# Patient Record
Sex: MALE | Race: BLACK OR AFRICAN AMERICAN | Employment: OTHER | URBAN - METROPOLITAN AREA
[De-identification: names, ages, dates, MRNs, and addresses within clinical notes are randomized per-mention and may not be internally consistent; named-entity substitution may affect disease eponyms.]

---

## 2018-02-08 PROBLEM — Z21 HIV (HUMAN IMMUNODEFICIENCY VIRUS INFECTION) (HCC): Chronic | Status: ACTIVE | Noted: 2018-02-08

## 2018-02-08 PROBLEM — R65.10 SIRS (SYSTEMIC INFLAMMATORY RESPONSE SYNDROME) (HCC): Status: ACTIVE | Noted: 2018-02-08

## 2018-02-08 PROBLEM — I50.33 ACUTE ON CHRONIC DIASTOLIC CHF (CONGESTIVE HEART FAILURE) (HCC): Status: ACTIVE | Noted: 2018-02-08

## 2018-02-08 PROBLEM — B20 HIV (HUMAN IMMUNODEFICIENCY VIRUS INFECTION) (HCC): Chronic | Status: ACTIVE | Noted: 2018-02-08

## 2018-02-08 PROBLEM — E87.20 LACTIC ACIDOSIS: Status: ACTIVE | Noted: 2018-02-08

## 2018-02-08 PROBLEM — F10.10 ALCOHOL ABUSE: Status: ACTIVE | Noted: 2018-02-08

## 2018-02-08 PROBLEM — I10 POORLY-CONTROLLED HYPERTENSION: Status: ACTIVE | Noted: 2018-02-08

## 2018-02-08 PROBLEM — Z72.0 TOBACCO ABUSE: Chronic | Status: ACTIVE | Noted: 2018-02-08

## 2018-04-15 ENCOUNTER — HOSPITAL ENCOUNTER (EMERGENCY)
Age: 58
Discharge: LEFT BEFORE DISCHARGE | End: 2018-04-15
Attending: EMERGENCY MEDICINE
Payer: COMMERCIAL

## 2018-04-15 ENCOUNTER — APPOINTMENT (OUTPATIENT)
Dept: GENERAL RADIOLOGY | Age: 58
End: 2018-04-15
Payer: COMMERCIAL

## 2018-04-15 VITALS
HEART RATE: 98 BPM | HEIGHT: 66 IN | BODY MASS INDEX: 28.28 KG/M2 | OXYGEN SATURATION: 90 % | SYSTOLIC BLOOD PRESSURE: 139 MMHG | RESPIRATION RATE: 16 BRPM | DIASTOLIC BLOOD PRESSURE: 89 MMHG | WEIGHT: 176 LBS | TEMPERATURE: 97.8 F

## 2018-04-15 DIAGNOSIS — Z53.20 PATIENT LEFT BEFORE TREATMENT COMPLETED: Primary | ICD-10-CM

## 2018-04-15 LAB
EKG ATRIAL RATE: 83 BPM
EKG P AXIS: 79 DEGREES
EKG P-R INTERVAL: 156 MS
EKG Q-T INTERVAL: 412 MS
EKG QRS DURATION: 92 MS
EKG QTC CALCULATION (BAZETT): 484 MS
EKG R AXIS: 14 DEGREES
EKG T AXIS: 52 DEGREES
EKG VENTRICULAR RATE: 83 BPM

## 2018-04-15 PROCEDURE — 94640 AIRWAY INHALATION TREATMENT: CPT

## 2018-04-15 PROCEDURE — 6370000000 HC RX 637 (ALT 250 FOR IP): Performed by: EMERGENCY MEDICINE

## 2018-04-15 PROCEDURE — 93005 ELECTROCARDIOGRAM TRACING: CPT | Performed by: EMERGENCY MEDICINE

## 2018-04-15 PROCEDURE — 71045 X-RAY EXAM CHEST 1 VIEW: CPT

## 2018-04-15 PROCEDURE — 99285 EMERGENCY DEPT VISIT HI MDM: CPT

## 2018-04-15 RX ORDER — IPRATROPIUM BROMIDE AND ALBUTEROL SULFATE 2.5; .5 MG/3ML; MG/3ML
3 SOLUTION RESPIRATORY (INHALATION) CONTINUOUS
Status: DISCONTINUED | OUTPATIENT
Start: 2018-04-15 | End: 2018-04-15

## 2018-04-15 RX ADMIN — IPRATROPIUM BROMIDE AND ALBUTEROL SULFATE 3 AMPULE: .5; 3 SOLUTION RESPIRATORY (INHALATION) at 19:12

## 2018-04-15 ASSESSMENT — ENCOUNTER SYMPTOMS
EYE PAIN: 0
NAUSEA: 0
SHORTNESS OF BREATH: 1
WHEEZING: 0
EYE REDNESS: 0
BACK PAIN: 0
SINUS PRESSURE: 0
ABDOMINAL PAIN: 0
SORE THROAT: 0
DIARRHEA: 0
COUGH: 0
EYE DISCHARGE: 0
VOMITING: 0

## 2018-04-19 ENCOUNTER — HOSPITAL ENCOUNTER (OUTPATIENT)
Dept: NON INVASIVE DIAGNOSTICS | Age: 58
Discharge: HOME | End: 2018-04-19
Payer: COMMERCIAL

## 2018-04-19 LAB
LV EF: 62 %
LVEF MODALITY: NORMAL

## 2018-04-19 PROCEDURE — 93306 TTE W/DOPPLER COMPLETE: CPT

## 2018-05-02 ENCOUNTER — HOSPITAL ENCOUNTER (EMERGENCY)
Age: 58
Discharge: HOME | End: 2018-05-02
Payer: COMMERCIAL

## 2018-05-02 ENCOUNTER — APPOINTMENT (OUTPATIENT)
Dept: GENERAL RADIOLOGY | Age: 58
End: 2018-05-02
Payer: COMMERCIAL

## 2018-05-02 VITALS
OXYGEN SATURATION: 94 % | HEART RATE: 80 BPM | WEIGHT: 188.2 LBS | HEIGHT: 66 IN | SYSTOLIC BLOOD PRESSURE: 152 MMHG | TEMPERATURE: 98 F | RESPIRATION RATE: 17 BRPM | DIASTOLIC BLOOD PRESSURE: 81 MMHG | BODY MASS INDEX: 30.25 KG/M2

## 2018-05-02 DIAGNOSIS — J44.1 COPD EXACERBATION (HCC): Primary | ICD-10-CM

## 2018-05-02 LAB
ANION GAP SERPL CALCULATED.3IONS-SCNC: 9 MMOL/L (ref 7–16)
BASOPHILS ABSOLUTE: 0 E9/L (ref 0–0.2)
BASOPHILS RELATIVE PERCENT: 0.3 % (ref 0–2)
BUN BLDV-MCNC: 28 MG/DL (ref 6–20)
CALCIUM SERPL-MCNC: 8.7 MG/DL (ref 8.6–10.2)
CHLORIDE BLD-SCNC: 102 MMOL/L (ref 98–107)
CO2: 32 MMOL/L (ref 22–29)
CREAT SERPL-MCNC: 0.6 MG/DL (ref 0.7–1.2)
D DIMER: <200 NG/ML DDU
EKG ATRIAL RATE: 84 BPM
EKG P AXIS: 59 DEGREES
EKG P-R INTERVAL: 154 MS
EKG Q-T INTERVAL: 392 MS
EKG QRS DURATION: 96 MS
EKG QTC CALCULATION (BAZETT): 463 MS
EKG R AXIS: 44 DEGREES
EKG T AXIS: 62 DEGREES
EKG VENTRICULAR RATE: 84 BPM
EOSINOPHILS ABSOLUTE: 0.07 E9/L (ref 0.05–0.5)
EOSINOPHILS RELATIVE PERCENT: 0.5 % (ref 0–6)
GFR AFRICAN AMERICAN: >60
GFR NON-AFRICAN AMERICAN: >60 ML/MIN/1.73
GLUCOSE BLD-MCNC: 122 MG/DL (ref 74–109)
HCT VFR BLD CALC: 39.6 % (ref 37–54)
HEMOGLOBIN: 12.6 G/DL (ref 12.5–16.5)
LYMPHOCYTES ABSOLUTE: 2.29 E9/L (ref 1.5–4)
LYMPHOCYTES RELATIVE PERCENT: 15.6 % (ref 20–42)
MCH RBC QN AUTO: 29.6 PG (ref 26–35)
MCHC RBC AUTO-ENTMCNC: 31.8 % (ref 32–34.5)
MCV RBC AUTO: 93 FL (ref 80–99.9)
METAMYELOCYTES RELATIVE PERCENT: 0.5 % (ref 0–1)
MONOCYTES ABSOLUTE: 1.72 E9/L (ref 0.1–0.95)
MONOCYTES RELATIVE PERCENT: 12.1 % (ref 2–12)
MYELOCYTE PERCENT: 0.5 % (ref 0–0)
NEUTROPHILS ABSOLUTE: 10.3 E9/L (ref 1.8–7.3)
NEUTROPHILS RELATIVE PERCENT: 70.9 % (ref 43–80)
PDW BLD-RTO: 14.6 FL (ref 11.5–15)
PLATELET # BLD: 152 E9/L (ref 130–450)
PMV BLD AUTO: 10.9 FL (ref 7–12)
POIKILOCYTES: ABNORMAL
POTASSIUM SERPL-SCNC: 3.9 MMOL/L (ref 3.5–5)
PRO-BNP: 33 PG/ML (ref 0–125)
RBC # BLD: 4.26 E12/L (ref 3.8–5.8)
SODIUM BLD-SCNC: 143 MMOL/L (ref 132–146)
SPHEROCYTES: ABNORMAL
TARGET CELLS: ABNORMAL
TROPONIN: <0.01 NG/ML (ref 0–0.03)
WBC # BLD: 14.3 E9/L (ref 4.5–11.5)

## 2018-05-02 PROCEDURE — 84484 ASSAY OF TROPONIN QUANT: CPT

## 2018-05-02 PROCEDURE — 93005 ELECTROCARDIOGRAM TRACING: CPT | Performed by: EMERGENCY MEDICINE

## 2018-05-02 PROCEDURE — 94761 N-INVAS EAR/PLS OXIMETRY MLT: CPT

## 2018-05-02 PROCEDURE — 99285 EMERGENCY DEPT VISIT HI MDM: CPT

## 2018-05-02 PROCEDURE — 85378 FIBRIN DEGRADE SEMIQUANT: CPT

## 2018-05-02 PROCEDURE — 6370000000 HC RX 637 (ALT 250 FOR IP): Performed by: EMERGENCY MEDICINE

## 2018-05-02 PROCEDURE — 80048 BASIC METABOLIC PNL TOTAL CA: CPT

## 2018-05-02 PROCEDURE — 85025 COMPLETE CBC W/AUTO DIFF WBC: CPT

## 2018-05-02 PROCEDURE — 83880 ASSAY OF NATRIURETIC PEPTIDE: CPT

## 2018-05-02 PROCEDURE — 71045 X-RAY EXAM CHEST 1 VIEW: CPT

## 2018-05-02 PROCEDURE — 94664 DEMO&/EVAL PT USE INHALER: CPT

## 2018-05-02 PROCEDURE — 36415 COLL VENOUS BLD VENIPUNCTURE: CPT

## 2018-05-02 RX ORDER — IPRATROPIUM BROMIDE AND ALBUTEROL SULFATE 2.5; .5 MG/3ML; MG/3ML
1 SOLUTION RESPIRATORY (INHALATION) ONCE
Status: COMPLETED | OUTPATIENT
Start: 2018-05-02 | End: 2018-05-02

## 2018-05-02 RX ORDER — DOXYCYCLINE HYCLATE 100 MG
100 TABLET ORAL 2 TIMES DAILY
Qty: 20 TABLET | Refills: 0 | Status: SHIPPED | OUTPATIENT
Start: 2018-05-02 | End: 2018-05-12

## 2018-05-02 RX ORDER — PREDNISONE 20 MG/1
TABLET ORAL
Qty: 10 TABLET | Refills: 0 | Status: ON HOLD | OUTPATIENT
Start: 2018-05-02 | End: 2018-05-22 | Stop reason: HOSPADM

## 2018-05-02 RX ADMIN — IPRATROPIUM BROMIDE AND ALBUTEROL SULFATE 1 AMPULE: .5; 3 SOLUTION RESPIRATORY (INHALATION) at 20:42

## 2018-05-02 ASSESSMENT — ENCOUNTER SYMPTOMS
SHORTNESS OF BREATH: 1
COUGH: 1
WHEEZING: 1
VOMITING: 0
NAUSEA: 0
ABDOMINAL PAIN: 0
BACK PAIN: 0

## 2018-05-15 ENCOUNTER — HOSPITAL ENCOUNTER (INPATIENT)
Age: 58
LOS: 7 days | Discharge: HOME HEALTH | DRG: 139 | End: 2018-05-22
Attending: EMERGENCY MEDICINE | Admitting: HOSPITALIST
Payer: COMMERCIAL

## 2018-05-15 ENCOUNTER — APPOINTMENT (OUTPATIENT)
Dept: GENERAL RADIOLOGY | Age: 58
DRG: 139 | End: 2018-05-15
Payer: COMMERCIAL

## 2018-05-15 DIAGNOSIS — J96.02 ACUTE RESPIRATORY FAILURE WITH HYPERCAPNIA (HCC): Primary | ICD-10-CM

## 2018-05-15 DIAGNOSIS — J45.901 SEVERE ASTHMA WITH ACUTE EXACERBATION, UNSPECIFIED WHETHER PERSISTENT: ICD-10-CM

## 2018-05-15 PROBLEM — J44.1 ASTHMA EXACERBATION WITH COPD (CHRONIC OBSTRUCTIVE PULMONARY DISEASE) (HCC): Status: ACTIVE | Noted: 2018-05-15

## 2018-05-15 LAB
AADO2: 213.1 MMHG
ALBUMIN SERPL-MCNC: 3.8 G/DL (ref 3.5–5.2)
ALP BLD-CCNC: 76 U/L (ref 40–129)
ALT SERPL-CCNC: 44 U/L (ref 0–40)
ANION GAP SERPL CALCULATED.3IONS-SCNC: 12 MMOL/L (ref 7–16)
ANISOCYTOSIS: ABNORMAL
AST SERPL-CCNC: 18 U/L (ref 0–39)
B.E.: 10.8 MMOL/L (ref -3–3)
B.E.: 5.2 MMOL/L (ref -3–3)
BASOPHILS ABSOLUTE: 0 E9/L (ref 0–0.2)
BASOPHILS RELATIVE PERCENT: 0.5 % (ref 0–2)
BILIRUB SERPL-MCNC: 1.7 MG/DL (ref 0–1.2)
BUN BLDV-MCNC: 26 MG/DL (ref 8–23)
CALCIUM SERPL-MCNC: 9.3 MG/DL (ref 8.6–10.2)
CARDIOPULMONARY BYPASS: NO
CHLORIDE BLD-SCNC: 97 MMOL/L (ref 98–107)
CO2: 35 MMOL/L (ref 22–29)
COHB: 1 % (ref 0–1.5)
CREAT SERPL-MCNC: 0.6 MG/DL (ref 0.7–1.2)
CRITICAL: ABNORMAL
DATE ANALYZED: ABNORMAL
DATE OF COLLECTION: ABNORMAL
DEVICE: ABNORMAL
EKG ATRIAL RATE: 95 BPM
EKG P AXIS: 74 DEGREES
EKG P-R INTERVAL: 118 MS
EKG Q-T INTERVAL: 382 MS
EKG QRS DURATION: 108 MS
EKG QTC CALCULATION (BAZETT): 480 MS
EKG R AXIS: 71 DEGREES
EKG T AXIS: 70 DEGREES
EKG VENTRICULAR RATE: 95 BPM
EOSINOPHILS ABSOLUTE: 0 E9/L (ref 0.05–0.5)
EOSINOPHILS RELATIVE PERCENT: 0 % (ref 0–6)
FIO2 ARTERIAL: 4
FIO2: 50 %
GFR AFRICAN AMERICAN: >60
GFR NON-AFRICAN AMERICAN: >60 ML/MIN/1.73
GLUCOSE BLD-MCNC: 215 MG/DL (ref 74–109)
HCO3 ARTERIAL: 40.3 MMOL/L (ref 22–26)
HCO3: 33 MMOL/L (ref 22–26)
HCT VFR BLD CALC: 42.7 % (ref 37–54)
HEMOGLOBIN: 13.6 G/DL (ref 12.5–16.5)
HHB: 10.5 % (ref 0–5)
LAB: ABNORMAL
LYMPHOCYTES ABSOLUTE: 0.93 E9/L (ref 1.5–4)
LYMPHOCYTES RELATIVE PERCENT: 6.8 % (ref 20–42)
Lab: 2300
MCH RBC QN AUTO: 29.4 PG (ref 26–35)
MCHC RBC AUTO-ENTMCNC: 31.9 % (ref 32–34.5)
MCV RBC AUTO: 92.4 FL (ref 80–99.9)
METHB: 0.3 % (ref 0–1.5)
MODE: ABNORMAL
MONOCYTES ABSOLUTE: 1.06 E9/L (ref 0.1–0.95)
MONOCYTES RELATIVE PERCENT: 7.8 % (ref 2–12)
NEUTROPHILS ABSOLUTE: 11.31 E9/L (ref 1.8–7.3)
NEUTROPHILS RELATIVE PERCENT: 85.4 % (ref 43–80)
O2 CONTENT: 17.4 ML/DL
O2 SATURATION: 89.4 % (ref 92–98.5)
O2 SATURATION: 99.2 % (ref 92–98.5)
O2HB: 88.2 % (ref 94–97)
OPERATOR ID: 377
OPERATOR ID: 377
PATIENT TEMP: 37 C
PCO2 ARTERIAL: 74.1 MMHG (ref 35–45)
PCO2: 62.8 MMHG (ref 35–45)
PDW BLD-RTO: 15 FL (ref 11.5–15)
PEEP/CPAP: 8 CMH?O
PFO2: 1.21 MMHG/%
PH BLOOD GAS: 7.34 (ref 7.35–7.45)
PH BLOOD GAS: 7.34 (ref 7.35–7.45)
PLATELET # BLD: 167 E9/L (ref 130–450)
PMV BLD AUTO: 11.1 FL (ref 7–12)
PO2 ARTERIAL: 156 MMHG (ref 60–80)
PO2: 60.3 MMHG (ref 60–100)
POTASSIUM SERPL-SCNC: 4.2 MMOL/L (ref 3.5–5)
PRO-BNP: 26 PG/ML (ref 0–125)
PS: 14 CMH20
RBC # BLD: 4.62 E12/L (ref 3.8–5.8)
RI(T): 3.53
SODIUM BLD-SCNC: 144 MMOL/L (ref 132–146)
SOURCE, BLOOD GAS: ABNORMAL
SOURCE, BLOOD GAS: ABNORMAL
THB: 14 G/DL (ref 11.5–16.5)
TIME ANALYZED: 2300
TOTAL PROTEIN: 6.5 G/DL (ref 6.4–8.3)
TROPONIN: <0.01 NG/ML (ref 0–0.03)
WBC # BLD: 13.3 E9/L (ref 4.5–11.5)

## 2018-05-15 PROCEDURE — 6360000002 HC RX W HCPCS: Performed by: EMERGENCY MEDICINE

## 2018-05-15 PROCEDURE — 2500000003 HC RX 250 WO HCPCS: Performed by: EMERGENCY MEDICINE

## 2018-05-15 PROCEDURE — 36415 COLL VENOUS BLD VENIPUNCTURE: CPT

## 2018-05-15 PROCEDURE — 2060000000 HC ICU INTERMEDIATE R&B

## 2018-05-15 PROCEDURE — 80053 COMPREHEN METABOLIC PANEL: CPT

## 2018-05-15 PROCEDURE — 82805 BLOOD GASES W/O2 SATURATION: CPT

## 2018-05-15 PROCEDURE — 99285 EMERGENCY DEPT VISIT HI MDM: CPT

## 2018-05-15 PROCEDURE — 6370000000 HC RX 637 (ALT 250 FOR IP): Performed by: EMERGENCY MEDICINE

## 2018-05-15 PROCEDURE — 83880 ASSAY OF NATRIURETIC PEPTIDE: CPT

## 2018-05-15 PROCEDURE — 2580000003 HC RX 258: Performed by: EMERGENCY MEDICINE

## 2018-05-15 PROCEDURE — 36600 WITHDRAWAL OF ARTERIAL BLOOD: CPT

## 2018-05-15 PROCEDURE — 85025 COMPLETE CBC W/AUTO DIFF WBC: CPT

## 2018-05-15 PROCEDURE — 94660 CPAP INITIATION&MGMT: CPT

## 2018-05-15 PROCEDURE — 96374 THER/PROPH/DIAG INJ IV PUSH: CPT

## 2018-05-15 PROCEDURE — 94640 AIRWAY INHALATION TREATMENT: CPT

## 2018-05-15 PROCEDURE — 82803 BLOOD GASES ANY COMBINATION: CPT

## 2018-05-15 PROCEDURE — 71045 X-RAY EXAM CHEST 1 VIEW: CPT

## 2018-05-15 PROCEDURE — 84484 ASSAY OF TROPONIN QUANT: CPT

## 2018-05-15 RX ORDER — IPRATROPIUM BROMIDE AND ALBUTEROL SULFATE 2.5; .5 MG/3ML; MG/3ML
3 SOLUTION RESPIRATORY (INHALATION) ONCE
Status: COMPLETED | OUTPATIENT
Start: 2018-05-15 | End: 2018-05-15

## 2018-05-15 RX ORDER — METHYLPREDNISOLONE SODIUM SUCCINATE 125 MG/2ML
125 INJECTION, POWDER, LYOPHILIZED, FOR SOLUTION INTRAMUSCULAR; INTRAVENOUS ONCE
Status: COMPLETED | OUTPATIENT
Start: 2018-05-15 | End: 2018-05-15

## 2018-05-15 RX ORDER — IPRATROPIUM BROMIDE AND ALBUTEROL SULFATE 2.5; .5 MG/3ML; MG/3ML
1 SOLUTION RESPIRATORY (INHALATION)
Status: DISCONTINUED | OUTPATIENT
Start: 2018-05-16 | End: 2018-05-15

## 2018-05-15 RX ORDER — ALBUTEROL SULFATE 2.5 MG/3ML
2.5 SOLUTION RESPIRATORY (INHALATION) EVERY 4 HOURS PRN
Status: DISCONTINUED | OUTPATIENT
Start: 2018-05-15 | End: 2018-05-17

## 2018-05-15 RX ADMIN — WATER 2 G: 1 INJECTION INTRAMUSCULAR; INTRAVENOUS; SUBCUTANEOUS at 23:17

## 2018-05-15 RX ADMIN — DOXYCYCLINE 100 MG: 100 INJECTION, POWDER, LYOPHILIZED, FOR SOLUTION INTRAVENOUS at 23:17

## 2018-05-15 RX ADMIN — METHYLPREDNISOLONE SODIUM SUCCINATE 125 MG: 125 INJECTION, POWDER, FOR SOLUTION INTRAMUSCULAR; INTRAVENOUS at 21:40

## 2018-05-15 RX ADMIN — IPRATROPIUM BROMIDE AND ALBUTEROL SULFATE 3 AMPULE: .5; 3 SOLUTION RESPIRATORY (INHALATION) at 21:48

## 2018-05-15 ASSESSMENT — ENCOUNTER SYMPTOMS
SHORTNESS OF BREATH: 1
ABDOMINAL PAIN: 0
DIARRHEA: 0
VOMITING: 0
WHEEZING: 1
COUGH: 1
SORE THROAT: 0

## 2018-05-16 ENCOUNTER — APPOINTMENT (OUTPATIENT)
Dept: CT IMAGING | Age: 58
DRG: 139 | End: 2018-05-16
Payer: COMMERCIAL

## 2018-05-16 LAB
ACETAMINOPHEN LEVEL: <5 MCG/ML (ref 10–30)
AMMONIA: 30 UMOL/L (ref 16–60)
ANION GAP SERPL CALCULATED.3IONS-SCNC: 16 MMOL/L (ref 7–16)
ANISOCYTOSIS: ABNORMAL
BASOPHILIC STIPPLING: ABNORMAL
BASOPHILS ABSOLUTE: 0 E9/L (ref 0–0.2)
BASOPHILS RELATIVE PERCENT: 0.3 % (ref 0–2)
BUN BLDV-MCNC: 29 MG/DL (ref 8–23)
CALCIUM IONIZED: 1.27 MMOL/L (ref 1.15–1.33)
CALCIUM SERPL-MCNC: 9 MG/DL (ref 8.6–10.2)
CHLORIDE BLD-SCNC: 96 MMOL/L (ref 98–107)
CO2: 32 MMOL/L (ref 22–29)
CREAT SERPL-MCNC: 0.6 MG/DL (ref 0.7–1.2)
EOSINOPHILS ABSOLUTE: 0 E9/L (ref 0.05–0.5)
EOSINOPHILS RELATIVE PERCENT: 0 % (ref 0–6)
ETHANOL: <10 MG/DL (ref 0–0.08)
FILM ARRAY ADENOVIRUS: ABNORMAL
FILM ARRAY BORDETELLA PERTUSSIS: ABNORMAL
FILM ARRAY CHLAMYDOPHILIA PNEUMONIAE: ABNORMAL
FILM ARRAY CORONAVIRUS 229E: ABNORMAL
FILM ARRAY CORONAVIRUS HKU1: ABNORMAL
FILM ARRAY CORONAVIRUS NL63: ABNORMAL
FILM ARRAY CORONAVIRUS OC43: ABNORMAL
FILM ARRAY INFLUENZA A VIRUS 09H1: ABNORMAL
FILM ARRAY INFLUENZA A VIRUS H1: ABNORMAL
FILM ARRAY INFLUENZA A VIRUS H3: ABNORMAL
FILM ARRAY INFLUENZA A VIRUS: ABNORMAL
FILM ARRAY INFLUENZA B: ABNORMAL
FILM ARRAY METAPNEUMOVIRUS: ABNORMAL
FILM ARRAY MYCOPLASMA PNEUMONIAE: ABNORMAL
FILM ARRAY PARAINFLUENZA VIRUS 1: ABNORMAL
FILM ARRAY PARAINFLUENZA VIRUS 2: ABNORMAL
FILM ARRAY PARAINFLUENZA VIRUS 4: ABNORMAL
FILM ARRAY RESPIRATORY SYNCITIAL VIRUS: ABNORMAL
FILM ARRAY RHINOVIRUS/ENTEROVIRUS: ABNORMAL
GFR AFRICAN AMERICAN: >60
GFR NON-AFRICAN AMERICAN: >60 ML/MIN/1.73
GLUCOSE BLD-MCNC: 185 MG/DL (ref 74–109)
HCT VFR BLD CALC: 41 % (ref 37–54)
HEMOGLOBIN: 12.7 G/DL (ref 12.5–16.5)
INFLUENZA A BY PCR: NOT DETECTED
INFLUENZA B BY PCR: NOT DETECTED
LACTIC ACID: 3.9 MMOL/L (ref 0.5–2.2)
LIPASE: 27 U/L (ref 13–60)
LYMPHOCYTES ABSOLUTE: 0.44 E9/L (ref 1.5–4)
LYMPHOCYTES RELATIVE PERCENT: 4 % (ref 20–42)
MAGNESIUM: 2.3 MG/DL (ref 1.6–2.6)
MAGNESIUM: 2.3 MG/DL (ref 1.6–2.6)
MCH RBC QN AUTO: 29.1 PG (ref 26–35)
MCHC RBC AUTO-ENTMCNC: 31 % (ref 32–34.5)
MCV RBC AUTO: 93.8 FL (ref 80–99.9)
MONOCYTES ABSOLUTE: 0.44 E9/L (ref 0.1–0.95)
MONOCYTES RELATIVE PERCENT: 4 % (ref 2–12)
NEUTROPHILS ABSOLUTE: 10.12 E9/L (ref 1.8–7.3)
NEUTROPHILS RELATIVE PERCENT: 92 % (ref 43–80)
ORGANISM: ABNORMAL
OSMOLALITY: 308 MOSM/KG (ref 285–310)
OVALOCYTES: ABNORMAL
PDW BLD-RTO: 15.2 FL (ref 11.5–15)
PHOSPHORUS: 4.2 MG/DL (ref 2.5–4.5)
PLATELET # BLD: 145 E9/L (ref 130–450)
PMV BLD AUTO: 11.3 FL (ref 7–12)
POIKILOCYTES: ABNORMAL
POLYCHROMASIA: ABNORMAL
POTASSIUM REFLEX MAGNESIUM: 4.5 MMOL/L (ref 3.5–5)
PROCALCITONIN: 0.04 NG/ML (ref 0–0.08)
RBC # BLD: 4.37 E12/L (ref 3.8–5.8)
SALICYLATE, SERUM: <0.3 MG/DL (ref 0–30)
SODIUM BLD-SCNC: 144 MMOL/L (ref 132–146)
T4 FREE: 0.81 NG/DL (ref 0.93–1.7)
TARGET CELLS: ABNORMAL
TROPONIN: <0.01 NG/ML (ref 0–0.03)
TSH SERPL DL<=0.05 MIU/L-ACNC: 0.11 UIU/ML (ref 0.27–4.2)
VITAMIN B-12: 426 PG/ML (ref 211–946)
WBC # BLD: 11 E9/L (ref 4.5–11.5)

## 2018-05-16 PROCEDURE — 6360000002 HC RX W HCPCS: Performed by: PHYSICIAN ASSISTANT

## 2018-05-16 PROCEDURE — 6370000000 HC RX 637 (ALT 250 FOR IP): Performed by: PHYSICIAN ASSISTANT

## 2018-05-16 PROCEDURE — 94660 CPAP INITIATION&MGMT: CPT

## 2018-05-16 PROCEDURE — 87502 INFLUENZA DNA AMP PROBE: CPT

## 2018-05-16 PROCEDURE — 87501 INFLUENZA DNA AMP PROB 1+: CPT

## 2018-05-16 PROCEDURE — 6360000002 HC RX W HCPCS: Performed by: EMERGENCY MEDICINE

## 2018-05-16 PROCEDURE — 6370000000 HC RX 637 (ALT 250 FOR IP): Performed by: EMERGENCY MEDICINE

## 2018-05-16 PROCEDURE — 80048 BASIC METABOLIC PNL TOTAL CA: CPT

## 2018-05-16 PROCEDURE — 87486 CHLMYD PNEUM DNA AMP PROBE: CPT

## 2018-05-16 PROCEDURE — 82330 ASSAY OF CALCIUM: CPT

## 2018-05-16 PROCEDURE — 6360000002 HC RX W HCPCS: Performed by: HOSPITALIST

## 2018-05-16 PROCEDURE — 84484 ASSAY OF TROPONIN QUANT: CPT

## 2018-05-16 PROCEDURE — 2060000000 HC ICU INTERMEDIATE R&B

## 2018-05-16 PROCEDURE — 83930 ASSAY OF BLOOD OSMOLALITY: CPT

## 2018-05-16 PROCEDURE — 86359 T CELLS TOTAL COUNT: CPT

## 2018-05-16 PROCEDURE — 36415 COLL VENOUS BLD VENIPUNCTURE: CPT

## 2018-05-16 PROCEDURE — 84100 ASSAY OF PHOSPHORUS: CPT

## 2018-05-16 PROCEDURE — 83605 ASSAY OF LACTIC ACID: CPT

## 2018-05-16 PROCEDURE — 94640 AIRWAY INHALATION TREATMENT: CPT

## 2018-05-16 PROCEDURE — 84145 PROCALCITONIN (PCT): CPT

## 2018-05-16 PROCEDURE — 83735 ASSAY OF MAGNESIUM: CPT

## 2018-05-16 PROCEDURE — 86592 SYPHILIS TEST NON-TREP QUAL: CPT

## 2018-05-16 PROCEDURE — 84443 ASSAY THYROID STIM HORMONE: CPT

## 2018-05-16 PROCEDURE — 71250 CT THORAX DX C-: CPT

## 2018-05-16 PROCEDURE — 86360 T CELL ABSOLUTE COUNT/RATIO: CPT

## 2018-05-16 PROCEDURE — 85025 COMPLETE CBC W/AUTO DIFF WBC: CPT

## 2018-05-16 PROCEDURE — 2580000003 HC RX 258: Performed by: PHYSICIAN ASSISTANT

## 2018-05-16 PROCEDURE — G0480 DRUG TEST DEF 1-7 CLASSES: HCPCS

## 2018-05-16 PROCEDURE — 2500000003 HC RX 250 WO HCPCS: Performed by: PHYSICIAN ASSISTANT

## 2018-05-16 PROCEDURE — 82140 ASSAY OF AMMONIA: CPT

## 2018-05-16 PROCEDURE — 82607 VITAMIN B-12: CPT

## 2018-05-16 PROCEDURE — 80307 DRUG TEST PRSMV CHEM ANLYZR: CPT

## 2018-05-16 PROCEDURE — 87581 M.PNEUMON DNA AMP PROBE: CPT

## 2018-05-16 PROCEDURE — 83690 ASSAY OF LIPASE: CPT

## 2018-05-16 PROCEDURE — 87503 INFLUENZA DNA AMP PROB ADDL: CPT

## 2018-05-16 PROCEDURE — 6370000000 HC RX 637 (ALT 250 FOR IP): Performed by: HOSPITALIST

## 2018-05-16 PROCEDURE — 87798 DETECT AGENT NOS DNA AMP: CPT

## 2018-05-16 PROCEDURE — 87536 HIV-1 QUANT&REVRSE TRNSCRPJ: CPT

## 2018-05-16 PROCEDURE — 84439 ASSAY OF FREE THYROXINE: CPT

## 2018-05-16 RX ORDER — M-VIT,TX,IRON,MINS/CALC/FOLIC 27MG-0.4MG
1 TABLET ORAL DAILY
Status: DISCONTINUED | OUTPATIENT
Start: 2018-05-16 | End: 2018-05-22 | Stop reason: HOSPADM

## 2018-05-16 RX ORDER — ONDANSETRON 2 MG/ML
4 INJECTION INTRAMUSCULAR; INTRAVENOUS EVERY 6 HOURS PRN
Status: DISCONTINUED | OUTPATIENT
Start: 2018-05-16 | End: 2018-05-16

## 2018-05-16 RX ORDER — LISINOPRIL 20 MG/1
20 TABLET ORAL DAILY
Status: DISCONTINUED | OUTPATIENT
Start: 2018-05-16 | End: 2018-05-22 | Stop reason: HOSPADM

## 2018-05-16 RX ORDER — METHYLPREDNISOLONE SODIUM SUCCINATE 40 MG/ML
40 INJECTION, POWDER, LYOPHILIZED, FOR SOLUTION INTRAMUSCULAR; INTRAVENOUS EVERY 8 HOURS
Status: DISCONTINUED | OUTPATIENT
Start: 2018-05-16 | End: 2018-05-19

## 2018-05-16 RX ORDER — ALBUTEROL SULFATE 2.5 MG/3ML
2.5 SOLUTION RESPIRATORY (INHALATION)
Status: DISCONTINUED | OUTPATIENT
Start: 2018-05-16 | End: 2018-05-22 | Stop reason: HOSPADM

## 2018-05-16 RX ORDER — POTASSIUM CHLORIDE 20MEQ/15ML
40 LIQUID (ML) ORAL PRN
Status: DISCONTINUED | OUTPATIENT
Start: 2018-05-16 | End: 2018-05-22 | Stop reason: HOSPADM

## 2018-05-16 RX ORDER — FLUTICASONE FUROATE AND VILANTEROL 200; 25 UG/1; UG/1
1 POWDER RESPIRATORY (INHALATION) 2 TIMES DAILY
Status: DISCONTINUED | OUTPATIENT
Start: 2018-05-16 | End: 2018-05-16 | Stop reason: ALTCHOICE

## 2018-05-16 RX ORDER — MAGNESIUM SULFATE IN WATER 40 MG/ML
2 INJECTION, SOLUTION INTRAVENOUS ONCE
Status: COMPLETED | OUTPATIENT
Start: 2018-05-16 | End: 2018-05-16

## 2018-05-16 RX ORDER — ACETAMINOPHEN 325 MG/1
650 TABLET ORAL EVERY 4 HOURS PRN
Status: DISCONTINUED | OUTPATIENT
Start: 2018-05-16 | End: 2018-05-22 | Stop reason: HOSPADM

## 2018-05-16 RX ORDER — PHENYTOIN SODIUM 100 MG/1
100 CAPSULE, EXTENDED RELEASE ORAL DAILY
Status: DISCONTINUED | OUTPATIENT
Start: 2018-05-16 | End: 2018-05-22 | Stop reason: HOSPADM

## 2018-05-16 RX ORDER — SODIUM CHLORIDE 0.9 % (FLUSH) 0.9 %
10 SYRINGE (ML) INJECTION PRN
Status: DISCONTINUED | OUTPATIENT
Start: 2018-05-16 | End: 2018-05-22 | Stop reason: HOSPADM

## 2018-05-16 RX ORDER — LORAZEPAM 2 MG/ML
1 INJECTION INTRAMUSCULAR
Status: DISCONTINUED | OUTPATIENT
Start: 2018-05-16 | End: 2018-05-17

## 2018-05-16 RX ORDER — HYDROXYZINE PAMOATE 25 MG/1
25 CAPSULE ORAL 2 TIMES DAILY
Status: DISCONTINUED | OUTPATIENT
Start: 2018-05-16 | End: 2018-05-22 | Stop reason: HOSPADM

## 2018-05-16 RX ORDER — LORAZEPAM 2 MG/ML
4 INJECTION INTRAMUSCULAR
Status: DISCONTINUED | OUTPATIENT
Start: 2018-05-16 | End: 2018-05-17

## 2018-05-16 RX ORDER — POTASSIUM CHLORIDE 7.45 MG/ML
10 INJECTION INTRAVENOUS PRN
Status: DISCONTINUED | OUTPATIENT
Start: 2018-05-16 | End: 2018-05-22 | Stop reason: HOSPADM

## 2018-05-16 RX ORDER — LORAZEPAM 1 MG/1
4 TABLET ORAL
Status: DISCONTINUED | OUTPATIENT
Start: 2018-05-16 | End: 2018-05-17

## 2018-05-16 RX ORDER — LORAZEPAM 1 MG/1
2 TABLET ORAL
Status: DISCONTINUED | OUTPATIENT
Start: 2018-05-16 | End: 2018-05-17

## 2018-05-16 RX ORDER — METHYLPREDNISOLONE SODIUM SUCCINATE 40 MG/ML
40 INJECTION, POWDER, LYOPHILIZED, FOR SOLUTION INTRAMUSCULAR; INTRAVENOUS DAILY
Status: DISCONTINUED | OUTPATIENT
Start: 2018-05-16 | End: 2018-05-16

## 2018-05-16 RX ORDER — CARVEDILOL 25 MG/1
25 TABLET ORAL 2 TIMES DAILY
Status: DISCONTINUED | OUTPATIENT
Start: 2018-05-16 | End: 2018-05-22 | Stop reason: HOSPADM

## 2018-05-16 RX ORDER — SODIUM CHLORIDE 0.9 % (FLUSH) 0.9 %
10 SYRINGE (ML) INJECTION EVERY 12 HOURS SCHEDULED
Status: DISCONTINUED | OUTPATIENT
Start: 2018-05-16 | End: 2018-05-22 | Stop reason: HOSPADM

## 2018-05-16 RX ORDER — FAMOTIDINE 20 MG/1
20 TABLET, FILM COATED ORAL 2 TIMES DAILY
Status: DISCONTINUED | OUTPATIENT
Start: 2018-05-16 | End: 2018-05-22 | Stop reason: HOSPADM

## 2018-05-16 RX ORDER — THIAMINE MONONITRATE (VIT B1) 100 MG
100 TABLET ORAL DAILY
Status: DISCONTINUED | OUTPATIENT
Start: 2018-05-16 | End: 2018-05-22 | Stop reason: HOSPADM

## 2018-05-16 RX ORDER — LORAZEPAM 1 MG/1
3 TABLET ORAL
Status: DISCONTINUED | OUTPATIENT
Start: 2018-05-16 | End: 2018-05-17

## 2018-05-16 RX ORDER — FOLIC ACID 1 MG/1
1 TABLET ORAL DAILY
Status: DISCONTINUED | OUTPATIENT
Start: 2018-05-16 | End: 2018-05-22 | Stop reason: HOSPADM

## 2018-05-16 RX ORDER — IPRATROPIUM BROMIDE AND ALBUTEROL SULFATE 2.5; .5 MG/3ML; MG/3ML
1 SOLUTION RESPIRATORY (INHALATION) EVERY 4 HOURS
Status: DISCONTINUED | OUTPATIENT
Start: 2018-05-16 | End: 2018-05-22 | Stop reason: HOSPADM

## 2018-05-16 RX ORDER — LORAZEPAM 2 MG/ML
3 INJECTION INTRAMUSCULAR
Status: DISCONTINUED | OUTPATIENT
Start: 2018-05-16 | End: 2018-05-17

## 2018-05-16 RX ORDER — LORAZEPAM 1 MG/1
1 TABLET ORAL
Status: DISCONTINUED | OUTPATIENT
Start: 2018-05-16 | End: 2018-05-17

## 2018-05-16 RX ORDER — ATAZANAVIR 300 MG/1
300 CAPSULE ORAL NIGHTLY
Status: DISCONTINUED | OUTPATIENT
Start: 2018-05-16 | End: 2018-05-22 | Stop reason: HOSPADM

## 2018-05-16 RX ORDER — POTASSIUM CHLORIDE 20 MEQ/1
40 TABLET, EXTENDED RELEASE ORAL PRN
Status: DISCONTINUED | OUTPATIENT
Start: 2018-05-16 | End: 2018-05-22 | Stop reason: HOSPADM

## 2018-05-16 RX ORDER — LORAZEPAM 2 MG/ML
2 INJECTION INTRAMUSCULAR
Status: DISCONTINUED | OUTPATIENT
Start: 2018-05-16 | End: 2018-05-17

## 2018-05-16 RX ORDER — CLONIDINE HYDROCHLORIDE 0.2 MG/1
0.2 TABLET ORAL 2 TIMES DAILY
Status: DISCONTINUED | OUTPATIENT
Start: 2018-05-16 | End: 2018-05-22 | Stop reason: HOSPADM

## 2018-05-16 RX ORDER — DULOXETIN HYDROCHLORIDE 60 MG/1
60 CAPSULE, DELAYED RELEASE ORAL 2 TIMES DAILY
Status: DISCONTINUED | OUTPATIENT
Start: 2018-05-16 | End: 2018-05-22 | Stop reason: HOSPADM

## 2018-05-16 RX ADMIN — PHENYTOIN SODIUM 100 MG: 100 CAPSULE, EXTENDED RELEASE ORAL at 08:55

## 2018-05-16 RX ADMIN — Medication 10 ML: at 20:03

## 2018-05-16 RX ADMIN — IPRATROPIUM BROMIDE AND ALBUTEROL SULFATE 1 AMPULE: .5; 3 SOLUTION RESPIRATORY (INHALATION) at 01:53

## 2018-05-16 RX ADMIN — FAMOTIDINE 20 MG: 20 TABLET ORAL at 08:55

## 2018-05-16 RX ADMIN — IPRATROPIUM BROMIDE AND ALBUTEROL SULFATE 1 AMPULE: .5; 3 SOLUTION RESPIRATORY (INHALATION) at 18:29

## 2018-05-16 RX ADMIN — WATER 2 G: 1 INJECTION INTRAMUSCULAR; INTRAVENOUS; SUBCUTANEOUS at 10:49

## 2018-05-16 RX ADMIN — METHYLPREDNISOLONE SODIUM SUCCINATE 40 MG: 40 INJECTION, POWDER, FOR SOLUTION INTRAMUSCULAR; INTRAVENOUS at 16:55

## 2018-05-16 RX ADMIN — METHYLPREDNISOLONE SODIUM SUCCINATE 40 MG: 40 INJECTION, POWDER, FOR SOLUTION INTRAMUSCULAR; INTRAVENOUS at 08:54

## 2018-05-16 RX ADMIN — Medication 100 MG: at 08:55

## 2018-05-16 RX ADMIN — FOLIC ACID 1 MG: 1 TABLET ORAL at 08:55

## 2018-05-16 RX ADMIN — IPRATROPIUM BROMIDE AND ALBUTEROL SULFATE 1 AMPULE: .5; 3 SOLUTION RESPIRATORY (INHALATION) at 09:35

## 2018-05-16 RX ADMIN — DOXYCYCLINE 100 MG: 100 INJECTION, POWDER, LYOPHILIZED, FOR SOLUTION INTRAVENOUS at 10:52

## 2018-05-16 RX ADMIN — HYDROXYZINE PAMOATE 25 MG: 25 CAPSULE ORAL at 19:57

## 2018-05-16 RX ADMIN — MAGNESIUM HYDROXIDE 30 ML: 400 SUSPENSION ORAL at 03:16

## 2018-05-16 RX ADMIN — ACETAMINOPHEN 650 MG: 325 TABLET ORAL at 14:08

## 2018-05-16 RX ADMIN — ATAZANAVIR 300 MG: 300 CAPSULE ORAL at 19:54

## 2018-05-16 RX ADMIN — ENOXAPARIN SODIUM 40 MG: 40 INJECTION SUBCUTANEOUS at 08:54

## 2018-05-16 RX ADMIN — CLONIDINE HYDROCHLORIDE 0.2 MG: 0.2 TABLET ORAL at 19:54

## 2018-05-16 RX ADMIN — MAGNESIUM SULFATE HEPTAHYDRATE 2 G: 40 INJECTION, SOLUTION INTRAVENOUS at 03:36

## 2018-05-16 RX ADMIN — CARVEDILOL 25 MG: 25 TABLET, FILM COATED ORAL at 08:55

## 2018-05-16 RX ADMIN — ALBUTEROL SULFATE 2.5 MG: 2.5 SOLUTION RESPIRATORY (INHALATION) at 15:53

## 2018-05-16 RX ADMIN — WATER 2 G: 1 INJECTION INTRAMUSCULAR; INTRAVENOUS; SUBCUTANEOUS at 23:23

## 2018-05-16 RX ADMIN — CARVEDILOL 25 MG: 25 TABLET, FILM COATED ORAL at 03:16

## 2018-05-16 RX ADMIN — MULTIPLE VITAMINS W/ MINERALS TAB 1 TABLET: TAB at 08:55

## 2018-05-16 RX ADMIN — HYDROXYZINE PAMOATE 25 MG: 25 CAPSULE ORAL at 03:16

## 2018-05-16 RX ADMIN — CARVEDILOL 25 MG: 25 TABLET, FILM COATED ORAL at 19:56

## 2018-05-16 RX ADMIN — CLONIDINE HYDROCHLORIDE 0.2 MG: 0.2 TABLET ORAL at 03:16

## 2018-05-16 RX ADMIN — FAMOTIDINE 20 MG: 20 TABLET ORAL at 19:57

## 2018-05-16 RX ADMIN — IPRATROPIUM BROMIDE AND ALBUTEROL SULFATE 1 AMPULE: .5; 3 SOLUTION RESPIRATORY (INHALATION) at 06:01

## 2018-05-16 RX ADMIN — DULOXETINE HYDROCHLORIDE 60 MG: 60 CAPSULE, DELAYED RELEASE ORAL at 08:55

## 2018-05-16 RX ADMIN — HYDROXYZINE PAMOATE 25 MG: 25 CAPSULE ORAL at 08:55

## 2018-05-16 RX ADMIN — Medication 10 ML: at 08:54

## 2018-05-16 RX ADMIN — DULOXETINE HYDROCHLORIDE 60 MG: 60 CAPSULE, DELAYED RELEASE ORAL at 19:56

## 2018-05-16 RX ADMIN — CLONIDINE HYDROCHLORIDE 0.2 MG: 0.2 TABLET ORAL at 08:55

## 2018-05-16 RX ADMIN — IPRATROPIUM BROMIDE AND ALBUTEROL SULFATE 1 AMPULE: .5; 3 SOLUTION RESPIRATORY (INHALATION) at 22:24

## 2018-05-16 RX ADMIN — IPRATROPIUM BROMIDE AND ALBUTEROL SULFATE 1 AMPULE: .5; 3 SOLUTION RESPIRATORY (INHALATION) at 13:31

## 2018-05-16 RX ADMIN — METHYLPREDNISOLONE SODIUM SUCCINATE 40 MG: 40 INJECTION, POWDER, FOR SOLUTION INTRAMUSCULAR; INTRAVENOUS at 23:23

## 2018-05-16 RX ADMIN — LISINOPRIL 20 MG: 20 TABLET ORAL at 08:55

## 2018-05-16 ASSESSMENT — PAIN SCALES - GENERAL
PAINLEVEL_OUTOF10: 0
PAINLEVEL_OUTOF10: 6
PAINLEVEL_OUTOF10: 0

## 2018-05-17 LAB
L. PNEUMOPHILA SEROGP 1 UR AG: NORMAL
MAGNESIUM: 2.6 MG/DL (ref 1.6–2.6)
RPR: NORMAL
SEDIMENTATION RATE, ERYTHROCYTE: 1 MM/HR (ref 0–15)

## 2018-05-17 PROCEDURE — 6360000002 HC RX W HCPCS: Performed by: HOSPITALIST

## 2018-05-17 PROCEDURE — 2700000000 HC OXYGEN THERAPY PER DAY

## 2018-05-17 PROCEDURE — 94640 AIRWAY INHALATION TREATMENT: CPT

## 2018-05-17 PROCEDURE — 6370000000 HC RX 637 (ALT 250 FOR IP): Performed by: PHYSICIAN ASSISTANT

## 2018-05-17 PROCEDURE — 94660 CPAP INITIATION&MGMT: CPT

## 2018-05-17 PROCEDURE — 2580000003 HC RX 258: Performed by: PHYSICIAN ASSISTANT

## 2018-05-17 PROCEDURE — 1200000000 HC SEMI PRIVATE

## 2018-05-17 PROCEDURE — 83735 ASSAY OF MAGNESIUM: CPT

## 2018-05-17 PROCEDURE — 87116 MYCOBACTERIA CULTURE: CPT

## 2018-05-17 PROCEDURE — 36415 COLL VENOUS BLD VENIPUNCTURE: CPT

## 2018-05-17 PROCEDURE — 6360000002 HC RX W HCPCS: Performed by: PHYSICIAN ASSISTANT

## 2018-05-17 PROCEDURE — 85651 RBC SED RATE NONAUTOMATED: CPT

## 2018-05-17 PROCEDURE — 87450 HC DIRECT STREP B ANTIGEN: CPT

## 2018-05-17 PROCEDURE — 2500000003 HC RX 250 WO HCPCS: Performed by: SPECIALIST

## 2018-05-17 PROCEDURE — 6370000000 HC RX 637 (ALT 250 FOR IP): Performed by: HOSPITALIST

## 2018-05-17 RX ADMIN — MULTIPLE VITAMINS W/ MINERALS TAB 1 TABLET: TAB at 09:49

## 2018-05-17 RX ADMIN — CLONIDINE HYDROCHLORIDE 0.2 MG: 0.2 TABLET ORAL at 09:49

## 2018-05-17 RX ADMIN — FAMOTIDINE 20 MG: 20 TABLET ORAL at 09:49

## 2018-05-17 RX ADMIN — DULOXETINE HYDROCHLORIDE 60 MG: 60 CAPSULE, DELAYED RELEASE ORAL at 20:17

## 2018-05-17 RX ADMIN — IPRATROPIUM BROMIDE AND ALBUTEROL SULFATE 1 AMPULE: .5; 3 SOLUTION RESPIRATORY (INHALATION) at 02:22

## 2018-05-17 RX ADMIN — CLONIDINE HYDROCHLORIDE 0.2 MG: 0.2 TABLET ORAL at 20:17

## 2018-05-17 RX ADMIN — IPRATROPIUM BROMIDE AND ALBUTEROL SULFATE 1 AMPULE: .5; 3 SOLUTION RESPIRATORY (INHALATION) at 10:39

## 2018-05-17 RX ADMIN — FAMOTIDINE 20 MG: 20 TABLET ORAL at 20:17

## 2018-05-17 RX ADMIN — ATAZANAVIR 300 MG: 300 CAPSULE ORAL at 21:09

## 2018-05-17 RX ADMIN — WATER 2 G: 1 INJECTION INTRAMUSCULAR; INTRAVENOUS; SUBCUTANEOUS at 11:00

## 2018-05-17 RX ADMIN — HYDROXYZINE PAMOATE 25 MG: 25 CAPSULE ORAL at 20:17

## 2018-05-17 RX ADMIN — IPRATROPIUM BROMIDE AND ALBUTEROL SULFATE 1 AMPULE: .5; 3 SOLUTION RESPIRATORY (INHALATION) at 18:47

## 2018-05-17 RX ADMIN — Medication 10 ML: at 21:14

## 2018-05-17 RX ADMIN — IPRATROPIUM BROMIDE AND ALBUTEROL SULFATE 1 AMPULE: .5; 3 SOLUTION RESPIRATORY (INHALATION) at 22:53

## 2018-05-17 RX ADMIN — FOLIC ACID 1 MG: 1 TABLET ORAL at 09:49

## 2018-05-17 RX ADMIN — Medication 10 ML: at 11:00

## 2018-05-17 RX ADMIN — ACETAMINOPHEN 650 MG: 325 TABLET ORAL at 15:55

## 2018-05-17 RX ADMIN — HYDROXYZINE PAMOATE 25 MG: 25 CAPSULE ORAL at 09:49

## 2018-05-17 RX ADMIN — WATER 2 G: 1 INJECTION INTRAMUSCULAR; INTRAVENOUS; SUBCUTANEOUS at 22:43

## 2018-05-17 RX ADMIN — Medication 100 MG: at 09:49

## 2018-05-17 RX ADMIN — METHYLPREDNISOLONE SODIUM SUCCINATE 40 MG: 40 INJECTION, POWDER, FOR SOLUTION INTRAMUSCULAR; INTRAVENOUS at 17:04

## 2018-05-17 RX ADMIN — CARVEDILOL 25 MG: 25 TABLET, FILM COATED ORAL at 09:51

## 2018-05-17 RX ADMIN — LISINOPRIL 20 MG: 20 TABLET ORAL at 09:49

## 2018-05-17 RX ADMIN — PHENYTOIN SODIUM 100 MG: 100 CAPSULE, EXTENDED RELEASE ORAL at 09:48

## 2018-05-17 RX ADMIN — Medication 10 ML: at 09:56

## 2018-05-17 RX ADMIN — DULOXETINE HYDROCHLORIDE 60 MG: 60 CAPSULE, DELAYED RELEASE ORAL at 09:49

## 2018-05-17 RX ADMIN — IPRATROPIUM BROMIDE AND ALBUTEROL SULFATE 1 AMPULE: .5; 3 SOLUTION RESPIRATORY (INHALATION) at 06:19

## 2018-05-17 RX ADMIN — METHYLPREDNISOLONE SODIUM SUCCINATE 40 MG: 40 INJECTION, POWDER, FOR SOLUTION INTRAMUSCULAR; INTRAVENOUS at 09:49

## 2018-05-17 RX ADMIN — TUBERCULIN PURIFIED PROTEIN DERIVATIVE 5 UNITS: 5 INJECTION INTRADERMAL at 13:26

## 2018-05-17 RX ADMIN — CARVEDILOL 25 MG: 25 TABLET, FILM COATED ORAL at 20:17

## 2018-05-17 RX ADMIN — ENOXAPARIN SODIUM 40 MG: 40 INJECTION SUBCUTANEOUS at 09:49

## 2018-05-17 ASSESSMENT — PAIN SCALES - GENERAL
PAINLEVEL_OUTOF10: 0
PAINLEVEL_OUTOF10: 5
PAINLEVEL_OUTOF10: 0

## 2018-05-17 ASSESSMENT — PAIN DESCRIPTION - FREQUENCY: FREQUENCY: CONTINUOUS

## 2018-05-17 ASSESSMENT — PAIN DESCRIPTION - LOCATION: LOCATION: GENERALIZED

## 2018-05-17 ASSESSMENT — PAIN DESCRIPTION - DESCRIPTORS: DESCRIPTORS: ACHING

## 2018-05-17 ASSESSMENT — PAIN DESCRIPTION - PAIN TYPE: TYPE: CHRONIC PAIN

## 2018-05-18 LAB
ABSOLUTE CD 3: 499 CELLS/UL (ref 570–2400)
ABSOLUTE CD 4 HELPER: 221 CELLS/UL (ref 430–1800)
ABSOLUTE CD 8 (SUPP): 277 CELLS/UL (ref 210–1200)
CD4/CD8 RATIO: 0.8 RATIO (ref 0.8–3.9)
LYMPH SUBSET INFORMATION: ABNORMAL
MAGNESIUM: 2.6 MG/DL (ref 1.6–2.6)

## 2018-05-18 PROCEDURE — 2580000003 HC RX 258: Performed by: PHYSICIAN ASSISTANT

## 2018-05-18 PROCEDURE — 1200000000 HC SEMI PRIVATE

## 2018-05-18 PROCEDURE — 94640 AIRWAY INHALATION TREATMENT: CPT

## 2018-05-18 PROCEDURE — 6370000000 HC RX 637 (ALT 250 FOR IP): Performed by: SPECIALIST

## 2018-05-18 PROCEDURE — 6360000002 HC RX W HCPCS: Performed by: PHYSICIAN ASSISTANT

## 2018-05-18 PROCEDURE — 83735 ASSAY OF MAGNESIUM: CPT

## 2018-05-18 PROCEDURE — 94660 CPAP INITIATION&MGMT: CPT

## 2018-05-18 PROCEDURE — 86481 TB AG RESPONSE T-CELL SUSP: CPT

## 2018-05-18 PROCEDURE — 36415 COLL VENOUS BLD VENIPUNCTURE: CPT

## 2018-05-18 PROCEDURE — 6360000002 HC RX W HCPCS: Performed by: HOSPITALIST

## 2018-05-18 PROCEDURE — 6370000000 HC RX 637 (ALT 250 FOR IP): Performed by: PHYSICIAN ASSISTANT

## 2018-05-18 PROCEDURE — 2700000000 HC OXYGEN THERAPY PER DAY

## 2018-05-18 RX ORDER — DOXYCYCLINE HYCLATE 100 MG/1
100 CAPSULE ORAL EVERY 12 HOURS SCHEDULED
Status: DISCONTINUED | OUTPATIENT
Start: 2018-05-18 | End: 2018-05-22 | Stop reason: HOSPADM

## 2018-05-18 RX ORDER — CLOTRIMAZOLE 10 MG/1
10 LOZENGE ORAL; TOPICAL
Status: DISCONTINUED | OUTPATIENT
Start: 2018-05-18 | End: 2018-05-22 | Stop reason: HOSPADM

## 2018-05-18 RX ORDER — DOXYCYCLINE HYCLATE 100 MG/1
100 CAPSULE ORAL EVERY 12 HOURS SCHEDULED
Qty: 20 CAPSULE | Refills: 0 | Status: SHIPPED | OUTPATIENT
Start: 2018-05-18 | End: 2018-05-28

## 2018-05-18 RX ADMIN — IPRATROPIUM BROMIDE AND ALBUTEROL SULFATE 1 AMPULE: .5; 3 SOLUTION RESPIRATORY (INHALATION) at 19:40

## 2018-05-18 RX ADMIN — IPRATROPIUM BROMIDE AND ALBUTEROL SULFATE 1 AMPULE: .5; 3 SOLUTION RESPIRATORY (INHALATION) at 06:33

## 2018-05-18 RX ADMIN — ENOXAPARIN SODIUM 40 MG: 40 INJECTION SUBCUTANEOUS at 09:33

## 2018-05-18 RX ADMIN — FAMOTIDINE 20 MG: 20 TABLET ORAL at 20:25

## 2018-05-18 RX ADMIN — DULOXETINE HYDROCHLORIDE 60 MG: 60 CAPSULE, DELAYED RELEASE ORAL at 20:26

## 2018-05-18 RX ADMIN — DOXYCYCLINE HYCLATE 100 MG: 100 CAPSULE ORAL at 20:30

## 2018-05-18 RX ADMIN — DULOXETINE HYDROCHLORIDE 60 MG: 60 CAPSULE, DELAYED RELEASE ORAL at 09:33

## 2018-05-18 RX ADMIN — CLONIDINE HYDROCHLORIDE 0.2 MG: 0.2 TABLET ORAL at 20:26

## 2018-05-18 RX ADMIN — FOLIC ACID 1 MG: 1 TABLET ORAL at 09:33

## 2018-05-18 RX ADMIN — Medication 10 ML: at 09:36

## 2018-05-18 RX ADMIN — CARVEDILOL 25 MG: 25 TABLET, FILM COATED ORAL at 09:33

## 2018-05-18 RX ADMIN — IPRATROPIUM BROMIDE AND ALBUTEROL SULFATE 1 AMPULE: .5; 3 SOLUTION RESPIRATORY (INHALATION) at 02:17

## 2018-05-18 RX ADMIN — CLONIDINE HYDROCHLORIDE 0.2 MG: 0.2 TABLET ORAL at 09:33

## 2018-05-18 RX ADMIN — METHYLPREDNISOLONE SODIUM SUCCINATE 40 MG: 40 INJECTION, POWDER, FOR SOLUTION INTRAMUSCULAR; INTRAVENOUS at 09:33

## 2018-05-18 RX ADMIN — HYDROXYZINE PAMOATE 25 MG: 25 CAPSULE ORAL at 09:35

## 2018-05-18 RX ADMIN — Medication 10 ML: at 20:27

## 2018-05-18 RX ADMIN — METHYLPREDNISOLONE SODIUM SUCCINATE 40 MG: 40 INJECTION, POWDER, FOR SOLUTION INTRAMUSCULAR; INTRAVENOUS at 16:50

## 2018-05-18 RX ADMIN — HYDROXYZINE PAMOATE 25 MG: 25 CAPSULE ORAL at 20:31

## 2018-05-18 RX ADMIN — CLOTRIMAZOLE 10 MG: 10 LOZENGE ORAL at 15:23

## 2018-05-18 RX ADMIN — LISINOPRIL 20 MG: 20 TABLET ORAL at 09:33

## 2018-05-18 RX ADMIN — Medication 100 MG: at 09:33

## 2018-05-18 RX ADMIN — IPRATROPIUM BROMIDE AND ALBUTEROL SULFATE 1 AMPULE: .5; 3 SOLUTION RESPIRATORY (INHALATION) at 09:55

## 2018-05-18 RX ADMIN — CLOTRIMAZOLE 10 MG: 10 LOZENGE ORAL at 20:27

## 2018-05-18 RX ADMIN — PHENYTOIN SODIUM 100 MG: 100 CAPSULE, EXTENDED RELEASE ORAL at 09:33

## 2018-05-18 RX ADMIN — METHYLPREDNISOLONE SODIUM SUCCINATE 40 MG: 40 INJECTION, POWDER, FOR SOLUTION INTRAMUSCULAR; INTRAVENOUS at 00:40

## 2018-05-18 RX ADMIN — DOXYCYCLINE HYCLATE 100 MG: 100 CAPSULE ORAL at 12:21

## 2018-05-18 RX ADMIN — MULTIPLE VITAMINS W/ MINERALS TAB 1 TABLET: TAB at 09:33

## 2018-05-18 RX ADMIN — IPRATROPIUM BROMIDE AND ALBUTEROL SULFATE 1 AMPULE: .5; 3 SOLUTION RESPIRATORY (INHALATION) at 15:22

## 2018-05-18 RX ADMIN — CARVEDILOL 25 MG: 25 TABLET, FILM COATED ORAL at 20:25

## 2018-05-18 RX ADMIN — ATAZANAVIR 300 MG: 300 CAPSULE ORAL at 20:32

## 2018-05-18 RX ADMIN — FAMOTIDINE 20 MG: 20 TABLET ORAL at 09:33

## 2018-05-18 ASSESSMENT — PAIN SCALES - GENERAL
PAINLEVEL_OUTOF10: 0

## 2018-05-19 LAB — MAGNESIUM: 2.8 MG/DL (ref 1.6–2.6)

## 2018-05-19 PROCEDURE — 6370000000 HC RX 637 (ALT 250 FOR IP): Performed by: HOSPITALIST

## 2018-05-19 PROCEDURE — 6370000000 HC RX 637 (ALT 250 FOR IP): Performed by: SPECIALIST

## 2018-05-19 PROCEDURE — 94660 CPAP INITIATION&MGMT: CPT

## 2018-05-19 PROCEDURE — 36415 COLL VENOUS BLD VENIPUNCTURE: CPT

## 2018-05-19 PROCEDURE — 1200000000 HC SEMI PRIVATE

## 2018-05-19 PROCEDURE — 83735 ASSAY OF MAGNESIUM: CPT

## 2018-05-19 PROCEDURE — 2580000003 HC RX 258: Performed by: PHYSICIAN ASSISTANT

## 2018-05-19 PROCEDURE — 6370000000 HC RX 637 (ALT 250 FOR IP): Performed by: PHYSICIAN ASSISTANT

## 2018-05-19 PROCEDURE — 94640 AIRWAY INHALATION TREATMENT: CPT

## 2018-05-19 PROCEDURE — 2700000000 HC OXYGEN THERAPY PER DAY

## 2018-05-19 PROCEDURE — 6360000002 HC RX W HCPCS: Performed by: HOSPITALIST

## 2018-05-19 PROCEDURE — 6360000002 HC RX W HCPCS: Performed by: PHYSICIAN ASSISTANT

## 2018-05-19 RX ORDER — PREDNISONE 20 MG/1
40 TABLET ORAL DAILY
Status: DISCONTINUED | OUTPATIENT
Start: 2018-05-19 | End: 2018-05-22 | Stop reason: HOSPADM

## 2018-05-19 RX ADMIN — DOXYCYCLINE HYCLATE 100 MG: 100 CAPSULE ORAL at 20:40

## 2018-05-19 RX ADMIN — IPRATROPIUM BROMIDE AND ALBUTEROL SULFATE 1 AMPULE: .5; 3 SOLUTION RESPIRATORY (INHALATION) at 18:29

## 2018-05-19 RX ADMIN — FAMOTIDINE 20 MG: 20 TABLET ORAL at 09:45

## 2018-05-19 RX ADMIN — Medication 10 ML: at 20:40

## 2018-05-19 RX ADMIN — CLOTRIMAZOLE 10 MG: 10 LOZENGE ORAL at 22:22

## 2018-05-19 RX ADMIN — PREDNISONE 40 MG: 20 TABLET ORAL at 09:45

## 2018-05-19 RX ADMIN — FAMOTIDINE 20 MG: 20 TABLET ORAL at 20:41

## 2018-05-19 RX ADMIN — Medication 10 ML: at 20:39

## 2018-05-19 RX ADMIN — PHENYTOIN SODIUM 100 MG: 100 CAPSULE, EXTENDED RELEASE ORAL at 09:44

## 2018-05-19 RX ADMIN — CLOTRIMAZOLE 10 MG: 10 LOZENGE ORAL at 17:35

## 2018-05-19 RX ADMIN — IPRATROPIUM BROMIDE AND ALBUTEROL SULFATE 1 AMPULE: .5; 3 SOLUTION RESPIRATORY (INHALATION) at 10:04

## 2018-05-19 RX ADMIN — HYDROXYZINE PAMOATE 25 MG: 25 CAPSULE ORAL at 20:42

## 2018-05-19 RX ADMIN — Medication 100 MG: at 09:45

## 2018-05-19 RX ADMIN — CLONIDINE HYDROCHLORIDE 0.2 MG: 0.2 TABLET ORAL at 09:44

## 2018-05-19 RX ADMIN — METHYLPREDNISOLONE SODIUM SUCCINATE 40 MG: 40 INJECTION, POWDER, FOR SOLUTION INTRAMUSCULAR; INTRAVENOUS at 00:30

## 2018-05-19 RX ADMIN — MULTIPLE VITAMINS W/ MINERALS TAB 1 TABLET: TAB at 09:45

## 2018-05-19 RX ADMIN — MAGNESIUM HYDROXIDE 30 ML: 400 SUSPENSION ORAL at 06:24

## 2018-05-19 RX ADMIN — CLOTRIMAZOLE 10 MG: 10 LOZENGE ORAL at 11:08

## 2018-05-19 RX ADMIN — IPRATROPIUM BROMIDE AND ALBUTEROL SULFATE 1 AMPULE: .5; 3 SOLUTION RESPIRATORY (INHALATION) at 05:55

## 2018-05-19 RX ADMIN — Medication 10 ML: at 09:46

## 2018-05-19 RX ADMIN — DULOXETINE HYDROCHLORIDE 60 MG: 60 CAPSULE, DELAYED RELEASE ORAL at 20:40

## 2018-05-19 RX ADMIN — ATAZANAVIR 300 MG: 300 CAPSULE ORAL at 20:32

## 2018-05-19 RX ADMIN — CLONIDINE HYDROCHLORIDE 0.2 MG: 0.2 TABLET ORAL at 20:41

## 2018-05-19 RX ADMIN — FOLIC ACID 1 MG: 1 TABLET ORAL at 09:45

## 2018-05-19 RX ADMIN — IPRATROPIUM BROMIDE AND ALBUTEROL SULFATE 1 AMPULE: .5; 3 SOLUTION RESPIRATORY (INHALATION) at 22:03

## 2018-05-19 RX ADMIN — CLOTRIMAZOLE 10 MG: 10 LOZENGE ORAL at 00:30

## 2018-05-19 RX ADMIN — ENOXAPARIN SODIUM 40 MG: 40 INJECTION SUBCUTANEOUS at 09:45

## 2018-05-19 RX ADMIN — HYDROXYZINE PAMOATE 25 MG: 25 CAPSULE ORAL at 09:45

## 2018-05-19 RX ADMIN — DOXYCYCLINE HYCLATE 100 MG: 100 CAPSULE ORAL at 09:44

## 2018-05-19 RX ADMIN — DULOXETINE HYDROCHLORIDE 60 MG: 60 CAPSULE, DELAYED RELEASE ORAL at 09:45

## 2018-05-19 RX ADMIN — CARVEDILOL 25 MG: 25 TABLET, FILM COATED ORAL at 20:40

## 2018-05-19 RX ADMIN — CARVEDILOL 25 MG: 25 TABLET, FILM COATED ORAL at 09:45

## 2018-05-19 RX ADMIN — CLOTRIMAZOLE 10 MG: 10 LOZENGE ORAL at 06:24

## 2018-05-19 RX ADMIN — LISINOPRIL 20 MG: 20 TABLET ORAL at 09:45

## 2018-05-19 RX ADMIN — IPRATROPIUM BROMIDE AND ALBUTEROL SULFATE 1 AMPULE: .5; 3 SOLUTION RESPIRATORY (INHALATION) at 14:12

## 2018-05-19 ASSESSMENT — PAIN SCALES - GENERAL
PAINLEVEL_OUTOF10: 0

## 2018-05-20 LAB
ALBUMIN SERPL-MCNC: 3.5 G/DL (ref 3.5–5.2)
ALP BLD-CCNC: 56 U/L (ref 40–129)
ALT SERPL-CCNC: 38 U/L (ref 0–40)
ANION GAP SERPL CALCULATED.3IONS-SCNC: 4 MMOL/L (ref 7–16)
AST SERPL-CCNC: 26 U/L (ref 0–39)
BASOPHILS ABSOLUTE: 0.02 E9/L (ref 0–0.2)
BASOPHILS RELATIVE PERCENT: 0.2 % (ref 0–2)
BILIRUB SERPL-MCNC: 3.2 MG/DL (ref 0–1.2)
BUN BLDV-MCNC: 20 MG/DL (ref 8–23)
CALCIUM SERPL-MCNC: 8.6 MG/DL (ref 8.6–10.2)
CHLORIDE BLD-SCNC: 92 MMOL/L (ref 98–107)
CO2: 40 MMOL/L (ref 22–29)
CREAT SERPL-MCNC: 0.4 MG/DL (ref 0.7–1.2)
EOSINOPHILS ABSOLUTE: 0 E9/L (ref 0.05–0.5)
EOSINOPHILS RELATIVE PERCENT: 0 % (ref 0–6)
GFR AFRICAN AMERICAN: >60
GFR NON-AFRICAN AMERICAN: >60 ML/MIN/1.73
GLUCOSE BLD-MCNC: 149 MG/DL (ref 74–109)
HCT VFR BLD CALC: 39.5 % (ref 37–54)
HEMOGLOBIN: 12.4 G/DL (ref 12.5–16.5)
IMMATURE GRANULOCYTES #: 0.18 E9/L
IMMATURE GRANULOCYTES %: 1.7 % (ref 0–5)
LYMPHOCYTES ABSOLUTE: 0.85 E9/L (ref 1.5–4)
LYMPHOCYTES RELATIVE PERCENT: 7.8 % (ref 20–42)
MAGNESIUM: 2.5 MG/DL (ref 1.6–2.6)
MCH RBC QN AUTO: 29.5 PG (ref 26–35)
MCHC RBC AUTO-ENTMCNC: 31.4 % (ref 32–34.5)
MCV RBC AUTO: 94 FL (ref 80–99.9)
MONOCYTES ABSOLUTE: 1.33 E9/L (ref 0.1–0.95)
MONOCYTES RELATIVE PERCENT: 12.2 % (ref 2–12)
NEUTROPHILS ABSOLUTE: 8.5 E9/L (ref 1.8–7.3)
NEUTROPHILS RELATIVE PERCENT: 78.1 % (ref 43–80)
PDW BLD-RTO: 14.7 FL (ref 11.5–15)
PLATELET # BLD: 133 E9/L (ref 130–450)
PMV BLD AUTO: 11 FL (ref 7–12)
POTASSIUM SERPL-SCNC: 5.2 MMOL/L (ref 3.5–5)
RBC # BLD: 4.2 E12/L (ref 3.8–5.8)
SODIUM BLD-SCNC: 136 MMOL/L (ref 132–146)
TOTAL PROTEIN: 6 G/DL (ref 6.4–8.3)
WBC # BLD: 10.9 E9/L (ref 4.5–11.5)

## 2018-05-20 PROCEDURE — 94640 AIRWAY INHALATION TREATMENT: CPT

## 2018-05-20 PROCEDURE — 97116 GAIT TRAINING THERAPY: CPT | Performed by: PHYSICAL THERAPIST

## 2018-05-20 PROCEDURE — 6370000000 HC RX 637 (ALT 250 FOR IP): Performed by: PHYSICIAN ASSISTANT

## 2018-05-20 PROCEDURE — 2700000000 HC OXYGEN THERAPY PER DAY

## 2018-05-20 PROCEDURE — 6360000002 HC RX W HCPCS: Performed by: PHYSICIAN ASSISTANT

## 2018-05-20 PROCEDURE — 6370000000 HC RX 637 (ALT 250 FOR IP): Performed by: HOSPITALIST

## 2018-05-20 PROCEDURE — 87116 MYCOBACTERIA CULTURE: CPT

## 2018-05-20 PROCEDURE — G8978 MOBILITY CURRENT STATUS: HCPCS | Performed by: PHYSICAL THERAPIST

## 2018-05-20 PROCEDURE — 87206 SMEAR FLUORESCENT/ACID STAI: CPT

## 2018-05-20 PROCEDURE — G8979 MOBILITY GOAL STATUS: HCPCS | Performed by: PHYSICAL THERAPIST

## 2018-05-20 PROCEDURE — 87015 SPECIMEN INFECT AGNT CONCNTJ: CPT

## 2018-05-20 PROCEDURE — 1200000000 HC SEMI PRIVATE

## 2018-05-20 PROCEDURE — 83735 ASSAY OF MAGNESIUM: CPT

## 2018-05-20 PROCEDURE — 94660 CPAP INITIATION&MGMT: CPT

## 2018-05-20 PROCEDURE — 80053 COMPREHEN METABOLIC PANEL: CPT

## 2018-05-20 PROCEDURE — 6370000000 HC RX 637 (ALT 250 FOR IP): Performed by: SPECIALIST

## 2018-05-20 PROCEDURE — 36415 COLL VENOUS BLD VENIPUNCTURE: CPT

## 2018-05-20 PROCEDURE — 97161 PT EVAL LOW COMPLEX 20 MIN: CPT | Performed by: PHYSICAL THERAPIST

## 2018-05-20 PROCEDURE — 2580000003 HC RX 258: Performed by: PHYSICIAN ASSISTANT

## 2018-05-20 PROCEDURE — 6360000002 HC RX W HCPCS: Performed by: INTERNAL MEDICINE

## 2018-05-20 PROCEDURE — 85025 COMPLETE CBC W/AUTO DIFF WBC: CPT

## 2018-05-20 RX ORDER — FUROSEMIDE 10 MG/ML
40 INJECTION INTRAMUSCULAR; INTRAVENOUS 2 TIMES DAILY
Status: DISCONTINUED | OUTPATIENT
Start: 2018-05-20 | End: 2018-05-22 | Stop reason: HOSPADM

## 2018-05-20 RX ADMIN — FAMOTIDINE 20 MG: 20 TABLET ORAL at 21:37

## 2018-05-20 RX ADMIN — DOXYCYCLINE HYCLATE 100 MG: 100 CAPSULE ORAL at 21:37

## 2018-05-20 RX ADMIN — MULTIPLE VITAMINS W/ MINERALS TAB 1 TABLET: TAB at 08:57

## 2018-05-20 RX ADMIN — Medication 100 MG: at 08:56

## 2018-05-20 RX ADMIN — CLOTRIMAZOLE 10 MG: 10 LOZENGE ORAL at 19:03

## 2018-05-20 RX ADMIN — ATAZANAVIR 300 MG: 300 CAPSULE ORAL at 21:39

## 2018-05-20 RX ADMIN — DULOXETINE HYDROCHLORIDE 60 MG: 60 CAPSULE, DELAYED RELEASE ORAL at 21:38

## 2018-05-20 RX ADMIN — CARVEDILOL 25 MG: 25 TABLET, FILM COATED ORAL at 21:38

## 2018-05-20 RX ADMIN — CLOTRIMAZOLE 10 MG: 10 LOZENGE ORAL at 16:37

## 2018-05-20 RX ADMIN — FAMOTIDINE 20 MG: 20 TABLET ORAL at 08:57

## 2018-05-20 RX ADMIN — MOMETASONE FUROATE AND FORMOTEROL FUMARATE DIHYDRATE 2 PUFF: 200; 5 AEROSOL RESPIRATORY (INHALATION) at 09:54

## 2018-05-20 RX ADMIN — CLOTRIMAZOLE 10 MG: 10 LOZENGE ORAL at 06:51

## 2018-05-20 RX ADMIN — HYDROXYZINE PAMOATE 25 MG: 25 CAPSULE ORAL at 08:57

## 2018-05-20 RX ADMIN — MOMETASONE FUROATE AND FORMOTEROL FUMARATE DIHYDRATE 2 PUFF: 200; 5 AEROSOL RESPIRATORY (INHALATION) at 18:26

## 2018-05-20 RX ADMIN — DOXYCYCLINE HYCLATE 100 MG: 100 CAPSULE ORAL at 08:57

## 2018-05-20 RX ADMIN — CLONIDINE HYDROCHLORIDE 0.2 MG: 0.2 TABLET ORAL at 21:38

## 2018-05-20 RX ADMIN — PHENYTOIN SODIUM 100 MG: 100 CAPSULE, EXTENDED RELEASE ORAL at 08:57

## 2018-05-20 RX ADMIN — ENOXAPARIN SODIUM 40 MG: 40 INJECTION SUBCUTANEOUS at 08:57

## 2018-05-20 RX ADMIN — IPRATROPIUM BROMIDE AND ALBUTEROL SULFATE 1 AMPULE: .5; 3 SOLUTION RESPIRATORY (INHALATION) at 22:41

## 2018-05-20 RX ADMIN — FUROSEMIDE 40 MG: 10 INJECTION, SOLUTION INTRAMUSCULAR; INTRAVENOUS at 19:03

## 2018-05-20 RX ADMIN — FUROSEMIDE 40 MG: 10 INJECTION, SOLUTION INTRAMUSCULAR; INTRAVENOUS at 14:20

## 2018-05-20 RX ADMIN — IPRATROPIUM BROMIDE AND ALBUTEROL SULFATE 1 AMPULE: .5; 3 SOLUTION RESPIRATORY (INHALATION) at 14:03

## 2018-05-20 RX ADMIN — CLONIDINE HYDROCHLORIDE 0.2 MG: 0.2 TABLET ORAL at 08:57

## 2018-05-20 RX ADMIN — HYDROXYZINE PAMOATE 25 MG: 25 CAPSULE ORAL at 21:37

## 2018-05-20 RX ADMIN — DULOXETINE HYDROCHLORIDE 60 MG: 60 CAPSULE, DELAYED RELEASE ORAL at 08:57

## 2018-05-20 RX ADMIN — IPRATROPIUM BROMIDE AND ALBUTEROL SULFATE 1 AMPULE: .5; 3 SOLUTION RESPIRATORY (INHALATION) at 09:53

## 2018-05-20 RX ADMIN — Medication 10 ML: at 08:58

## 2018-05-20 RX ADMIN — PREDNISONE 40 MG: 20 TABLET ORAL at 08:56

## 2018-05-20 RX ADMIN — Medication 10 ML: at 21:37

## 2018-05-20 RX ADMIN — FOLIC ACID 1 MG: 1 TABLET ORAL at 08:57

## 2018-05-20 RX ADMIN — CARVEDILOL 25 MG: 25 TABLET, FILM COATED ORAL at 08:57

## 2018-05-20 RX ADMIN — IPRATROPIUM BROMIDE AND ALBUTEROL SULFATE 1 AMPULE: .5; 3 SOLUTION RESPIRATORY (INHALATION) at 06:01

## 2018-05-20 RX ADMIN — CLOTRIMAZOLE 10 MG: 10 LOZENGE ORAL at 12:48

## 2018-05-20 RX ADMIN — Medication 10 ML: at 14:20

## 2018-05-20 RX ADMIN — LISINOPRIL 20 MG: 20 TABLET ORAL at 08:57

## 2018-05-20 RX ADMIN — IPRATROPIUM BROMIDE AND ALBUTEROL SULFATE 1 AMPULE: .5; 3 SOLUTION RESPIRATORY (INHALATION) at 18:26

## 2018-05-20 ASSESSMENT — PAIN SCALES - GENERAL
PAINLEVEL_OUTOF10: 0

## 2018-05-21 ENCOUNTER — APPOINTMENT (OUTPATIENT)
Dept: GENERAL RADIOLOGY | Age: 58
DRG: 139 | End: 2018-05-21
Payer: COMMERCIAL

## 2018-05-21 LAB
ANION GAP SERPL CALCULATED.3IONS-SCNC: 3 MMOL/L (ref 7–16)
BUN BLDV-MCNC: 20 MG/DL (ref 8–23)
CALCIUM SERPL-MCNC: 8.6 MG/DL (ref 8.6–10.2)
CHLORIDE BLD-SCNC: 88 MMOL/L (ref 98–107)
CO2: 42 MMOL/L (ref 22–29)
CREAT SERPL-MCNC: 0.5 MG/DL (ref 0.7–1.2)
GFR AFRICAN AMERICAN: >60
GFR NON-AFRICAN AMERICAN: >60 ML/MIN/1.73
GLUCOSE BLD-MCNC: 118 MG/DL (ref 74–109)
POTASSIUM SERPL-SCNC: 5.3 MMOL/L (ref 3.5–5)
SODIUM BLD-SCNC: 133 MMOL/L (ref 132–146)

## 2018-05-21 PROCEDURE — 1200000000 HC SEMI PRIVATE

## 2018-05-21 PROCEDURE — G8987 SELF CARE CURRENT STATUS: HCPCS

## 2018-05-21 PROCEDURE — 97110 THERAPEUTIC EXERCISES: CPT

## 2018-05-21 PROCEDURE — 6370000000 HC RX 637 (ALT 250 FOR IP): Performed by: SPECIALIST

## 2018-05-21 PROCEDURE — 6370000000 HC RX 637 (ALT 250 FOR IP): Performed by: PHYSICIAN ASSISTANT

## 2018-05-21 PROCEDURE — 6360000002 HC RX W HCPCS: Performed by: PHYSICIAN ASSISTANT

## 2018-05-21 PROCEDURE — 80048 BASIC METABOLIC PNL TOTAL CA: CPT

## 2018-05-21 PROCEDURE — 97116 GAIT TRAINING THERAPY: CPT

## 2018-05-21 PROCEDURE — 97535 SELF CARE MNGMENT TRAINING: CPT

## 2018-05-21 PROCEDURE — G8988 SELF CARE GOAL STATUS: HCPCS

## 2018-05-21 PROCEDURE — 71046 X-RAY EXAM CHEST 2 VIEWS: CPT

## 2018-05-21 PROCEDURE — 6360000002 HC RX W HCPCS: Performed by: INTERNAL MEDICINE

## 2018-05-21 PROCEDURE — 2580000003 HC RX 258: Performed by: PHYSICIAN ASSISTANT

## 2018-05-21 PROCEDURE — 94640 AIRWAY INHALATION TREATMENT: CPT

## 2018-05-21 PROCEDURE — 2700000000 HC OXYGEN THERAPY PER DAY

## 2018-05-21 PROCEDURE — 94660 CPAP INITIATION&MGMT: CPT

## 2018-05-21 PROCEDURE — 36415 COLL VENOUS BLD VENIPUNCTURE: CPT

## 2018-05-21 PROCEDURE — 97165 OT EVAL LOW COMPLEX 30 MIN: CPT

## 2018-05-21 PROCEDURE — 6370000000 HC RX 637 (ALT 250 FOR IP): Performed by: HOSPITALIST

## 2018-05-21 RX ADMIN — IPRATROPIUM BROMIDE AND ALBUTEROL SULFATE 1 AMPULE: .5; 3 SOLUTION RESPIRATORY (INHALATION) at 06:55

## 2018-05-21 RX ADMIN — DOXYCYCLINE HYCLATE 100 MG: 100 CAPSULE ORAL at 08:58

## 2018-05-21 RX ADMIN — FUROSEMIDE 40 MG: 10 INJECTION, SOLUTION INTRAMUSCULAR; INTRAVENOUS at 08:58

## 2018-05-21 RX ADMIN — PHENYTOIN SODIUM 100 MG: 100 CAPSULE, EXTENDED RELEASE ORAL at 08:58

## 2018-05-21 RX ADMIN — ATAZANAVIR 300 MG: 300 CAPSULE ORAL at 20:22

## 2018-05-21 RX ADMIN — CARVEDILOL 25 MG: 25 TABLET, FILM COATED ORAL at 20:21

## 2018-05-21 RX ADMIN — PREDNISONE 40 MG: 20 TABLET ORAL at 08:58

## 2018-05-21 RX ADMIN — ENOXAPARIN SODIUM 40 MG: 40 INJECTION SUBCUTANEOUS at 10:07

## 2018-05-21 RX ADMIN — MOMETASONE FUROATE AND FORMOTEROL FUMARATE DIHYDRATE 2 PUFF: 200; 5 AEROSOL RESPIRATORY (INHALATION) at 20:01

## 2018-05-21 RX ADMIN — CLONIDINE HYDROCHLORIDE 0.2 MG: 0.2 TABLET ORAL at 08:56

## 2018-05-21 RX ADMIN — DULOXETINE HYDROCHLORIDE 60 MG: 60 CAPSULE, DELAYED RELEASE ORAL at 20:21

## 2018-05-21 RX ADMIN — MOMETASONE FUROATE AND FORMOTEROL FUMARATE DIHYDRATE 2 PUFF: 200; 5 AEROSOL RESPIRATORY (INHALATION) at 06:55

## 2018-05-21 RX ADMIN — FUROSEMIDE 40 MG: 10 INJECTION, SOLUTION INTRAMUSCULAR; INTRAVENOUS at 17:03

## 2018-05-21 RX ADMIN — DULOXETINE HYDROCHLORIDE 60 MG: 60 CAPSULE, DELAYED RELEASE ORAL at 08:57

## 2018-05-21 RX ADMIN — FAMOTIDINE 20 MG: 20 TABLET ORAL at 08:57

## 2018-05-21 RX ADMIN — FAMOTIDINE 20 MG: 20 TABLET ORAL at 20:21

## 2018-05-21 RX ADMIN — CLONIDINE HYDROCHLORIDE 0.2 MG: 0.2 TABLET ORAL at 20:21

## 2018-05-21 RX ADMIN — LISINOPRIL 20 MG: 20 TABLET ORAL at 08:57

## 2018-05-21 RX ADMIN — IPRATROPIUM BROMIDE AND ALBUTEROL SULFATE 1 AMPULE: .5; 3 SOLUTION RESPIRATORY (INHALATION) at 02:12

## 2018-05-21 RX ADMIN — ALBUTEROL SULFATE 2.5 MG: 2.5 SOLUTION RESPIRATORY (INHALATION) at 10:51

## 2018-05-21 RX ADMIN — CLOTRIMAZOLE 10 MG: 10 LOZENGE ORAL at 06:46

## 2018-05-21 RX ADMIN — IPRATROPIUM BROMIDE AND ALBUTEROL SULFATE 1 AMPULE: .5; 3 SOLUTION RESPIRATORY (INHALATION) at 19:51

## 2018-05-21 RX ADMIN — Medication 10 ML: at 08:58

## 2018-05-21 RX ADMIN — Medication 100 MG: at 08:58

## 2018-05-21 RX ADMIN — MULTIPLE VITAMINS W/ MINERALS TAB 1 TABLET: TAB at 08:58

## 2018-05-21 RX ADMIN — IPRATROPIUM BROMIDE AND ALBUTEROL SULFATE 1 AMPULE: .5; 3 SOLUTION RESPIRATORY (INHALATION) at 15:22

## 2018-05-21 RX ADMIN — HYDROXYZINE PAMOATE 25 MG: 25 CAPSULE ORAL at 20:21

## 2018-05-21 RX ADMIN — IPRATROPIUM BROMIDE AND ALBUTEROL SULFATE 1 AMPULE: .5; 3 SOLUTION RESPIRATORY (INHALATION) at 09:56

## 2018-05-21 RX ADMIN — HYDROXYZINE PAMOATE 25 MG: 25 CAPSULE ORAL at 08:58

## 2018-05-21 RX ADMIN — FOLIC ACID 1 MG: 1 TABLET ORAL at 08:57

## 2018-05-21 RX ADMIN — Medication 10 ML: at 20:24

## 2018-05-21 RX ADMIN — DOXYCYCLINE HYCLATE 100 MG: 100 CAPSULE ORAL at 20:21

## 2018-05-21 RX ADMIN — CARVEDILOL 25 MG: 25 TABLET, FILM COATED ORAL at 08:56

## 2018-05-21 ASSESSMENT — PAIN SCALES - GENERAL
PAINLEVEL_OUTOF10: 0

## 2018-05-22 VITALS
TEMPERATURE: 97.9 F | HEIGHT: 66 IN | RESPIRATION RATE: 18 BRPM | WEIGHT: 188 LBS | HEART RATE: 80 BPM | OXYGEN SATURATION: 99 % | DIASTOLIC BLOOD PRESSURE: 78 MMHG | SYSTOLIC BLOOD PRESSURE: 104 MMHG | BODY MASS INDEX: 30.22 KG/M2

## 2018-05-22 LAB
ANION GAP SERPL CALCULATED.3IONS-SCNC: 5 MMOL/L (ref 7–16)
BUN BLDV-MCNC: 19 MG/DL (ref 8–23)
CALCIUM SERPL-MCNC: 8.8 MG/DL (ref 8.6–10.2)
CHLORIDE BLD-SCNC: 89 MMOL/L (ref 98–107)
CO2: 43 MMOL/L (ref 22–29)
CREAT SERPL-MCNC: 0.5 MG/DL (ref 0.7–1.2)
DIRECT EXAM: NORMAL
GFR AFRICAN AMERICAN: >60
GFR NON-AFRICAN AMERICAN: >60 ML/MIN/1.73
GLUCOSE BLD-MCNC: 105 MG/DL (ref 74–109)
POTASSIUM SERPL-SCNC: 4.9 MMOL/L (ref 3.5–5)
SODIUM BLD-SCNC: 137 MMOL/L (ref 132–146)
SPECIMEN: NORMAL

## 2018-05-22 PROCEDURE — 6370000000 HC RX 637 (ALT 250 FOR IP): Performed by: HOSPITALIST

## 2018-05-22 PROCEDURE — 6370000000 HC RX 637 (ALT 250 FOR IP): Performed by: PHYSICIAN ASSISTANT

## 2018-05-22 PROCEDURE — 36415 COLL VENOUS BLD VENIPUNCTURE: CPT

## 2018-05-22 PROCEDURE — 94640 AIRWAY INHALATION TREATMENT: CPT

## 2018-05-22 PROCEDURE — 2700000000 HC OXYGEN THERAPY PER DAY

## 2018-05-22 PROCEDURE — 6360000002 HC RX W HCPCS: Performed by: INTERNAL MEDICINE

## 2018-05-22 PROCEDURE — 6370000000 HC RX 637 (ALT 250 FOR IP): Performed by: SPECIALIST

## 2018-05-22 PROCEDURE — 2580000003 HC RX 258: Performed by: PHYSICIAN ASSISTANT

## 2018-05-22 PROCEDURE — 6360000002 HC RX W HCPCS: Performed by: PHYSICIAN ASSISTANT

## 2018-05-22 PROCEDURE — 80048 BASIC METABOLIC PNL TOTAL CA: CPT

## 2018-05-22 RX ORDER — CLOTRIMAZOLE 10 MG/1
10 LOZENGE ORAL; TOPICAL
Qty: 50 TABLET | Refills: 0 | Status: SHIPPED | OUTPATIENT
Start: 2018-05-22 | End: 2018-06-01

## 2018-05-22 RX ORDER — PREDNISONE 10 MG/1
TABLET ORAL
Qty: 18 TABLET | Refills: 0 | Status: SHIPPED | OUTPATIENT
Start: 2018-05-23 | End: 2018-09-05 | Stop reason: ALTCHOICE

## 2018-05-22 RX ADMIN — MOMETASONE FUROATE AND FORMOTEROL FUMARATE DIHYDRATE 2 PUFF: 200; 5 AEROSOL RESPIRATORY (INHALATION) at 06:09

## 2018-05-22 RX ADMIN — HYDROXYZINE PAMOATE 25 MG: 25 CAPSULE ORAL at 08:56

## 2018-05-22 RX ADMIN — IPRATROPIUM BROMIDE AND ALBUTEROL SULFATE 1 AMPULE: .5; 3 SOLUTION RESPIRATORY (INHALATION) at 09:43

## 2018-05-22 RX ADMIN — LISINOPRIL 20 MG: 20 TABLET ORAL at 08:55

## 2018-05-22 RX ADMIN — ENOXAPARIN SODIUM 40 MG: 40 INJECTION SUBCUTANEOUS at 08:56

## 2018-05-22 RX ADMIN — MULTIPLE VITAMINS W/ MINERALS TAB 1 TABLET: TAB at 08:55

## 2018-05-22 RX ADMIN — PHENYTOIN SODIUM 100 MG: 100 CAPSULE, EXTENDED RELEASE ORAL at 08:55

## 2018-05-22 RX ADMIN — FAMOTIDINE 20 MG: 20 TABLET ORAL at 08:55

## 2018-05-22 RX ADMIN — FUROSEMIDE 40 MG: 10 INJECTION, SOLUTION INTRAMUSCULAR; INTRAVENOUS at 08:56

## 2018-05-22 RX ADMIN — FOLIC ACID 1 MG: 1 TABLET ORAL at 08:55

## 2018-05-22 RX ADMIN — CARVEDILOL 25 MG: 25 TABLET, FILM COATED ORAL at 08:55

## 2018-05-22 RX ADMIN — IPRATROPIUM BROMIDE AND ALBUTEROL SULFATE 1 AMPULE: .5; 3 SOLUTION RESPIRATORY (INHALATION) at 06:09

## 2018-05-22 RX ADMIN — DOXYCYCLINE HYCLATE 100 MG: 100 CAPSULE ORAL at 08:56

## 2018-05-22 RX ADMIN — Medication 100 MG: at 08:55

## 2018-05-22 RX ADMIN — IPRATROPIUM BROMIDE AND ALBUTEROL SULFATE 1 AMPULE: .5; 3 SOLUTION RESPIRATORY (INHALATION) at 13:08

## 2018-05-22 RX ADMIN — Medication 10 ML: at 08:56

## 2018-05-22 RX ADMIN — DULOXETINE HYDROCHLORIDE 60 MG: 60 CAPSULE, DELAYED RELEASE ORAL at 08:55

## 2018-05-22 RX ADMIN — CLONIDINE HYDROCHLORIDE 0.2 MG: 0.2 TABLET ORAL at 08:57

## 2018-05-22 RX ADMIN — PREDNISONE 40 MG: 20 TABLET ORAL at 08:56

## 2018-05-22 ASSESSMENT — PAIN SCALES - GENERAL
PAINLEVEL_OUTOF10: 0
PAINLEVEL_OUTOF10: 0

## 2018-05-23 ENCOUNTER — TELEPHONE (OUTPATIENT)
Dept: PULMONOLOGY | Age: 58
End: 2018-05-23

## 2018-05-30 ENCOUNTER — OFFICE VISIT (OUTPATIENT)
Dept: CARDIOLOGY CLINIC | Age: 58
End: 2018-05-30
Payer: COMMERCIAL

## 2018-05-30 VITALS
WEIGHT: 182 LBS | HEIGHT: 66 IN | SYSTOLIC BLOOD PRESSURE: 100 MMHG | BODY MASS INDEX: 29.25 KG/M2 | HEART RATE: 121 BPM | DIASTOLIC BLOOD PRESSURE: 62 MMHG

## 2018-05-30 DIAGNOSIS — J44.9 CHRONIC OBSTRUCTIVE PULMONARY DISEASE, UNSPECIFIED COPD TYPE (HCC): ICD-10-CM

## 2018-05-30 DIAGNOSIS — B20 HIV (HUMAN IMMUNODEFICIENCY VIRUS INFECTION) (HCC): Primary | Chronic | ICD-10-CM

## 2018-05-30 DIAGNOSIS — I10 ESSENTIAL HYPERTENSION: ICD-10-CM

## 2018-05-30 LAB
COMMENT: NORMAL
REPORT: NORMAL

## 2018-05-30 PROCEDURE — G8417 CALC BMI ABV UP PARAM F/U: HCPCS | Performed by: INTERNAL MEDICINE

## 2018-05-30 PROCEDURE — G8427 DOCREV CUR MEDS BY ELIG CLIN: HCPCS | Performed by: INTERNAL MEDICINE

## 2018-05-30 PROCEDURE — 1036F TOBACCO NON-USER: CPT | Performed by: INTERNAL MEDICINE

## 2018-05-30 PROCEDURE — 3023F SPIROM DOC REV: CPT | Performed by: INTERNAL MEDICINE

## 2018-05-30 PROCEDURE — G8926 SPIRO NO PERF OR DOC: HCPCS | Performed by: INTERNAL MEDICINE

## 2018-05-30 PROCEDURE — 93000 ELECTROCARDIOGRAM COMPLETE: CPT | Performed by: INTERNAL MEDICINE

## 2018-05-30 PROCEDURE — 3017F COLORECTAL CA SCREEN DOC REV: CPT | Performed by: INTERNAL MEDICINE

## 2018-05-30 PROCEDURE — 99204 OFFICE O/P NEW MOD 45 MIN: CPT | Performed by: INTERNAL MEDICINE

## 2018-05-30 PROCEDURE — 1111F DSCHRG MED/CURRENT MED MERGE: CPT | Performed by: INTERNAL MEDICINE

## 2018-05-30 RX ORDER — POTASSIUM CHLORIDE 750 MG/1
10 CAPSULE, EXTENDED RELEASE ORAL DAILY
Status: ON HOLD | COMMUNITY
Start: 2018-04-25 | End: 2020-08-10 | Stop reason: HOSPADM

## 2018-05-30 RX ORDER — TAMSULOSIN HYDROCHLORIDE 0.4 MG/1
0.4 CAPSULE ORAL DAILY
COMMUNITY
Start: 2018-05-09

## 2018-05-30 RX ORDER — UMECLIDINIUM 62.5 UG/1
AEROSOL, POWDER ORAL
Refills: 3 | COMMUNITY
Start: 2018-05-25 | End: 2018-09-05

## 2018-05-30 RX ORDER — FUROSEMIDE 80 MG
TABLET ORAL
Refills: 0 | Status: ON HOLD | COMMUNITY
Start: 2018-05-25 | End: 2020-02-11 | Stop reason: HOSPADM

## 2018-07-03 ENCOUNTER — OFFICE VISIT (OUTPATIENT)
Dept: PULMONOLOGY | Age: 58
End: 2018-07-03
Payer: MEDICAID

## 2018-07-03 VITALS
RESPIRATION RATE: 16 BRPM | SYSTOLIC BLOOD PRESSURE: 113 MMHG | TEMPERATURE: 98.3 F | HEIGHT: 66 IN | BODY MASS INDEX: 29.73 KG/M2 | DIASTOLIC BLOOD PRESSURE: 55 MMHG | WEIGHT: 185 LBS | OXYGEN SATURATION: 94 % | HEART RATE: 82 BPM

## 2018-07-03 DIAGNOSIS — J44.1 ASTHMA EXACERBATION WITH COPD (CHRONIC OBSTRUCTIVE PULMONARY DISEASE) (HCC): Primary | ICD-10-CM

## 2018-07-03 DIAGNOSIS — J45.901 ASTHMA EXACERBATION WITH COPD (CHRONIC OBSTRUCTIVE PULMONARY DISEASE) (HCC): Primary | ICD-10-CM

## 2018-07-03 LAB — PARTS PER BILLION: 6

## 2018-07-03 PROCEDURE — 99203 OFFICE O/P NEW LOW 30 MIN: CPT | Performed by: INTERNAL MEDICINE

## 2018-07-03 PROCEDURE — 3017F COLORECTAL CA SCREEN DOC REV: CPT | Performed by: INTERNAL MEDICINE

## 2018-07-03 PROCEDURE — G8427 DOCREV CUR MEDS BY ELIG CLIN: HCPCS | Performed by: INTERNAL MEDICINE

## 2018-07-03 PROCEDURE — 99205 OFFICE O/P NEW HI 60 MIN: CPT | Performed by: INTERNAL MEDICINE

## 2018-07-03 PROCEDURE — 1036F TOBACCO NON-USER: CPT | Performed by: INTERNAL MEDICINE

## 2018-07-03 PROCEDURE — G8926 SPIRO NO PERF OR DOC: HCPCS | Performed by: INTERNAL MEDICINE

## 2018-07-03 PROCEDURE — G8417 CALC BMI ABV UP PARAM F/U: HCPCS | Performed by: INTERNAL MEDICINE

## 2018-07-03 PROCEDURE — 95012 NITRIC OXIDE EXP GAS DETER: CPT | Performed by: INTERNAL MEDICINE

## 2018-07-03 PROCEDURE — 3023F SPIROM DOC REV: CPT | Performed by: INTERNAL MEDICINE

## 2018-07-03 RX ORDER — GUAIFENESIN 1200 MG/1
TABLET, EXTENDED RELEASE ORAL
COMMUNITY
Start: 2018-06-08 | End: 2018-08-10

## 2018-07-03 RX ORDER — AMLODIPINE BESYLATE 5 MG/1
TABLET ORAL
Status: ON HOLD | COMMUNITY
Start: 2018-03-28 | End: 2020-08-10 | Stop reason: HOSPADM

## 2018-07-03 RX ORDER — PREDNISONE 20 MG/1
TABLET ORAL
Qty: 7 TABLET | Refills: 0 | Status: SHIPPED | OUTPATIENT
Start: 2018-07-03 | End: 2018-07-13

## 2018-07-03 RX ORDER — AZITHROMYCIN 250 MG/1
250 TABLET, FILM COATED ORAL DAILY
Qty: 6 TABLET | Refills: 0 | Status: SHIPPED | OUTPATIENT
Start: 2018-07-03 | End: 2018-07-09

## 2018-07-03 NOTE — PROGRESS NOTES
New pt to see Dr. Braulio Thibodeaux. History of SOB and Asthma/COPD. LAb work to be completed and Scripts sent to pharmacy for Prednisone, Azythromycin and Script printed for [place]. Follow up in 4 weeks; appt given.

## 2018-07-03 NOTE — PROGRESS NOTES
Dispense Refill    amLODIPine (NORVASC) 5 MG tablet Take by mouth      guaiFENesin (MUCINEX MAXIMUM STRENGTH) 1200 MG TB12 Take by mouth      furosemide (LASIX) 80 MG tablet TAKE 1 TABLET BY ORAL ROUTE EVERY DAY FOR 7 DAYS AND THEN RSUME 40 MG DOSE  0    potassium chloride (MICRO-K) 10 MEQ extended release capsule Take 10 mEq by mouth daily       INCRUSE ELLIPTA 62.5 MCG/INH AEPB INHALE 1 PUFF BY INHALATION ROUTE EVERY DAY AT THE SAME TIME EACH DAY  3    lisinopril (PRINIVIL;ZESTRIL) 20 MG tablet Take 1 tablet by mouth daily 30 tablet 0    DULoxetine (CYMBALTA) 20 MG extended release capsule Take 3 capsules by mouth 2 times daily      Fluticasone Furoate-Vilanterol (BREO ELLIPTA) 200-25 MCG/INH AEPB Inhale 1 puff into the lungs 2 times daily      carvedilol (COREG) 25 MG tablet Take 25 mg by mouth 2 times daily      ranitidine (ZANTAC) 150 MG tablet Take 150 mg by mouth 2 times daily      ipratropium-albuterol (DUONEB) 0.5-2.5 (3) MG/3ML SOLN nebulizer solution Inhale 3 mLs into the lungs every 6 hours as needed for Shortness of Breath 360 mL 0    albuterol sulfate  (90 BASE) MCG/ACT inhaler Inhale 2 puffs into the lungs every 6 hours as needed for Wheezing      cloNIDine (CATAPRES) 0.1 MG tablet 2 in the morning and one at at bedtime (Patient taking differently: Take 0.2 mg by mouth 2 times daily ) 90 tablet 3    tamsulosin (FLOMAX) 0.4 MG capsule Take 0.4 mg by mouth daily       predniSONE (DELTASONE) 10 MG tablet Take 3 tabs for 3 days, then 2 tabs for 3 days, then 1 tab for 3 days, then stop 18 tablet 0    phenytoin (DILANTIN) 100 MG ER capsule Take 100 mg by mouth daily      emtricitabine-tenofovir alafenamide (DESCOVY) 200-25 MG TABS tablet Take 1 tablet by mouth daily 30 tablet 3    hydrOXYzine (VISTARIL) 25 MG capsule Take 25 mg by mouth 2 times daily      ritonavir (NORVIR) 100 MG capsule Take 100 mg by mouth nightly       atazanavir (REYATAZ) 150 MG capsule Take 2 capsules by mouth daily (with breakfast) (Patient taking differently: Take 300 mg by mouth nightly ) 60 capsule 3     No current facility-administered medications for this visit. SOCIAL AND OCCUPATIONAL HEALTH:  History   Smoking Status    Former Smoker    Packs/day: 0.50    Years: 35.00    Types: Cigarettes    Quit date: 1/1/2018   Smokeless Tobacco    Never Used     TB :  Pets   Industrial exposure:  Birds :    SURGICAL HISTORY:   Past Surgical History:   Procedure Laterality Date    OTHER SURGICAL HISTORY  05/09/2017    exc abd neoplasm    ROTATOR CUFF REPAIR  2011    left shoulder in [address]:   Lung cancer:  DVT or PE     REVIEW OF SYSTEMS:  Constitutional: General health is good . There has been no weight changes. No fevers, fatigue or weakness. Head: Patient denies any history of trauma, convulsive disorder or syncope. Skin:  Patient denies history of changes in pigmentation, eruptions or pruritus. No easy bruising or bleeding. EENT: no nasal congestion   Cardiovascular ,No chest pain ,No edema ,  Respiration:SOB:  ,MCGARRY :  Gastrointestinal:No GI bleed ,no melena  ,no hematemesis    Musculoskeletal: no joint pain ,no swelling  Neurological:no , syncope. Denies twitching, convulsions, loss of consciousness or memory. Endocrine:  . No history of goiter, exophthalmos or dryness of skin. The patient has no history of diabetes. Hematopoietic:  No history of bleeding disorders or easy bruising. Rheumatic:  No connective tissue disease history or polyarthritis/inflammatory joint disease. PHYSICAL EXAMINATION:  Vitals:    07/03/18 1412   BP: (!) 113/55   Pulse: 82   Resp: 16   Temp: 98.3 °F (36.8 °C)   SpO2: 94%     Constitutional: This is a well developed, well nourished 59y.o. year old male who is alert, oriented, cooperative and in no apparent distress. Head was normocephalic and atraumatic. EENT: Mallampati class :  Extraocular muscles intact.    External canals are

## 2018-07-10 LAB
AFB CULTURE (MYCOBACTERIA): NORMAL
AFB SMEAR: NORMAL
CULTURE, AFB BLOOD: NORMAL
CULTURE, AFB BLOOD: NORMAL

## 2018-07-23 ENCOUNTER — HOSPITAL ENCOUNTER (OUTPATIENT)
Age: 58
Discharge: HOME | End: 2018-07-23
Payer: MEDICAID

## 2018-07-23 ENCOUNTER — HOSPITAL ENCOUNTER (OUTPATIENT)
Dept: MAMMOGRAPHY | Age: 58
Discharge: HOME | End: 2018-07-25
Payer: MEDICAID

## 2018-07-23 DIAGNOSIS — Z13.820 SCREENING FOR OSTEOPOROSIS: ICD-10-CM

## 2018-07-23 LAB
ALBUMIN SERPL-MCNC: 4 G/DL (ref 3.5–5.2)
ALP BLD-CCNC: 84 U/L (ref 40–129)
ALT SERPL-CCNC: 21 U/L (ref 0–40)
ANION GAP SERPL CALCULATED.3IONS-SCNC: 11 MMOL/L (ref 7–16)
AST SERPL-CCNC: 21 U/L (ref 0–39)
BASOPHILS ABSOLUTE: 0.03 E9/L (ref 0–0.2)
BASOPHILS RELATIVE PERCENT: 0.3 % (ref 0–2)
BILIRUB SERPL-MCNC: 0.4 MG/DL (ref 0–1.2)
BUN BLDV-MCNC: 7 MG/DL (ref 8–23)
CALCIUM SERPL-MCNC: 9.4 MG/DL (ref 8.6–10.2)
CHLORIDE BLD-SCNC: 101 MMOL/L (ref 98–107)
CHOLESTEROL, TOTAL: 177 MG/DL (ref 0–199)
CO2: 30 MMOL/L (ref 22–29)
CREAT SERPL-MCNC: 0.8 MG/DL (ref 0.7–1.2)
EOSINOPHILS ABSOLUTE: 0.15 E9/L (ref 0.05–0.5)
EOSINOPHILS RELATIVE PERCENT: 1.6 % (ref 0–6)
GFR AFRICAN AMERICAN: >60
GFR NON-AFRICAN AMERICAN: >60 ML/MIN/1.73
GLUCOSE BLD-MCNC: 104 MG/DL (ref 74–109)
HCT VFR BLD CALC: 35.8 % (ref 37–54)
HDLC SERPL-MCNC: 43 MG/DL
HEMOGLOBIN: 11.1 G/DL (ref 12.5–16.5)
IMMATURE GRANULOCYTES #: 0.05 E9/L
IMMATURE GRANULOCYTES %: 0.5 % (ref 0–5)
LDL CHOLESTEROL CALCULATED: 111 MG/DL (ref 0–99)
LYMPHOCYTES ABSOLUTE: 2.68 E9/L (ref 1.5–4)
LYMPHOCYTES RELATIVE PERCENT: 28.3 % (ref 20–42)
MCH RBC QN AUTO: 29 PG (ref 26–35)
MCHC RBC AUTO-ENTMCNC: 31 % (ref 32–34.5)
MCV RBC AUTO: 93.5 FL (ref 80–99.9)
MONOCYTES ABSOLUTE: 1.02 E9/L (ref 0.1–0.95)
MONOCYTES RELATIVE PERCENT: 10.8 % (ref 2–12)
NEUTROPHILS ABSOLUTE: 5.53 E9/L (ref 1.8–7.3)
NEUTROPHILS RELATIVE PERCENT: 58.5 % (ref 43–80)
PDW BLD-RTO: 15.3 FL (ref 11.5–15)
PLATELET # BLD: 262 E9/L (ref 130–450)
PMV BLD AUTO: 11.7 FL (ref 7–12)
POTASSIUM SERPL-SCNC: 3.9 MMOL/L (ref 3.5–5)
RBC # BLD: 3.83 E12/L (ref 3.8–5.8)
SODIUM BLD-SCNC: 142 MMOL/L (ref 132–146)
TESTOSTERONE TOTAL: 569.9 NG/DL
TOTAL PROTEIN: 7.4 G/DL (ref 6.4–8.3)
TRIGL SERPL-MCNC: 117 MG/DL (ref 0–149)
VITAMIN D 25-HYDROXY: 33 NG/ML (ref 30–100)
VLDLC SERPL CALC-MCNC: 23 MG/DL
WBC # BLD: 9.5 E9/L (ref 4.5–11.5)

## 2018-07-23 PROCEDURE — 82306 VITAMIN D 25 HYDROXY: CPT

## 2018-07-23 PROCEDURE — 80061 LIPID PANEL: CPT

## 2018-07-23 PROCEDURE — 85025 COMPLETE CBC W/AUTO DIFF WBC: CPT

## 2018-07-23 PROCEDURE — 86481 TB AG RESPONSE T-CELL SUSP: CPT

## 2018-07-23 PROCEDURE — 80053 COMPREHEN METABOLIC PANEL: CPT

## 2018-07-23 PROCEDURE — 87536 HIV-1 QUANT&REVRSE TRNSCRPJ: CPT

## 2018-07-23 PROCEDURE — 77080 DXA BONE DENSITY AXIAL: CPT

## 2018-07-23 PROCEDURE — 86357 NK CELLS TOTAL COUNT: CPT

## 2018-07-23 PROCEDURE — 86803 HEPATITIS C AB TEST: CPT

## 2018-07-23 PROCEDURE — 86360 T CELL ABSOLUTE COUNT/RATIO: CPT

## 2018-07-23 PROCEDURE — 84403 ASSAY OF TOTAL TESTOSTERONE: CPT

## 2018-07-23 PROCEDURE — 86359 T CELLS TOTAL COUNT: CPT

## 2018-07-23 PROCEDURE — 36415 COLL VENOUS BLD VENIPUNCTURE: CPT

## 2018-07-24 ENCOUNTER — HOSPITAL ENCOUNTER (OUTPATIENT)
Age: 58
Discharge: HOME | End: 2018-07-24
Payer: MEDICAID

## 2018-07-24 LAB
% CD 3 POS. LYMPH.: 74 % (ref 62–87)
% CD19: 3 % (ref 6–23)
% CD4: 34 % (ref 32–64)
% CD8: 40 % (ref 15–46)
% NK (CD56/16): 22 % (ref 4–26)
ABSOLUTE CD 3: 1893 CELLS/UL (ref 570–2400)
ABSOLUTE CD 4 HELPER: 863 CELLS/UL (ref 430–1800)
ABSOLUTE CD 8 (SUPP): 1009 CELLS/UL (ref 210–1200)
CD19 ABSOLUTE: 86 CELLS/UL (ref 91–610)
CD4/CD8 RATIO: 0.85 RATIO (ref 0.8–3.9)
EOSINOPHILS ABSOLUTE COUNT: 120 /UL (ref 50–250)
HEPATITIS C ANTIBODY INTERPRETATION: NORMAL
LYMPHOCYTE SUBSET PANEL 5 INFO: ABNORMAL
NATURAL KILLER CD16 AND CD56 ABSOLUTE: 553 CELLS/UL (ref 78–470)
SEDIMENTATION RATE, ERYTHROCYTE: 30 MM/HR (ref 0–15)

## 2018-07-24 PROCEDURE — 85651 RBC SED RATE NONAUTOMATED: CPT

## 2018-07-24 PROCEDURE — 86003 ALLG SPEC IGE CRUDE XTRC EA: CPT

## 2018-07-24 PROCEDURE — 85048 AUTOMATED LEUKOCYTE COUNT: CPT

## 2018-07-24 PROCEDURE — 82103 ALPHA-1-ANTITRYPSIN TOTAL: CPT

## 2018-07-24 PROCEDURE — 82785 ASSAY OF IGE: CPT

## 2018-07-24 PROCEDURE — 36415 COLL VENOUS BLD VENIPUNCTURE: CPT

## 2018-07-25 LAB — ALPHA-1 ANTITRYPSIN: 181 MG/DL (ref 90–200)

## 2018-07-26 LAB
Lab: NORMAL
REPORT: NORMAL
THIS TEST SENT TO: NORMAL

## 2018-07-29 ENCOUNTER — HOSPITAL ENCOUNTER (OUTPATIENT)
Dept: SLEEP CENTER | Age: 58
Discharge: HOME | End: 2018-07-29
Payer: MEDICAID

## 2018-07-29 LAB
COMMENT: NORMAL
REPORT: NORMAL

## 2018-07-29 PROCEDURE — 95810 POLYSOM 6/> YRS 4/> PARAM: CPT

## 2018-07-30 LAB
DIRECT EXAM: NORMAL
SPECIMEN: NORMAL

## 2018-08-02 ENCOUNTER — OFFICE VISIT (OUTPATIENT)
Dept: PULMONOLOGY | Age: 58
End: 2018-08-02
Payer: MEDICAID

## 2018-08-02 VITALS
TEMPERATURE: 98 F | BODY MASS INDEX: 30.86 KG/M2 | HEIGHT: 66 IN | SYSTOLIC BLOOD PRESSURE: 142 MMHG | OXYGEN SATURATION: 91 % | WEIGHT: 192 LBS | RESPIRATION RATE: 16 BRPM | HEART RATE: 76 BPM | DIASTOLIC BLOOD PRESSURE: 70 MMHG

## 2018-08-02 DIAGNOSIS — J44.9 CHRONIC OBSTRUCTIVE PULMONARY DISEASE, UNSPECIFIED COPD TYPE (HCC): ICD-10-CM

## 2018-08-02 DIAGNOSIS — J45.901 ASTHMA EXACERBATION WITH COPD (CHRONIC OBSTRUCTIVE PULMONARY DISEASE) (HCC): ICD-10-CM

## 2018-08-02 DIAGNOSIS — J44.1 ASTHMA EXACERBATION WITH COPD (CHRONIC OBSTRUCTIVE PULMONARY DISEASE) (HCC): ICD-10-CM

## 2018-08-02 PROCEDURE — 99213 OFFICE O/P EST LOW 20 MIN: CPT | Performed by: INTERNAL MEDICINE

## 2018-08-02 PROCEDURE — 3023F SPIROM DOC REV: CPT | Performed by: INTERNAL MEDICINE

## 2018-08-02 PROCEDURE — 1036F TOBACCO NON-USER: CPT | Performed by: INTERNAL MEDICINE

## 2018-08-02 PROCEDURE — G8926 SPIRO NO PERF OR DOC: HCPCS | Performed by: INTERNAL MEDICINE

## 2018-08-02 PROCEDURE — 3017F COLORECTAL CA SCREEN DOC REV: CPT | Performed by: INTERNAL MEDICINE

## 2018-08-02 PROCEDURE — G8417 CALC BMI ABV UP PARAM F/U: HCPCS | Performed by: INTERNAL MEDICINE

## 2018-08-02 PROCEDURE — G8427 DOCREV CUR MEDS BY ELIG CLIN: HCPCS | Performed by: INTERNAL MEDICINE

## 2018-08-02 RX ORDER — MONTELUKAST SODIUM 10 MG/1
10 TABLET, FILM COATED ORAL NIGHTLY
Qty: 30 TABLET | Refills: 3
Start: 2018-08-02 | End: 2018-09-05 | Stop reason: ALTCHOICE

## 2018-08-02 NOTE — PROGRESS NOTES
Sleep apnea     no cpap       MEDICATIONS:   Current Outpatient Prescriptions   Medication Sig Dispense Refill    Umeclidinium Bromide (INCRUSE ELLIPTA) 62.5 MCG/INH AEPB Inhale 62.5 mcg into the lungs daily 1 each 3    Fluticasone Furoate-Vilanterol (BREO ELLIPTA) 200-25 MCG/INH AEPB Inhale 1 puff into the lungs 2 times daily      albuterol sulfate  (90 BASE) MCG/ACT inhaler Inhale 2 puffs into the lungs every 6 hours as needed for Wheezing      amLODIPine (NORVASC) 5 MG tablet Take by mouth      guaiFENesin (MUCINEX MAXIMUM STRENGTH) 1200 MG TB12 Take by mouth      furosemide (LASIX) 80 MG tablet TAKE 1 TABLET BY ORAL ROUTE EVERY DAY FOR 7 DAYS AND THEN RSUME 40 MG DOSE  0    potassium chloride (MICRO-K) 10 MEQ extended release capsule Take 10 mEq by mouth daily       tamsulosin (FLOMAX) 0.4 MG capsule Take 0.4 mg by mouth daily       INCRUSE ELLIPTA 62.5 MCG/INH AEPB INHALE 1 PUFF BY INHALATION ROUTE EVERY DAY AT THE SAME TIME EACH DAY  3    predniSONE (DELTASONE) 10 MG tablet Take 3 tabs for 3 days, then 2 tabs for 3 days, then 1 tab for 3 days, then stop 18 tablet 0    lisinopril (PRINIVIL;ZESTRIL) 20 MG tablet Take 1 tablet by mouth daily 30 tablet 0    DULoxetine (CYMBALTA) 20 MG extended release capsule Take 3 capsules by mouth 2 times daily      phenytoin (DILANTIN) 100 MG ER capsule Take 100 mg by mouth daily      emtricitabine-tenofovir alafenamide (DESCOVY) 200-25 MG TABS tablet Take 1 tablet by mouth daily 30 tablet 3    carvedilol (COREG) 25 MG tablet Take 25 mg by mouth 2 times daily      ranitidine (ZANTAC) 150 MG tablet Take 150 mg by mouth 2 times daily      ipratropium-albuterol (DUONEB) 0.5-2.5 (3) MG/3ML SOLN nebulizer solution Inhale 3 mLs into the lungs every 6 hours as needed for Shortness of Breath 360 mL 0    hydrOXYzine (VISTARIL) 25 MG capsule Take 25 mg by mouth 2 times daily      ritonavir (NORVIR) 100 MG capsule Take 100 mg by mouth nightly       cloNIDine y.o. year old male who is alert, oriented, cooperative and in no apparent distress. Head was normocephalic and atraumatic. EENT: Mallampati class :  Extraocular muscles intact. External canals are patent and no discharge was appreciated. Septum was midline,   mucosa was without erythema, exudates or cobblestoning. No thrush was noted. Neck: Supple without thyromegaly. No elevated JVP. Trachea was midline. No carotid bruits were auscultated. Respiratory:rhonchi and bibasilar rales     Cardiovascular: Regular without murmur, clicks, gallops or rubs. There is no left or right ventricular heave. Pulses:  Carotid, radial and femoral pulses were equally bilaterally. Abdomen: Slightly rounded and soft without organomegaly. No rebound, rigidity or guarding was appreciated. Lymphatic: No lymphadenopathy. Musculoskeletal: no joint deformity   Extremities: +2   Skin:  Warm and dry. Good color, turgor and pigmentation. No lesions or scars. Neurological/Psychiatric: The patient's general behavior, level of consciousness, thought content and emotional status is normal.  Cranial nerves II-XII are intact. DATA: Spirometry done in the office today demonstrates an FVC of 1.88  liters which is 54  % of predicted with an FEV1 of  0.87  liters which is  31 % of predicted. FEV1/FVC ratio is 46 %. Flow volume loop shows no signs of intrathoracic or extrathoracic obstruction. Impressions:   TLC 7.94  128  RV/TLC 74         IMPRESSION:    1-Very severe COPD   2-?  CHEN   3-CHF   4-severe  air trapping  5- micro nodularities with possible atypical infection     6- HIV disease          PLAN:        Patient is very pleasant 75-year-old male with history of HIV, multiple years history of smoking, has been having worsening shortness of breath and has also congestive heart failure normal EF but also has possible pulmonary hypertension    The patient has been having worsening hypoxia in addition to his PFT showing

## 2018-08-06 NOTE — PROGRESS NOTES
narcotics,  hypnotics, or any other substance that could worsen the apnea is  recommended.     Thank you kindly,        Leann Stone MD    D: 08/02/2018 17:49:51       T: 08/03/2018 0:56:54     [email or web address]  Job#: [id number]     Doc#: [id number]    CC:

## 2018-08-14 ENCOUNTER — TELEPHONE (OUTPATIENT)
Dept: PULMONOLOGY | Age: 58
End: 2018-08-14

## 2018-08-14 DIAGNOSIS — J44.9 CHRONIC OBSTRUCTIVE PULMONARY DISEASE, UNSPECIFIED COPD TYPE (HCC): ICD-10-CM

## 2018-08-14 DIAGNOSIS — J45.901 ASTHMA EXACERBATION WITH COPD (CHRONIC OBSTRUCTIVE PULMONARY DISEASE) (HCC): Primary | ICD-10-CM

## 2018-08-14 DIAGNOSIS — J44.1 ASTHMA EXACERBATION WITH COPD (CHRONIC OBSTRUCTIVE PULMONARY DISEASE) (HCC): Primary | ICD-10-CM

## 2018-08-14 DIAGNOSIS — J45.909 ASTHMA, INTRINSIC: ICD-10-CM

## 2018-08-14 RX ORDER — METHYLPREDNISOLONE SODIUM SUCCINATE 125 MG/2ML
125 INJECTION, POWDER, LYOPHILIZED, FOR SOLUTION INTRAMUSCULAR; INTRAVENOUS ONCE
Status: CANCELLED | OUTPATIENT
Start: 2018-08-14 | End: 2018-08-14

## 2018-08-14 RX ORDER — SODIUM CHLORIDE 0.9 % (FLUSH) 0.9 %
10 SYRINGE (ML) INJECTION PRN
Status: CANCELLED | OUTPATIENT
Start: 2018-08-14

## 2018-08-14 RX ORDER — EPINEPHRINE 0.3 MG/.3ML
0.3 INJECTION SUBCUTANEOUS ONCE
Qty: 0.3 ML | Refills: 1 | Status: SHIPPED
Start: 2018-08-14 | End: 2020-05-26 | Stop reason: ALTCHOICE

## 2018-08-14 RX ORDER — 0.9 % SODIUM CHLORIDE 0.9 %
10 VIAL (ML) INJECTION ONCE
Status: CANCELLED | OUTPATIENT
Start: 2018-08-14 | End: 2018-08-14

## 2018-08-14 RX ORDER — SODIUM CHLORIDE 0.9 % (FLUSH) 0.9 %
5 SYRINGE (ML) INJECTION PRN
Status: CANCELLED | OUTPATIENT
Start: 2018-08-14

## 2018-08-14 RX ORDER — SODIUM CHLORIDE 9 MG/ML
INJECTION, SOLUTION INTRAVENOUS CONTINUOUS
Status: CANCELLED | OUTPATIENT
Start: 2018-08-14

## 2018-08-14 RX ORDER — HEPARIN SODIUM (PORCINE) LOCK FLUSH IV SOLN 100 UNIT/ML 100 UNIT/ML
500 SOLUTION INTRAVENOUS PRN
Status: CANCELLED | OUTPATIENT
Start: 2018-08-14

## 2018-08-14 RX ORDER — EPINEPHRINE 1 MG/ML
0.3 INJECTION, SOLUTION, CONCENTRATE INTRAVENOUS PRN
Status: CANCELLED | OUTPATIENT
Start: 2018-08-14

## 2018-08-14 RX ORDER — DIPHENHYDRAMINE HYDROCHLORIDE 50 MG/ML
50 INJECTION INTRAMUSCULAR; INTRAVENOUS ONCE
Status: CANCELLED | OUTPATIENT
Start: 2018-08-14 | End: 2018-08-14

## 2018-08-14 NOTE — TELEPHONE ENCOUNTER
Infusion center was called and given information to schedule the patient for his first Xolair injection. An EpiPen script was generated and sent to Dr. Shazia Aleman for signature. Infusion Center will contact central scheduling for an appt date and then call the patient.

## 2018-08-15 ENCOUNTER — TELEPHONE (OUTPATIENT)
Dept: PULMONOLOGY | Age: 58
End: 2018-08-15

## 2018-08-15 NOTE — TELEPHONE ENCOUNTER
To home to advise pt/caregiver that EpiPen has been ordered for pt to take to infusion appt when it is scheduled to get Xolair injections. Advised pt that infusion center will be calling to schedule injection time. Pt answered and put caregiver Nisha on the phone right away and she reports they have already picked up the Epipen and she gave the pt a dose approx 10-15 min ago. Clarified with Letha Fernnadez that she administered that EpiPen and she confirmed \"Yes I did it the way the doctor ordered it\". Reviewed with Letha Fernandez that this medication was not intended to be given unless pt had a reaction to the Xolair when he received infusions in the future. Pt denies headache, dizziness or any tachycardia. Pt states \"I feel fine\". Spoke to Letha Fernandez at length about side effects and that Epipen medication acts quickly and doesn't last long if he did develop side effects of today's dose. Dr. Lulu Lemus notified of today's medication administration in the home. Advised Nisha I would be calling back in a little while to check on pt.

## 2018-08-16 ENCOUNTER — HOSPITAL ENCOUNTER (OUTPATIENT)
Dept: SLEEP CENTER | Age: 58
Discharge: HOME | End: 2018-08-16
Payer: MEDICAID

## 2018-08-16 DIAGNOSIS — G47.33 OSA (OBSTRUCTIVE SLEEP APNEA): Primary | ICD-10-CM

## 2018-08-16 PROCEDURE — 95811 POLYSOM 6/>YRS CPAP 4/> PARM: CPT

## 2018-08-22 ENCOUNTER — HOSPITAL ENCOUNTER (OUTPATIENT)
Dept: INFUSION THERAPY | Age: 58
Setting detail: INFUSION SERIES
Discharge: HOME | End: 2018-08-22
Payer: MEDICAID

## 2018-08-22 VITALS
HEART RATE: 68 BPM | TEMPERATURE: 97.6 F | RESPIRATION RATE: 18 BRPM | OXYGEN SATURATION: 95 % | DIASTOLIC BLOOD PRESSURE: 87 MMHG | SYSTOLIC BLOOD PRESSURE: 133 MMHG

## 2018-08-22 DIAGNOSIS — J44.1 ASTHMA EXACERBATION WITH COPD (CHRONIC OBSTRUCTIVE PULMONARY DISEASE) (HCC): ICD-10-CM

## 2018-08-22 DIAGNOSIS — J45.901 ASTHMA EXACERBATION WITH COPD (CHRONIC OBSTRUCTIVE PULMONARY DISEASE) (HCC): ICD-10-CM

## 2018-08-22 DIAGNOSIS — J45.909 ASTHMA, INTRINSIC: ICD-10-CM

## 2018-08-22 PROCEDURE — 2580000003 HC RX 258: Performed by: INTERNAL MEDICINE

## 2018-08-22 PROCEDURE — 6360000002 HC RX W HCPCS: Performed by: INTERNAL MEDICINE

## 2018-08-22 PROCEDURE — 96372 THER/PROPH/DIAG INJ SC/IM: CPT

## 2018-08-22 RX ORDER — HEPARIN SODIUM (PORCINE) LOCK FLUSH IV SOLN 100 UNIT/ML 100 UNIT/ML
500 SOLUTION INTRAVENOUS PRN
Status: CANCELLED | OUTPATIENT
Start: 2018-08-22

## 2018-08-22 RX ORDER — SODIUM CHLORIDE 0.9 % (FLUSH) 0.9 %
10 SYRINGE (ML) INJECTION PRN
Status: CANCELLED | OUTPATIENT
Start: 2018-08-22

## 2018-08-22 RX ORDER — SODIUM CHLORIDE 0.9 % (FLUSH) 0.9 %
5 SYRINGE (ML) INJECTION PRN
Status: DISCONTINUED | OUTPATIENT
Start: 2018-08-22 | End: 2018-08-23 | Stop reason: HOSPADM

## 2018-08-22 RX ORDER — DIPHENHYDRAMINE HYDROCHLORIDE 50 MG/ML
50 INJECTION INTRAMUSCULAR; INTRAVENOUS ONCE
Status: CANCELLED | OUTPATIENT
Start: 2018-08-22 | End: 2018-08-22

## 2018-08-22 RX ORDER — HEPARIN SODIUM (PORCINE) LOCK FLUSH IV SOLN 100 UNIT/ML 100 UNIT/ML
500 SOLUTION INTRAVENOUS PRN
Status: DISCONTINUED | OUTPATIENT
Start: 2018-08-22 | End: 2018-08-23 | Stop reason: HOSPADM

## 2018-08-22 RX ORDER — SODIUM CHLORIDE 0.9 % (FLUSH) 0.9 %
5 SYRINGE (ML) INJECTION PRN
Status: CANCELLED | OUTPATIENT
Start: 2018-08-22

## 2018-08-22 RX ORDER — METHYLPREDNISOLONE SODIUM SUCCINATE 125 MG/2ML
125 INJECTION, POWDER, LYOPHILIZED, FOR SOLUTION INTRAMUSCULAR; INTRAVENOUS ONCE
Status: CANCELLED | OUTPATIENT
Start: 2018-08-22 | End: 2018-08-22

## 2018-08-22 RX ORDER — SODIUM CHLORIDE 9 MG/ML
INJECTION, SOLUTION INTRAVENOUS CONTINUOUS
Status: CANCELLED | OUTPATIENT
Start: 2018-08-22

## 2018-08-22 RX ORDER — 0.9 % SODIUM CHLORIDE 0.9 %
10 VIAL (ML) INJECTION ONCE
Status: CANCELLED | OUTPATIENT
Start: 2018-08-22 | End: 2018-08-22

## 2018-08-22 RX ORDER — EPINEPHRINE 1 MG/ML
0.3 INJECTION, SOLUTION, CONCENTRATE INTRAVENOUS PRN
Status: CANCELLED | OUTPATIENT
Start: 2018-08-22

## 2018-08-22 RX ORDER — SODIUM CHLORIDE 0.9 % (FLUSH) 0.9 %
10 SYRINGE (ML) INJECTION PRN
Status: DISCONTINUED | OUTPATIENT
Start: 2018-08-22 | End: 2018-08-23 | Stop reason: HOSPADM

## 2018-08-22 RX ADMIN — OMALIZUMAB 375 MG: 202.5 INJECTION, SOLUTION SUBCUTANEOUS at 11:46

## 2018-08-27 NOTE — PROGRESS NOTES
[address]                                SLEEP STUDY REPORT    PATIENT NAME: Barby Armijo                  :        1960  MED REC NO:   [id number]                            ROOM:  ACCOUNT NO:   [de-identified]                           ADMIT DATE: 2018  PROVIDER:     Nany Hatch MD    DATE OF STUDY:  2018    ATTENDING PROVIDER:  Malika Mason CNP. SLEEP SPECIALIST:  Nany Hatch MD    INDICATION:  CPAP titration. SLEEP ARCHITECTURE:   minutes,  minutes, the sleep efficiency  is moderately decreased at 75%. SLEEP STAGES:  N1 3%, N2 30%, N3 7%, REM 58%. SLEEP LATENCY:  N1 0 minutes, N2 1 minute, N3 296 minutes,  minutes. VENTILATION SUMMARY:  With an apnea plus hypopnea index of 4. PLMS SUMMARY WITH AROUSALS:  With an index of 0. OXYGENATION SUMMARY:  Mean 90%. Note that the majority of the time, the  saturations were below 89%. HEART RATE SUMMARY:  With a mean of 67 beats per minute. CONCLUSION:  This titration was performed as per the [address] [place]. The best results were obtained with a  BiPAP system of 20/16 cm of water. The mask used was a ResMed AirFit F20  full face mask, size medium. Heated humidity and ramping of the pressures  will improve compliance. Please also note that there was evidence of  significant desaturations, I will strongly recommend recheck with an all  night oximeter with the BiPAP system on, whether this patient may benefit  from oxygen therapy.     Thank you kindly,        Nellie Sotelo MD    D: 2018 16:12:21       T: 2018 23:30:50     [email or web address]  Job#: [id number]     Doc#: [id number]    CC:

## 2018-09-05 ENCOUNTER — HOSPITAL ENCOUNTER (OUTPATIENT)
Dept: INFUSION THERAPY | Age: 58
Setting detail: INFUSION SERIES
Discharge: HOME | End: 2018-09-05
Payer: MEDICAID

## 2018-09-05 VITALS
SYSTOLIC BLOOD PRESSURE: 134 MMHG | OXYGEN SATURATION: 94 % | TEMPERATURE: 97.8 F | HEART RATE: 70 BPM | RESPIRATION RATE: 18 BRPM | DIASTOLIC BLOOD PRESSURE: 80 MMHG

## 2018-09-05 DIAGNOSIS — J45.901 ASTHMA EXACERBATION WITH COPD (CHRONIC OBSTRUCTIVE PULMONARY DISEASE) (HCC): ICD-10-CM

## 2018-09-05 DIAGNOSIS — J44.1 ASTHMA EXACERBATION WITH COPD (CHRONIC OBSTRUCTIVE PULMONARY DISEASE) (HCC): ICD-10-CM

## 2018-09-05 DIAGNOSIS — J45.909 ASTHMA, INTRINSIC: ICD-10-CM

## 2018-09-05 PROCEDURE — 6360000002 HC RX W HCPCS: Performed by: INTERNAL MEDICINE

## 2018-09-05 PROCEDURE — 2580000003 HC RX 258: Performed by: INTERNAL MEDICINE

## 2018-09-05 PROCEDURE — 96372 THER/PROPH/DIAG INJ SC/IM: CPT

## 2018-09-05 RX ORDER — 0.9 % SODIUM CHLORIDE 0.9 %
10 VIAL (ML) INJECTION ONCE
Status: CANCELLED | OUTPATIENT
Start: 2018-09-05 | End: 2018-09-05

## 2018-09-05 RX ORDER — DIPHENHYDRAMINE HYDROCHLORIDE 50 MG/ML
50 INJECTION INTRAMUSCULAR; INTRAVENOUS ONCE
Status: CANCELLED | OUTPATIENT
Start: 2018-09-05 | End: 2018-09-05

## 2018-09-05 RX ORDER — SODIUM CHLORIDE 9 MG/ML
INJECTION, SOLUTION INTRAVENOUS CONTINUOUS
Status: CANCELLED | OUTPATIENT
Start: 2018-09-05

## 2018-09-05 RX ORDER — EPINEPHRINE 1 MG/ML
0.3 INJECTION, SOLUTION, CONCENTRATE INTRAVENOUS PRN
Status: CANCELLED | OUTPATIENT
Start: 2018-09-05

## 2018-09-05 RX ORDER — SODIUM CHLORIDE 0.9 % (FLUSH) 0.9 %
10 SYRINGE (ML) INJECTION PRN
Status: CANCELLED | OUTPATIENT
Start: 2018-09-05

## 2018-09-05 RX ORDER — HEPARIN SODIUM (PORCINE) LOCK FLUSH IV SOLN 100 UNIT/ML 100 UNIT/ML
500 SOLUTION INTRAVENOUS PRN
Status: CANCELLED | OUTPATIENT
Start: 2018-09-05

## 2018-09-05 RX ORDER — METHYLPREDNISOLONE SODIUM SUCCINATE 125 MG/2ML
125 INJECTION, POWDER, LYOPHILIZED, FOR SOLUTION INTRAMUSCULAR; INTRAVENOUS ONCE
Status: CANCELLED | OUTPATIENT
Start: 2018-09-05 | End: 2018-09-05

## 2018-09-05 RX ORDER — SODIUM CHLORIDE 0.9 % (FLUSH) 0.9 %
5 SYRINGE (ML) INJECTION PRN
Status: CANCELLED | OUTPATIENT
Start: 2018-09-05

## 2018-09-05 RX ADMIN — WATER 375 MG: 1 INJECTION INTRAMUSCULAR; INTRAVENOUS; SUBCUTANEOUS at 11:17

## 2018-09-19 ENCOUNTER — HOSPITAL ENCOUNTER (OUTPATIENT)
Dept: INFUSION THERAPY | Age: 58
Setting detail: INFUSION SERIES
Discharge: HOME | End: 2018-09-19
Payer: MEDICAID

## 2018-09-19 VITALS
SYSTOLIC BLOOD PRESSURE: 163 MMHG | DIASTOLIC BLOOD PRESSURE: 94 MMHG | OXYGEN SATURATION: 92 % | RESPIRATION RATE: 18 BRPM | TEMPERATURE: 98.3 F | HEART RATE: 70 BPM

## 2018-09-19 DIAGNOSIS — J45.901 ASTHMA EXACERBATION WITH COPD (CHRONIC OBSTRUCTIVE PULMONARY DISEASE) (HCC): ICD-10-CM

## 2018-09-19 DIAGNOSIS — J45.909 ASTHMA, INTRINSIC: ICD-10-CM

## 2018-09-19 DIAGNOSIS — J44.1 ASTHMA EXACERBATION WITH COPD (CHRONIC OBSTRUCTIVE PULMONARY DISEASE) (HCC): ICD-10-CM

## 2018-09-19 PROCEDURE — 96372 THER/PROPH/DIAG INJ SC/IM: CPT

## 2018-09-19 PROCEDURE — 6360000002 HC RX W HCPCS: Performed by: INTERNAL MEDICINE

## 2018-09-19 PROCEDURE — 2580000003 HC RX 258: Performed by: INTERNAL MEDICINE

## 2018-09-19 RX ORDER — EPINEPHRINE 1 MG/ML
0.3 INJECTION, SOLUTION, CONCENTRATE INTRAVENOUS PRN
Status: CANCELLED | OUTPATIENT
Start: 2018-09-19

## 2018-09-19 RX ORDER — 0.9 % SODIUM CHLORIDE 0.9 %
10 VIAL (ML) INJECTION ONCE
Status: CANCELLED | OUTPATIENT
Start: 2018-09-19 | End: 2018-09-19

## 2018-09-19 RX ORDER — HEPARIN SODIUM (PORCINE) LOCK FLUSH IV SOLN 100 UNIT/ML 100 UNIT/ML
500 SOLUTION INTRAVENOUS PRN
Status: CANCELLED | OUTPATIENT
Start: 2018-09-19

## 2018-09-19 RX ORDER — DIPHENHYDRAMINE HYDROCHLORIDE 50 MG/ML
50 INJECTION INTRAMUSCULAR; INTRAVENOUS ONCE
Status: CANCELLED | OUTPATIENT
Start: 2018-09-19 | End: 2018-09-19

## 2018-09-19 RX ORDER — SODIUM CHLORIDE 9 MG/ML
INJECTION, SOLUTION INTRAVENOUS CONTINUOUS
Status: CANCELLED | OUTPATIENT
Start: 2018-09-19

## 2018-09-19 RX ORDER — SODIUM CHLORIDE 0.9 % (FLUSH) 0.9 %
5 SYRINGE (ML) INJECTION PRN
Status: CANCELLED | OUTPATIENT
Start: 2018-09-19

## 2018-09-19 RX ORDER — METHYLPREDNISOLONE SODIUM SUCCINATE 125 MG/2ML
125 INJECTION, POWDER, LYOPHILIZED, FOR SOLUTION INTRAMUSCULAR; INTRAVENOUS ONCE
Status: CANCELLED | OUTPATIENT
Start: 2018-09-19 | End: 2018-09-19

## 2018-09-19 RX ORDER — SODIUM CHLORIDE 0.9 % (FLUSH) 0.9 %
10 SYRINGE (ML) INJECTION PRN
Status: CANCELLED | OUTPATIENT
Start: 2018-09-19

## 2018-09-19 RX ADMIN — OMALIZUMAB 375 MG: 202.5 INJECTION, SOLUTION SUBCUTANEOUS at 11:38

## 2018-09-19 ASSESSMENT — PAIN DESCRIPTION - FREQUENCY: FREQUENCY: OTHER (COMMENT)

## 2018-09-19 ASSESSMENT — PAIN SCALES - GENERAL: PAINLEVEL_OUTOF10: 6

## 2018-09-19 ASSESSMENT — PAIN DESCRIPTION - DESCRIPTORS: DESCRIPTORS: ACHING

## 2018-09-19 ASSESSMENT — PAIN DESCRIPTION - PROGRESSION: CLINICAL_PROGRESSION: NOT CHANGED

## 2018-09-19 ASSESSMENT — PAIN DESCRIPTION - ONSET: ONSET: GRADUAL

## 2018-09-19 ASSESSMENT — PAIN DESCRIPTION - LOCATION: LOCATION: SHOULDER

## 2018-09-19 ASSESSMENT — PAIN DESCRIPTION - ORIENTATION: ORIENTATION: LEFT

## 2018-09-19 ASSESSMENT — PAIN DESCRIPTION - PAIN TYPE: TYPE: CHRONIC PAIN

## 2018-09-19 NOTE — PROGRESS NOTES
[facility] Xolair Allergy Control Test    Prescribing Physician:    Was patient administered Xolair on appointed administration date? [x] yes [] no    Reason for not administering Xolair :[] Illness [] No show [] Other:    Was there any reaction to mediation? [] yes [x] no    If Yes: Reactions:      1. In the past 4 weeks, how much of the time did your asthma keep you from getting as much done as usual at work, school or at home? [] 1 [] 2 [x] 3 [] 4 [] 5   Score:__3____    2. During the past 4 weeks, how often have you had shortness of breath? [] 1 [] 2 [] 3 [x] 4 [] 5   Score:_4_____    3. During the past four weeks, how often did your asthma symptoms (wheezing, coughing, shortness of breath, chest tightness, or pain) wake you up at night or earlier than usual in the morning? [] 1 [] 2 [x] 3 [x] 4 [] 5   Score:__3____    4. During the past four weeks, how often have you used your rescue inhaler or nebulizer medication? [] 1 [] 2 [x] 3 [] 4 [] 5   Score:__3____    5. How would you rate your asthma control during the past 4 weeks? [] 1 [] 2 [x] 3 [] 4 [] 5   Score:___3___        Total Score:_________16_______        To score the Asthma control test: Each response to the 5 ACT questions has a point value from 1-5 as shown on the form. To score the ACT, add up the point value for each response to the 5 questions.

## 2018-09-19 NOTE — PROGRESS NOTES
Observation period of 2 hours post Xolair injection completed. Patient tolerated very well. Oxygen saturation level is good. Patient declined a copy of his discharge instructions and discharged in stable condition.

## 2018-10-03 ENCOUNTER — HOSPITAL ENCOUNTER (OUTPATIENT)
Dept: INFUSION THERAPY | Age: 58
Setting detail: INFUSION SERIES
Discharge: HOME | End: 2018-10-03
Payer: MEDICAID

## 2018-10-03 VITALS
TEMPERATURE: 98.1 F | SYSTOLIC BLOOD PRESSURE: 144 MMHG | RESPIRATION RATE: 18 BRPM | DIASTOLIC BLOOD PRESSURE: 68 MMHG | OXYGEN SATURATION: 93 % | HEART RATE: 68 BPM

## 2018-10-03 DIAGNOSIS — J45.901 ASTHMA EXACERBATION WITH COPD (CHRONIC OBSTRUCTIVE PULMONARY DISEASE) (HCC): ICD-10-CM

## 2018-10-03 DIAGNOSIS — J44.1 ASTHMA EXACERBATION WITH COPD (CHRONIC OBSTRUCTIVE PULMONARY DISEASE) (HCC): ICD-10-CM

## 2018-10-03 DIAGNOSIS — J45.909 ASTHMA, INTRINSIC: ICD-10-CM

## 2018-10-03 PROCEDURE — 6360000002 HC RX W HCPCS: Performed by: INTERNAL MEDICINE

## 2018-10-03 PROCEDURE — 2580000003 HC RX 258: Performed by: INTERNAL MEDICINE

## 2018-10-03 PROCEDURE — 96372 THER/PROPH/DIAG INJ SC/IM: CPT

## 2018-10-03 PROCEDURE — 6360000002 HC RX W HCPCS

## 2018-10-03 PROCEDURE — 6370000000 HC RX 637 (ALT 250 FOR IP)

## 2018-10-03 PROCEDURE — 96374 THER/PROPH/DIAG INJ IV PUSH: CPT

## 2018-10-03 RX ORDER — DIPHENHYDRAMINE HYDROCHLORIDE 50 MG/ML
50 INJECTION INTRAMUSCULAR; INTRAVENOUS ONCE
Status: CANCELLED | OUTPATIENT
Start: 2018-10-03 | End: 2018-10-03

## 2018-10-03 RX ORDER — EPINEPHRINE 1 MG/ML
0.3 INJECTION, SOLUTION, CONCENTRATE INTRAVENOUS PRN
Status: CANCELLED | OUTPATIENT
Start: 2018-10-03

## 2018-10-03 RX ORDER — DIPHENHYDRAMINE HCL 25 MG
25 TABLET ORAL ONCE
Status: CANCELLED
Start: 2018-10-03 | End: 2018-10-03

## 2018-10-03 RX ORDER — HEPARIN SODIUM (PORCINE) LOCK FLUSH IV SOLN 100 UNIT/ML 100 UNIT/ML
500 SOLUTION INTRAVENOUS PRN
Status: CANCELLED | OUTPATIENT
Start: 2018-10-03

## 2018-10-03 RX ORDER — SODIUM CHLORIDE 0.9 % (FLUSH) 0.9 %
SYRINGE (ML) INJECTION
Status: DISPENSED
Start: 2018-10-03 | End: 2018-10-04

## 2018-10-03 RX ORDER — 0.9 % SODIUM CHLORIDE 0.9 %
10 VIAL (ML) INJECTION ONCE
Status: CANCELLED | OUTPATIENT
Start: 2018-10-03 | End: 2018-10-03

## 2018-10-03 RX ORDER — SODIUM CHLORIDE 0.9 % (FLUSH) 0.9 %
SYRINGE (ML) INJECTION
Status: DISCONTINUED
Start: 2018-10-03 | End: 2018-10-04 | Stop reason: HOSPADM

## 2018-10-03 RX ORDER — SODIUM CHLORIDE 0.9 % (FLUSH) 0.9 %
10 SYRINGE (ML) INJECTION PRN
Status: CANCELLED | OUTPATIENT
Start: 2018-10-03

## 2018-10-03 RX ORDER — METHYLPREDNISOLONE SODIUM SUCCINATE 125 MG/2ML
INJECTION, POWDER, LYOPHILIZED, FOR SOLUTION INTRAMUSCULAR; INTRAVENOUS
Status: COMPLETED
Start: 2018-10-03 | End: 2018-10-03

## 2018-10-03 RX ORDER — METHYLPREDNISOLONE SODIUM SUCCINATE 125 MG/2ML
125 INJECTION, POWDER, LYOPHILIZED, FOR SOLUTION INTRAMUSCULAR; INTRAVENOUS ONCE
Status: CANCELLED | OUTPATIENT
Start: 2018-10-03 | End: 2018-10-03

## 2018-10-03 RX ORDER — METHYLPREDNISOLONE SODIUM SUCCINATE 125 MG/2ML
125 INJECTION, POWDER, LYOPHILIZED, FOR SOLUTION INTRAMUSCULAR; INTRAVENOUS ONCE
Status: CANCELLED
Start: 2018-10-03 | End: 2018-10-03

## 2018-10-03 RX ORDER — DIPHENHYDRAMINE HCL 25 MG
TABLET ORAL
Status: COMPLETED
Start: 2018-10-03 | End: 2018-10-03

## 2018-10-03 RX ORDER — SODIUM CHLORIDE 0.9 % (FLUSH) 0.9 %
5 SYRINGE (ML) INJECTION PRN
Status: CANCELLED | OUTPATIENT
Start: 2018-10-03

## 2018-10-03 RX ORDER — SODIUM CHLORIDE 9 MG/ML
INJECTION, SOLUTION INTRAVENOUS CONTINUOUS
Status: CANCELLED | OUTPATIENT
Start: 2018-10-03

## 2018-10-03 RX ADMIN — METHYLPREDNISOLONE SODIUM SUCCINATE 125 MG: 125 INJECTION, POWDER, FOR SOLUTION INTRAMUSCULAR; INTRAVENOUS at 13:16

## 2018-10-03 RX ADMIN — DIPHENHYDRAMINE HCL 25 MG: 25 TABLET ORAL at 13:14

## 2018-10-03 RX ADMIN — OMALIZUMAB 375 MG: 202.5 INJECTION, SOLUTION SUBCUTANEOUS at 13:52

## 2018-10-03 NOTE — PROGRESS NOTES
Patient admitted to infusion services for Xolair injection. Patient states that in the last month or more he has been complaining of itching of his skin, especially his back and shoulders. He feels at times that he has a rash. He explains that he feels that his skin is crawling and the itching keeps him up at night. His wife states that he has trouble with anxiety and when he takes his vistaril he is less anxious but he continues to complain of itching. He state that the vistaril does not help his itching. He also states that he takes several showers a day to relieve the itching. Call placed to Dr. Mojgan Valentin about this.

## 2018-10-03 NOTE — PROGRESS NOTES
[facility] Xolair Allergy Control Test    Prescribing Physician:    Was patient administered Xolair on appointed administration date? [x] yes [] no    Reason for not administering Xolair :[] Illness [] No show [] Other:    Was there any reaction to mediation? [] yes [] no    If Yes: Reactions:      1. In the past 4 weeks, how much of the time did your asthma keep you from getting as much done as usual at work, school or at home? [] 1 [] 2 [] 3 [] 4 [x] 5   Score:___5___    2. During the past 4 weeks, how often have you had shortness of breath? [] 1 [] 2 [x] 3 [] 4 [] 5   Score:___3___    3. During the past four weeks, how often did your asthma symptoms (wheezing, coughing, shortness of breath, chest tightness, or pain) wake you up at night or earlier than usual in the morning? [] 1 [] 2 [x] 3 [] 4 [] 5   Score:___3___    4. During the past four weeks, how often have you used your rescue inhaler or nebulizer medication? [] 1 [x] 2 [] 3 [] 4 [] 5   Score:___2___    5. How would you rate your asthma control during the past 4 weeks? [] 1 [] 2 [] 3 [x] 4 [] 5   Score:___4___        Total Score:_____17___________        To score the Asthma control test: Each response to the 5 ACT questions has a point value from 1-5 as shown on the form. To score the ACT, add up the point value for each response to the 5 questions.

## 2018-10-17 ENCOUNTER — HOSPITAL ENCOUNTER (OUTPATIENT)
Dept: INFUSION THERAPY | Age: 58
Setting detail: INFUSION SERIES
Discharge: HOME | End: 2018-10-17
Payer: MEDICAID

## 2018-10-17 VITALS
OXYGEN SATURATION: 95 % | HEART RATE: 67 BPM | SYSTOLIC BLOOD PRESSURE: 146 MMHG | DIASTOLIC BLOOD PRESSURE: 87 MMHG | TEMPERATURE: 97.6 F | RESPIRATION RATE: 18 BRPM

## 2018-10-17 DIAGNOSIS — J45.909 ASTHMA, INTRINSIC: ICD-10-CM

## 2018-10-17 DIAGNOSIS — J45.901 ASTHMA EXACERBATION WITH COPD (CHRONIC OBSTRUCTIVE PULMONARY DISEASE) (HCC): ICD-10-CM

## 2018-10-17 DIAGNOSIS — J44.1 ASTHMA EXACERBATION WITH COPD (CHRONIC OBSTRUCTIVE PULMONARY DISEASE) (HCC): ICD-10-CM

## 2018-10-17 PROCEDURE — 2580000003 HC RX 258: Performed by: INTERNAL MEDICINE

## 2018-10-17 PROCEDURE — 96372 THER/PROPH/DIAG INJ SC/IM: CPT

## 2018-10-17 PROCEDURE — 6360000002 HC RX W HCPCS: Performed by: INTERNAL MEDICINE

## 2018-10-17 PROCEDURE — 6370000000 HC RX 637 (ALT 250 FOR IP): Performed by: INTERNAL MEDICINE

## 2018-10-17 PROCEDURE — 96374 THER/PROPH/DIAG INJ IV PUSH: CPT

## 2018-10-17 RX ORDER — DIPHENHYDRAMINE HYDROCHLORIDE 50 MG/ML
50 INJECTION INTRAMUSCULAR; INTRAVENOUS ONCE
Status: CANCELLED | OUTPATIENT
Start: 2018-10-17 | End: 2018-10-17

## 2018-10-17 RX ORDER — METHYLPREDNISOLONE SODIUM SUCCINATE 125 MG/2ML
125 INJECTION, POWDER, LYOPHILIZED, FOR SOLUTION INTRAMUSCULAR; INTRAVENOUS ONCE
Status: CANCELLED
Start: 2018-10-17 | End: 2018-10-17

## 2018-10-17 RX ORDER — METHYLPREDNISOLONE SODIUM SUCCINATE 125 MG/2ML
125 INJECTION, POWDER, LYOPHILIZED, FOR SOLUTION INTRAMUSCULAR; INTRAVENOUS ONCE
Status: CANCELLED | OUTPATIENT
Start: 2018-10-17 | End: 2018-10-17

## 2018-10-17 RX ORDER — METHYLPREDNISOLONE SODIUM SUCCINATE 125 MG/2ML
125 INJECTION, POWDER, LYOPHILIZED, FOR SOLUTION INTRAMUSCULAR; INTRAVENOUS ONCE
Status: DISCONTINUED | OUTPATIENT
Start: 2018-10-17 | End: 2018-10-18 | Stop reason: HOSPADM

## 2018-10-17 RX ORDER — SODIUM CHLORIDE 0.9 % (FLUSH) 0.9 %
10 SYRINGE (ML) INJECTION PRN
Status: CANCELLED | OUTPATIENT
Start: 2018-10-17

## 2018-10-17 RX ORDER — SODIUM CHLORIDE 9 MG/ML
INJECTION, SOLUTION INTRAVENOUS CONTINUOUS
Status: CANCELLED | OUTPATIENT
Start: 2018-10-17

## 2018-10-17 RX ORDER — DIPHENHYDRAMINE HCL 25 MG
25 TABLET ORAL ONCE
Status: COMPLETED | OUTPATIENT
Start: 2018-10-17 | End: 2018-10-17

## 2018-10-17 RX ORDER — DIPHENHYDRAMINE HCL 25 MG
25 TABLET ORAL ONCE
Status: CANCELLED
Start: 2018-10-17 | End: 2018-10-17

## 2018-10-17 RX ORDER — 0.9 % SODIUM CHLORIDE 0.9 %
10 VIAL (ML) INJECTION ONCE
Status: CANCELLED | OUTPATIENT
Start: 2018-10-17 | End: 2018-10-17

## 2018-10-17 RX ORDER — METHYLPREDNISOLONE SODIUM SUCCINATE 125 MG/2ML
125 INJECTION, POWDER, LYOPHILIZED, FOR SOLUTION INTRAMUSCULAR; INTRAVENOUS DAILY
Status: DISCONTINUED | OUTPATIENT
Start: 2018-10-17 | End: 2018-10-18 | Stop reason: HOSPADM

## 2018-10-17 RX ORDER — SODIUM CHLORIDE 0.9 % (FLUSH) 0.9 %
5 SYRINGE (ML) INJECTION PRN
Status: CANCELLED | OUTPATIENT
Start: 2018-10-17

## 2018-10-17 RX ORDER — EPINEPHRINE 1 MG/ML
0.3 INJECTION, SOLUTION, CONCENTRATE INTRAVENOUS PRN
Status: CANCELLED | OUTPATIENT
Start: 2018-10-17

## 2018-10-17 RX ORDER — HEPARIN SODIUM (PORCINE) LOCK FLUSH IV SOLN 100 UNIT/ML 100 UNIT/ML
500 SOLUTION INTRAVENOUS PRN
Status: CANCELLED | OUTPATIENT
Start: 2018-10-17

## 2018-10-17 RX ORDER — SODIUM CHLORIDE 0.9 % (FLUSH) 0.9 %
SYRINGE (ML) INJECTION
Status: DISPENSED
Start: 2018-10-17 | End: 2018-10-17

## 2018-10-17 RX ADMIN — DIPHENHYDRAMINE HCL 25 MG: 25 TABLET ORAL at 11:39

## 2018-10-17 RX ADMIN — METHYLPREDNISOLONE SODIUM SUCCINATE 125 MG: 125 INJECTION, POWDER, FOR SOLUTION INTRAMUSCULAR; INTRAVENOUS at 11:51

## 2018-10-17 RX ADMIN — OMALIZUMAB 375 MG: 202.5 INJECTION, SOLUTION SUBCUTANEOUS at 12:18

## 2018-10-17 NOTE — PROGRESS NOTES
[facility] Xolair Allergy Control Test    Prescribing Physician:    Was patient administered Xolair on appointed administration date? [x] yes [] no    Reason for not administering Xolair :[] Illness [] No show [] Other:    Was there any reaction to mediation? [] yes [x] no    If Yes: Reactions:      1. In the past 4 weeks, how much of the time did your asthma keep you from getting as much done as usual at work, school or at home? [] 1 [] 2 [] 3 [x] 4 [] 5   Score:_4_____    2. During the past 4 weeks, how often have you had shortness of breath? [] 1 [] 2 [x] 3 [] 4 [] 5   Score:_3_____    3. During the past four weeks, how often did your asthma symptoms (wheezing, coughing, shortness of breath, chest tightness, or pain) wake you up at night or earlier than usual in the morning? [] 1 [x] 2 [] 3 [] 4 [] 5   Score:_2_____    4. During the past four weeks, how often have you used your rescue inhaler or nebulizer medication? [] 1 [x] 2 [] 3 [] 4 [] 5   Score:_2_____    5. How would you rate your asthma control during the past 4 weeks? [] 1 [] 2 [x] 3 [] 4 [] 5   Score:__3____        Total Score:_____14___________        To score the Asthma control test: Each response to the 5 ACT questions has a point value from 1-5 as shown on the form. To score the ACT, add up the point value for each response to the 5 questions.

## 2018-10-23 ENCOUNTER — HOSPITAL ENCOUNTER (EMERGENCY)
Age: 58
Discharge: HOME | End: 2018-10-23
Attending: EMERGENCY MEDICINE
Payer: MEDICAID

## 2018-10-23 ENCOUNTER — APPOINTMENT (OUTPATIENT)
Dept: GENERAL RADIOLOGY | Age: 58
End: 2018-10-23
Payer: MEDICAID

## 2018-10-23 VITALS
RESPIRATION RATE: 18 BRPM | SYSTOLIC BLOOD PRESSURE: 127 MMHG | HEART RATE: 73 BPM | OXYGEN SATURATION: 95 % | BODY MASS INDEX: 26.52 KG/M2 | HEIGHT: 66 IN | WEIGHT: 165 LBS | TEMPERATURE: 97.1 F | DIASTOLIC BLOOD PRESSURE: 74 MMHG

## 2018-10-23 DIAGNOSIS — J45.909 MILD ASTHMA, UNSPECIFIED WHETHER COMPLICATED, UNSPECIFIED WHETHER PERSISTENT: Primary | ICD-10-CM

## 2018-10-23 LAB
EKG ATRIAL RATE: 72 BPM
EKG P AXIS: 81 DEGREES
EKG P-R INTERVAL: 176 MS
EKG Q-T INTERVAL: 398 MS
EKG QRS DURATION: 90 MS
EKG QTC CALCULATION (BAZETT): 435 MS
EKG R AXIS: 58 DEGREES
EKG T AXIS: 59 DEGREES
EKG VENTRICULAR RATE: 72 BPM

## 2018-10-23 PROCEDURE — 99284 EMERGENCY DEPT VISIT MOD MDM: CPT

## 2018-10-23 PROCEDURE — 71046 X-RAY EXAM CHEST 2 VIEWS: CPT

## 2018-10-23 PROCEDURE — 6370000000 HC RX 637 (ALT 250 FOR IP): Performed by: EMERGENCY MEDICINE

## 2018-10-23 PROCEDURE — G0383 LEV 4 HOSP TYPE B ED VISIT: HCPCS

## 2018-10-23 RX ORDER — IPRATROPIUM BROMIDE AND ALBUTEROL SULFATE 2.5; .5 MG/3ML; MG/3ML
1 SOLUTION RESPIRATORY (INHALATION) ONCE
Status: COMPLETED | OUTPATIENT
Start: 2018-10-23 | End: 2018-10-23

## 2018-10-23 RX ORDER — PREDNISONE 20 MG/1
40 TABLET ORAL ONCE
Status: COMPLETED | OUTPATIENT
Start: 2018-10-23 | End: 2018-10-23

## 2018-10-23 RX ORDER — PREDNISONE 20 MG/1
40 TABLET ORAL DAILY
Qty: 8 TABLET | Refills: 0 | Status: SHIPPED | OUTPATIENT
Start: 2018-10-23 | End: 2018-10-27

## 2018-10-23 RX ADMIN — IPRATROPIUM BROMIDE AND ALBUTEROL SULFATE 1 AMPULE: .5; 3 SOLUTION RESPIRATORY (INHALATION) at 14:17

## 2018-10-23 RX ADMIN — PREDNISONE 40 MG: 20 TABLET ORAL at 14:15

## 2018-10-23 ASSESSMENT — PAIN DESCRIPTION - LOCATION: LOCATION: GENERALIZED

## 2018-10-23 ASSESSMENT — PAIN DESCRIPTION - ONSET: ONSET: ON-GOING

## 2018-10-23 ASSESSMENT — PAIN DESCRIPTION - FREQUENCY: FREQUENCY: CONTINUOUS

## 2018-10-23 ASSESSMENT — PAIN DESCRIPTION - PAIN TYPE: TYPE: ACUTE PAIN

## 2018-10-23 ASSESSMENT — PAIN DESCRIPTION - DESCRIPTORS: DESCRIPTORS: ACHING

## 2018-10-23 ASSESSMENT — PAIN SCALES - GENERAL: PAINLEVEL_OUTOF10: 9

## 2018-10-23 ASSESSMENT — PAIN DESCRIPTION - PROGRESSION: CLINICAL_PROGRESSION: NOT CHANGED

## 2018-10-23 NOTE — ED PROVIDER NOTES
HPI:  10/23/18,   Time: 1:57 PM         Teddy Roberson is a 59 y.o. male with history of HIV, asthma/COPD presenting to the ED for cough and wheezing for the past 3 days. Patient states that he has a history of asthma and usually responds well to steroids. He states he does not have any chest pain. Patient states that he has been using his Proventil inhaler at home without relief. Patient arrives to the emergency department saturating at 95% on room air, he has no increased work of breathing, he is afebrile and blood pressure is normal.    ROS:   Pertinent positives and negatives are stated within HPI, all other systems reviewed and are negative.  --------------------------------------------- PAST HISTORY ---------------------------------------------  Past Medical History:  has a past medical history of Asthma; [facility] COPD (chronic obstructive pulmonary disease) ([facility] Depression; Emphysema of lung ([facility] History of echocardiogram; HIV (human immunodeficiency virus infection) ([facility] Hypertension; and Sleep apnea. Past Surgical History:  has a past surgical history that includes Rotator cuff repair (2011) and other surgical history (05/09/2017). Social History:  reports that he quit smoking about 9 months ago. His smoking use included Cigarettes. He has a 17.50 pack-year smoking history. He has never used smokeless tobacco. He reports that he drinks alcohol. He reports that he does not use drugs. Family History: family history includes Alcohol Abuse in his father and mother. The patients home medications have been reviewed.     Allergies: Ketorolac tromethamine    -------------------------------------------------- RESULTS -------------------------------------------------  All laboratory and radiology results have been personally reviewed by myself   LABS:  Results for orders placed or performed during the hospital encounter of 10/23/18   EKG 12 Lead   Result Value Ref Range    Ventricular Rate 72 BPM    Atrial Rate 72 BPM    P-R Interval 176 ms    QRS Duration 90 ms    Q-T Interval 398 ms    QTc Calculation (Bazett) 435 ms    P Axis 81 degrees    R Axis 58 degrees    T Axis 59 degrees       RADIOLOGY:  Interpreted by Radiologist.  XR CHEST STANDARD (2 VW)   Final Result   No acute abnormality.          ------------------------- NURSING NOTES AND VITALS REVIEWED ---------------------------   The nursing notes within the ED encounter and vital signs as below have been reviewed. /74   Pulse 73   Temp 97.1 °F (36.2 °C) (Oral)   Resp 18   Ht 5' 6\" (1.676 m)   Wt 165 lb (74.8 kg)   SpO2 95%   BMI 26.63 kg/m²   Oxygen Saturation Interpretation: Normal      ---------------------------------------------------PHYSICAL EXAM--------------------------------------    Constitutional/General: Alert and oriented x3, well appearing, non toxic in NAD  Head: NC/AT  Eyes: PERRL, EOMI  Mouth: Oropharynx clear, handling secretions, no trismus  Neck: Supple, full ROM, no meningeal signs  Pulmonary: Diffuse expiratory wheezing bilaterally and throughout  Cardiovascular:  Regular rate and rhythm, no murmurs, gallops, or rubs. 2+ distal pulses  Abdomen: Soft, non tender, non distended,   Extremities: Moves all extremities x 4. Warm and well perfused  Skin: warm and dry without rash  Neurologic: GCS 15, no focal deficits, no ataxia with ambulation  Psych: Normal Affect      ------------------------------ ED COURSE/MEDICAL DECISION MAKING----------------------  Medications   ipratropium-albuterol (DUONEB) nebulizer solution 1 ampule (1 ampule Inhalation Given 10/23/18 1417)   predniSONE (DELTASONE) tablet 40 mg (40 mg Oral Given 10/23/18 1415)         Medical Decision Making:    The patient is a 58-year-old male with history of COPD/asthma, presents to the emergency department with a chief complaint of wheezing for the past 3 days despite his use of inhalers at home.  Patient also complained of nonproductive cough. EKG Is normal sinus rhythm, did not show any ischemic changes, patient is not complaining of any chest pain. Patient was given a DuoNeb breathing treatment and his wheezing improved. He was also given a dose of oral prednisone. He stated that he felt much better. His lungs are clear on reassessment. His chest x-ray shows no evidence of any acute process. Patient was given a prescription for prednisone for home going. He states that he does have inhalers at home already. He'll follow-up with his primary care provider tomorrow for further management. Patient was discharged in stable condition. Counseling: The emergency provider has spoken with the patient and discussed todays results, in addition to providing specific details for the plan of care and counseling regarding the diagnosis and prognosis. Questions are answered at this time and they are agreeable with the plan.      --------------------------------- IMPRESSION AND DISPOSITION ---------------------------------    IMPRESSION  1.  Mild asthma, unspecified whether complicated, unspecified whether persistent        DISPOSITION  Disposition: Discharge to home  Patient condition is good                  Luis Shearer DO  10/23/18 8205

## 2018-10-31 ENCOUNTER — HOSPITAL ENCOUNTER (OUTPATIENT)
Dept: INFUSION THERAPY | Age: 58
Setting detail: INFUSION SERIES
Discharge: HOME | End: 2018-10-31
Payer: MEDICAID

## 2018-10-31 VITALS
TEMPERATURE: 98.6 F | SYSTOLIC BLOOD PRESSURE: 152 MMHG | RESPIRATION RATE: 18 BRPM | HEART RATE: 78 BPM | DIASTOLIC BLOOD PRESSURE: 71 MMHG | OXYGEN SATURATION: 98 %

## 2018-10-31 DIAGNOSIS — J44.1 ASTHMA EXACERBATION WITH COPD (CHRONIC OBSTRUCTIVE PULMONARY DISEASE) (HCC): ICD-10-CM

## 2018-10-31 DIAGNOSIS — J45.909 ASTHMA, INTRINSIC: ICD-10-CM

## 2018-10-31 DIAGNOSIS — J45.901 ASTHMA EXACERBATION WITH COPD (CHRONIC OBSTRUCTIVE PULMONARY DISEASE) (HCC): ICD-10-CM

## 2018-10-31 PROCEDURE — 6360000002 HC RX W HCPCS: Performed by: INTERNAL MEDICINE

## 2018-10-31 PROCEDURE — 2580000003 HC RX 258: Performed by: INTERNAL MEDICINE

## 2018-10-31 PROCEDURE — 6370000000 HC RX 637 (ALT 250 FOR IP): Performed by: INTERNAL MEDICINE

## 2018-10-31 PROCEDURE — 96372 THER/PROPH/DIAG INJ SC/IM: CPT

## 2018-10-31 RX ORDER — SODIUM CHLORIDE 0.9 % (FLUSH) 0.9 %
10 SYRINGE (ML) INJECTION PRN
Status: CANCELLED | OUTPATIENT
Start: 2018-10-31

## 2018-10-31 RX ORDER — EPINEPHRINE 1 MG/ML
0.3 INJECTION, SOLUTION, CONCENTRATE INTRAVENOUS PRN
Status: CANCELLED | OUTPATIENT
Start: 2018-10-31

## 2018-10-31 RX ORDER — 0.9 % SODIUM CHLORIDE 0.9 %
10 VIAL (ML) INJECTION ONCE
Status: CANCELLED | OUTPATIENT
Start: 2018-10-31 | End: 2018-10-31

## 2018-10-31 RX ORDER — SODIUM CHLORIDE 0.9 % (FLUSH) 0.9 %
5 SYRINGE (ML) INJECTION PRN
Status: CANCELLED | OUTPATIENT
Start: 2018-10-31

## 2018-10-31 RX ORDER — HEPARIN SODIUM (PORCINE) LOCK FLUSH IV SOLN 100 UNIT/ML 100 UNIT/ML
500 SOLUTION INTRAVENOUS PRN
Status: CANCELLED | OUTPATIENT
Start: 2018-10-31

## 2018-10-31 RX ORDER — METHYLPREDNISOLONE SODIUM SUCCINATE 125 MG/2ML
125 INJECTION, POWDER, LYOPHILIZED, FOR SOLUTION INTRAMUSCULAR; INTRAVENOUS ONCE
Status: COMPLETED | OUTPATIENT
Start: 2018-10-31 | End: 2018-10-31

## 2018-10-31 RX ORDER — DIPHENHYDRAMINE HCL 25 MG
25 TABLET ORAL ONCE
Status: COMPLETED | OUTPATIENT
Start: 2018-10-31 | End: 2018-10-31

## 2018-10-31 RX ORDER — DIPHENHYDRAMINE HCL 25 MG
25 TABLET ORAL ONCE
Status: CANCELLED
Start: 2018-10-31 | End: 2018-10-31

## 2018-10-31 RX ORDER — SODIUM CHLORIDE 9 MG/ML
INJECTION, SOLUTION INTRAVENOUS CONTINUOUS
Status: CANCELLED | OUTPATIENT
Start: 2018-10-31

## 2018-10-31 RX ORDER — METHYLPREDNISOLONE SODIUM SUCCINATE 125 MG/2ML
125 INJECTION, POWDER, LYOPHILIZED, FOR SOLUTION INTRAMUSCULAR; INTRAVENOUS ONCE
Status: CANCELLED | OUTPATIENT
Start: 2018-10-31 | End: 2018-10-31

## 2018-10-31 RX ORDER — METHYLPREDNISOLONE SODIUM SUCCINATE 125 MG/2ML
125 INJECTION, POWDER, LYOPHILIZED, FOR SOLUTION INTRAMUSCULAR; INTRAVENOUS ONCE
Status: CANCELLED
Start: 2018-10-31 | End: 2018-10-31

## 2018-10-31 RX ORDER — DIPHENHYDRAMINE HYDROCHLORIDE 50 MG/ML
50 INJECTION INTRAMUSCULAR; INTRAVENOUS ONCE
Status: CANCELLED | OUTPATIENT
Start: 2018-10-31 | End: 2018-10-31

## 2018-10-31 RX ADMIN — DIPHENHYDRAMINE HCL 25 MG: 25 TABLET ORAL at 11:18

## 2018-10-31 RX ADMIN — METHYLPREDNISOLONE SODIUM SUCCINATE 125 MG: 125 INJECTION, POWDER, FOR SOLUTION INTRAMUSCULAR; INTRAVENOUS at 11:29

## 2018-10-31 RX ADMIN — WATER 375 MG: 1 INJECTION INTRAMUSCULAR; INTRAVENOUS; SUBCUTANEOUS at 12:16

## 2018-10-31 NOTE — PROGRESS NOTES
Tolerated injection well discharged to home in stable condition. Patient declined discharge instructions.

## 2018-10-31 NOTE — PROGRESS NOTES
[facility] Xolair Allergy Control Test    Prescribing Physician:    Was patient administered Xolair on appointed administration date? [x] yes [] no    Reason for not administering Xolair :[] Illness [] No show [] Other:    Was there any reaction to mediation? [] yes [x] no    If Yes: Reactions:      1. In the past 4 weeks, how much of the time did your asthma keep you from getting as much done as usual at work, school or at home? [] 1 [] 2 [x] 3 [] 4 [] 5   Score:_3_____    2. During the past 4 weeks, how often have you had shortness of breath? [] 1 [] 2 [] 3 [x] 4 [] 5   Score:___4___    3. During the past four weeks, how often did your asthma symptoms (wheezing, coughing, shortness of breath, chest tightness, or pain) wake you up at night or earlier than usual in the morning? [] 1 [] 2 [] 3 [] 4 [x] 5   Score:___4___    4. During the past four weeks, how often have you used your rescue inhaler or nebulizer medication? [] 1 [] 2 [] 3 [x] 4 [] 5   Score:__4____    5. How would you rate your asthma control during the past 4 weeks? [] 1 [] 2 [] 3 [x] 4 [] 5   Score:___4___      19  Total Score:_____19___________        To score the Asthma control test: Each response to the 5 ACT questions has a point value from 1-5 as shown on the form. To score the ACT, add up the point value for each response to the 5 questions.

## 2018-11-14 ENCOUNTER — HOSPITAL ENCOUNTER (OUTPATIENT)
Dept: INFUSION THERAPY | Age: 58
Setting detail: INFUSION SERIES
Discharge: HOME | End: 2018-11-14
Payer: MEDICAID

## 2018-11-14 VITALS
OXYGEN SATURATION: 96 % | TEMPERATURE: 98.3 F | DIASTOLIC BLOOD PRESSURE: 87 MMHG | RESPIRATION RATE: 18 BRPM | SYSTOLIC BLOOD PRESSURE: 143 MMHG | HEART RATE: 73 BPM

## 2018-11-14 DIAGNOSIS — J45.909 ASTHMA, INTRINSIC: ICD-10-CM

## 2018-11-14 DIAGNOSIS — J44.1 ASTHMA EXACERBATION WITH COPD (CHRONIC OBSTRUCTIVE PULMONARY DISEASE) (HCC): ICD-10-CM

## 2018-11-14 DIAGNOSIS — J45.901 ASTHMA EXACERBATION WITH COPD (CHRONIC OBSTRUCTIVE PULMONARY DISEASE) (HCC): ICD-10-CM

## 2018-11-14 PROCEDURE — 6360000002 HC RX W HCPCS: Performed by: INTERNAL MEDICINE

## 2018-11-14 PROCEDURE — 2580000003 HC RX 258: Performed by: INTERNAL MEDICINE

## 2018-11-14 PROCEDURE — 6370000000 HC RX 637 (ALT 250 FOR IP): Performed by: INTERNAL MEDICINE

## 2018-11-14 PROCEDURE — 96372 THER/PROPH/DIAG INJ SC/IM: CPT

## 2018-11-14 RX ORDER — DIPHENHYDRAMINE HCL 25 MG
25 TABLET ORAL ONCE
Status: CANCELLED
Start: 2018-11-14 | End: 2018-11-14

## 2018-11-14 RX ORDER — SODIUM CHLORIDE 0.9 % (FLUSH) 0.9 %
10 SYRINGE (ML) INJECTION PRN
Status: CANCELLED | OUTPATIENT
Start: 2018-11-14

## 2018-11-14 RX ORDER — HEPARIN SODIUM (PORCINE) LOCK FLUSH IV SOLN 100 UNIT/ML 100 UNIT/ML
500 SOLUTION INTRAVENOUS PRN
Status: CANCELLED | OUTPATIENT
Start: 2018-11-14

## 2018-11-14 RX ORDER — SODIUM CHLORIDE 0.9 % (FLUSH) 0.9 %
10 SYRINGE (ML) INJECTION PRN
Status: DISCONTINUED | OUTPATIENT
Start: 2018-11-14 | End: 2018-11-15 | Stop reason: HOSPADM

## 2018-11-14 RX ORDER — SODIUM CHLORIDE 9 MG/ML
INJECTION, SOLUTION INTRAVENOUS CONTINUOUS
Status: CANCELLED | OUTPATIENT
Start: 2018-11-14

## 2018-11-14 RX ORDER — METHYLPREDNISOLONE SODIUM SUCCINATE 125 MG/2ML
125 INJECTION, POWDER, LYOPHILIZED, FOR SOLUTION INTRAMUSCULAR; INTRAVENOUS ONCE
Status: COMPLETED | OUTPATIENT
Start: 2018-11-14 | End: 2018-11-14

## 2018-11-14 RX ORDER — METHYLPREDNISOLONE SODIUM SUCCINATE 125 MG/2ML
125 INJECTION, POWDER, LYOPHILIZED, FOR SOLUTION INTRAMUSCULAR; INTRAVENOUS ONCE
Status: CANCELLED
Start: 2018-11-14 | End: 2018-11-14

## 2018-11-14 RX ORDER — EPINEPHRINE 1 MG/ML
0.3 INJECTION, SOLUTION, CONCENTRATE INTRAVENOUS PRN
Status: CANCELLED | OUTPATIENT
Start: 2018-11-14

## 2018-11-14 RX ORDER — HEPARIN SODIUM (PORCINE) LOCK FLUSH IV SOLN 100 UNIT/ML 100 UNIT/ML
500 SOLUTION INTRAVENOUS PRN
Status: DISCONTINUED | OUTPATIENT
Start: 2018-11-14 | End: 2018-11-15 | Stop reason: HOSPADM

## 2018-11-14 RX ORDER — SODIUM CHLORIDE 0.9 % (FLUSH) 0.9 %
5 SYRINGE (ML) INJECTION PRN
Status: CANCELLED | OUTPATIENT
Start: 2018-11-14

## 2018-11-14 RX ORDER — METHYLPREDNISOLONE SODIUM SUCCINATE 125 MG/2ML
125 INJECTION, POWDER, LYOPHILIZED, FOR SOLUTION INTRAMUSCULAR; INTRAVENOUS ONCE
Status: CANCELLED | OUTPATIENT
Start: 2018-11-14 | End: 2018-11-14

## 2018-11-14 RX ORDER — DIPHENHYDRAMINE HYDROCHLORIDE 50 MG/ML
50 INJECTION INTRAMUSCULAR; INTRAVENOUS ONCE
Status: CANCELLED | OUTPATIENT
Start: 2018-11-14 | End: 2018-11-14

## 2018-11-14 RX ORDER — SODIUM CHLORIDE 0.9 % (FLUSH) 0.9 %
5 SYRINGE (ML) INJECTION PRN
Status: DISCONTINUED | OUTPATIENT
Start: 2018-11-14 | End: 2018-11-15 | Stop reason: HOSPADM

## 2018-11-14 RX ORDER — 0.9 % SODIUM CHLORIDE 0.9 %
10 VIAL (ML) INJECTION ONCE
Status: CANCELLED | OUTPATIENT
Start: 2018-11-14 | End: 2018-11-14

## 2018-11-14 RX ORDER — DIPHENHYDRAMINE HCL 25 MG
25 TABLET ORAL ONCE
Status: COMPLETED | OUTPATIENT
Start: 2018-11-14 | End: 2018-11-14

## 2018-11-14 RX ADMIN — DIPHENHYDRAMINE HCL 25 MG: 25 TABLET ORAL at 11:14

## 2018-11-14 RX ADMIN — METHYLPREDNISOLONE SODIUM SUCCINATE 125 MG: 125 INJECTION, POWDER, FOR SOLUTION INTRAMUSCULAR; INTRAVENOUS at 11:14

## 2018-11-14 RX ADMIN — WATER 375 MG: 1 INJECTION INTRAMUSCULAR; INTRAVENOUS; SUBCUTANEOUS at 12:02

## 2018-11-28 ENCOUNTER — HOSPITAL ENCOUNTER (OUTPATIENT)
Dept: INFUSION THERAPY | Age: 58
Setting detail: INFUSION SERIES
Discharge: HOME | End: 2018-11-28
Payer: MEDICAID

## 2018-11-28 VITALS
DIASTOLIC BLOOD PRESSURE: 80 MMHG | HEART RATE: 64 BPM | SYSTOLIC BLOOD PRESSURE: 142 MMHG | RESPIRATION RATE: 18 BRPM | TEMPERATURE: 98.4 F | OXYGEN SATURATION: 95 %

## 2018-11-28 DIAGNOSIS — J45.901 ASTHMA EXACERBATION WITH COPD (CHRONIC OBSTRUCTIVE PULMONARY DISEASE) (HCC): ICD-10-CM

## 2018-11-28 DIAGNOSIS — J45.909 ASTHMA, INTRINSIC: ICD-10-CM

## 2018-11-28 DIAGNOSIS — J44.1 ASTHMA EXACERBATION WITH COPD (CHRONIC OBSTRUCTIVE PULMONARY DISEASE) (HCC): ICD-10-CM

## 2018-11-28 PROCEDURE — 6370000000 HC RX 637 (ALT 250 FOR IP): Performed by: INTERNAL MEDICINE

## 2018-11-28 PROCEDURE — 96372 THER/PROPH/DIAG INJ SC/IM: CPT

## 2018-11-28 PROCEDURE — 6360000002 HC RX W HCPCS: Performed by: INTERNAL MEDICINE

## 2018-11-28 PROCEDURE — 2580000003 HC RX 258: Performed by: INTERNAL MEDICINE

## 2018-11-28 RX ORDER — HEPARIN SODIUM (PORCINE) LOCK FLUSH IV SOLN 100 UNIT/ML 100 UNIT/ML
500 SOLUTION INTRAVENOUS PRN
Status: CANCELLED | OUTPATIENT
Start: 2018-11-28

## 2018-11-28 RX ORDER — METHYLPREDNISOLONE SODIUM SUCCINATE 125 MG/2ML
125 INJECTION, POWDER, LYOPHILIZED, FOR SOLUTION INTRAMUSCULAR; INTRAVENOUS ONCE
Status: CANCELLED | OUTPATIENT
Start: 2018-11-28 | End: 2018-11-28

## 2018-11-28 RX ORDER — METHYLPREDNISOLONE SODIUM SUCCINATE 125 MG/2ML
125 INJECTION, POWDER, LYOPHILIZED, FOR SOLUTION INTRAMUSCULAR; INTRAVENOUS ONCE
Status: COMPLETED | OUTPATIENT
Start: 2018-11-28 | End: 2018-11-28

## 2018-11-28 RX ORDER — DIPHENHYDRAMINE HYDROCHLORIDE 50 MG/ML
50 INJECTION INTRAMUSCULAR; INTRAVENOUS ONCE
Status: CANCELLED | OUTPATIENT
Start: 2018-11-28 | End: 2018-11-28

## 2018-11-28 RX ORDER — METHYLPREDNISOLONE SODIUM SUCCINATE 125 MG/2ML
125 INJECTION, POWDER, LYOPHILIZED, FOR SOLUTION INTRAMUSCULAR; INTRAVENOUS ONCE
Status: CANCELLED
Start: 2018-11-28 | End: 2018-11-28

## 2018-11-28 RX ORDER — SODIUM CHLORIDE 0.9 % (FLUSH) 0.9 %
5 SYRINGE (ML) INJECTION PRN
Status: CANCELLED | OUTPATIENT
Start: 2018-11-28

## 2018-11-28 RX ORDER — SODIUM CHLORIDE 0.9 % (FLUSH) 0.9 %
10 SYRINGE (ML) INJECTION PRN
Status: CANCELLED | OUTPATIENT
Start: 2018-11-28

## 2018-11-28 RX ORDER — DIPHENHYDRAMINE HCL 25 MG
25 TABLET ORAL ONCE
Status: CANCELLED
Start: 2018-11-28 | End: 2018-11-28

## 2018-11-28 RX ORDER — EPINEPHRINE 1 MG/ML
0.3 INJECTION, SOLUTION, CONCENTRATE INTRAVENOUS PRN
Status: CANCELLED | OUTPATIENT
Start: 2018-11-28

## 2018-11-28 RX ORDER — SODIUM CHLORIDE 9 MG/ML
INJECTION, SOLUTION INTRAVENOUS CONTINUOUS
Status: CANCELLED | OUTPATIENT
Start: 2018-11-28

## 2018-11-28 RX ORDER — DIPHENHYDRAMINE HCL 25 MG
25 TABLET ORAL ONCE
Status: COMPLETED | OUTPATIENT
Start: 2018-11-28 | End: 2018-11-28

## 2018-11-28 RX ORDER — 0.9 % SODIUM CHLORIDE 0.9 %
10 VIAL (ML) INJECTION ONCE
Status: CANCELLED | OUTPATIENT
Start: 2018-11-28 | End: 2018-11-28

## 2018-11-28 RX ADMIN — WATER 375 MG: 1 INJECTION INTRAMUSCULAR; INTRAVENOUS; SUBCUTANEOUS at 11:48

## 2018-11-28 RX ADMIN — METHYLPREDNISOLONE SODIUM SUCCINATE 125 MG: 125 INJECTION, POWDER, FOR SOLUTION INTRAMUSCULAR; INTRAVENOUS at 11:20

## 2018-11-28 RX ADMIN — DIPHENHYDRAMINE HCL 25 MG: 25 TABLET ORAL at 11:19

## 2018-11-28 NOTE — PROGRESS NOTES
Patient to complete Asthma Control Test form  (Xolair questionnaire) post injection and fax to physician office. [facility] Xolair Allergy Control Test    Prescribing Physician:    Was patient administered Xolair on appointed administration date? [x] yes [] no    Reason for not administering Xolair :[] Illness [] No show [] Other:    Was there any reaction to mediation? [] yes [x] no    If Yes: Reactions:      1. In the past 4 weeks, how much of the time did your asthma keep you from getting as much done as usual at work, school or at home? [] 1 [] 2 [x] 3 [] 4 [] 5   Score:______    2. During the past 4 weeks, how often have you had shortness of breath? [x] 1 [] 2 [] 3 [] 4 [] 5   Score:______    3. During the past four weeks, how often did your asthma symptoms (wheezing, coughing, shortness of breath, chest tightness, or pain) wake you up at night or earlier than usual in the morning? [] 1 [x] 2 [] 3 [] 4 [] 5   Score:______    4. During the past four weeks, how often have you used your rescue inhaler or nebulizer medication? [] 1 [] 2 [x] 3 [] 4 [] 5   Score:______    5. How would you rate your asthma control during the past 4 weeks? [] 1 [] 2 [x] 3 [] 4 [] 5   Score:______        Total Score:_____13___________        To score the Asthma control test: Each response to the 5 ACT questions has a point value from 1-5 as shown on the form. To score the ACT, add up the point value for each response to the 5 questions.

## 2018-12-12 ENCOUNTER — HOSPITAL ENCOUNTER (OUTPATIENT)
Dept: INFUSION THERAPY | Age: 58
Setting detail: INFUSION SERIES
Discharge: HOME | End: 2018-12-12
Payer: MEDICAID

## 2018-12-12 VITALS
RESPIRATION RATE: 20 BRPM | SYSTOLIC BLOOD PRESSURE: 132 MMHG | DIASTOLIC BLOOD PRESSURE: 81 MMHG | HEART RATE: 69 BPM | OXYGEN SATURATION: 96 % | TEMPERATURE: 97.7 F

## 2018-12-12 DIAGNOSIS — J44.1 ASTHMA EXACERBATION WITH COPD (CHRONIC OBSTRUCTIVE PULMONARY DISEASE) (HCC): ICD-10-CM

## 2018-12-12 DIAGNOSIS — J45.901 ASTHMA EXACERBATION WITH COPD (CHRONIC OBSTRUCTIVE PULMONARY DISEASE) (HCC): ICD-10-CM

## 2018-12-12 DIAGNOSIS — J45.909 ASTHMA, INTRINSIC: ICD-10-CM

## 2018-12-12 PROCEDURE — 6360000002 HC RX W HCPCS: Performed by: INTERNAL MEDICINE

## 2018-12-12 PROCEDURE — 6370000000 HC RX 637 (ALT 250 FOR IP): Performed by: INTERNAL MEDICINE

## 2018-12-12 PROCEDURE — 2580000003 HC RX 258: Performed by: INTERNAL MEDICINE

## 2018-12-12 PROCEDURE — 96372 THER/PROPH/DIAG INJ SC/IM: CPT

## 2018-12-12 RX ORDER — SODIUM CHLORIDE 0.9 % (FLUSH) 0.9 %
10 SYRINGE (ML) INJECTION PRN
Status: CANCELLED | OUTPATIENT
Start: 2018-12-12

## 2018-12-12 RX ORDER — SODIUM CHLORIDE 9 MG/ML
INJECTION, SOLUTION INTRAVENOUS CONTINUOUS
Status: CANCELLED | OUTPATIENT
Start: 2018-12-12

## 2018-12-12 RX ORDER — SODIUM CHLORIDE 0.9 % (FLUSH) 0.9 %
SYRINGE (ML) INJECTION
Status: DISCONTINUED
Start: 2018-12-12 | End: 2018-12-12 | Stop reason: WASHOUT

## 2018-12-12 RX ORDER — METHYLPREDNISOLONE SODIUM SUCCINATE 125 MG/2ML
125 INJECTION, POWDER, LYOPHILIZED, FOR SOLUTION INTRAMUSCULAR; INTRAVENOUS ONCE
Status: CANCELLED | OUTPATIENT
Start: 2018-12-12 | End: 2018-12-12

## 2018-12-12 RX ORDER — EPINEPHRINE 1 MG/ML
0.3 INJECTION, SOLUTION, CONCENTRATE INTRAVENOUS PRN
Status: CANCELLED | OUTPATIENT
Start: 2018-12-12

## 2018-12-12 RX ORDER — DIPHENHYDRAMINE HYDROCHLORIDE 50 MG/ML
50 INJECTION INTRAMUSCULAR; INTRAVENOUS ONCE
Status: CANCELLED | OUTPATIENT
Start: 2018-12-12 | End: 2018-12-12

## 2018-12-12 RX ORDER — 0.9 % SODIUM CHLORIDE 0.9 %
10 VIAL (ML) INJECTION ONCE
Status: CANCELLED | OUTPATIENT
Start: 2018-12-12 | End: 2018-12-12

## 2018-12-12 RX ORDER — HEPARIN SODIUM (PORCINE) LOCK FLUSH IV SOLN 100 UNIT/ML 100 UNIT/ML
500 SOLUTION INTRAVENOUS PRN
Status: CANCELLED | OUTPATIENT
Start: 2018-12-12

## 2018-12-12 RX ORDER — METHYLPREDNISOLONE SODIUM SUCCINATE 125 MG/2ML
125 INJECTION, POWDER, LYOPHILIZED, FOR SOLUTION INTRAMUSCULAR; INTRAVENOUS ONCE
Status: COMPLETED | OUTPATIENT
Start: 2018-12-12 | End: 2018-12-12

## 2018-12-12 RX ORDER — METHYLPREDNISOLONE SODIUM SUCCINATE 125 MG/2ML
125 INJECTION, POWDER, LYOPHILIZED, FOR SOLUTION INTRAMUSCULAR; INTRAVENOUS ONCE
Status: CANCELLED
Start: 2018-12-12 | End: 2018-12-12

## 2018-12-12 RX ORDER — DIPHENHYDRAMINE HCL 25 MG
25 TABLET ORAL ONCE
Status: CANCELLED
Start: 2018-12-12 | End: 2018-12-12

## 2018-12-12 RX ORDER — SODIUM CHLORIDE 0.9 % (FLUSH) 0.9 %
5 SYRINGE (ML) INJECTION PRN
Status: CANCELLED | OUTPATIENT
Start: 2018-12-12

## 2018-12-12 RX ORDER — DIPHENHYDRAMINE HCL 25 MG
25 TABLET ORAL ONCE
Status: COMPLETED | OUTPATIENT
Start: 2018-12-12 | End: 2018-12-12

## 2018-12-12 RX ADMIN — WATER 375 MG: 1 INJECTION INTRAMUSCULAR; INTRAVENOUS; SUBCUTANEOUS at 11:49

## 2018-12-12 RX ADMIN — METHYLPREDNISOLONE SODIUM SUCCINATE 125 MG: 125 INJECTION, POWDER, FOR SOLUTION INTRAMUSCULAR; INTRAVENOUS at 11:22

## 2018-12-12 RX ADMIN — DIPHENHYDRAMINE HCL 25 MG: 25 TABLET ORAL at 11:21

## 2018-12-26 ENCOUNTER — HOSPITAL ENCOUNTER (OUTPATIENT)
Dept: INFUSION THERAPY | Age: 58
Setting detail: INFUSION SERIES
Discharge: HOME | End: 2018-12-26
Payer: MEDICAID

## 2018-12-26 VITALS
DIASTOLIC BLOOD PRESSURE: 88 MMHG | OXYGEN SATURATION: 95 % | HEART RATE: 75 BPM | RESPIRATION RATE: 20 BRPM | TEMPERATURE: 98.1 F | SYSTOLIC BLOOD PRESSURE: 148 MMHG

## 2018-12-26 DIAGNOSIS — J45.901 ASTHMA EXACERBATION WITH COPD (CHRONIC OBSTRUCTIVE PULMONARY DISEASE) (HCC): ICD-10-CM

## 2018-12-26 DIAGNOSIS — J45.909 ASTHMA, INTRINSIC: ICD-10-CM

## 2018-12-26 DIAGNOSIS — J44.1 ASTHMA EXACERBATION WITH COPD (CHRONIC OBSTRUCTIVE PULMONARY DISEASE) (HCC): ICD-10-CM

## 2018-12-26 PROCEDURE — 6360000002 HC RX W HCPCS: Performed by: INTERNAL MEDICINE

## 2018-12-26 PROCEDURE — 96372 THER/PROPH/DIAG INJ SC/IM: CPT

## 2018-12-26 PROCEDURE — 6370000000 HC RX 637 (ALT 250 FOR IP): Performed by: INTERNAL MEDICINE

## 2018-12-26 PROCEDURE — 2580000003 HC RX 258: Performed by: INTERNAL MEDICINE

## 2018-12-26 RX ORDER — EPINEPHRINE 1 MG/ML
0.3 INJECTION, SOLUTION, CONCENTRATE INTRAVENOUS PRN
Status: CANCELLED | OUTPATIENT
Start: 2018-12-26

## 2018-12-26 RX ORDER — SODIUM CHLORIDE 0.9 % (FLUSH) 0.9 %
5 SYRINGE (ML) INJECTION PRN
Status: CANCELLED | OUTPATIENT
Start: 2018-12-26

## 2018-12-26 RX ORDER — METHYLPREDNISOLONE SODIUM SUCCINATE 125 MG/2ML
125 INJECTION, POWDER, LYOPHILIZED, FOR SOLUTION INTRAMUSCULAR; INTRAVENOUS ONCE
Status: CANCELLED | OUTPATIENT
Start: 2018-12-26 | End: 2018-12-26

## 2018-12-26 RX ORDER — DIPHENHYDRAMINE HYDROCHLORIDE 50 MG/ML
50 INJECTION INTRAMUSCULAR; INTRAVENOUS ONCE
Status: CANCELLED | OUTPATIENT
Start: 2018-12-26 | End: 2018-12-26

## 2018-12-26 RX ORDER — METHYLPREDNISOLONE SODIUM SUCCINATE 125 MG/2ML
125 INJECTION, POWDER, LYOPHILIZED, FOR SOLUTION INTRAMUSCULAR; INTRAVENOUS ONCE
Status: COMPLETED | OUTPATIENT
Start: 2018-12-26 | End: 2018-12-26

## 2018-12-26 RX ORDER — DIPHENHYDRAMINE HCL 25 MG
25 TABLET ORAL ONCE
Status: COMPLETED | OUTPATIENT
Start: 2018-12-26 | End: 2018-12-26

## 2018-12-26 RX ORDER — DIPHENHYDRAMINE HCL 25 MG
25 TABLET ORAL ONCE
Status: CANCELLED
Start: 2018-12-26 | End: 2018-12-26

## 2018-12-26 RX ORDER — SODIUM CHLORIDE 9 MG/ML
INJECTION, SOLUTION INTRAVENOUS CONTINUOUS
Status: CANCELLED | OUTPATIENT
Start: 2018-12-26

## 2018-12-26 RX ORDER — HEPARIN SODIUM (PORCINE) LOCK FLUSH IV SOLN 100 UNIT/ML 100 UNIT/ML
500 SOLUTION INTRAVENOUS PRN
Status: CANCELLED | OUTPATIENT
Start: 2018-12-26

## 2018-12-26 RX ORDER — 0.9 % SODIUM CHLORIDE 0.9 %
10 VIAL (ML) INJECTION ONCE
Status: CANCELLED | OUTPATIENT
Start: 2018-12-26 | End: 2018-12-26

## 2018-12-26 RX ORDER — SODIUM CHLORIDE 0.9 % (FLUSH) 0.9 %
10 SYRINGE (ML) INJECTION PRN
Status: CANCELLED | OUTPATIENT
Start: 2018-12-26

## 2018-12-26 RX ORDER — METHYLPREDNISOLONE SODIUM SUCCINATE 125 MG/2ML
125 INJECTION, POWDER, LYOPHILIZED, FOR SOLUTION INTRAMUSCULAR; INTRAVENOUS ONCE
Status: CANCELLED
Start: 2018-12-26 | End: 2018-12-26

## 2018-12-26 RX ADMIN — METHYLPREDNISOLONE SODIUM SUCCINATE 125 MG: 125 INJECTION, POWDER, FOR SOLUTION INTRAMUSCULAR; INTRAVENOUS at 10:30

## 2018-12-26 RX ADMIN — DIPHENHYDRAMINE HCL 25 MG: 25 TABLET ORAL at 10:30

## 2018-12-26 RX ADMIN — WATER 375 MG: 1 INJECTION INTRAMUSCULAR; INTRAVENOUS; SUBCUTANEOUS at 10:39

## 2018-12-26 NOTE — PROGRESS NOTES
[facility] Xolair Allergy Control Test    Prescribing Physician:    Was patient administered Xolair on appointed administration date? [x] yes [] no    Reason for not administering Xolair :[] Illness [] No show [] Other:    Was there any reaction to mediation? [] yes [] no    If Yes: Reactions:      1. In the past 4 weeks, how much of the time did your asthma keep you from getting as much done as usual at work, school or at home? [] 1 [] 2 [x] 3 [] 4 [] 5   Score:______    2. During the past 4 weeks, how often have you had shortness of breath? [] 1 [x] 2 [] 3 [] 4 [] 5   Score:______    3. During the past four weeks, how often did your asthma symptoms (wheezing, coughing, shortness of breath, chest tightness, or pain) wake you up at night or earlier than usual in the morning? [] 1 [] 2 [] 3 [x] 4 [] 5   Score:______    4. During the past four weeks, how often have you used your rescue inhaler or nebulizer medication? [] 1 [] 2 [] 3 [x] 4 [] 5   Score:______    5. How would you rate your asthma control during the past 4 weeks? [] 1 [] 2 [x] 3 [] 4 [] 5   Score:______        Total Score:_______18_________        To score the Asthma control test: Each response to the 5 ACT questions has a point value from 1-5 as shown on the form. To score the ACT, add up the point value for each response to the 5 questions.

## 2019-01-09 ENCOUNTER — HOSPITAL ENCOUNTER (OUTPATIENT)
Dept: INFUSION THERAPY | Age: 59
Setting detail: INFUSION SERIES
Discharge: HOME | End: 2019-01-09
Payer: MEDICAID

## 2019-01-09 VITALS
TEMPERATURE: 98.4 F | HEART RATE: 71 BPM | RESPIRATION RATE: 18 BRPM | DIASTOLIC BLOOD PRESSURE: 73 MMHG | OXYGEN SATURATION: 93 % | SYSTOLIC BLOOD PRESSURE: 122 MMHG

## 2019-01-09 DIAGNOSIS — J45.901 ASTHMA EXACERBATION WITH COPD (CHRONIC OBSTRUCTIVE PULMONARY DISEASE) (HCC): ICD-10-CM

## 2019-01-09 DIAGNOSIS — J45.909 ASTHMA, INTRINSIC: ICD-10-CM

## 2019-01-09 DIAGNOSIS — J44.1 ASTHMA EXACERBATION WITH COPD (CHRONIC OBSTRUCTIVE PULMONARY DISEASE) (HCC): ICD-10-CM

## 2019-01-09 PROCEDURE — 6360000002 HC RX W HCPCS: Performed by: INTERNAL MEDICINE

## 2019-01-09 PROCEDURE — 96372 THER/PROPH/DIAG INJ SC/IM: CPT

## 2019-01-09 PROCEDURE — 2580000003 HC RX 258: Performed by: INTERNAL MEDICINE

## 2019-01-09 PROCEDURE — 6370000000 HC RX 637 (ALT 250 FOR IP): Performed by: INTERNAL MEDICINE

## 2019-01-09 RX ORDER — SODIUM CHLORIDE 9 MG/ML
INJECTION, SOLUTION INTRAVENOUS CONTINUOUS
Status: CANCELLED | OUTPATIENT
Start: 2019-01-09

## 2019-01-09 RX ORDER — METHYLPREDNISOLONE SODIUM SUCCINATE 125 MG/2ML
125 INJECTION, POWDER, LYOPHILIZED, FOR SOLUTION INTRAMUSCULAR; INTRAVENOUS ONCE
Status: CANCELLED
Start: 2019-01-09 | End: 2019-01-09

## 2019-01-09 RX ORDER — DIPHENHYDRAMINE HCL 25 MG
25 TABLET ORAL ONCE
Status: COMPLETED | OUTPATIENT
Start: 2019-01-09 | End: 2019-01-09

## 2019-01-09 RX ORDER — DIPHENHYDRAMINE HYDROCHLORIDE 50 MG/ML
50 INJECTION INTRAMUSCULAR; INTRAVENOUS ONCE
Status: CANCELLED | OUTPATIENT
Start: 2019-01-09 | End: 2019-01-09

## 2019-01-09 RX ORDER — METHYLPREDNISOLONE SODIUM SUCCINATE 125 MG/2ML
125 INJECTION, POWDER, LYOPHILIZED, FOR SOLUTION INTRAMUSCULAR; INTRAVENOUS ONCE
Status: COMPLETED | OUTPATIENT
Start: 2019-01-09 | End: 2019-01-09

## 2019-01-09 RX ORDER — SODIUM CHLORIDE 0.9 % (FLUSH) 0.9 %
5 SYRINGE (ML) INJECTION PRN
Status: CANCELLED | OUTPATIENT
Start: 2019-01-09

## 2019-01-09 RX ORDER — DIPHENHYDRAMINE HCL 25 MG
25 TABLET ORAL ONCE
Status: CANCELLED
Start: 2019-01-09 | End: 2019-01-09

## 2019-01-09 RX ORDER — EPINEPHRINE 1 MG/ML
0.3 INJECTION, SOLUTION, CONCENTRATE INTRAVENOUS PRN
Status: CANCELLED | OUTPATIENT
Start: 2019-01-09

## 2019-01-09 RX ORDER — METHYLPREDNISOLONE SODIUM SUCCINATE 125 MG/2ML
125 INJECTION, POWDER, LYOPHILIZED, FOR SOLUTION INTRAMUSCULAR; INTRAVENOUS ONCE
Status: CANCELLED | OUTPATIENT
Start: 2019-01-09 | End: 2019-01-09

## 2019-01-09 RX ORDER — 0.9 % SODIUM CHLORIDE 0.9 %
10 VIAL (ML) INJECTION ONCE
Status: CANCELLED | OUTPATIENT
Start: 2019-01-09 | End: 2019-01-09

## 2019-01-09 RX ORDER — HEPARIN SODIUM (PORCINE) LOCK FLUSH IV SOLN 100 UNIT/ML 100 UNIT/ML
500 SOLUTION INTRAVENOUS PRN
Status: CANCELLED | OUTPATIENT
Start: 2019-01-09

## 2019-01-09 RX ORDER — SODIUM CHLORIDE 0.9 % (FLUSH) 0.9 %
10 SYRINGE (ML) INJECTION PRN
Status: CANCELLED | OUTPATIENT
Start: 2019-01-09

## 2019-01-09 RX ADMIN — WATER 375 MG: 1 INJECTION INTRAMUSCULAR; INTRAVENOUS; SUBCUTANEOUS at 11:32

## 2019-01-09 RX ADMIN — METHYLPREDNISOLONE SODIUM SUCCINATE 125 MG: 125 INJECTION, POWDER, FOR SOLUTION INTRAMUSCULAR; INTRAVENOUS at 11:09

## 2019-01-09 RX ADMIN — DIPHENHYDRAMINE HCL 25 MG: 25 TABLET ORAL at 11:08

## 2019-01-09 ASSESSMENT — PAIN SCALES - GENERAL: PAINLEVEL_OUTOF10: 0

## 2019-01-23 ENCOUNTER — TELEPHONE (OUTPATIENT)
Dept: PULMONOLOGY | Age: 59
End: 2019-01-23

## 2019-01-23 ENCOUNTER — HOSPITAL ENCOUNTER (OUTPATIENT)
Dept: INFUSION THERAPY | Age: 59
Setting detail: INFUSION SERIES
Discharge: HOME | End: 2019-01-23
Payer: MEDICAID

## 2019-01-23 VITALS
HEART RATE: 99 BPM | SYSTOLIC BLOOD PRESSURE: 164 MMHG | RESPIRATION RATE: 18 BRPM | OXYGEN SATURATION: 95 % | TEMPERATURE: 98.7 F | DIASTOLIC BLOOD PRESSURE: 82 MMHG

## 2019-01-23 DIAGNOSIS — J45.901 ASTHMA EXACERBATION WITH COPD (CHRONIC OBSTRUCTIVE PULMONARY DISEASE) (HCC): ICD-10-CM

## 2019-01-23 DIAGNOSIS — J45.909 ASTHMA, INTRINSIC: ICD-10-CM

## 2019-01-23 DIAGNOSIS — J44.1 ASTHMA EXACERBATION WITH COPD (CHRONIC OBSTRUCTIVE PULMONARY DISEASE) (HCC): ICD-10-CM

## 2019-01-23 PROCEDURE — 96372 THER/PROPH/DIAG INJ SC/IM: CPT

## 2019-01-23 PROCEDURE — 6370000000 HC RX 637 (ALT 250 FOR IP): Performed by: INTERNAL MEDICINE

## 2019-01-23 PROCEDURE — 2580000003 HC RX 258: Performed by: INTERNAL MEDICINE

## 2019-01-23 PROCEDURE — 6360000002 HC RX W HCPCS: Performed by: INTERNAL MEDICINE

## 2019-01-23 RX ORDER — SODIUM CHLORIDE 0.9 % (FLUSH) 0.9 %
10 SYRINGE (ML) INJECTION PRN
Status: CANCELLED | OUTPATIENT
Start: 2019-01-23

## 2019-01-23 RX ORDER — DIPHENHYDRAMINE HYDROCHLORIDE 50 MG/ML
50 INJECTION INTRAMUSCULAR; INTRAVENOUS ONCE
Status: CANCELLED | OUTPATIENT
Start: 2019-01-23 | End: 2019-01-23

## 2019-01-23 RX ORDER — 0.9 % SODIUM CHLORIDE 0.9 %
10 VIAL (ML) INJECTION ONCE
Status: CANCELLED | OUTPATIENT
Start: 2019-01-23 | End: 2019-01-23

## 2019-01-23 RX ORDER — DIPHENHYDRAMINE HCL 25 MG
25 TABLET ORAL ONCE
Status: COMPLETED | OUTPATIENT
Start: 2019-01-23 | End: 2019-01-23

## 2019-01-23 RX ORDER — METHYLPREDNISOLONE SODIUM SUCCINATE 125 MG/2ML
125 INJECTION, POWDER, LYOPHILIZED, FOR SOLUTION INTRAMUSCULAR; INTRAVENOUS ONCE
Status: CANCELLED
Start: 2019-01-23 | End: 2019-01-23

## 2019-01-23 RX ORDER — EPINEPHRINE 1 MG/ML
0.3 INJECTION, SOLUTION, CONCENTRATE INTRAVENOUS PRN
Status: CANCELLED | OUTPATIENT
Start: 2019-01-23

## 2019-01-23 RX ORDER — SODIUM CHLORIDE 9 MG/ML
INJECTION, SOLUTION INTRAVENOUS CONTINUOUS
Status: CANCELLED | OUTPATIENT
Start: 2019-01-23

## 2019-01-23 RX ORDER — SODIUM CHLORIDE 0.9 % (FLUSH) 0.9 %
5 SYRINGE (ML) INJECTION PRN
Status: CANCELLED | OUTPATIENT
Start: 2019-01-23

## 2019-01-23 RX ORDER — METHYLPREDNISOLONE SODIUM SUCCINATE 125 MG/2ML
125 INJECTION, POWDER, LYOPHILIZED, FOR SOLUTION INTRAMUSCULAR; INTRAVENOUS ONCE
Status: CANCELLED | OUTPATIENT
Start: 2019-01-23 | End: 2019-01-23

## 2019-01-23 RX ORDER — HEPARIN SODIUM (PORCINE) LOCK FLUSH IV SOLN 100 UNIT/ML 100 UNIT/ML
500 SOLUTION INTRAVENOUS PRN
Status: CANCELLED | OUTPATIENT
Start: 2019-01-23

## 2019-01-23 RX ORDER — METHYLPREDNISOLONE SODIUM SUCCINATE 125 MG/2ML
125 INJECTION, POWDER, LYOPHILIZED, FOR SOLUTION INTRAMUSCULAR; INTRAVENOUS ONCE
Status: COMPLETED | OUTPATIENT
Start: 2019-01-23 | End: 2019-01-23

## 2019-01-23 RX ORDER — DIPHENHYDRAMINE HCL 25 MG
25 TABLET ORAL ONCE
Status: CANCELLED
Start: 2019-01-23 | End: 2019-01-23

## 2019-01-23 RX ADMIN — METHYLPREDNISOLONE SODIUM SUCCINATE 125 MG: 125 INJECTION, POWDER, FOR SOLUTION INTRAMUSCULAR; INTRAVENOUS at 11:25

## 2019-01-23 RX ADMIN — DIPHENHYDRAMINE HCL 25 MG: 25 TABLET ORAL at 11:25

## 2019-01-23 RX ADMIN — WATER 375 MG: 1 INJECTION INTRAMUSCULAR; INTRAVENOUS; SUBCUTANEOUS at 11:59

## 2019-01-23 NOTE — PROGRESS NOTES
Pt admitte for xolair, pt does not have epi pen here or at home, kait luevano from dr. Lora Laird office called, states pt may have xolair shot today and he is to  new epi pen at [facility].

## 2019-01-23 NOTE — PROGRESS NOTES
Discharged to home in stable condition, tolerated xolair injections well, dc papers signed and given to pt.

## 2019-02-06 ENCOUNTER — HOSPITAL ENCOUNTER (OUTPATIENT)
Dept: INFUSION THERAPY | Age: 59
Setting detail: INFUSION SERIES
Discharge: HOME | End: 2019-02-06
Payer: MEDICAID

## 2019-02-06 VITALS
TEMPERATURE: 98.4 F | SYSTOLIC BLOOD PRESSURE: 157 MMHG | OXYGEN SATURATION: 97 % | HEART RATE: 104 BPM | RESPIRATION RATE: 18 BRPM | DIASTOLIC BLOOD PRESSURE: 80 MMHG

## 2019-02-06 DIAGNOSIS — J44.1 ASTHMA EXACERBATION WITH COPD (CHRONIC OBSTRUCTIVE PULMONARY DISEASE) (HCC): ICD-10-CM

## 2019-02-06 DIAGNOSIS — J45.901 ASTHMA EXACERBATION WITH COPD (CHRONIC OBSTRUCTIVE PULMONARY DISEASE) (HCC): ICD-10-CM

## 2019-02-06 DIAGNOSIS — J45.909 ASTHMA, INTRINSIC: ICD-10-CM

## 2019-02-06 DIAGNOSIS — J45.40 MODERATE PERSISTENT ASTHMA, UNSPECIFIED WHETHER COMPLICATED: Primary | ICD-10-CM

## 2019-02-06 PROCEDURE — 96372 THER/PROPH/DIAG INJ SC/IM: CPT

## 2019-02-06 PROCEDURE — 6360000002 HC RX W HCPCS: Performed by: INTERNAL MEDICINE

## 2019-02-06 PROCEDURE — 2580000003 HC RX 258: Performed by: INTERNAL MEDICINE

## 2019-02-06 PROCEDURE — 6370000000 HC RX 637 (ALT 250 FOR IP): Performed by: INTERNAL MEDICINE

## 2019-02-06 RX ORDER — EPINEPHRINE 1 MG/ML
0.3 INJECTION, SOLUTION, CONCENTRATE INTRAVENOUS PRN
Status: CANCELLED | OUTPATIENT
Start: 2019-02-06

## 2019-02-06 RX ORDER — METHYLPREDNISOLONE SODIUM SUCCINATE 125 MG/2ML
125 INJECTION, POWDER, LYOPHILIZED, FOR SOLUTION INTRAMUSCULAR; INTRAVENOUS ONCE
Status: COMPLETED | OUTPATIENT
Start: 2019-02-06 | End: 2019-02-06

## 2019-02-06 RX ORDER — SODIUM CHLORIDE 0.9 % (FLUSH) 0.9 %
5 SYRINGE (ML) INJECTION PRN
Status: CANCELLED | OUTPATIENT
Start: 2019-02-06

## 2019-02-06 RX ORDER — DIPHENHYDRAMINE HCL 25 MG
25 TABLET ORAL ONCE
Status: COMPLETED | OUTPATIENT
Start: 2019-02-06 | End: 2019-02-06

## 2019-02-06 RX ORDER — METHYLPREDNISOLONE SODIUM SUCCINATE 125 MG/2ML
125 INJECTION, POWDER, LYOPHILIZED, FOR SOLUTION INTRAMUSCULAR; INTRAVENOUS ONCE
Status: CANCELLED | OUTPATIENT
Start: 2019-02-06 | End: 2019-02-06

## 2019-02-06 RX ORDER — HEPARIN SODIUM (PORCINE) LOCK FLUSH IV SOLN 100 UNIT/ML 100 UNIT/ML
500 SOLUTION INTRAVENOUS PRN
Status: CANCELLED | OUTPATIENT
Start: 2019-02-06

## 2019-02-06 RX ORDER — SODIUM CHLORIDE 9 MG/ML
INJECTION, SOLUTION INTRAVENOUS CONTINUOUS
Status: CANCELLED | OUTPATIENT
Start: 2019-02-06

## 2019-02-06 RX ORDER — METHYLPREDNISOLONE SODIUM SUCCINATE 125 MG/2ML
125 INJECTION, POWDER, LYOPHILIZED, FOR SOLUTION INTRAMUSCULAR; INTRAVENOUS ONCE
Status: CANCELLED
Start: 2019-02-06 | End: 2019-02-06

## 2019-02-06 RX ORDER — SODIUM CHLORIDE 0.9 % (FLUSH) 0.9 %
10 SYRINGE (ML) INJECTION PRN
Status: CANCELLED | OUTPATIENT
Start: 2019-02-06

## 2019-02-06 RX ORDER — DIPHENHYDRAMINE HYDROCHLORIDE 50 MG/ML
50 INJECTION INTRAMUSCULAR; INTRAVENOUS ONCE
Status: CANCELLED | OUTPATIENT
Start: 2019-02-06 | End: 2019-02-06

## 2019-02-06 RX ORDER — DIPHENHYDRAMINE HCL 25 MG
25 TABLET ORAL ONCE
Status: CANCELLED
Start: 2019-02-06 | End: 2019-02-06

## 2019-02-06 RX ORDER — 0.9 % SODIUM CHLORIDE 0.9 %
10 VIAL (ML) INJECTION ONCE
Status: CANCELLED | OUTPATIENT
Start: 2019-02-06 | End: 2019-02-06

## 2019-02-06 RX ADMIN — WATER 375 MG: 1 INJECTION INTRAMUSCULAR; INTRAVENOUS; SUBCUTANEOUS at 11:40

## 2019-02-06 RX ADMIN — DIPHENHYDRAMINE HCL 25 MG: 25 TABLET ORAL at 11:14

## 2019-02-06 RX ADMIN — METHYLPREDNISOLONE SODIUM SUCCINATE 125 MG: 125 INJECTION, POWDER, FOR SOLUTION INTRAMUSCULAR; INTRAVENOUS at 11:16

## 2019-02-06 ASSESSMENT — PAIN SCALES - GENERAL: PAINLEVEL_OUTOF10: 0

## 2019-02-06 NOTE — PROGRESS NOTES
[facility] Xolair Allergy Control Test    Prescribing Physician:    Was patient administered Xolair on appointed administration date? [] yes [] no    Reason for not administering Xolair :[] Illness [] No show [] Other:    Was there any reaction to mediation? [] yes [] no    If Yes: Reactions:      1. In the past 4 weeks, how much of the time did your asthma keep you from getting as much done as usual at work, school or at home? [] 1 [] 2 [x] 3 [] 4 [] 5   Score:_3_____    2. During the past 4 weeks, how often have you had shortness of breath? [x] 1 [] 2 [] 3 [] 4 [] 5   Score:__1____    3. During the past four weeks, how often did your asthma symptoms (wheezing, coughing, shortness of breath, chest tightness, or pain) wake you up at night or earlier than usual in the morning? [] 1 [x] 2 [] 3 [] 4 [] 5   Score:_2_____    4. During the past four weeks, how often have you used your rescue inhaler or nebulizer medication? [] 1 [x] 2 [] 3 [] 4 [] 5   Score:__2____    5. How would you rate your asthma control during the past 4 weeks? [] 1 [] 2 [] 3 [] 4 [] 5   Score:_3_____        Total Score:_____11___________        To score the Asthma control test: Each response to the 5 ACT questions has a point value from 1-5 as shown on the form. To score the ACT, add up the point value for each response to the 5 questions.

## 2019-02-13 ENCOUNTER — OFFICE VISIT (OUTPATIENT)
Dept: PULMONOLOGY | Age: 59
End: 2019-02-13
Payer: MEDICAID

## 2019-02-13 VITALS
HEART RATE: 76 BPM | TEMPERATURE: 98 F | BODY MASS INDEX: 24.43 KG/M2 | RESPIRATION RATE: 16 BRPM | WEIGHT: 152 LBS | SYSTOLIC BLOOD PRESSURE: 126 MMHG | HEIGHT: 66 IN | OXYGEN SATURATION: 94 % | DIASTOLIC BLOOD PRESSURE: 67 MMHG

## 2019-02-13 DIAGNOSIS — R91.8 LUNG NODULES: Primary | ICD-10-CM

## 2019-02-13 PROCEDURE — 99214 OFFICE O/P EST MOD 30 MIN: CPT | Performed by: INTERNAL MEDICINE

## 2019-02-13 PROCEDURE — 99213 OFFICE O/P EST LOW 20 MIN: CPT | Performed by: INTERNAL MEDICINE

## 2019-02-13 RX ORDER — FLUTICASONE PROPIONATE 50 MCG
1 SPRAY, SUSPENSION (ML) NASAL DAILY
Refills: 3 | COMMUNITY
Start: 2019-01-21

## 2019-02-13 RX ORDER — HYDROXYZINE 50 MG/1
TABLET, FILM COATED ORAL
Refills: 3 | Status: ON HOLD | COMMUNITY
Start: 2019-01-21 | End: 2020-08-10 | Stop reason: HOSPADM

## 2019-02-13 RX ORDER — FUROSEMIDE 40 MG/1
TABLET ORAL
Refills: 3 | Status: ON HOLD | COMMUNITY
Start: 2019-01-21 | End: 2020-05-29 | Stop reason: HOSPADM

## 2019-02-13 RX ORDER — EPINEPHRINE 0.3 MG/.3ML
INJECTION SUBCUTANEOUS
Refills: 1 | COMMUNITY
Start: 2019-01-23 | End: 2019-10-30 | Stop reason: SDUPTHER

## 2019-02-13 RX ORDER — ESCITALOPRAM OXALATE 5 MG/1
TABLET ORAL
Refills: 1 | Status: ON HOLD | COMMUNITY
Start: 2019-01-31 | End: 2020-02-11 | Stop reason: HOSPADM

## 2019-02-13 RX ORDER — CLONIDINE HYDROCHLORIDE 0.2 MG/1
0.2 TABLET ORAL 2 TIMES DAILY
Refills: 3 | COMMUNITY
Start: 2019-01-21

## 2019-02-13 RX ORDER — LEVETIRACETAM 1000 MG/1
1000 TABLET ORAL
Refills: 3 | Status: ON HOLD | COMMUNITY
Start: 2019-01-21 | End: 2020-08-10 | Stop reason: HOSPADM

## 2019-02-14 ENCOUNTER — TELEPHONE (OUTPATIENT)
Dept: PULMONOLOGY | Age: 59
End: 2019-02-14

## 2019-02-20 ENCOUNTER — HOSPITAL ENCOUNTER (OUTPATIENT)
Dept: INFUSION THERAPY | Age: 59
Setting detail: INFUSION SERIES
End: 2019-02-20
Payer: MEDICAID

## 2019-02-22 ENCOUNTER — HOSPITAL ENCOUNTER (OUTPATIENT)
Dept: MRI IMAGING | Age: 59
Discharge: HOME | End: 2019-02-24
Payer: MEDICAID

## 2019-02-22 DIAGNOSIS — R41.3 OTHER AMNESIA: ICD-10-CM

## 2019-02-22 PROCEDURE — 70551 MRI BRAIN STEM W/O DYE: CPT

## 2019-02-27 ENCOUNTER — HOSPITAL ENCOUNTER (OUTPATIENT)
Dept: INFUSION THERAPY | Age: 59
Setting detail: INFUSION SERIES
Discharge: HOME | End: 2019-02-27
Payer: MEDICAID

## 2019-02-27 ENCOUNTER — TELEPHONE (OUTPATIENT)
Dept: PULMONOLOGY | Age: 59
End: 2019-02-27

## 2019-02-27 VITALS
RESPIRATION RATE: 18 BRPM | DIASTOLIC BLOOD PRESSURE: 89 MMHG | OXYGEN SATURATION: 94 % | SYSTOLIC BLOOD PRESSURE: 145 MMHG | TEMPERATURE: 98.9 F | HEART RATE: 94 BPM

## 2019-02-27 DIAGNOSIS — J45.909 ASTHMA, INTRINSIC: ICD-10-CM

## 2019-02-27 DIAGNOSIS — J45.901 ASTHMA EXACERBATION WITH COPD (CHRONIC OBSTRUCTIVE PULMONARY DISEASE) (HCC): ICD-10-CM

## 2019-02-27 DIAGNOSIS — J45.51 SEVERE PERSISTENT EXTRINSIC ASTHMA WITH ACUTE EXACERBATION: Primary | ICD-10-CM

## 2019-02-27 DIAGNOSIS — J44.1 ASTHMA EXACERBATION WITH COPD (CHRONIC OBSTRUCTIVE PULMONARY DISEASE) (HCC): ICD-10-CM

## 2019-02-27 PROCEDURE — 6370000000 HC RX 637 (ALT 250 FOR IP): Performed by: INTERNAL MEDICINE

## 2019-02-27 PROCEDURE — 96372 THER/PROPH/DIAG INJ SC/IM: CPT

## 2019-02-27 PROCEDURE — 6360000002 HC RX W HCPCS: Performed by: INTERNAL MEDICINE

## 2019-02-27 PROCEDURE — 2580000003 HC RX 258: Performed by: INTERNAL MEDICINE

## 2019-02-27 RX ORDER — 0.9 % SODIUM CHLORIDE 0.9 %
10 VIAL (ML) INJECTION ONCE
Status: CANCELLED | OUTPATIENT
Start: 2019-02-27 | End: 2019-02-27

## 2019-02-27 RX ORDER — METHYLPREDNISOLONE SODIUM SUCCINATE 125 MG/2ML
125 INJECTION, POWDER, LYOPHILIZED, FOR SOLUTION INTRAMUSCULAR; INTRAVENOUS ONCE
Status: COMPLETED | OUTPATIENT
Start: 2019-02-27 | End: 2019-02-27

## 2019-02-27 RX ORDER — DIPHENHYDRAMINE HCL 25 MG
25 TABLET ORAL ONCE
Status: COMPLETED | OUTPATIENT
Start: 2019-02-27 | End: 2019-02-27

## 2019-02-27 RX ORDER — DIPHENHYDRAMINE HYDROCHLORIDE 50 MG/ML
50 INJECTION INTRAMUSCULAR; INTRAVENOUS ONCE
Status: CANCELLED | OUTPATIENT
Start: 2019-02-27 | End: 2019-02-27

## 2019-02-27 RX ORDER — SODIUM CHLORIDE 0.9 % (FLUSH) 0.9 %
10 SYRINGE (ML) INJECTION PRN
Status: CANCELLED | OUTPATIENT
Start: 2019-02-27

## 2019-02-27 RX ORDER — HEPARIN SODIUM (PORCINE) LOCK FLUSH IV SOLN 100 UNIT/ML 100 UNIT/ML
500 SOLUTION INTRAVENOUS PRN
Status: CANCELLED | OUTPATIENT
Start: 2019-02-27

## 2019-02-27 RX ORDER — METHYLPREDNISOLONE SODIUM SUCCINATE 125 MG/2ML
125 INJECTION, POWDER, LYOPHILIZED, FOR SOLUTION INTRAMUSCULAR; INTRAVENOUS ONCE
Status: CANCELLED
Start: 2019-02-27 | End: 2019-02-27

## 2019-02-27 RX ORDER — SODIUM CHLORIDE 0.9 % (FLUSH) 0.9 %
5 SYRINGE (ML) INJECTION PRN
Status: CANCELLED | OUTPATIENT
Start: 2019-02-27

## 2019-02-27 RX ORDER — METHYLPREDNISOLONE SODIUM SUCCINATE 125 MG/2ML
125 INJECTION, POWDER, LYOPHILIZED, FOR SOLUTION INTRAMUSCULAR; INTRAVENOUS ONCE
Status: CANCELLED | OUTPATIENT
Start: 2019-02-27 | End: 2019-02-27

## 2019-02-27 RX ORDER — SODIUM CHLORIDE 9 MG/ML
INJECTION, SOLUTION INTRAVENOUS CONTINUOUS
Status: CANCELLED | OUTPATIENT
Start: 2019-02-27

## 2019-02-27 RX ORDER — DIPHENHYDRAMINE HCL 25 MG
25 TABLET ORAL ONCE
Status: CANCELLED
Start: 2019-02-27 | End: 2019-02-27

## 2019-02-27 RX ORDER — EPINEPHRINE 1 MG/ML
0.3 INJECTION, SOLUTION, CONCENTRATE INTRAVENOUS PRN
Status: CANCELLED | OUTPATIENT
Start: 2019-02-27

## 2019-02-27 RX ADMIN — WATER 375 MG: 1 INJECTION INTRAMUSCULAR; INTRAVENOUS; SUBCUTANEOUS at 11:12

## 2019-02-27 RX ADMIN — DIPHENHYDRAMINE HCL 25 MG: 25 TABLET ORAL at 10:49

## 2019-02-27 RX ADMIN — METHYLPREDNISOLONE SODIUM SUCCINATE 125 MG: 125 INJECTION, POWDER, FOR SOLUTION INTRAMUSCULAR; INTRAVENOUS at 10:55

## 2019-02-27 ASSESSMENT — PAIN SCALES - GENERAL: PAINLEVEL_OUTOF10: 0

## 2019-02-27 NOTE — PROGRESS NOTES
Call placed to Dr. Ulises Vásquez office to [facility]. Reported that patient's insurance does not cover his new inhalers and that his samples have run out. He feel that his asthma is not well controlled on Xolair. Carolina Benavidez will speak with insurance company and reach out to Mr Hankins [place] significant other Gilmar Stanley. Vital signs stable as well as oxygen saturation. Patient declined discharge instructions and discharged in stable condition.

## 2019-02-27 NOTE — PROGRESS NOTES
darbepoetin florencio  Pronunciation:  MICHEL NICOLE e tin AL fa  Brand:  Aranesp, Aranesp Albumin Free, Aranesp SureClick  What is darbepoetin florencio? Darbepoetin florencio is a man-made form of a protein that helps your body produce red blood cells. The amount of this protein in your body may be reduced when you have kidney failure or use certain medications. When fewer red blood cells are produced, you can develop a condition called anemia. Darbepoetin florencio is used to treat anemia (a lack of red blood cells in the body). Darbepoetin florencio may also be used for purposes not listed in this medication guide. What should I discuss with my healthcare provider before using darbepoetin florencio? You should not use this medication if you are allergic to darbepoetin florencio or epoetin florencio (Epogen or Procrit), or if you have:  · untreated or uncontrolled high blood pressure; or  · if you have ever had pure red cell aplasia (PRCA, a type of anemia) caused by using darbepoetin florencio or epoetin florencio. To make sure you can safely take darbepoetin florencio, tell your doctor if you have any of these other conditions:  · heart disease, congestive heart failure, high blood pressure;  · kidney disease (or if you are on dialysis);  · a history of stroke, heart attack, or blood clots;  · a blood cell or clotting disorder, such as sickle cell anemia or hemophilia;  · cancer;  · a seizure disorder; or  · if you are allergic to latex. FDA pregnancy category C. It is not known whether darbepoetin florencio will harm an unborn baby. Tell your doctor if you are pregnant or plan to become pregnant while using this medication. It is not known whether darbepoetin florencio passes into breast milk or if it could harm a nursing baby. Do not use this medication without telling your doctor if you are breast-feeding a baby. Some women using darbepoetin florencio have started having menstrual periods, even after not having a period for a long time due to a medical condition.  You may be able to get pregnant if your periods restart. Talk with your doctor about the need to use birth control while you are using darbepoetin florencio. Darbepoetin florencio may shorten remission time in some people with head and neck cancer who are also being treated with radiation. Darbepoetin florencio may also shorten survival time in certain people with breast cancer, non-small cell lung cancer, head and neck cancer, cervical cancer, or lymphoid cancer. Talk with your doctor about your individual risk. How should I use darbepoetin florencio? Use exactly as prescribed by your doctor. Do not use in larger or smaller amounts or for longer than recommended. Follow the directions on your prescription label. Your doctor may occasionally change your dose to make sure you get the best results. Darbepoetin florencio is injected under the skin or into a vein. You may be shown how to use an IV at home. Do not self-inject this medicine if you do not fully understand how to give the injection and properly dispose of used needles, IV tubing, and other items used to inject the medicine. Do not shake the medication bottle or you may ruin the medicine. Prepare your dose in a syringe only when you are ready to give yourself an injection. Do not use the medication if it has changed colors or has particles in it. Call your doctor for a new prescription. Use a disposable needle only once. Throw away used needles in a puncture-proof container (ask your pharmacist where you can get one and how to dispose of it). Keep this container out of the reach of children and pets. To be sure this medication is helping your condition, your blood may need to be tested often. Your blood pressure will also need to be checked. Visit your doctor regularly. If you need surgery, tell the surgeon ahead of time that you are using darbepoetin florencio. Store in the refrigerator and protect from light.  Do not freeze darbepoetin florencio, and throw away the medication if it has become frozen. What happens if I miss a dose? Call your doctor for instructions if you miss a dose of darbepoetin florencio. What happens if I overdose? Seek emergency medical attention or call the Poison Help line at [phone]. Overdose symptoms may include some of the serious side effects listed in this medication guide. What should I avoid while using darbepoetin florencio? Follow your doctor's instructions about any restrictions on food, beverages, or activity. What are the possible side effects of darbepoetin florencio? Get emergency medical help if you have any of these signs of an allergic reaction: hives; difficulty breathing; swelling of your face, lips, tongue, or throat. Contact your doctor if you feel light-headed or unusually weak or tired. These may be signs that your body has stopped responding to darbepoetin florencio. Darbepoetin florencio can increase your risk of life-threatening heart or circulation problems, including heart attack or stroke. This risk will increase the longer you use darbepoetin florencio. Seek emergency medical help if you have symptoms of heart or circulation problems, such as:  · chest pain or heavy feeling, pain spreading to the arm or shoulder, nausea, sweating, general ill feeling;  · feeling short of breath, even with mild exertion;  · swelling, rapid weight gain;  · sudden numbness or weakness, especially on one side of the body;  · sudden severe headache, confusion, problems with vision, speech, or balance; or  · pain, swelling, warmth, or redness in one or both legs.   Stop using darbepoetin florencio and call your doctor at once if you have other serious side effects such as:  · fever, chills, body aches, flu symptoms;  · feeling like you might pass out;  · easy bruising, unusual bleeding (nose, mouth, vagina, or rectum), purple or red pinpoint spots under your skin;  · seizure (black-out or convulsions); or  · dangerously high blood pressure (severe headache, blurred vision, buzzing in your ears, anxiety, confusion, chest pain, shortness of breath, uneven heartbeats, seizure). Less serious side effects may include:  · stomach pain;  · mild cough;  · mild skin rash or redness; or  · pain, bruising, swelling, warmth, redness, oozing, or bleeding where the IV needle is placed. This is not a complete list of side effects and others may occur. Call your doctor for medical advice about side effects. You may report side effects to FDA at 2-628-UMV-3825. What other drugs will affect darbepoetin florencio? There may be other drugs that can interact with darbepoetin florencio. Tell your doctor about all medications you use. This includes prescription, over-the-counter, vitamin, and herbal products. Do not start a new medication without telling your doctor. Where can I get more information? Your pharmacist can provide more information about darbepoetin florencio. Remember, keep this and all other medicines out of the reach of children, never share your medicines with others, and use this medication only for the indication prescribed. Every effort has been made to ensure that the information provided by [address] is accurate, up-to-date, and complete, but no guarantee is made to that effect. Drug information contained herein may be time sensitive. [employer] information has been compiled for use by healthcare practitioners and consumers in the [place] and therefore [employer] does not warrant that uses outside of the [place] are appropriate, unless specifically indicated otherwise. [employer]'s drug information does not endorse drugs, diagnose patients or recommend therapy. [employer] drug information is an informational resource designed to assist licensed healthcare practitioners in caring for their patients and/or to serve consumers viewing this service as a supplement to, and not a substitute for, the expertise, skill, knowledge and judgment of healthcare practitioners.  The absence of a warning for a given drug or drug combination in no way should be construed to indicate that the drug or drug combination is safe, effective or appropriate for any given patient. [facility] does not assume any responsibility for any aspect of healthcare administered with the aid of information [facility] provides. The information contained herein is not intended to cover all possible uses, directions, precautions, warnings, drug interactions, allergic reactions, or adverse effects. If you have questions about the drugs you are taking, check with your doctor, nurse or pharmacist.  Copyright 7600-3674 [facility] [place]. Version: 7.02. Revision date: 2/26/2012. This information does not replace the advice of a doctor. [employer] disclaims any warranty or liability for your use of this information. Content Version: 78.6.043340          [facility] Xolair Allergy Control Test    Prescribing Physician:    Was patient administered Xolair on appointed administration date? [] yes [x] no    Reason for not administering Xolair :[] Illness [] No show [x] Other:    Was there any reaction to mediation? [] yes [] no    If Yes: Reactions:      1. In the past 4 weeks, how much of the time did your asthma keep you from getting as much done as usual at work, school or at home? [x] 1 [] 2 [] 3 [] 4 [] 5   Score:___1___    2. During the past 4 weeks, how often have you had shortness of breath? [x] 1 [] 2 [] 3 [] 4 [] 5   Score:__1____    3. During the past four weeks, how often did your asthma symptoms (wheezing, coughing, shortness of breath, chest tightness, or pain) wake you up at night or earlier than usual in the morning? [x] 1 [] 2 [] 3 [] 4 [] 5   Score:__1____    4. During the past four weeks, how often have you used your rescue inhaler or nebulizer medication? [x] 1 [] 2 [] 3 [] 4 [] 5   Score:___1___    5. How would you rate your asthma control during the past 4 weeks?     [x] 1 [] 2 [] 3 [] 4 [] 5 Score:___1___        Total Score:_____5___________        To score the Asthma control test: Each response to the 5 ACT questions has a point value from 1-5 as shown on the form. To score the ACT, add up the point value for each response to the 5 questions.

## 2019-03-13 ENCOUNTER — HOSPITAL ENCOUNTER (OUTPATIENT)
Dept: INFUSION THERAPY | Age: 59
Setting detail: INFUSION SERIES
Discharge: HOME | End: 2019-03-13
Payer: MEDICAID

## 2019-03-13 VITALS
OXYGEN SATURATION: 94 % | DIASTOLIC BLOOD PRESSURE: 68 MMHG | TEMPERATURE: 97.6 F | HEART RATE: 78 BPM | RESPIRATION RATE: 16 BRPM | SYSTOLIC BLOOD PRESSURE: 141 MMHG

## 2019-03-13 DIAGNOSIS — J45.909 ASTHMA, INTRINSIC: Primary | ICD-10-CM

## 2019-03-13 DIAGNOSIS — J44.1 ASTHMA EXACERBATION WITH COPD (CHRONIC OBSTRUCTIVE PULMONARY DISEASE) (HCC): ICD-10-CM

## 2019-03-13 DIAGNOSIS — J45.901 ASTHMA EXACERBATION WITH COPD (CHRONIC OBSTRUCTIVE PULMONARY DISEASE) (HCC): ICD-10-CM

## 2019-03-13 PROCEDURE — 96372 THER/PROPH/DIAG INJ SC/IM: CPT

## 2019-03-13 PROCEDURE — 6360000002 HC RX W HCPCS: Performed by: INTERNAL MEDICINE

## 2019-03-13 PROCEDURE — 6370000000 HC RX 637 (ALT 250 FOR IP): Performed by: INTERNAL MEDICINE

## 2019-03-13 PROCEDURE — 2580000003 HC RX 258: Performed by: INTERNAL MEDICINE

## 2019-03-13 PROCEDURE — 96374 THER/PROPH/DIAG INJ IV PUSH: CPT

## 2019-03-13 RX ORDER — METHYLPREDNISOLONE SODIUM SUCCINATE 125 MG/2ML
125 INJECTION, POWDER, LYOPHILIZED, FOR SOLUTION INTRAMUSCULAR; INTRAVENOUS ONCE
Status: CANCELLED | OUTPATIENT
Start: 2019-03-13 | End: 2019-03-13

## 2019-03-13 RX ORDER — 0.9 % SODIUM CHLORIDE 0.9 %
10 VIAL (ML) INJECTION ONCE
Status: CANCELLED | OUTPATIENT
Start: 2019-03-13 | End: 2019-03-13

## 2019-03-13 RX ORDER — DIPHENHYDRAMINE HCL 25 MG
25 TABLET ORAL ONCE
Status: COMPLETED | OUTPATIENT
Start: 2019-03-13 | End: 2019-03-13

## 2019-03-13 RX ORDER — SODIUM CHLORIDE 0.9 % (FLUSH) 0.9 %
10 SYRINGE (ML) INJECTION PRN
Status: CANCELLED | OUTPATIENT
Start: 2019-03-13

## 2019-03-13 RX ORDER — SODIUM CHLORIDE 9 MG/ML
INJECTION, SOLUTION INTRAVENOUS CONTINUOUS
Status: CANCELLED | OUTPATIENT
Start: 2019-03-13

## 2019-03-13 RX ORDER — HEPARIN SODIUM (PORCINE) LOCK FLUSH IV SOLN 100 UNIT/ML 100 UNIT/ML
500 SOLUTION INTRAVENOUS PRN
Status: CANCELLED | OUTPATIENT
Start: 2019-03-13

## 2019-03-13 RX ORDER — SODIUM CHLORIDE 0.9 % (FLUSH) 0.9 %
5 SYRINGE (ML) INJECTION PRN
Status: CANCELLED | OUTPATIENT
Start: 2019-03-13

## 2019-03-13 RX ORDER — METHYLPREDNISOLONE SODIUM SUCCINATE 125 MG/2ML
125 INJECTION, POWDER, LYOPHILIZED, FOR SOLUTION INTRAMUSCULAR; INTRAVENOUS ONCE
Status: COMPLETED | OUTPATIENT
Start: 2019-03-13 | End: 2019-03-13

## 2019-03-13 RX ORDER — DIPHENHYDRAMINE HCL 25 MG
25 TABLET ORAL ONCE
Status: CANCELLED
Start: 2019-03-13 | End: 2019-03-13

## 2019-03-13 RX ORDER — METHYLPREDNISOLONE SODIUM SUCCINATE 125 MG/2ML
125 INJECTION, POWDER, LYOPHILIZED, FOR SOLUTION INTRAMUSCULAR; INTRAVENOUS ONCE
Status: CANCELLED
Start: 2019-03-13 | End: 2019-03-13

## 2019-03-13 RX ORDER — EPINEPHRINE 1 MG/ML
0.3 INJECTION, SOLUTION, CONCENTRATE INTRAVENOUS PRN
Status: CANCELLED | OUTPATIENT
Start: 2019-03-13

## 2019-03-13 RX ORDER — DIPHENHYDRAMINE HYDROCHLORIDE 50 MG/ML
50 INJECTION INTRAMUSCULAR; INTRAVENOUS ONCE
Status: CANCELLED | OUTPATIENT
Start: 2019-03-13 | End: 2019-03-13

## 2019-03-13 RX ADMIN — WATER 375 MG: 1 INJECTION INTRAMUSCULAR; INTRAVENOUS; SUBCUTANEOUS at 11:35

## 2019-03-13 RX ADMIN — METHYLPREDNISOLONE SODIUM SUCCINATE 125 MG: 125 INJECTION, POWDER, FOR SOLUTION INTRAMUSCULAR; INTRAVENOUS at 11:09

## 2019-03-13 RX ADMIN — DIPHENHYDRAMINE HCL 25 MG: 25 TABLET ORAL at 11:10

## 2019-03-13 NOTE — PROGRESS NOTES
Discharged to home in stable condition, tolerate xolair injections well, dc papers signed and given to pt.

## 2019-03-13 NOTE — DISCHARGE INSTR - COC
Continuity of Care Form    Patient Name: Shamika Laguerre   :  1960  MRN:  [id number]    Admit date:  3/13/2019  Discharge date:  ***    Code Status Order: Prior   Advance Directives:     Admitting Physician:  No admitting provider for patient encounter. PCP: LAVON Vargas NP    Discharging Nurse: [facility] Unit/Room#: No information available for this encounter.   Discharging Unit Phone Number: ***    Emergency Contact:   Extended Emergency Contact Information  Primary Emergency Contact: Terri Mckenna  Address:  [address] Phone: [phone]  Mobile Phone: [phone]  Relation: Other  Secondary Emergency Contact: Ana Clemons  Address: [address] Phone: [phone]  Mobile Phone: [phone]  Relation: Brother/Sister    Past Surgical History:  Past Surgical History:   Procedure Laterality Date    OTHER SURGICAL HISTORY  2017    exc abd neoplasm    ROTATOR CUFF REPAIR  2011    left shoulder in [facility]       Immunization History:   Immunization History   Administered Date(s) Administered    PPD Test 2018    Td, unspecified formulation 2015       Active Problems:  Patient Active Problem List   Diagnosis Code    Depression F32.9    Bronchospasm, acute J98.01    [facility] R65.10    Acute on chronic diastolic CHF (congestive heart failure) ([facility]) I50.33    Lactic acidosis E87.2    Poorly-controlled hypertension I10    Alcohol abuse F10.10    HIV (human immunodeficiency virus infection) ([facility] B20    Tobacco abuse Z72.0    Asthma exacerbation with COPD (chronic obstructive pulmonary disease) ([facility] J44.1, J45.901    Essential hypertension I10    Chronic obstructive pulmonary disease ([facility] J44.9    Asthma, intrinsic J45.909 ***    Patient's personal belongings (please select all that are sent with patient):  {CHP DME Belongings:078623752}    RN SIGNATURE:  {Esignature:[id number]}    CASE MANAGEMENT/SOCIAL WORK SECTION    Inpatient Status Date: ***    Readmission Risk Assessment Score:  Readmission Risk              Risk of Unplanned Readmission:        0           Discharging to Facility/ Agency   · Name:   · Address:  · Phone:  · Fax:    Dialysis Facility (if applicable)   · Name:  · Address:  · Dialysis Schedule:  · Phone:  · Fax:    / signature: {Esignature:[id number]}    PHYSICIAN SECTION    Prognosis: {Prognosis:6829017901}    Condition at Discharge: [facility] Patient Condition:131699641}    Rehab Potential (if transferring to Rehab): {Prognosis:2838586912}    Recommended Labs or Other Treatments After Discharge: ***    Physician Certification: I certify the above information and transfer of Migel Emerson  is necessary for the continuing treatment of the diagnosis listed and that he requires {Admit to Appropriate Level of Care:80920} for {GREATER/LESS:180713165} 30 days.      Update Admission H&P: {CHP DME Changes in IUYMY:994078213}    PHYSICIAN SIGNATURE:  {Esignature:[id number]}

## 2019-03-27 ENCOUNTER — HOSPITAL ENCOUNTER (OUTPATIENT)
Dept: INFUSION THERAPY | Age: 59
Setting detail: INFUSION SERIES
Discharge: HOME | End: 2019-03-27
Payer: MEDICAID

## 2019-03-27 VITALS
OXYGEN SATURATION: 95 % | RESPIRATION RATE: 14 BRPM | SYSTOLIC BLOOD PRESSURE: 142 MMHG | TEMPERATURE: 98.5 F | HEART RATE: 72 BPM | DIASTOLIC BLOOD PRESSURE: 85 MMHG

## 2019-03-27 DIAGNOSIS — J45.909 ASTHMA, INTRINSIC: Primary | ICD-10-CM

## 2019-03-27 DIAGNOSIS — J44.1 ASTHMA EXACERBATION WITH COPD (CHRONIC OBSTRUCTIVE PULMONARY DISEASE) (HCC): ICD-10-CM

## 2019-03-27 DIAGNOSIS — J45.901 ASTHMA EXACERBATION WITH COPD (CHRONIC OBSTRUCTIVE PULMONARY DISEASE) (HCC): ICD-10-CM

## 2019-03-27 PROCEDURE — 2580000003 HC RX 258: Performed by: INTERNAL MEDICINE

## 2019-03-27 PROCEDURE — 6370000000 HC RX 637 (ALT 250 FOR IP): Performed by: INTERNAL MEDICINE

## 2019-03-27 PROCEDURE — 6360000002 HC RX W HCPCS: Performed by: INTERNAL MEDICINE

## 2019-03-27 PROCEDURE — 96372 THER/PROPH/DIAG INJ SC/IM: CPT

## 2019-03-27 RX ORDER — SODIUM CHLORIDE 0.9 % (FLUSH) 0.9 %
5 SYRINGE (ML) INJECTION PRN
Status: CANCELLED | OUTPATIENT
Start: 2019-03-27

## 2019-03-27 RX ORDER — SODIUM CHLORIDE 9 MG/ML
INJECTION, SOLUTION INTRAVENOUS CONTINUOUS
Status: CANCELLED | OUTPATIENT
Start: 2019-03-27

## 2019-03-27 RX ORDER — EPINEPHRINE 1 MG/ML
0.3 INJECTION, SOLUTION, CONCENTRATE INTRAVENOUS PRN
Status: CANCELLED | OUTPATIENT
Start: 2019-03-27

## 2019-03-27 RX ORDER — DIPHENHYDRAMINE HCL 25 MG
25 TABLET ORAL ONCE
Status: COMPLETED | OUTPATIENT
Start: 2019-03-27 | End: 2019-03-27

## 2019-03-27 RX ORDER — SODIUM CHLORIDE 0.9 % (FLUSH) 0.9 %
10 SYRINGE (ML) INJECTION PRN
Status: CANCELLED | OUTPATIENT
Start: 2019-03-27

## 2019-03-27 RX ORDER — 0.9 % SODIUM CHLORIDE 0.9 %
10 VIAL (ML) INJECTION ONCE
Status: CANCELLED | OUTPATIENT
Start: 2019-03-27 | End: 2019-03-27

## 2019-03-27 RX ORDER — METHYLPREDNISOLONE SODIUM SUCCINATE 125 MG/2ML
125 INJECTION, POWDER, LYOPHILIZED, FOR SOLUTION INTRAMUSCULAR; INTRAVENOUS ONCE
Status: CANCELLED
Start: 2019-03-27 | End: 2019-03-27

## 2019-03-27 RX ORDER — METHYLPREDNISOLONE SODIUM SUCCINATE 125 MG/2ML
125 INJECTION, POWDER, LYOPHILIZED, FOR SOLUTION INTRAMUSCULAR; INTRAVENOUS ONCE
Status: CANCELLED | OUTPATIENT
Start: 2019-03-27 | End: 2019-03-27

## 2019-03-27 RX ORDER — HEPARIN SODIUM (PORCINE) LOCK FLUSH IV SOLN 100 UNIT/ML 100 UNIT/ML
500 SOLUTION INTRAVENOUS PRN
Status: CANCELLED | OUTPATIENT
Start: 2019-03-27

## 2019-03-27 RX ORDER — DIPHENHYDRAMINE HCL 25 MG
25 TABLET ORAL ONCE
Status: CANCELLED
Start: 2019-03-27 | End: 2019-03-27

## 2019-03-27 RX ORDER — DIPHENHYDRAMINE HYDROCHLORIDE 50 MG/ML
50 INJECTION INTRAMUSCULAR; INTRAVENOUS ONCE
Status: CANCELLED | OUTPATIENT
Start: 2019-03-27 | End: 2019-03-27

## 2019-03-27 RX ORDER — METHYLPREDNISOLONE SODIUM SUCCINATE 125 MG/2ML
125 INJECTION, POWDER, LYOPHILIZED, FOR SOLUTION INTRAMUSCULAR; INTRAVENOUS ONCE
Status: COMPLETED | OUTPATIENT
Start: 2019-03-27 | End: 2019-03-27

## 2019-03-27 RX ADMIN — METHYLPREDNISOLONE SODIUM SUCCINATE 125 MG: 125 INJECTION, POWDER, FOR SOLUTION INTRAMUSCULAR; INTRAVENOUS at 11:32

## 2019-03-27 RX ADMIN — WATER 375 MG: 1 INJECTION INTRAMUSCULAR; INTRAVENOUS; SUBCUTANEOUS at 11:33

## 2019-03-27 RX ADMIN — DIPHENHYDRAMINE HCL 25 MG: 25 TABLET ORAL at 11:33

## 2019-03-27 ASSESSMENT — PAIN SCALES - GENERAL
PAINLEVEL_OUTOF10: 0
PAINLEVEL_OUTOF10: 0

## 2019-03-27 NOTE — PROGRESS NOTES
[facility] Xolair Allergy Control Test    Prescribing Physician:    Was patient administered Xolair on appointed administration date? [] yes [] no    Reason for not administering Xolair :[] Illness [] No show [] Other:    Was there any reaction to mediation? [] yes [] no    If Yes: Reactions:      1. In the past 4 weeks, how much of the time did your asthma keep you from getting as much done as usual at work, school or at home? [x] 1 [] 2 [] 3 [] 4 [] 5   Score:______    2. During the past 4 weeks, how often have you had shortness of breath? [x] 1 [] 2 [] 3 [] 4 [] 5   Score:______    3. During the past four weeks, how often did your asthma symptoms (wheezing, coughing, shortness of breath, chest tightness, or pain) wake you up at night or earlier than usual in the morning? [x] 1 [] 2 [] 3 [] 4 [] 5   Score:______    4. During the past four weeks, how often have you used your rescue inhaler or nebulizer medication? [x] 1 [] 2 [] 3 [] 4 [] 5   Score:______    5. How would you rate your asthma control during the past 4 weeks? [] 1 [] 2 [x] 3 [x] 4 [] 5   Score:______        Total Score:____7___________        To score the Asthma control test: Each response to the 5 ACT questions has a point value from 1-5 as shown on the form. To score the ACT, add up the point value for each response to the 5 questions.

## 2019-04-10 ENCOUNTER — HOSPITAL ENCOUNTER (OUTPATIENT)
Dept: INFUSION THERAPY | Age: 59
Setting detail: INFUSION SERIES
Discharge: HOME | End: 2019-04-10
Payer: MEDICAID

## 2019-04-10 VITALS
SYSTOLIC BLOOD PRESSURE: 153 MMHG | TEMPERATURE: 98 F | RESPIRATION RATE: 14 BRPM | HEART RATE: 90 BPM | DIASTOLIC BLOOD PRESSURE: 95 MMHG

## 2019-04-10 DIAGNOSIS — J44.1 ASTHMA EXACERBATION WITH COPD (CHRONIC OBSTRUCTIVE PULMONARY DISEASE) (HCC): ICD-10-CM

## 2019-04-10 DIAGNOSIS — J45.901 ASTHMA EXACERBATION WITH COPD (CHRONIC OBSTRUCTIVE PULMONARY DISEASE) (HCC): ICD-10-CM

## 2019-04-10 DIAGNOSIS — J45.909 ASTHMA, INTRINSIC: Primary | ICD-10-CM

## 2019-04-10 PROCEDURE — 2580000003 HC RX 258: Performed by: INTERNAL MEDICINE

## 2019-04-10 PROCEDURE — 6370000000 HC RX 637 (ALT 250 FOR IP): Performed by: INTERNAL MEDICINE

## 2019-04-10 PROCEDURE — 6360000002 HC RX W HCPCS: Performed by: INTERNAL MEDICINE

## 2019-04-10 PROCEDURE — 96372 THER/PROPH/DIAG INJ SC/IM: CPT

## 2019-04-10 RX ORDER — DIPHENHYDRAMINE HCL 25 MG
25 TABLET ORAL ONCE
Status: COMPLETED | OUTPATIENT
Start: 2019-04-10 | End: 2019-04-10

## 2019-04-10 RX ORDER — METHYLPREDNISOLONE SODIUM SUCCINATE 125 MG/2ML
125 INJECTION, POWDER, LYOPHILIZED, FOR SOLUTION INTRAMUSCULAR; INTRAVENOUS ONCE
Status: CANCELLED
Start: 2019-04-24

## 2019-04-10 RX ORDER — HEPARIN SODIUM (PORCINE) LOCK FLUSH IV SOLN 100 UNIT/ML 100 UNIT/ML
500 SOLUTION INTRAVENOUS PRN
Status: CANCELLED | OUTPATIENT
Start: 2019-04-24

## 2019-04-10 RX ORDER — SODIUM CHLORIDE 0.9 % (FLUSH) 0.9 %
5 SYRINGE (ML) INJECTION PRN
Status: CANCELLED | OUTPATIENT
Start: 2019-04-24

## 2019-04-10 RX ORDER — METHYLPREDNISOLONE SODIUM SUCCINATE 125 MG/2ML
125 INJECTION, POWDER, LYOPHILIZED, FOR SOLUTION INTRAMUSCULAR; INTRAVENOUS ONCE
Status: COMPLETED | OUTPATIENT
Start: 2019-04-10 | End: 2019-04-10

## 2019-04-10 RX ORDER — SODIUM CHLORIDE 0.9 % (FLUSH) 0.9 %
10 SYRINGE (ML) INJECTION PRN
Status: CANCELLED | OUTPATIENT
Start: 2019-04-24

## 2019-04-10 RX ORDER — 0.9 % SODIUM CHLORIDE 0.9 %
10 VIAL (ML) INJECTION ONCE
Status: CANCELLED | OUTPATIENT
Start: 2019-04-24

## 2019-04-10 RX ORDER — EPINEPHRINE 1 MG/ML
0.3 INJECTION, SOLUTION, CONCENTRATE INTRAVENOUS PRN
Status: CANCELLED | OUTPATIENT
Start: 2019-04-24

## 2019-04-10 RX ORDER — METHYLPREDNISOLONE SODIUM SUCCINATE 125 MG/2ML
125 INJECTION, POWDER, LYOPHILIZED, FOR SOLUTION INTRAMUSCULAR; INTRAVENOUS ONCE
Status: CANCELLED | OUTPATIENT
Start: 2019-04-24

## 2019-04-10 RX ORDER — DIPHENHYDRAMINE HCL 25 MG
25 TABLET ORAL ONCE
Status: CANCELLED
Start: 2019-04-24

## 2019-04-10 RX ORDER — DIPHENHYDRAMINE HYDROCHLORIDE 50 MG/ML
50 INJECTION INTRAMUSCULAR; INTRAVENOUS ONCE
Status: CANCELLED | OUTPATIENT
Start: 2019-04-24

## 2019-04-10 RX ORDER — SODIUM CHLORIDE 9 MG/ML
INJECTION, SOLUTION INTRAVENOUS CONTINUOUS
Status: CANCELLED | OUTPATIENT
Start: 2019-04-24

## 2019-04-10 RX ADMIN — METHYLPREDNISOLONE SODIUM SUCCINATE 125 MG: 125 INJECTION, POWDER, FOR SOLUTION INTRAMUSCULAR; INTRAVENOUS at 10:05

## 2019-04-10 RX ADMIN — DIPHENHYDRAMINE HCL 25 MG: 25 TABLET ORAL at 10:06

## 2019-04-10 RX ADMIN — WATER 375 MG: 1 INJECTION INTRAMUSCULAR; INTRAVENOUS; SUBCUTANEOUS at 10:06

## 2019-04-10 ASSESSMENT — PAIN SCALES - GENERAL: PAINLEVEL_OUTOF10: 0

## 2019-04-24 ENCOUNTER — HOSPITAL ENCOUNTER (OUTPATIENT)
Dept: INFUSION THERAPY | Age: 59
Setting detail: INFUSION SERIES
Discharge: HOME | End: 2019-04-24
Payer: MEDICAID

## 2019-04-24 VITALS
OXYGEN SATURATION: 95 % | DIASTOLIC BLOOD PRESSURE: 94 MMHG | TEMPERATURE: 97.7 F | RESPIRATION RATE: 16 BRPM | HEART RATE: 68 BPM | SYSTOLIC BLOOD PRESSURE: 152 MMHG

## 2019-04-24 DIAGNOSIS — J45.909 ASTHMA, INTRINSIC: Primary | ICD-10-CM

## 2019-04-24 DIAGNOSIS — J45.901 ASTHMA EXACERBATION WITH COPD (CHRONIC OBSTRUCTIVE PULMONARY DISEASE) (HCC): ICD-10-CM

## 2019-04-24 DIAGNOSIS — J44.1 ASTHMA EXACERBATION WITH COPD (CHRONIC OBSTRUCTIVE PULMONARY DISEASE) (HCC): ICD-10-CM

## 2019-04-24 PROCEDURE — 6370000000 HC RX 637 (ALT 250 FOR IP): Performed by: INTERNAL MEDICINE

## 2019-04-24 PROCEDURE — 2580000003 HC RX 258: Performed by: INTERNAL MEDICINE

## 2019-04-24 PROCEDURE — 6360000002 HC RX W HCPCS: Performed by: INTERNAL MEDICINE

## 2019-04-24 PROCEDURE — 96372 THER/PROPH/DIAG INJ SC/IM: CPT

## 2019-04-24 RX ORDER — DIPHENHYDRAMINE HYDROCHLORIDE 50 MG/ML
50 INJECTION INTRAMUSCULAR; INTRAVENOUS ONCE
Status: CANCELLED | OUTPATIENT
Start: 2019-05-08

## 2019-04-24 RX ORDER — METHYLPREDNISOLONE SODIUM SUCCINATE 125 MG/2ML
125 INJECTION, POWDER, LYOPHILIZED, FOR SOLUTION INTRAMUSCULAR; INTRAVENOUS ONCE
Status: COMPLETED | OUTPATIENT
Start: 2019-04-24 | End: 2019-04-24

## 2019-04-24 RX ORDER — METHYLPREDNISOLONE SODIUM SUCCINATE 125 MG/2ML
125 INJECTION, POWDER, LYOPHILIZED, FOR SOLUTION INTRAMUSCULAR; INTRAVENOUS ONCE
Status: CANCELLED | OUTPATIENT
Start: 2019-05-08

## 2019-04-24 RX ORDER — HEPARIN SODIUM (PORCINE) LOCK FLUSH IV SOLN 100 UNIT/ML 100 UNIT/ML
500 SOLUTION INTRAVENOUS PRN
Status: CANCELLED | OUTPATIENT
Start: 2019-05-08

## 2019-04-24 RX ORDER — METHYLPREDNISOLONE SODIUM SUCCINATE 125 MG/2ML
125 INJECTION, POWDER, LYOPHILIZED, FOR SOLUTION INTRAMUSCULAR; INTRAVENOUS ONCE
Status: CANCELLED
Start: 2019-05-08

## 2019-04-24 RX ORDER — 0.9 % SODIUM CHLORIDE 0.9 %
10 VIAL (ML) INJECTION ONCE
Status: CANCELLED | OUTPATIENT
Start: 2019-05-08

## 2019-04-24 RX ORDER — SODIUM CHLORIDE 0.9 % (FLUSH) 0.9 %
10 SYRINGE (ML) INJECTION PRN
Status: CANCELLED | OUTPATIENT
Start: 2019-05-08

## 2019-04-24 RX ORDER — EPINEPHRINE 1 MG/ML
0.3 INJECTION, SOLUTION, CONCENTRATE INTRAVENOUS PRN
Status: CANCELLED | OUTPATIENT
Start: 2019-05-08

## 2019-04-24 RX ORDER — DIPHENHYDRAMINE HCL 25 MG
25 TABLET ORAL ONCE
Status: COMPLETED | OUTPATIENT
Start: 2019-04-24 | End: 2019-04-24

## 2019-04-24 RX ORDER — SODIUM CHLORIDE 0.9 % (FLUSH) 0.9 %
5 SYRINGE (ML) INJECTION PRN
Status: CANCELLED | OUTPATIENT
Start: 2019-05-08

## 2019-04-24 RX ORDER — SODIUM CHLORIDE 9 MG/ML
INJECTION, SOLUTION INTRAVENOUS CONTINUOUS
Status: CANCELLED | OUTPATIENT
Start: 2019-05-08

## 2019-04-24 RX ORDER — DIPHENHYDRAMINE HCL 25 MG
25 TABLET ORAL ONCE
Status: CANCELLED
Start: 2019-05-08

## 2019-04-24 RX ADMIN — METHYLPREDNISOLONE SODIUM SUCCINATE 125 MG: 125 INJECTION, POWDER, FOR SOLUTION INTRAMUSCULAR; INTRAVENOUS at 11:38

## 2019-04-24 RX ADMIN — DIPHENHYDRAMINE HCL 25 MG: 25 TABLET ORAL at 11:43

## 2019-04-24 RX ADMIN — WATER 375 MG: 1 INJECTION INTRAMUSCULAR; INTRAVENOUS; SUBCUTANEOUS at 11:43

## 2019-04-24 NOTE — PROGRESS NOTES
[address] [place] [address] Allergy Control Test    Prescribing Physician: Chris Rodríguez    Was patient administered Xolair on appointed administration date? [x] yes [] no    Reason for not administering Xolair :[] Illness [] No show [] Other:    Was there any reaction to mediation? [] yes [x] no    If Yes: Reactions:      1. In the past 4 weeks, how much of the time did your asthma keep you from getting as much done as usual at work, school or at home? [] 1 [x] 2 [] 3 [] 4 [] 5   Score:__2____    2. During the past 4 weeks, how often have you had shortness of breath? [] 1 [] 2 [x] 3 [] 4 [] 5   Score:__3____    3. During the past four weeks, how often did your asthma symptoms (wheezing, coughing, shortness of breath, chest tightness, or pain) wake you up at night or earlier than usual in the morning? [x] 1 [] 2 [] 3 [] 4 [] 5   Score:__1____    4. During the past four weeks, how often have you used your rescue inhaler or nebulizer medication? [] 1 [x] 2 [] 3 [] 4 [] 5   Score:__2____    5. How would you rate your asthma control during the past 4 weeks? [] 1 [x] 2 [] 3 [] 4 [] 5   Score:__2____        Total Score:______10__________        To score the Asthma control test: Each response to the 5 ACT questions has a point value from 1-5 as shown on the form. To score the ACT, add up the point value for each response to the 5 questions.

## 2019-05-08 ENCOUNTER — HOSPITAL ENCOUNTER (OUTPATIENT)
Dept: INFUSION THERAPY | Age: 59
Setting detail: INFUSION SERIES
Discharge: HOME | End: 2019-05-08
Payer: MEDICAID

## 2019-05-08 VITALS
SYSTOLIC BLOOD PRESSURE: 153 MMHG | HEART RATE: 75 BPM | TEMPERATURE: 98.1 F | RESPIRATION RATE: 16 BRPM | OXYGEN SATURATION: 92 % | DIASTOLIC BLOOD PRESSURE: 93 MMHG

## 2019-05-08 DIAGNOSIS — J44.1 ASTHMA EXACERBATION WITH COPD (CHRONIC OBSTRUCTIVE PULMONARY DISEASE) (HCC): ICD-10-CM

## 2019-05-08 DIAGNOSIS — J45.909 ASTHMA, INTRINSIC: Primary | ICD-10-CM

## 2019-05-08 DIAGNOSIS — J45.901 ASTHMA EXACERBATION WITH COPD (CHRONIC OBSTRUCTIVE PULMONARY DISEASE) (HCC): ICD-10-CM

## 2019-05-08 PROCEDURE — 96372 THER/PROPH/DIAG INJ SC/IM: CPT

## 2019-05-08 PROCEDURE — 6370000000 HC RX 637 (ALT 250 FOR IP): Performed by: INTERNAL MEDICINE

## 2019-05-08 PROCEDURE — 6360000002 HC RX W HCPCS: Performed by: INTERNAL MEDICINE

## 2019-05-08 PROCEDURE — 2580000003 HC RX 258: Performed by: INTERNAL MEDICINE

## 2019-05-08 RX ORDER — SODIUM CHLORIDE 0.9 % (FLUSH) 0.9 %
10 SYRINGE (ML) INJECTION PRN
Status: CANCELLED | OUTPATIENT
Start: 2019-05-22

## 2019-05-08 RX ORDER — DIPHENHYDRAMINE HCL 25 MG
25 TABLET ORAL ONCE
Status: COMPLETED | OUTPATIENT
Start: 2019-05-08 | End: 2019-05-08

## 2019-05-08 RX ORDER — METHYLPREDNISOLONE SODIUM SUCCINATE 125 MG/2ML
125 INJECTION, POWDER, LYOPHILIZED, FOR SOLUTION INTRAMUSCULAR; INTRAVENOUS ONCE
Status: CANCELLED
Start: 2019-05-22

## 2019-05-08 RX ORDER — METHYLPREDNISOLONE SODIUM SUCCINATE 125 MG/2ML
125 INJECTION, POWDER, LYOPHILIZED, FOR SOLUTION INTRAMUSCULAR; INTRAVENOUS ONCE
Status: COMPLETED | OUTPATIENT
Start: 2019-05-08 | End: 2019-05-08

## 2019-05-08 RX ORDER — 0.9 % SODIUM CHLORIDE 0.9 %
10 VIAL (ML) INJECTION ONCE
Status: CANCELLED | OUTPATIENT
Start: 2019-05-22

## 2019-05-08 RX ORDER — DIPHENHYDRAMINE HYDROCHLORIDE 50 MG/ML
50 INJECTION INTRAMUSCULAR; INTRAVENOUS ONCE
Status: CANCELLED | OUTPATIENT
Start: 2019-05-22

## 2019-05-08 RX ORDER — DIPHENHYDRAMINE HCL 25 MG
25 TABLET ORAL ONCE
Status: CANCELLED
Start: 2019-05-22

## 2019-05-08 RX ORDER — SODIUM CHLORIDE 9 MG/ML
INJECTION, SOLUTION INTRAVENOUS CONTINUOUS
Status: CANCELLED | OUTPATIENT
Start: 2019-05-22

## 2019-05-08 RX ORDER — HEPARIN SODIUM (PORCINE) LOCK FLUSH IV SOLN 100 UNIT/ML 100 UNIT/ML
500 SOLUTION INTRAVENOUS PRN
Status: CANCELLED | OUTPATIENT
Start: 2019-05-22

## 2019-05-08 RX ORDER — EPINEPHRINE 1 MG/ML
0.3 INJECTION, SOLUTION, CONCENTRATE INTRAVENOUS PRN
Status: CANCELLED | OUTPATIENT
Start: 2019-05-22

## 2019-05-08 RX ORDER — SODIUM CHLORIDE 0.9 % (FLUSH) 0.9 %
5 SYRINGE (ML) INJECTION PRN
Status: CANCELLED | OUTPATIENT
Start: 2019-05-22

## 2019-05-08 RX ORDER — METHYLPREDNISOLONE SODIUM SUCCINATE 125 MG/2ML
125 INJECTION, POWDER, LYOPHILIZED, FOR SOLUTION INTRAMUSCULAR; INTRAVENOUS ONCE
Status: CANCELLED | OUTPATIENT
Start: 2019-05-22

## 2019-05-08 RX ADMIN — WATER 375 MG: 1 INJECTION INTRAMUSCULAR; INTRAVENOUS; SUBCUTANEOUS at 12:02

## 2019-05-08 RX ADMIN — METHYLPREDNISOLONE SODIUM SUCCINATE 125 MG: 125 INJECTION, POWDER, FOR SOLUTION INTRAMUSCULAR; INTRAVENOUS at 11:45

## 2019-05-08 RX ADMIN — DIPHENHYDRAMINE HCL 25 MG: 25 TABLET ORAL at 11:44

## 2019-05-22 ENCOUNTER — HOSPITAL ENCOUNTER (OUTPATIENT)
Dept: INFUSION THERAPY | Age: 59
Setting detail: INFUSION SERIES
Discharge: HOME | End: 2019-05-22
Payer: MEDICAID

## 2019-05-22 DIAGNOSIS — J45.901 ASTHMA EXACERBATION WITH COPD (CHRONIC OBSTRUCTIVE PULMONARY DISEASE) (HCC): ICD-10-CM

## 2019-05-22 DIAGNOSIS — J44.1 ASTHMA EXACERBATION WITH COPD (CHRONIC OBSTRUCTIVE PULMONARY DISEASE) (HCC): ICD-10-CM

## 2019-05-22 DIAGNOSIS — J45.909 ASTHMA, INTRINSIC: Primary | ICD-10-CM

## 2019-05-22 RX ORDER — EPINEPHRINE 1 MG/ML
0.3 INJECTION, SOLUTION, CONCENTRATE INTRAVENOUS PRN
Status: CANCELLED | OUTPATIENT
Start: 2019-06-05

## 2019-05-22 RX ORDER — DIPHENHYDRAMINE HCL 25 MG
25 TABLET ORAL ONCE
Status: DISCONTINUED | OUTPATIENT
Start: 2019-05-22 | End: 2019-05-22

## 2019-05-22 RX ORDER — SODIUM CHLORIDE 0.9 % (FLUSH) 0.9 %
10 SYRINGE (ML) INJECTION PRN
Status: DISCONTINUED | OUTPATIENT
Start: 2019-05-22 | End: 2019-05-22

## 2019-05-22 RX ORDER — 0.9 % SODIUM CHLORIDE 0.9 %
10 VIAL (ML) INJECTION ONCE
Status: CANCELLED | OUTPATIENT
Start: 2019-06-05

## 2019-05-22 RX ORDER — DIPHENHYDRAMINE HCL 25 MG
25 TABLET ORAL ONCE
Status: CANCELLED
Start: 2019-06-05

## 2019-05-22 RX ORDER — METHYLPREDNISOLONE SODIUM SUCCINATE 125 MG/2ML
125 INJECTION, POWDER, LYOPHILIZED, FOR SOLUTION INTRAMUSCULAR; INTRAVENOUS ONCE
Status: CANCELLED | OUTPATIENT
Start: 2019-06-05

## 2019-05-22 RX ORDER — METHYLPREDNISOLONE SODIUM SUCCINATE 125 MG/2ML
125 INJECTION, POWDER, LYOPHILIZED, FOR SOLUTION INTRAMUSCULAR; INTRAVENOUS ONCE
Status: CANCELLED
Start: 2019-06-05

## 2019-05-22 RX ORDER — DIPHENHYDRAMINE HYDROCHLORIDE 50 MG/ML
50 INJECTION INTRAMUSCULAR; INTRAVENOUS ONCE
Status: CANCELLED | OUTPATIENT
Start: 2019-06-05

## 2019-05-22 RX ORDER — SODIUM CHLORIDE 0.9 % (FLUSH) 0.9 %
10 SYRINGE (ML) INJECTION PRN
Status: CANCELLED | OUTPATIENT
Start: 2019-06-05

## 2019-05-22 RX ORDER — HEPARIN SODIUM (PORCINE) LOCK FLUSH IV SOLN 100 UNIT/ML 100 UNIT/ML
500 SOLUTION INTRAVENOUS PRN
Status: CANCELLED | OUTPATIENT
Start: 2019-06-05

## 2019-05-22 RX ORDER — METHYLPREDNISOLONE SODIUM SUCCINATE 125 MG/2ML
125 INJECTION, POWDER, LYOPHILIZED, FOR SOLUTION INTRAMUSCULAR; INTRAVENOUS ONCE
Status: DISCONTINUED | OUTPATIENT
Start: 2019-05-22 | End: 2019-05-22

## 2019-05-22 RX ORDER — SODIUM CHLORIDE 9 MG/ML
INJECTION, SOLUTION INTRAVENOUS CONTINUOUS
Status: CANCELLED | OUTPATIENT
Start: 2019-06-05

## 2019-05-22 RX ORDER — SODIUM CHLORIDE 0.9 % (FLUSH) 0.9 %
5 SYRINGE (ML) INJECTION PRN
Status: CANCELLED | OUTPATIENT
Start: 2019-06-05

## 2019-06-05 ENCOUNTER — HOSPITAL ENCOUNTER (OUTPATIENT)
Dept: INFUSION THERAPY | Age: 59
Setting detail: INFUSION SERIES
Discharge: HOME | End: 2019-06-05
Payer: MEDICAID

## 2019-06-05 VITALS
RESPIRATION RATE: 18 BRPM | OXYGEN SATURATION: 96 % | SYSTOLIC BLOOD PRESSURE: 167 MMHG | HEART RATE: 77 BPM | TEMPERATURE: 98.3 F | DIASTOLIC BLOOD PRESSURE: 94 MMHG

## 2019-06-05 DIAGNOSIS — J45.909 ASTHMA, INTRINSIC: Primary | ICD-10-CM

## 2019-06-05 DIAGNOSIS — J44.1 ASTHMA EXACERBATION WITH COPD (CHRONIC OBSTRUCTIVE PULMONARY DISEASE) (HCC): ICD-10-CM

## 2019-06-05 DIAGNOSIS — J45.901 ASTHMA EXACERBATION WITH COPD (CHRONIC OBSTRUCTIVE PULMONARY DISEASE) (HCC): ICD-10-CM

## 2019-06-05 PROCEDURE — 6370000000 HC RX 637 (ALT 250 FOR IP): Performed by: INTERNAL MEDICINE

## 2019-06-05 PROCEDURE — 2580000003 HC RX 258: Performed by: INTERNAL MEDICINE

## 2019-06-05 PROCEDURE — 6360000002 HC RX W HCPCS: Performed by: INTERNAL MEDICINE

## 2019-06-05 PROCEDURE — 96372 THER/PROPH/DIAG INJ SC/IM: CPT

## 2019-06-05 RX ORDER — SODIUM CHLORIDE 0.9 % (FLUSH) 0.9 %
10 SYRINGE (ML) INJECTION PRN
Status: CANCELLED | OUTPATIENT
Start: 2019-06-19

## 2019-06-05 RX ORDER — DIPHENHYDRAMINE HYDROCHLORIDE 50 MG/ML
50 INJECTION INTRAMUSCULAR; INTRAVENOUS ONCE
Status: CANCELLED | OUTPATIENT
Start: 2019-06-19

## 2019-06-05 RX ORDER — SODIUM CHLORIDE 0.9 % (FLUSH) 0.9 %
5 SYRINGE (ML) INJECTION PRN
Status: CANCELLED | OUTPATIENT
Start: 2019-06-19

## 2019-06-05 RX ORDER — DIPHENHYDRAMINE HCL 25 MG
25 TABLET ORAL ONCE
Status: COMPLETED | OUTPATIENT
Start: 2019-06-05 | End: 2019-06-05

## 2019-06-05 RX ORDER — METHYLPREDNISOLONE SODIUM SUCCINATE 125 MG/2ML
125 INJECTION, POWDER, LYOPHILIZED, FOR SOLUTION INTRAMUSCULAR; INTRAVENOUS ONCE
Status: CANCELLED | OUTPATIENT
Start: 2019-06-19

## 2019-06-05 RX ORDER — METHYLPREDNISOLONE SODIUM SUCCINATE 125 MG/2ML
125 INJECTION, POWDER, LYOPHILIZED, FOR SOLUTION INTRAMUSCULAR; INTRAVENOUS ONCE
Status: COMPLETED | OUTPATIENT
Start: 2019-06-05 | End: 2019-06-05

## 2019-06-05 RX ORDER — DIPHENHYDRAMINE HCL 25 MG
25 TABLET ORAL ONCE
Status: CANCELLED
Start: 2019-06-19

## 2019-06-05 RX ORDER — SODIUM CHLORIDE 9 MG/ML
INJECTION, SOLUTION INTRAVENOUS CONTINUOUS
Status: CANCELLED | OUTPATIENT
Start: 2019-06-19

## 2019-06-05 RX ORDER — HEPARIN SODIUM (PORCINE) LOCK FLUSH IV SOLN 100 UNIT/ML 100 UNIT/ML
500 SOLUTION INTRAVENOUS PRN
Status: CANCELLED | OUTPATIENT
Start: 2019-06-19

## 2019-06-05 RX ORDER — EPINEPHRINE 1 MG/ML
0.3 INJECTION, SOLUTION, CONCENTRATE INTRAVENOUS PRN
Status: CANCELLED | OUTPATIENT
Start: 2019-06-19

## 2019-06-05 RX ORDER — 0.9 % SODIUM CHLORIDE 0.9 %
10 VIAL (ML) INJECTION ONCE
Status: CANCELLED | OUTPATIENT
Start: 2019-06-19

## 2019-06-05 RX ORDER — METHYLPREDNISOLONE SODIUM SUCCINATE 125 MG/2ML
125 INJECTION, POWDER, LYOPHILIZED, FOR SOLUTION INTRAMUSCULAR; INTRAVENOUS ONCE
Status: CANCELLED
Start: 2019-06-19

## 2019-06-05 RX ADMIN — METHYLPREDNISOLONE SODIUM SUCCINATE 125 MG: 125 INJECTION, POWDER, FOR SOLUTION INTRAMUSCULAR; INTRAVENOUS at 11:04

## 2019-06-05 RX ADMIN — WATER 375 MG: 1 INJECTION INTRAMUSCULAR; INTRAVENOUS; SUBCUTANEOUS at 11:36

## 2019-06-05 RX ADMIN — DIPHENHYDRAMINE HCL 25 MG: 25 TABLET ORAL at 11:03

## 2019-06-05 ASSESSMENT — PAIN SCALES - GENERAL: PAINLEVEL_OUTOF10: 0

## 2019-06-05 NOTE — PROGRESS NOTES
Patient to complete Asthma Control Test form  (Xolair questionnaire) post injection and fax to physician office. [facility] Xolair Allergy Control Test    Prescribing Physician:    Was patient administered Xolair on appointed administration date? [x] yes [] no    Reason for not administering Xolair :[] Illness [] No show [] Other:    Was there any reaction to mediation? [] yes [x] no    If Yes: Reactions:      1. In the past 4 weeks, how much of the time did your asthma keep you from getting as much done as usual at work, school or at home? [] 1 [] 2 [x] 3 [] 4 [] 5   Score:______    2. During the past 4 weeks, how often have you had shortness of breath? [] 1 [] 2 [] 3 [x] 4 [] 5   Score:______    3. During the past four weeks, how often did your asthma symptoms (wheezing, coughing, shortness of breath, chest tightness, or pain) wake you up at night or earlier than usual in the morning? [] 1 [] 2 [] 3 [] 4 [] 5   Score:______    4. During the past four weeks, how often have you used your rescue inhaler or nebulizer medication? [] 1 [] 2 [x] 3 [] 4 [] 5   Score:______    5. How would you rate your asthma control during the past 4 weeks? [] 1 [] 2 [] 3 [x] 4 [] 5   Score:______        Total Score:_________18_______        To score the Asthma control test: Each response to the 5 ACT questions has a point value from 1-5 as shown on the form. To score the ACT, add up the point value for each response to the 5 questions.

## 2019-06-19 ENCOUNTER — HOSPITAL ENCOUNTER (OUTPATIENT)
Dept: INFUSION THERAPY | Age: 59
Setting detail: INFUSION SERIES
Discharge: HOME | End: 2019-06-19
Payer: MEDICAID

## 2019-06-19 VITALS
SYSTOLIC BLOOD PRESSURE: 119 MMHG | OXYGEN SATURATION: 94 % | DIASTOLIC BLOOD PRESSURE: 67 MMHG | RESPIRATION RATE: 18 BRPM | HEART RATE: 79 BPM | TEMPERATURE: 97.7 F

## 2019-06-19 DIAGNOSIS — J45.901 ASTHMA EXACERBATION WITH COPD (CHRONIC OBSTRUCTIVE PULMONARY DISEASE) (HCC): ICD-10-CM

## 2019-06-19 DIAGNOSIS — J45.909 ASTHMA, INTRINSIC: Primary | ICD-10-CM

## 2019-06-19 DIAGNOSIS — J44.1 ASTHMA EXACERBATION WITH COPD (CHRONIC OBSTRUCTIVE PULMONARY DISEASE) (HCC): ICD-10-CM

## 2019-06-19 PROCEDURE — 2580000003 HC RX 258: Performed by: INTERNAL MEDICINE

## 2019-06-19 PROCEDURE — 6370000000 HC RX 637 (ALT 250 FOR IP): Performed by: INTERNAL MEDICINE

## 2019-06-19 PROCEDURE — 96372 THER/PROPH/DIAG INJ SC/IM: CPT

## 2019-06-19 PROCEDURE — 6360000002 HC RX W HCPCS: Performed by: INTERNAL MEDICINE

## 2019-06-19 RX ORDER — DIPHENHYDRAMINE HCL 25 MG
25 TABLET ORAL ONCE
Status: COMPLETED | OUTPATIENT
Start: 2019-06-19 | End: 2019-06-19

## 2019-06-19 RX ORDER — SODIUM CHLORIDE 0.9 % (FLUSH) 0.9 %
10 SYRINGE (ML) INJECTION PRN
Status: CANCELLED | OUTPATIENT
Start: 2019-07-03

## 2019-06-19 RX ORDER — METHYLPREDNISOLONE SODIUM SUCCINATE 125 MG/2ML
125 INJECTION, POWDER, LYOPHILIZED, FOR SOLUTION INTRAMUSCULAR; INTRAVENOUS ONCE
Status: COMPLETED | OUTPATIENT
Start: 2019-06-19 | End: 2019-06-19

## 2019-06-19 RX ORDER — SODIUM CHLORIDE 0.9 % (FLUSH) 0.9 %
5 SYRINGE (ML) INJECTION PRN
Status: CANCELLED | OUTPATIENT
Start: 2019-07-03

## 2019-06-19 RX ORDER — 0.9 % SODIUM CHLORIDE 0.9 %
10 VIAL (ML) INJECTION ONCE
Status: CANCELLED | OUTPATIENT
Start: 2019-07-03

## 2019-06-19 RX ORDER — METHYLPREDNISOLONE SODIUM SUCCINATE 125 MG/2ML
125 INJECTION, POWDER, LYOPHILIZED, FOR SOLUTION INTRAMUSCULAR; INTRAVENOUS ONCE
Status: CANCELLED | OUTPATIENT
Start: 2019-07-03

## 2019-06-19 RX ORDER — HEPARIN SODIUM (PORCINE) LOCK FLUSH IV SOLN 100 UNIT/ML 100 UNIT/ML
500 SOLUTION INTRAVENOUS PRN
Status: CANCELLED | OUTPATIENT
Start: 2019-07-03

## 2019-06-19 RX ORDER — EPINEPHRINE 1 MG/ML
0.3 INJECTION, SOLUTION, CONCENTRATE INTRAVENOUS PRN
Status: CANCELLED | OUTPATIENT
Start: 2019-07-03

## 2019-06-19 RX ORDER — METHYLPREDNISOLONE SODIUM SUCCINATE 125 MG/2ML
125 INJECTION, POWDER, LYOPHILIZED, FOR SOLUTION INTRAMUSCULAR; INTRAVENOUS ONCE
Status: CANCELLED
Start: 2019-07-03

## 2019-06-19 RX ORDER — SODIUM CHLORIDE 9 MG/ML
INJECTION, SOLUTION INTRAVENOUS CONTINUOUS
Status: CANCELLED | OUTPATIENT
Start: 2019-07-03

## 2019-06-19 RX ORDER — DIPHENHYDRAMINE HCL 25 MG
25 TABLET ORAL ONCE
Status: CANCELLED
Start: 2019-07-03

## 2019-06-19 RX ORDER — DIPHENHYDRAMINE HYDROCHLORIDE 50 MG/ML
50 INJECTION INTRAMUSCULAR; INTRAVENOUS ONCE
Status: CANCELLED | OUTPATIENT
Start: 2019-07-03

## 2019-06-19 RX ADMIN — DIPHENHYDRAMINE HCL 25 MG: 25 TABLET ORAL at 11:25

## 2019-06-19 RX ADMIN — METHYLPREDNISOLONE SODIUM SUCCINATE 125 MG: 125 INJECTION, POWDER, FOR SOLUTION INTRAMUSCULAR; INTRAVENOUS at 11:25

## 2019-06-19 RX ADMIN — WATER 375 MG: 1 INJECTION INTRAMUSCULAR; INTRAVENOUS; SUBCUTANEOUS at 11:48

## 2019-07-03 ENCOUNTER — HOSPITAL ENCOUNTER (OUTPATIENT)
Dept: INFUSION THERAPY | Age: 59
Setting detail: INFUSION SERIES
Discharge: HOME | End: 2019-07-03
Payer: MEDICAID

## 2019-07-03 VITALS
SYSTOLIC BLOOD PRESSURE: 139 MMHG | TEMPERATURE: 98 F | OXYGEN SATURATION: 93 % | RESPIRATION RATE: 18 BRPM | DIASTOLIC BLOOD PRESSURE: 80 MMHG

## 2019-07-03 DIAGNOSIS — J44.1 ASTHMA EXACERBATION WITH COPD (CHRONIC OBSTRUCTIVE PULMONARY DISEASE) (HCC): ICD-10-CM

## 2019-07-03 DIAGNOSIS — J45.909 ASTHMA, INTRINSIC: Primary | ICD-10-CM

## 2019-07-03 DIAGNOSIS — J45.901 ASTHMA EXACERBATION WITH COPD (CHRONIC OBSTRUCTIVE PULMONARY DISEASE) (HCC): ICD-10-CM

## 2019-07-03 PROCEDURE — 96372 THER/PROPH/DIAG INJ SC/IM: CPT

## 2019-07-03 PROCEDURE — 6360000002 HC RX W HCPCS: Performed by: INTERNAL MEDICINE

## 2019-07-03 PROCEDURE — 6370000000 HC RX 637 (ALT 250 FOR IP): Performed by: INTERNAL MEDICINE

## 2019-07-03 PROCEDURE — 2580000003 HC RX 258: Performed by: INTERNAL MEDICINE

## 2019-07-03 RX ORDER — METHYLPREDNISOLONE SODIUM SUCCINATE 125 MG/2ML
125 INJECTION, POWDER, LYOPHILIZED, FOR SOLUTION INTRAMUSCULAR; INTRAVENOUS ONCE
Status: COMPLETED | OUTPATIENT
Start: 2019-07-03 | End: 2019-07-03

## 2019-07-03 RX ORDER — SODIUM CHLORIDE 0.9 % (FLUSH) 0.9 %
10 SYRINGE (ML) INJECTION PRN
Status: CANCELLED | OUTPATIENT
Start: 2019-07-17

## 2019-07-03 RX ORDER — METHYLPREDNISOLONE SODIUM SUCCINATE 125 MG/2ML
125 INJECTION, POWDER, LYOPHILIZED, FOR SOLUTION INTRAMUSCULAR; INTRAVENOUS ONCE
Status: CANCELLED
Start: 2019-07-17

## 2019-07-03 RX ORDER — DIPHENHYDRAMINE HYDROCHLORIDE 50 MG/ML
50 INJECTION INTRAMUSCULAR; INTRAVENOUS ONCE
Status: CANCELLED | OUTPATIENT
Start: 2019-07-17

## 2019-07-03 RX ORDER — DIPHENHYDRAMINE HCL 25 MG
25 TABLET ORAL ONCE
Status: CANCELLED
Start: 2019-07-17

## 2019-07-03 RX ORDER — SODIUM CHLORIDE 9 MG/ML
INJECTION, SOLUTION INTRAVENOUS CONTINUOUS
Status: CANCELLED | OUTPATIENT
Start: 2019-07-17

## 2019-07-03 RX ORDER — DIPHENHYDRAMINE HCL 25 MG
25 TABLET ORAL ONCE
Status: COMPLETED | OUTPATIENT
Start: 2019-07-03 | End: 2019-07-03

## 2019-07-03 RX ORDER — HEPARIN SODIUM (PORCINE) LOCK FLUSH IV SOLN 100 UNIT/ML 100 UNIT/ML
500 SOLUTION INTRAVENOUS PRN
Status: CANCELLED | OUTPATIENT
Start: 2019-07-17

## 2019-07-03 RX ORDER — METHYLPREDNISOLONE SODIUM SUCCINATE 125 MG/2ML
125 INJECTION, POWDER, LYOPHILIZED, FOR SOLUTION INTRAMUSCULAR; INTRAVENOUS ONCE
Status: CANCELLED | OUTPATIENT
Start: 2019-07-17

## 2019-07-03 RX ORDER — EPINEPHRINE 1 MG/ML
0.3 INJECTION, SOLUTION, CONCENTRATE INTRAVENOUS PRN
Status: CANCELLED | OUTPATIENT
Start: 2019-07-17

## 2019-07-03 RX ORDER — 0.9 % SODIUM CHLORIDE 0.9 %
10 VIAL (ML) INJECTION ONCE
Status: CANCELLED | OUTPATIENT
Start: 2019-07-17

## 2019-07-03 RX ORDER — SODIUM CHLORIDE 0.9 % (FLUSH) 0.9 %
5 SYRINGE (ML) INJECTION PRN
Status: CANCELLED | OUTPATIENT
Start: 2019-07-17

## 2019-07-03 RX ADMIN — WATER 375 MG: 1 INJECTION INTRAMUSCULAR; INTRAVENOUS; SUBCUTANEOUS at 12:05

## 2019-07-03 RX ADMIN — DIPHENHYDRAMINE HCL 25 MG: 25 TABLET ORAL at 11:59

## 2019-07-03 RX ADMIN — METHYLPREDNISOLONE SODIUM SUCCINATE 125 MG: 125 INJECTION, POWDER, FOR SOLUTION INTRAMUSCULAR; INTRAVENOUS at 12:04

## 2019-07-03 NOTE — PROGRESS NOTES
[address] [place] [address] Allergy Control Test    Prescribing Physician: Chris Rodríguez    Was patient administered Xolair on appointed administration date? [x] yes [] no    Reason for not administering Xolair :[] Illness [] No show [] Other:    Was there any reaction to mediation? [] yes [x] no    If Yes: Reactions:      1. In the past 4 weeks, how much of the time did your asthma keep you from getting as much done as usual at work, school or at home? [] 1 [] 2 [x] 3 [] 4 [] 5   Score:___3___    2. During the past 4 weeks, how often have you had shortness of breath? [] 1 [] 2 [] 3 [x] 4 [] 5   Score:__4____    3. During the past four weeks, how often did your asthma symptoms (wheezing, coughing, shortness of breath, chest tightness, or pain) wake you up at night or earlier than usual in the morning? [] 1 [x] 2 [] 3 [] 4 [] 5   Score:__2____    4. During the past four weeks, how often have you used your rescue inhaler or nebulizer medication? [] 1 [] 2 [x] 3 [] 4 [] 5   Score:_3_____    5. How would you rate your asthma control during the past 4 weeks? [] 1 [] 2 [x] 3 [] 4 [] 5   Score:___3___        Total Score:_______15_________        To score the Asthma control test: Each response to the 5 ACT questions has a point value from 1-5 as shown on the form. To score the ACT, add up the point value for each response to the 5 questions.

## 2019-07-17 ENCOUNTER — HOSPITAL ENCOUNTER (OUTPATIENT)
Dept: INFUSION THERAPY | Age: 59
Setting detail: INFUSION SERIES
Discharge: HOME | End: 2019-07-17
Payer: MEDICAID

## 2019-07-17 VITALS
HEART RATE: 72 BPM | TEMPERATURE: 98 F | DIASTOLIC BLOOD PRESSURE: 79 MMHG | RESPIRATION RATE: 18 BRPM | OXYGEN SATURATION: 98 % | SYSTOLIC BLOOD PRESSURE: 130 MMHG

## 2019-07-17 DIAGNOSIS — J44.1 ASTHMA EXACERBATION WITH COPD (CHRONIC OBSTRUCTIVE PULMONARY DISEASE) (HCC): ICD-10-CM

## 2019-07-17 DIAGNOSIS — J45.901 ASTHMA EXACERBATION WITH COPD (CHRONIC OBSTRUCTIVE PULMONARY DISEASE) (HCC): ICD-10-CM

## 2019-07-17 DIAGNOSIS — J45.909 ASTHMA, INTRINSIC: Primary | ICD-10-CM

## 2019-07-17 PROCEDURE — 6360000002 HC RX W HCPCS: Performed by: INTERNAL MEDICINE

## 2019-07-17 PROCEDURE — 96372 THER/PROPH/DIAG INJ SC/IM: CPT

## 2019-07-17 PROCEDURE — 2580000003 HC RX 258: Performed by: INTERNAL MEDICINE

## 2019-07-17 PROCEDURE — 6370000000 HC RX 637 (ALT 250 FOR IP): Performed by: INTERNAL MEDICINE

## 2019-07-17 RX ORDER — EPINEPHRINE 1 MG/ML
0.3 INJECTION, SOLUTION, CONCENTRATE INTRAVENOUS PRN
Status: CANCELLED | OUTPATIENT
Start: 2019-07-31

## 2019-07-17 RX ORDER — SODIUM CHLORIDE 0.9 % (FLUSH) 0.9 %
5 SYRINGE (ML) INJECTION PRN
Status: CANCELLED | OUTPATIENT
Start: 2019-07-31

## 2019-07-17 RX ORDER — DIPHENHYDRAMINE HCL 25 MG
25 TABLET ORAL ONCE
Status: COMPLETED | OUTPATIENT
Start: 2019-07-17 | End: 2019-07-17

## 2019-07-17 RX ORDER — SODIUM CHLORIDE 0.9 % (FLUSH) 0.9 %
10 SYRINGE (ML) INJECTION PRN
Status: CANCELLED | OUTPATIENT
Start: 2019-07-31

## 2019-07-17 RX ORDER — SODIUM CHLORIDE 9 MG/ML
INJECTION, SOLUTION INTRAVENOUS CONTINUOUS
Status: CANCELLED | OUTPATIENT
Start: 2019-07-31

## 2019-07-17 RX ORDER — METHYLPREDNISOLONE SODIUM SUCCINATE 125 MG/2ML
125 INJECTION, POWDER, LYOPHILIZED, FOR SOLUTION INTRAMUSCULAR; INTRAVENOUS ONCE
Status: CANCELLED | OUTPATIENT
Start: 2019-07-31

## 2019-07-17 RX ORDER — DIPHENHYDRAMINE HCL 25 MG
25 TABLET ORAL ONCE
Status: CANCELLED
Start: 2019-07-31

## 2019-07-17 RX ORDER — METHYLPREDNISOLONE SODIUM SUCCINATE 125 MG/2ML
125 INJECTION, POWDER, LYOPHILIZED, FOR SOLUTION INTRAMUSCULAR; INTRAVENOUS ONCE
Status: CANCELLED
Start: 2019-07-31

## 2019-07-17 RX ORDER — METHYLPREDNISOLONE SODIUM SUCCINATE 125 MG/2ML
125 INJECTION, POWDER, LYOPHILIZED, FOR SOLUTION INTRAMUSCULAR; INTRAVENOUS ONCE
Status: COMPLETED | OUTPATIENT
Start: 2019-07-17 | End: 2019-07-17

## 2019-07-17 RX ORDER — DIPHENHYDRAMINE HYDROCHLORIDE 50 MG/ML
50 INJECTION INTRAMUSCULAR; INTRAVENOUS ONCE
Status: CANCELLED | OUTPATIENT
Start: 2019-07-31

## 2019-07-17 RX ORDER — HEPARIN SODIUM (PORCINE) LOCK FLUSH IV SOLN 100 UNIT/ML 100 UNIT/ML
500 SOLUTION INTRAVENOUS PRN
Status: CANCELLED | OUTPATIENT
Start: 2019-07-31

## 2019-07-17 RX ORDER — 0.9 % SODIUM CHLORIDE 0.9 %
10 VIAL (ML) INJECTION ONCE
Status: CANCELLED | OUTPATIENT
Start: 2019-07-31

## 2019-07-17 RX ADMIN — METHYLPREDNISOLONE SODIUM SUCCINATE 125 MG: 125 INJECTION, POWDER, FOR SOLUTION INTRAMUSCULAR; INTRAVENOUS at 11:41

## 2019-07-17 RX ADMIN — DIPHENHYDRAMINE HCL 25 MG: 25 TABLET ORAL at 11:35

## 2019-07-17 RX ADMIN — WATER 375 MG: 1 INJECTION INTRAMUSCULAR; INTRAVENOUS; SUBCUTANEOUS at 11:43

## 2019-08-05 RX ORDER — SODIUM CHLORIDE 9 MG/ML
INJECTION, SOLUTION INTRAVENOUS CONTINUOUS
Status: CANCELLED | OUTPATIENT
Start: 2019-08-05

## 2019-08-05 RX ORDER — ACETAMINOPHEN 325 MG/1
650 TABLET ORAL ONCE
Status: CANCELLED | OUTPATIENT
Start: 2019-08-05

## 2019-08-05 RX ORDER — EPINEPHRINE 1 MG/ML
0.3 INJECTION, SOLUTION, CONCENTRATE INTRAVENOUS PRN
Status: CANCELLED | OUTPATIENT
Start: 2019-08-05

## 2019-08-05 RX ORDER — METHYLPREDNISOLONE SODIUM SUCCINATE 125 MG/2ML
125 INJECTION, POWDER, LYOPHILIZED, FOR SOLUTION INTRAMUSCULAR; INTRAVENOUS ONCE
Status: CANCELLED | OUTPATIENT
Start: 2019-08-05

## 2019-08-05 RX ORDER — DIPHENHYDRAMINE HYDROCHLORIDE 50 MG/ML
50 INJECTION INTRAMUSCULAR; INTRAVENOUS ONCE
Status: CANCELLED | OUTPATIENT
Start: 2019-08-05

## 2019-08-05 RX ORDER — HEPARIN SODIUM (PORCINE) LOCK FLUSH IV SOLN 100 UNIT/ML 100 UNIT/ML
500 SOLUTION INTRAVENOUS PRN
Status: CANCELLED | OUTPATIENT
Start: 2019-08-05

## 2019-08-05 RX ORDER — SODIUM CHLORIDE 0.9 % (FLUSH) 0.9 %
5 SYRINGE (ML) INJECTION PRN
Status: CANCELLED | OUTPATIENT
Start: 2019-08-05

## 2019-08-05 RX ORDER — SODIUM CHLORIDE 0.9 % (FLUSH) 0.9 %
10 SYRINGE (ML) INJECTION PRN
Status: CANCELLED | OUTPATIENT
Start: 2019-08-05

## 2019-08-13 ENCOUNTER — TELEPHONE (OUTPATIENT)
Dept: PULMONOLOGY | Age: 59
End: 2019-08-13

## 2019-08-13 DIAGNOSIS — J45.51 SEVERE PERSISTENT EXTRINSIC ASTHMA WITH ACUTE EXACERBATION: Primary | ICD-10-CM

## 2019-08-14 ENCOUNTER — HOSPITAL ENCOUNTER (OUTPATIENT)
Dept: INFUSION THERAPY | Age: 59
Setting detail: INFUSION SERIES
Discharge: HOME | End: 2019-08-14
Payer: MEDICAID

## 2019-08-14 VITALS
HEART RATE: 73 BPM | DIASTOLIC BLOOD PRESSURE: 73 MMHG | SYSTOLIC BLOOD PRESSURE: 147 MMHG | TEMPERATURE: 97.9 F | RESPIRATION RATE: 18 BRPM | OXYGEN SATURATION: 91 %

## 2019-08-14 DIAGNOSIS — J45.901 ASTHMA EXACERBATION WITH COPD (CHRONIC OBSTRUCTIVE PULMONARY DISEASE) (HCC): ICD-10-CM

## 2019-08-14 DIAGNOSIS — J45.909 ASTHMA, INTRINSIC: Primary | ICD-10-CM

## 2019-08-14 DIAGNOSIS — J44.1 ASTHMA EXACERBATION WITH COPD (CHRONIC OBSTRUCTIVE PULMONARY DISEASE) (HCC): ICD-10-CM

## 2019-08-14 PROCEDURE — 6370000000 HC RX 637 (ALT 250 FOR IP): Performed by: INTERNAL MEDICINE

## 2019-08-14 PROCEDURE — 6360000002 HC RX W HCPCS: Performed by: INTERNAL MEDICINE

## 2019-08-14 PROCEDURE — 96372 THER/PROPH/DIAG INJ SC/IM: CPT

## 2019-08-14 RX ORDER — SODIUM CHLORIDE 0.9 % (FLUSH) 0.9 %
10 SYRINGE (ML) INJECTION PRN
Status: CANCELLED | OUTPATIENT
Start: 2019-08-21

## 2019-08-14 RX ORDER — METHYLPREDNISOLONE SODIUM SUCCINATE 125 MG/2ML
125 INJECTION, POWDER, LYOPHILIZED, FOR SOLUTION INTRAMUSCULAR; INTRAVENOUS ONCE
Status: CANCELLED
Start: 2019-08-21

## 2019-08-14 RX ORDER — HEPARIN SODIUM (PORCINE) LOCK FLUSH IV SOLN 100 UNIT/ML 100 UNIT/ML
500 SOLUTION INTRAVENOUS PRN
Status: CANCELLED | OUTPATIENT
Start: 2019-08-21

## 2019-08-14 RX ORDER — DIPHENHYDRAMINE HCL 25 MG
25 TABLET ORAL ONCE
Status: CANCELLED
Start: 2019-08-14

## 2019-08-14 RX ORDER — SODIUM CHLORIDE 0.9 % (FLUSH) 0.9 %
5 SYRINGE (ML) INJECTION PRN
Status: CANCELLED | OUTPATIENT
Start: 2019-08-21

## 2019-08-14 RX ORDER — ACETAMINOPHEN 325 MG/1
650 TABLET ORAL ONCE
Status: CANCELLED
Start: 2019-08-21

## 2019-08-14 RX ORDER — 0.9 % SODIUM CHLORIDE 0.9 %
10 VIAL (ML) INJECTION ONCE
Status: CANCELLED | OUTPATIENT
Start: 2019-08-21

## 2019-08-14 RX ORDER — SODIUM CHLORIDE 9 MG/ML
INJECTION, SOLUTION INTRAVENOUS CONTINUOUS
Status: CANCELLED | OUTPATIENT
Start: 2019-08-21

## 2019-08-14 RX ORDER — METHYLPREDNISOLONE SODIUM SUCCINATE 125 MG/2ML
125 INJECTION, POWDER, LYOPHILIZED, FOR SOLUTION INTRAMUSCULAR; INTRAVENOUS ONCE
Status: COMPLETED | OUTPATIENT
Start: 2019-08-14 | End: 2019-08-14

## 2019-08-14 RX ORDER — DIPHENHYDRAMINE HYDROCHLORIDE 50 MG/ML
50 INJECTION INTRAMUSCULAR; INTRAVENOUS ONCE
Status: CANCELLED | OUTPATIENT
Start: 2019-08-21

## 2019-08-14 RX ORDER — METHYLPREDNISOLONE SODIUM SUCCINATE 125 MG/2ML
125 INJECTION, POWDER, LYOPHILIZED, FOR SOLUTION INTRAMUSCULAR; INTRAVENOUS ONCE
Status: CANCELLED | OUTPATIENT
Start: 2019-08-21

## 2019-08-14 RX ORDER — DIPHENHYDRAMINE HCL 25 MG
25 TABLET ORAL ONCE
Status: CANCELLED
Start: 2019-08-21

## 2019-08-14 RX ORDER — DIPHENHYDRAMINE HCL 25 MG
25 TABLET ORAL ONCE
Status: COMPLETED | OUTPATIENT
Start: 2019-08-14 | End: 2019-08-14

## 2019-08-14 RX ORDER — EPINEPHRINE 1 MG/ML
0.3 INJECTION, SOLUTION, CONCENTRATE INTRAVENOUS PRN
Status: CANCELLED | OUTPATIENT
Start: 2019-08-21

## 2019-08-14 RX ORDER — ACETAMINOPHEN 325 MG/1
650 TABLET ORAL ONCE
Status: DISCONTINUED | OUTPATIENT
Start: 2019-08-14 | End: 2019-08-15 | Stop reason: HOSPADM

## 2019-08-14 RX ORDER — METHYLPREDNISOLONE SODIUM SUCCINATE 125 MG/2ML
125 INJECTION, POWDER, LYOPHILIZED, FOR SOLUTION INTRAMUSCULAR; INTRAVENOUS ONCE
Status: CANCELLED
Start: 2019-08-14

## 2019-08-14 RX ADMIN — METHYLPREDNISOLONE SODIUM SUCCINATE 125 MG: 125 INJECTION, POWDER, FOR SOLUTION INTRAMUSCULAR; INTRAVENOUS at 11:02

## 2019-08-14 RX ADMIN — DIPHENHYDRAMINE HCL 25 MG: 25 TABLET ORAL at 11:01

## 2019-08-14 RX ADMIN — OMALIZUMAB 375 MG: 150 INJECTION, SOLUTION SUBCUTANEOUS at 11:27

## 2019-08-14 NOTE — PROGRESS NOTES
[address] [place] [address] Allergy Control Test    Prescribing Physician: Maciel David    Was patient administered Xolair on appointed administration date? [x] yes [] no    Reason for not administering Xolair :[] Illness [] No show [] Other:    Was there any reaction to mediation? [] yes [x] no    If Yes: Reactions:      1. In the past 4 weeks, how much of the time did your asthma keep you from getting as much done as usual at work, school or at home? [] 1 [x] 2 [] 3 [] 4 [] 5   Score:__2____    2. During the past 4 weeks, how often have you had shortness of breath? [] 1 [] 2 [] 3 [x] 4 [] 5   Score:___4___    3. During the past four weeks, how often did your asthma symptoms (wheezing, coughing, shortness of breath, chest tightness, or pain) wake you up at night or earlier than usual in the morning? [] 1 [] 2 [] 3 [x] 4 [] 5   Score:__4____    4. During the past four weeks, how often have you used your rescue inhaler or nebulizer medication? [] 1 [] 2 [x] 3 [] 4 [] 5   Score:___3___    5. How would you rate your asthma control during the past 4 weeks? [] 1 [] 2 [x] 3 [] 4 [] 5   Score:__3____        Total Score:_______16_________        To score the Asthma control test: Each response to the 5 ACT questions has a point value from 1-5 as shown on the form. To score the ACT, add up the point value for each response to the 5 questions.

## 2019-09-04 ENCOUNTER — HOSPITAL ENCOUNTER (OUTPATIENT)
Dept: INFUSION THERAPY | Age: 59
Setting detail: INFUSION SERIES
Discharge: HOME | End: 2019-09-04
Payer: MEDICAID

## 2019-09-04 VITALS
OXYGEN SATURATION: 95 % | TEMPERATURE: 97.9 F | DIASTOLIC BLOOD PRESSURE: 77 MMHG | HEART RATE: 77 BPM | RESPIRATION RATE: 18 BRPM | SYSTOLIC BLOOD PRESSURE: 135 MMHG

## 2019-09-04 DIAGNOSIS — J45.909 ASTHMA, INTRINSIC: Primary | ICD-10-CM

## 2019-09-04 DIAGNOSIS — J44.1 ASTHMA EXACERBATION WITH COPD (CHRONIC OBSTRUCTIVE PULMONARY DISEASE) (HCC): ICD-10-CM

## 2019-09-04 DIAGNOSIS — J45.901 ASTHMA EXACERBATION WITH COPD (CHRONIC OBSTRUCTIVE PULMONARY DISEASE) (HCC): ICD-10-CM

## 2019-09-04 PROCEDURE — 6370000000 HC RX 637 (ALT 250 FOR IP): Performed by: INTERNAL MEDICINE

## 2019-09-04 PROCEDURE — 6360000002 HC RX W HCPCS: Performed by: INTERNAL MEDICINE

## 2019-09-04 PROCEDURE — 96372 THER/PROPH/DIAG INJ SC/IM: CPT

## 2019-09-04 RX ORDER — METHYLPREDNISOLONE SODIUM SUCCINATE 125 MG/2ML
125 INJECTION, POWDER, LYOPHILIZED, FOR SOLUTION INTRAMUSCULAR; INTRAVENOUS ONCE
Status: CANCELLED
Start: 2019-09-11

## 2019-09-04 RX ORDER — HEPARIN SODIUM (PORCINE) LOCK FLUSH IV SOLN 100 UNIT/ML 100 UNIT/ML
500 SOLUTION INTRAVENOUS PRN
Status: CANCELLED | OUTPATIENT
Start: 2019-09-11

## 2019-09-04 RX ORDER — DIPHENHYDRAMINE HYDROCHLORIDE 50 MG/ML
50 INJECTION INTRAMUSCULAR; INTRAVENOUS ONCE
Status: CANCELLED | OUTPATIENT
Start: 2019-09-11

## 2019-09-04 RX ORDER — SODIUM CHLORIDE 9 MG/ML
INJECTION, SOLUTION INTRAVENOUS CONTINUOUS
Status: CANCELLED | OUTPATIENT
Start: 2019-09-11

## 2019-09-04 RX ORDER — EPINEPHRINE 1 MG/ML
0.3 INJECTION, SOLUTION, CONCENTRATE INTRAVENOUS PRN
Status: CANCELLED | OUTPATIENT
Start: 2019-09-11

## 2019-09-04 RX ORDER — ACETAMINOPHEN 325 MG/1
650 TABLET ORAL ONCE
Status: COMPLETED | OUTPATIENT
Start: 2019-09-04 | End: 2019-09-04

## 2019-09-04 RX ORDER — METHYLPREDNISOLONE SODIUM SUCCINATE 125 MG/2ML
125 INJECTION, POWDER, LYOPHILIZED, FOR SOLUTION INTRAMUSCULAR; INTRAVENOUS ONCE
Status: CANCELLED | OUTPATIENT
Start: 2019-09-11

## 2019-09-04 RX ORDER — SODIUM CHLORIDE 0.9 % (FLUSH) 0.9 %
10 SYRINGE (ML) INJECTION PRN
Status: CANCELLED | OUTPATIENT
Start: 2019-09-11

## 2019-09-04 RX ORDER — DIPHENHYDRAMINE HCL 25 MG
25 TABLET ORAL ONCE
Status: COMPLETED | OUTPATIENT
Start: 2019-09-04 | End: 2019-09-04

## 2019-09-04 RX ORDER — DIPHENHYDRAMINE HCL 25 MG
25 TABLET ORAL ONCE
Status: CANCELLED
Start: 2019-09-11

## 2019-09-04 RX ORDER — SODIUM CHLORIDE 0.9 % (FLUSH) 0.9 %
5 SYRINGE (ML) INJECTION PRN
Status: CANCELLED | OUTPATIENT
Start: 2019-09-11

## 2019-09-04 RX ORDER — METHYLPREDNISOLONE SODIUM SUCCINATE 125 MG/2ML
125 INJECTION, POWDER, LYOPHILIZED, FOR SOLUTION INTRAMUSCULAR; INTRAVENOUS ONCE
Status: COMPLETED | OUTPATIENT
Start: 2019-09-04 | End: 2019-09-04

## 2019-09-04 RX ORDER — 0.9 % SODIUM CHLORIDE 0.9 %
10 VIAL (ML) INJECTION ONCE
Status: CANCELLED | OUTPATIENT
Start: 2019-09-11

## 2019-09-04 RX ORDER — ACETAMINOPHEN 325 MG/1
650 TABLET ORAL ONCE
Status: CANCELLED
Start: 2019-09-11

## 2019-09-04 RX ADMIN — DIPHENHYDRAMINE HCL 25 MG: 25 TABLET ORAL at 11:54

## 2019-09-04 RX ADMIN — ACETAMINOPHEN 650 MG: 325 TABLET, FILM COATED ORAL at 11:53

## 2019-09-04 RX ADMIN — OMALIZUMAB 375 MG: 150 INJECTION, SOLUTION SUBCUTANEOUS at 12:07

## 2019-09-04 RX ADMIN — METHYLPREDNISOLONE SODIUM SUCCINATE 125 MG: 125 INJECTION, POWDER, FOR SOLUTION INTRAMUSCULAR; INTRAVENOUS at 12:02

## 2019-09-04 ASSESSMENT — PAIN SCALES - GENERAL
PAINLEVEL_OUTOF10: 0
PAINLEVEL_OUTOF10: 0

## 2019-10-02 ENCOUNTER — HOSPITAL ENCOUNTER (OUTPATIENT)
Dept: INFUSION THERAPY | Age: 59
Setting detail: INFUSION SERIES
Discharge: HOME | End: 2019-10-02
Payer: MEDICAID

## 2019-10-02 VITALS
DIASTOLIC BLOOD PRESSURE: 82 MMHG | OXYGEN SATURATION: 95 % | TEMPERATURE: 98.1 F | RESPIRATION RATE: 18 BRPM | HEART RATE: 71 BPM | SYSTOLIC BLOOD PRESSURE: 125 MMHG

## 2019-10-02 DIAGNOSIS — J44.1 ASTHMA EXACERBATION WITH COPD (CHRONIC OBSTRUCTIVE PULMONARY DISEASE) (HCC): ICD-10-CM

## 2019-10-02 DIAGNOSIS — J45.901 ASTHMA EXACERBATION WITH COPD (CHRONIC OBSTRUCTIVE PULMONARY DISEASE) (HCC): ICD-10-CM

## 2019-10-02 DIAGNOSIS — J45.909 ASTHMA, INTRINSIC: Primary | ICD-10-CM

## 2019-10-02 PROCEDURE — 6360000002 HC RX W HCPCS: Performed by: INTERNAL MEDICINE

## 2019-10-02 PROCEDURE — 6370000000 HC RX 637 (ALT 250 FOR IP): Performed by: INTERNAL MEDICINE

## 2019-10-02 PROCEDURE — 96372 THER/PROPH/DIAG INJ SC/IM: CPT

## 2019-10-02 RX ORDER — SODIUM CHLORIDE 0.9 % (FLUSH) 0.9 %
5 SYRINGE (ML) INJECTION PRN
Status: CANCELLED | OUTPATIENT
Start: 2019-10-16

## 2019-10-02 RX ORDER — METHYLPREDNISOLONE SODIUM SUCCINATE 125 MG/2ML
125 INJECTION, POWDER, LYOPHILIZED, FOR SOLUTION INTRAMUSCULAR; INTRAVENOUS ONCE
Status: CANCELLED
Start: 2019-10-16

## 2019-10-02 RX ORDER — ACETAMINOPHEN 325 MG/1
650 TABLET ORAL ONCE
Status: CANCELLED
Start: 2019-10-16

## 2019-10-02 RX ORDER — EPINEPHRINE 1 MG/ML
0.3 INJECTION, SOLUTION, CONCENTRATE INTRAVENOUS PRN
Status: CANCELLED | OUTPATIENT
Start: 2019-10-16

## 2019-10-02 RX ORDER — DIPHENHYDRAMINE HYDROCHLORIDE 50 MG/ML
50 INJECTION INTRAMUSCULAR; INTRAVENOUS ONCE
Status: CANCELLED | OUTPATIENT
Start: 2019-10-16

## 2019-10-02 RX ORDER — METHYLPREDNISOLONE SODIUM SUCCINATE 125 MG/2ML
125 INJECTION, POWDER, LYOPHILIZED, FOR SOLUTION INTRAMUSCULAR; INTRAVENOUS ONCE
Status: COMPLETED | OUTPATIENT
Start: 2019-10-02 | End: 2019-10-02

## 2019-10-02 RX ORDER — DIPHENHYDRAMINE HCL 25 MG
25 TABLET ORAL ONCE
Status: CANCELLED
Start: 2019-10-16

## 2019-10-02 RX ORDER — SODIUM CHLORIDE 9 MG/ML
INJECTION, SOLUTION INTRAVENOUS CONTINUOUS
Status: CANCELLED | OUTPATIENT
Start: 2019-10-16

## 2019-10-02 RX ORDER — 0.9 % SODIUM CHLORIDE 0.9 %
10 VIAL (ML) INJECTION ONCE
Status: CANCELLED | OUTPATIENT
Start: 2019-10-16

## 2019-10-02 RX ORDER — SODIUM CHLORIDE 0.9 % (FLUSH) 0.9 %
10 SYRINGE (ML) INJECTION PRN
Status: CANCELLED | OUTPATIENT
Start: 2019-10-16

## 2019-10-02 RX ORDER — HEPARIN SODIUM (PORCINE) LOCK FLUSH IV SOLN 100 UNIT/ML 100 UNIT/ML
500 SOLUTION INTRAVENOUS PRN
Status: CANCELLED | OUTPATIENT
Start: 2019-10-16

## 2019-10-02 RX ORDER — ACETAMINOPHEN 325 MG/1
650 TABLET ORAL ONCE
Status: COMPLETED | OUTPATIENT
Start: 2019-10-02 | End: 2019-10-02

## 2019-10-02 RX ORDER — METHYLPREDNISOLONE SODIUM SUCCINATE 125 MG/2ML
125 INJECTION, POWDER, LYOPHILIZED, FOR SOLUTION INTRAMUSCULAR; INTRAVENOUS ONCE
Status: CANCELLED | OUTPATIENT
Start: 2019-10-16

## 2019-10-02 RX ORDER — DIPHENHYDRAMINE HCL 25 MG
25 TABLET ORAL ONCE
Status: COMPLETED | OUTPATIENT
Start: 2019-10-02 | End: 2019-10-02

## 2019-10-02 RX ADMIN — DIPHENHYDRAMINE HCL 25 MG: 25 TABLET ORAL at 10:59

## 2019-10-02 RX ADMIN — OMALIZUMAB 375 MG: 150 INJECTION, SOLUTION SUBCUTANEOUS at 11:36

## 2019-10-02 RX ADMIN — METHYLPREDNISOLONE SODIUM SUCCINATE 125 MG: 125 INJECTION, POWDER, FOR SOLUTION INTRAMUSCULAR; INTRAVENOUS at 11:15

## 2019-10-02 RX ADMIN — ACETAMINOPHEN 650 MG: 325 TABLET, FILM COATED ORAL at 11:00

## 2019-10-02 ASSESSMENT — PAIN SCALES - GENERAL: PAINLEVEL_OUTOF10: 0

## 2019-10-16 ENCOUNTER — HOSPITAL ENCOUNTER (OUTPATIENT)
Dept: INFUSION THERAPY | Age: 59
Setting detail: INFUSION SERIES
End: 2019-10-16
Payer: MEDICAID

## 2019-10-30 ENCOUNTER — HOSPITAL ENCOUNTER (OUTPATIENT)
Dept: INFUSION THERAPY | Age: 59
Setting detail: INFUSION SERIES
Discharge: HOME | End: 2019-10-30
Payer: MEDICAID

## 2019-10-30 VITALS
SYSTOLIC BLOOD PRESSURE: 139 MMHG | DIASTOLIC BLOOD PRESSURE: 94 MMHG | RESPIRATION RATE: 18 BRPM | HEART RATE: 72 BPM | OXYGEN SATURATION: 94 % | TEMPERATURE: 98.4 F

## 2019-10-30 DIAGNOSIS — J45.51 SEVERE PERSISTENT EXTRINSIC ASTHMA WITH ACUTE EXACERBATION: Primary | ICD-10-CM

## 2019-10-30 DIAGNOSIS — J44.1 ASTHMA EXACERBATION WITH COPD (CHRONIC OBSTRUCTIVE PULMONARY DISEASE) (HCC): ICD-10-CM

## 2019-10-30 DIAGNOSIS — J45.909 ASTHMA, INTRINSIC: Primary | ICD-10-CM

## 2019-10-30 DIAGNOSIS — J45.901 ASTHMA EXACERBATION WITH COPD (CHRONIC OBSTRUCTIVE PULMONARY DISEASE) (HCC): ICD-10-CM

## 2019-10-30 PROCEDURE — 6360000002 HC RX W HCPCS: Performed by: INTERNAL MEDICINE

## 2019-10-30 PROCEDURE — 96372 THER/PROPH/DIAG INJ SC/IM: CPT

## 2019-10-30 PROCEDURE — 6370000000 HC RX 637 (ALT 250 FOR IP): Performed by: INTERNAL MEDICINE

## 2019-10-30 RX ORDER — METHYLPREDNISOLONE SODIUM SUCCINATE 125 MG/2ML
125 INJECTION, POWDER, LYOPHILIZED, FOR SOLUTION INTRAMUSCULAR; INTRAVENOUS ONCE
Status: CANCELLED
Start: 2019-11-13

## 2019-10-30 RX ORDER — SODIUM CHLORIDE 9 MG/ML
INJECTION, SOLUTION INTRAVENOUS CONTINUOUS
Status: CANCELLED | OUTPATIENT
Start: 2019-11-13

## 2019-10-30 RX ORDER — METHYLPREDNISOLONE SODIUM SUCCINATE 125 MG/2ML
125 INJECTION, POWDER, LYOPHILIZED, FOR SOLUTION INTRAMUSCULAR; INTRAVENOUS ONCE
Status: COMPLETED | OUTPATIENT
Start: 2019-10-30 | End: 2019-10-30

## 2019-10-30 RX ORDER — ACETAMINOPHEN 325 MG/1
650 TABLET ORAL ONCE
Status: CANCELLED
Start: 2019-11-13

## 2019-10-30 RX ORDER — SODIUM CHLORIDE 0.9 % (FLUSH) 0.9 %
5 SYRINGE (ML) INJECTION PRN
Status: CANCELLED | OUTPATIENT
Start: 2019-11-13

## 2019-10-30 RX ORDER — 0.9 % SODIUM CHLORIDE 0.9 %
10 VIAL (ML) INJECTION ONCE
Status: CANCELLED | OUTPATIENT
Start: 2019-11-13

## 2019-10-30 RX ORDER — ACETAMINOPHEN 325 MG/1
650 TABLET ORAL ONCE
Status: COMPLETED | OUTPATIENT
Start: 2019-10-30 | End: 2019-10-30

## 2019-10-30 RX ORDER — SODIUM CHLORIDE 0.9 % (FLUSH) 0.9 %
10 SYRINGE (ML) INJECTION PRN
Status: CANCELLED | OUTPATIENT
Start: 2019-11-13

## 2019-10-30 RX ORDER — METHYLPREDNISOLONE SODIUM SUCCINATE 125 MG/2ML
125 INJECTION, POWDER, LYOPHILIZED, FOR SOLUTION INTRAMUSCULAR; INTRAVENOUS ONCE
Status: CANCELLED | OUTPATIENT
Start: 2019-11-13

## 2019-10-30 RX ORDER — HEPARIN SODIUM (PORCINE) LOCK FLUSH IV SOLN 100 UNIT/ML 100 UNIT/ML
500 SOLUTION INTRAVENOUS PRN
Status: CANCELLED | OUTPATIENT
Start: 2019-11-13

## 2019-10-30 RX ORDER — DIPHENHYDRAMINE HCL 25 MG
25 TABLET ORAL ONCE
Status: CANCELLED
Start: 2019-11-13

## 2019-10-30 RX ORDER — EPINEPHRINE 0.3 MG/.3ML
INJECTION SUBCUTANEOUS
Qty: 1 EACH | Refills: 1 | Status: SHIPPED
Start: 2019-10-30 | End: 2020-11-12 | Stop reason: SDUPTHER

## 2019-10-30 RX ORDER — DIPHENHYDRAMINE HYDROCHLORIDE 50 MG/ML
50 INJECTION INTRAMUSCULAR; INTRAVENOUS ONCE
Status: CANCELLED | OUTPATIENT
Start: 2019-11-13

## 2019-10-30 RX ORDER — DIPHENHYDRAMINE HCL 25 MG
25 TABLET ORAL ONCE
Status: COMPLETED | OUTPATIENT
Start: 2019-10-30 | End: 2019-10-30

## 2019-10-30 RX ORDER — EPINEPHRINE 1 MG/ML
0.3 INJECTION, SOLUTION, CONCENTRATE INTRAVENOUS PRN
Status: CANCELLED | OUTPATIENT
Start: 2019-11-13

## 2019-10-30 RX ADMIN — DIPHENHYDRAMINE HCL 25 MG: 25 TABLET ORAL at 12:05

## 2019-10-30 RX ADMIN — METHYLPREDNISOLONE SODIUM SUCCINATE 125 MG: 125 INJECTION, POWDER, FOR SOLUTION INTRAMUSCULAR; INTRAVENOUS at 12:10

## 2019-10-30 RX ADMIN — ACETAMINOPHEN 650 MG: 325 TABLET, FILM COATED ORAL at 12:04

## 2019-10-30 RX ADMIN — OMALIZUMAB 375 MG: 150 INJECTION, SOLUTION SUBCUTANEOUS at 12:23

## 2019-10-30 NOTE — PROGRESS NOTES
Patient has  epi-pen since 2019. Magi Pham at DR. Santamaria's office called and she will phone script into pharmacy now. Patient instructed to  epi- pen today.

## 2019-10-30 NOTE — PROGRESS NOTES
[facility] Xolair Allergy Control Test    Prescribing Physician:    Was patient administered Xolair on appointed administration date? [] yes [x] no    Reason for not administering Xolair :[] Illness [] No show [x] Other:    Was there any reaction to mediation? [] yes [x] no    If Yes: Reactions:      1. In the past 4 weeks, how much of the time did your asthma keep you from getting as much done as usual at work, school or at home? [] 1 [x] 2 [] 3 [] 4 [] 5   Score:___2___    2. During the past 4 weeks, how often have you had shortness of breath? [x] 1 [] 2 [] 3 [] 4 [] 5   Score:___1___    3. During the past four weeks, how often did your asthma symptoms (wheezing, coughing, shortness of breath, chest tightness, or pain) wake you up at night or earlier than usual in the morning? 1  [x] 1 [] 2 [] 3 [] 4 [] 5   Score:____1__  14. During the past four weeks, how often have you used your rescue inhaler or nebulizer medication? [] 1 [x] 2 [] 3 [] 4 [] 5   Score:__2____  25. How would you rate your asthma control during the past 4 weeks? [] 1 [] 2 [x] 3 [] 4 [] 5   Score:__3____        Total Score:______9__________        To score the Asthma control test: Each response to the 5 ACT questions has a point value from 1-5 as shown on the form. To score the ACT, add up the point value for each response to the 5 questions.

## 2020-02-06 ENCOUNTER — APPOINTMENT (OUTPATIENT)
Dept: GENERAL RADIOLOGY | Age: 60
DRG: 140 | End: 2020-02-06
Payer: MEDICAID

## 2020-02-06 ENCOUNTER — HOSPITAL ENCOUNTER (INPATIENT)
Age: 60
LOS: 5 days | Discharge: NURSING, REHAB OR LONG-TERM CARE | DRG: 140 | End: 2020-02-11
Attending: EMERGENCY MEDICINE | Admitting: INTERNAL MEDICINE
Payer: MEDICAID

## 2020-02-06 ENCOUNTER — APPOINTMENT (OUTPATIENT)
Dept: CT IMAGING | Age: 60
DRG: 140 | End: 2020-02-06
Payer: MEDICAID

## 2020-02-06 PROBLEM — J44.1 COPD EXACERBATION (HCC): Status: ACTIVE | Noted: 2020-02-06

## 2020-02-06 LAB
ALBUMIN SERPL-MCNC: 4.1 G/DL (ref 3.5–5.2)
ALP BLD-CCNC: 110 U/L (ref 40–129)
ALT SERPL-CCNC: 20 U/L (ref 0–40)
ANION GAP SERPL CALCULATED.3IONS-SCNC: 9 MMOL/L (ref 7–16)
AST SERPL-CCNC: 35 U/L (ref 0–39)
B.E.: 4.8 MMOL/L (ref -3–3)
BACTERIA: NORMAL /HPF
BASOPHILS ABSOLUTE: 0.02 E9/L (ref 0–0.2)
BASOPHILS RELATIVE PERCENT: 0.2 % (ref 0–2)
BILIRUB SERPL-MCNC: 0.4 MG/DL (ref 0–1.2)
BILIRUBIN URINE: NEGATIVE
BLOOD, URINE: NEGATIVE
BUN BLDV-MCNC: 6 MG/DL (ref 8–23)
CALCIUM SERPL-MCNC: 8.8 MG/DL (ref 8.6–10.2)
CHLORIDE BLD-SCNC: 100 MMOL/L (ref 98–107)
CLARITY: CLEAR
CO2: 31 MMOL/L (ref 22–29)
COLOR: YELLOW
CREAT SERPL-MCNC: 1 MG/DL (ref 0.7–1.2)
DELIVERY SYSTEMS: ABNORMAL
DEVICE: ABNORMAL
EOSINOPHILS ABSOLUTE: 0.2 E9/L (ref 0.05–0.5)
EOSINOPHILS RELATIVE PERCENT: 2.3 % (ref 0–6)
FIO2 ARTERIAL: 4
GFR AFRICAN AMERICAN: >60
GFR NON-AFRICAN AMERICAN: >60 ML/MIN/1.73
GLUCOSE BLD-MCNC: 90 MG/DL (ref 74–99)
GLUCOSE URINE: NEGATIVE MG/DL
HCO3 ARTERIAL: 32.1 MMOL/L (ref 22–26)
HCT VFR BLD CALC: 44 % (ref 37–54)
HEMOGLOBIN: 14.1 G/DL (ref 12.5–16.5)
IMMATURE GRANULOCYTES #: 0.03 E9/L
IMMATURE GRANULOCYTES %: 0.3 % (ref 0–5)
INFLUENZA A BY PCR: NOT DETECTED
INFLUENZA B BY PCR: NOT DETECTED
KETONES, URINE: NEGATIVE MG/DL
LACTIC ACID, SEPSIS: 1.1 MMOL/L (ref 0.5–1.9)
LEUKOCYTE ESTERASE, URINE: NEGATIVE
LYMPHOCYTES ABSOLUTE: 3.32 E9/L (ref 1.5–4)
LYMPHOCYTES RELATIVE PERCENT: 37.4 % (ref 20–42)
MCH RBC QN AUTO: 29 PG (ref 26–35)
MCHC RBC AUTO-ENTMCNC: 32 % (ref 32–34.5)
MCV RBC AUTO: 90.5 FL (ref 80–99.9)
MONOCYTES ABSOLUTE: 1.01 E9/L (ref 0.1–0.95)
MONOCYTES RELATIVE PERCENT: 11.4 % (ref 2–12)
NEUTROPHILS ABSOLUTE: 4.29 E9/L (ref 1.8–7.3)
NEUTROPHILS RELATIVE PERCENT: 48.4 % (ref 43–80)
NITRITE, URINE: NEGATIVE
O2 SATURATION: 92.4 % (ref 92–98.5)
OPERATOR ID: 797
PCO2 ARTERIAL: 57 MMHG (ref 35–45)
PDW BLD-RTO: 13.4 FL (ref 11.5–15)
PH BLOOD GAS: 7.36 (ref 7.35–7.45)
PH UA: 7 (ref 5–9)
PLATELET # BLD: 204 E9/L (ref 130–450)
PMV BLD AUTO: 12.3 FL (ref 7–12)
PO2 ARTERIAL: 69.5 MMHG (ref 60–80)
POTASSIUM REFLEX MAGNESIUM: 4.7 MMOL/L (ref 3.5–5)
PRO-BNP: 54 PG/ML (ref 0–125)
PROTEIN UA: NEGATIVE MG/DL
RBC # BLD: 4.86 E12/L (ref 3.8–5.8)
RBC UA: NORMAL /HPF (ref 0–2)
SODIUM BLD-SCNC: 140 MMOL/L (ref 132–146)
SOURCE, BLOOD GAS: ABNORMAL
SPECIFIC GRAVITY UA: 1.02 (ref 1–1.03)
TOTAL PROTEIN: 7.4 G/DL (ref 6.4–8.3)
TROPONIN: <0.01 NG/ML (ref 0–0.03)
TROPONIN: <0.01 NG/ML (ref 0–0.03)
UROBILINOGEN, URINE: 1 E.U./DL
WBC # BLD: 8.9 E9/L (ref 4.5–11.5)
WBC UA: NORMAL /HPF (ref 0–5)

## 2020-02-06 PROCEDURE — 96374 THER/PROPH/DIAG INJ IV PUSH: CPT

## 2020-02-06 PROCEDURE — 99285 EMERGENCY DEPT VISIT HI MDM: CPT

## 2020-02-06 PROCEDURE — 81001 URINALYSIS AUTO W/SCOPE: CPT

## 2020-02-06 PROCEDURE — 82803 BLOOD GASES ANY COMBINATION: CPT

## 2020-02-06 PROCEDURE — 36415 COLL VENOUS BLD VENIPUNCTURE: CPT

## 2020-02-06 PROCEDURE — 71046 X-RAY EXAM CHEST 2 VIEWS: CPT

## 2020-02-06 PROCEDURE — 80053 COMPREHEN METABOLIC PANEL: CPT

## 2020-02-06 PROCEDURE — 83605 ASSAY OF LACTIC ACID: CPT

## 2020-02-06 PROCEDURE — 99223 1ST HOSP IP/OBS HIGH 75: CPT | Performed by: INTERNAL MEDICINE

## 2020-02-06 PROCEDURE — 2060000000 HC ICU INTERMEDIATE R&B

## 2020-02-06 PROCEDURE — 87502 INFLUENZA DNA AMP PROBE: CPT

## 2020-02-06 PROCEDURE — 1200000000 HC SEMI PRIVATE

## 2020-02-06 PROCEDURE — 94640 AIRWAY INHALATION TREATMENT: CPT

## 2020-02-06 PROCEDURE — 83880 ASSAY OF NATRIURETIC PEPTIDE: CPT

## 2020-02-06 PROCEDURE — 93005 ELECTROCARDIOGRAM TRACING: CPT | Performed by: EMERGENCY MEDICINE

## 2020-02-06 PROCEDURE — 6360000002 HC RX W HCPCS: Performed by: EMERGENCY MEDICINE

## 2020-02-06 PROCEDURE — 70450 CT HEAD/BRAIN W/O DYE: CPT

## 2020-02-06 PROCEDURE — 6370000000 HC RX 637 (ALT 250 FOR IP): Performed by: EMERGENCY MEDICINE

## 2020-02-06 PROCEDURE — 84484 ASSAY OF TROPONIN QUANT: CPT

## 2020-02-06 PROCEDURE — 85025 COMPLETE CBC W/AUTO DIFF WBC: CPT

## 2020-02-06 PROCEDURE — 84145 PROCALCITONIN (PCT): CPT

## 2020-02-06 RX ORDER — METHYLPREDNISOLONE SODIUM SUCCINATE 40 MG/ML
40 INJECTION, POWDER, LYOPHILIZED, FOR SOLUTION INTRAMUSCULAR; INTRAVENOUS EVERY 8 HOURS
Status: DISCONTINUED | OUTPATIENT
Start: 2020-02-07 | End: 2020-02-07

## 2020-02-06 RX ORDER — ONDANSETRON 2 MG/ML
4 INJECTION INTRAMUSCULAR; INTRAVENOUS EVERY 6 HOURS PRN
Status: DISCONTINUED | OUTPATIENT
Start: 2020-02-06 | End: 2020-02-11 | Stop reason: HOSPADM

## 2020-02-06 RX ORDER — SODIUM CHLORIDE 0.9 % (FLUSH) 0.9 %
10 SYRINGE (ML) INJECTION PRN
Status: DISCONTINUED | OUTPATIENT
Start: 2020-02-06 | End: 2020-02-11 | Stop reason: HOSPADM

## 2020-02-06 RX ORDER — ALBUTEROL SULFATE 2.5 MG/3ML
2.5 SOLUTION RESPIRATORY (INHALATION)
Status: DISCONTINUED | OUTPATIENT
Start: 2020-02-07 | End: 2020-02-07

## 2020-02-06 RX ORDER — IPRATROPIUM BROMIDE AND ALBUTEROL SULFATE 2.5; .5 MG/3ML; MG/3ML
2 SOLUTION RESPIRATORY (INHALATION) ONCE
Status: COMPLETED | OUTPATIENT
Start: 2020-02-06 | End: 2020-02-06

## 2020-02-06 RX ORDER — SODIUM CHLORIDE 0.9 % (FLUSH) 0.9 %
10 SYRINGE (ML) INJECTION EVERY 12 HOURS SCHEDULED
Status: DISCONTINUED | OUTPATIENT
Start: 2020-02-06 | End: 2020-02-11 | Stop reason: HOSPADM

## 2020-02-06 RX ORDER — IPRATROPIUM BROMIDE AND ALBUTEROL SULFATE 2.5; .5 MG/3ML; MG/3ML
1 SOLUTION RESPIRATORY (INHALATION) ONCE
Status: COMPLETED | OUTPATIENT
Start: 2020-02-06 | End: 2020-02-06

## 2020-02-06 RX ORDER — ACETAMINOPHEN 325 MG/1
650 TABLET ORAL EVERY 4 HOURS PRN
Status: DISCONTINUED | OUTPATIENT
Start: 2020-02-06 | End: 2020-02-11 | Stop reason: HOSPADM

## 2020-02-06 RX ORDER — METHYLPREDNISOLONE SODIUM SUCCINATE 125 MG/2ML
125 INJECTION, POWDER, LYOPHILIZED, FOR SOLUTION INTRAMUSCULAR; INTRAVENOUS ONCE
Status: COMPLETED | OUTPATIENT
Start: 2020-02-06 | End: 2020-02-06

## 2020-02-06 RX ADMIN — METHYLPREDNISOLONE SODIUM SUCCINATE 125 MG: 125 INJECTION, POWDER, FOR SOLUTION INTRAMUSCULAR; INTRAVENOUS at 19:39

## 2020-02-06 RX ADMIN — IPRATROPIUM BROMIDE AND ALBUTEROL SULFATE 1 AMPULE: .5; 3 SOLUTION RESPIRATORY (INHALATION) at 17:33

## 2020-02-06 RX ADMIN — IPRATROPIUM BROMIDE AND ALBUTEROL SULFATE 2 AMPULE: .5; 3 SOLUTION RESPIRATORY (INHALATION) at 19:34

## 2020-02-06 ASSESSMENT — ENCOUNTER SYMPTOMS
ABDOMINAL PAIN: 0
VOMITING: 0
SHORTNESS OF BREATH: 1
PHOTOPHOBIA: 0
DIARRHEA: 0
WHEEZING: 1
EYE REDNESS: 0
COUGH: 1
SORE THROAT: 0
NAUSEA: 0
EYE PAIN: 0
BACK PAIN: 0
RHINORRHEA: 0

## 2020-02-06 ASSESSMENT — PAIN SCALES - GENERAL
PAINLEVEL_OUTOF10: 0
PAINLEVEL_OUTOF10: 0

## 2020-02-06 NOTE — ED PROVIDER NOTES
The patient is a HTN, COPD, CHF, HIV, who presents to the ED for the chief complaint of shortness of breath . Onset started a few days ago. He complains of cough, congestion, wheezing, SOB, and chest pain. His chest pain is substernal. He describes it as an intermittent pressure type of pain. Denies history of heart attack. Denies having a stress test. His symptoms have progressively worsened in duration and severity. Nothing makes it better. Worse with exertion. He wears 4L NC all the time. He is on breathing treatments and inhalers. They report about one month ago he had shortness of breath and were evaluated by the PCP and told CHF or PNA and placed on antibiotic. He was seen two days ago at [facility] for follow-up and placed on a new antibiotic. The history is provided by the patient and a caregiver. No  was used. Review of Systems   Constitutional: Negative for chills and fever. HENT: Negative for congestion, ear pain, rhinorrhea and sore throat. Eyes: Negative for photophobia, pain, redness and visual disturbance. Respiratory: Positive for cough, shortness of breath and wheezing. Cardiovascular: Positive for chest pain. Gastrointestinal: Negative for abdominal pain, diarrhea, nausea and vomiting. Genitourinary: Positive for urgency. Negative for difficulty urinating, discharge, dysuria, frequency, hematuria, penile pain, penile swelling, scrotal swelling and testicular pain. Musculoskeletal: Negative for arthralgias, back pain and neck pain. Skin: Negative for rash and wound. Neurological: Negative for dizziness, weakness, light-headedness, numbness and headaches. Hematological: Negative for adenopathy. All other systems reviewed and are negative. /80   Pulse 76   Temp 97.5 °F (36.4 °C) (Oral)   Resp 19   Ht 5' 6\" (1.676 m)   Wt 198 lb 6.6 oz (90 kg)   SpO2 98%   BMI 32.02 kg/m²     Physical Exam  Vitals signs and nursing note reviewed. present. Mental Status: He is alert and oriented to person, place, and time. Psychiatric:         Mood and Affect: Mood normal.         Procedures    MDM  Number of Diagnoses or Management Options  Acute exacerbation of chronic obstructive pulmonary disease (COPD) ([facility]):   Acute on chronic respiratory failure with hypercapnia [facility]):   Chest pain, unspecified type:   Confusion:   Human immunodeficiency virus (HIV) disease ([facility]):   Old cerebral infarct without residual deficit:   Wheezing:   Diagnosis management comments: History, physical exam, labs and imaging is consistent with acute on chronic respiratory failure with hypercapnia and hypoxia most likely secondary to acute COPD exacerbation. Differential diagnoses considered are COPD exacerbation, pneumonia, CHF, UTI, stroke. Patient presented with complaints of cough, shortness of breath, wheezing, chest pain and urinary urgency. Caregiver reports some intermittent confusion the past few days. On exam the patient is awake, alert and oriented for me. No focal neurological deficit. ABG reveals no acidosis, mildly elevated CO2--probably his baseline. UA reveals no infection. Rapid influenza is negative. No leukocytosis. No anemia. Troponin is within normal limits As well as BNP. Lactic acid is normal.  EKG was obtained reveals no acute ischemic changes. Chest x-ray 2 view was obtained and reveals atelectasis. No focal consolidation. The patient has failed to outpatient antibiotics for suspected pneumonia. I feel this is more likely exacerbation of his COPD at this time and less likely pneumonia. He is afebrile and not tachycardic. Patient was given IV Solu-Medrol as well as continuous DuoNeb's. His wheezing has mildly improved. He will be admitted for further evaluation management. Noncontrast CT of the head reveals no acute intracranial abnormality but did show a remote lacunar infarct.   This was relayed with the patient he was unaware of does not use drugs. Family History: family history includes Alcohol Abuse in his father and mother. The patients home medications have been reviewed.     Allergies: Ketorolac tromethamine    -------------------------------------------------- RESULTS -------------------------------------------------    Lab  Results for orders placed or performed during the hospital encounter of 02/06/20   Rapid influenza A/B antigens   Result Value Ref Range    Influenza A by PCR Not Detected Not Detected    Influenza B by PCR Not Detected Not Detected   CBC auto differential   Result Value Ref Range    WBC 8.9 4.5 - 11.5 E9/L    RBC 4.86 3.80 - 5.80 E12/L    Hemoglobin 14.1 12.5 - 16.5 g/dL    Hematocrit 44.0 37.0 - 54.0 %    MCV 90.5 80.0 - 99.9 fL    MCH 29.0 26.0 - 35.0 pg    MCHC 32.0 32.0 - 34.5 %    RDW 13.4 11.5 - 15.0 fL    Platelets 816 504 - 378 E9/L    MPV 12.3 (H) 7.0 - 12.0 fL    Neutrophils % 48.4 43.0 - 80.0 %    Immature Granulocytes % 0.3 0.0 - 5.0 %    Lymphocytes % 37.4 20.0 - 42.0 %    Monocytes % 11.4 2.0 - 12.0 %    Eosinophils % 2.3 0.0 - 6.0 %    Basophils % 0.2 0.0 - 2.0 %    Neutrophils Absolute 4.29 1.80 - 7.30 E9/L    Immature Granulocytes # 0.03 E9/L    Lymphocytes Absolute 3.32 1.50 - 4.00 E9/L    Monocytes Absolute 1.01 (H) 0.10 - 0.95 E9/L    Eosinophils Absolute 0.20 0.05 - 0.50 E9/L    Basophils Absolute 0.02 0.00 - 0.20 E9/L   Lactate, Sepsis   Result Value Ref Range    Lactic Acid, Sepsis 1.1 0.5 - 1.9 mmol/L   Troponin   Result Value Ref Range    Troponin <0.01 0.00 - 0.03 ng/mL   Brain Natriuretic Peptide   Result Value Ref Range    Pro-BNP 54 0 - 125 pg/mL   Comprehensive Metabolic Panel w/ Reflex to MG   Result Value Ref Range    Sodium 140 132 - 146 mmol/L    Potassium reflex Magnesium 4.7 3.5 - 5.0 mmol/L    Chloride 100 98 - 107 mmol/L    CO2 31 (H) 22 - 29 mmol/L    Anion Gap 9 7 - 16 mmol/L    Glucose 90 74 - 99 mg/dL    BUN 6 (L) 8 - 23 mg/dL    CREATININE 1.0 0.7 - 1.2 mg/dL angles are clear. There is no evidence of pneumothorax. Minimal linear left basilar subsegmental atelectasis. Ct Head Wo Contrast    Result Date: 2/6/2020  Location: 200 Indication: Confusion Comparison: MRI brain from 2/22/2019. Technique: Multidetector CT imaging was obtained from the skull base to vertex without the administration of intravenous contrast. Coronal and sagittal reformatted images were obtained. Automated dose exposure control was used for this exam. FINDINGS: Prominence of ventricles and sulci is compatible with age-related volume loss. No extra-axial collection. No acute intracranial hemorrhage. No cerebral edema or mass effect. No CT evidence of acute, large territorial infarction. There are patchy areas of hypoattenuation within the cerebral white matter, which are nonspecific but most compatible with mild changes of microangiopathy. There is remote lacunar infarct within the right aspect of the timo. There is mild right maxillary sinus mucosal thickening. Visualized mastoid air cells are well aerated. The calvarium is intact. 1. No acute intracranial hemorrhage or mass effect. 2. Patchy areas of hypoattenuation within the cerebral white matter, which are nonspecific but most compatible with mild changes of microangiopathy. 3. Remote lacunar infarct within the right aspect of the timo. ------------------------- NURSING NOTES AND VITALS REVIEWED ---------------------------  The nursing notes within the ED encounter and vital signs as below have been reviewed.    Patient Vitals for the past 24 hrs:   BP Temp Temp src Pulse Resp SpO2 Height Weight   02/06/20 2124 129/80 -- -- 76 19 98 % -- --   02/06/20 2123 -- -- -- 78 20 93 % -- --   02/06/20 2122 -- -- -- -- 21 97 % -- --   02/06/20 2121 -- -- -- 74 17 99 % -- --   02/06/20 2120 -- -- -- 67 18 99 % -- --   02/06/20 2119 -- -- -- 69 17 99 % -- --   02/06/20 2118 -- -- -- 69 18 99 % -- --   02/06/20 2117 -- -- -- 76 19 99 % -- 02/06/20 1809 -- -- -- 67 21 96 % -- --   02/06/20 1808 -- -- -- 69 19 95 % -- --   02/06/20 1807 -- -- -- 73 20 95 % -- --   02/06/20 1806 -- -- -- 69 20 96 % -- --   02/06/20 1805 -- -- -- 71 19 96 % -- --   02/06/20 1804 -- -- -- 69 17 96 % -- --   02/06/20 1803 -- -- -- 69 15 97 % -- --   02/06/20 1802 -- -- -- 70 16 96 % -- --   02/06/20 1801 -- -- -- 69 18 96 % -- --   02/06/20 1800 -- -- -- 70 17 96 % -- --   02/06/20 1759 -- -- -- 69 18 97 % -- --   02/06/20 1758 -- -- -- 71 16 96 % -- --   02/06/20 1757 -- -- -- 70 17 95 % -- --   02/06/20 1756 -- -- -- 69 18 93 % -- --   02/06/20 1755 -- -- -- 70 17 95 % -- --   02/06/20 1754 -- -- -- 68 17 95 % -- --   02/06/20 1753 -- -- -- 66 18 98 % -- --   02/06/20 1752 -- -- -- 56 17 98 % -- --   02/06/20 1640 124/71 97.5 °F (36.4 °C) Oral 70 18 97 % 5' 6\" (1.676 m) 198 lb 6.6 oz (90 kg)       Oxygen Saturation Interpretation: Abnormal improved after treatment      ------------------------------------------ PROGRESS NOTES ------------------------------------------  Re-evaluation(s):    ED Course as of Feb 06 2146   Thu Feb 06, 2020 1925 Informed by Wong Chaudhari RN that patients wheezing is worse. Re-evaluated patient and he has audible wheezes. Respiratory therapist paged and 2 Duonebs ordered. Repeat lung exam reveals diffuse wheezes. [AD]   2015 Re-evaluated the patient. Still has mild wheezes but overall improved. Discussed plan with patient and caregiver, agree to admission. Also discussed results of blood work and imaging including old stroke which patient was unaware. They verbalized understanding and agreed to plan.   Questions were answered    [AD]   2112 Discussed case with hospitalist, Dr. Justina Sanchez and will admit.    [AD]      ED Course User Index  [AD] Alejandro Cleary, DO       I have spoken with the patient and caregiver and discussed todays results, in addition to providing specific details for the plan of care and counseling regarding the diagnosis and prognosis. Their questions are answered at this time and they are agreeable with the plan.      --------------------------------- ADDITIONAL PROVIDER NOTES ---------------------------------  Consultations:  Spoke with Dr. Gabino Boast,  They will admit this patient. This patient's ED course included: a personal history and physicial examination, re-evaluation prior to disposition, multiple bedside re-evaluations, IV medications, cardiac monitoring, continuous pulse oximetry and complex medical decision making and emergency management    This patient has remained hemodynamically stable during their ED course. Please note that the withdrawal or failure to initiate urgent interventions for this patient would likely result in a life threatening deterioration or permanent disability. Accordingly this patient received 0 minutes of critical care time, excluding separately billable procedures. Clinical Impression  1. Acute on chronic respiratory failure with hypercapnia (HCC)    2. Acute exacerbation of chronic obstructive pulmonary disease (COPD) ([facility]    3. Wheezing    4. Chest pain, unspecified type    5. Confusion    6. Old cerebral infarct without residual deficit    7. Human immunodeficiency virus (HIV) disease ([facility]          Disposition  Patient's disposition: Admit to telemetry  Patient's condition is stable.        Tonya Hitchcock DO  02/06/20 [id number]

## 2020-02-06 NOTE — ED NOTES
Bed: 14  Expected date:   Expected time:   Means of arrival:   Comments:   dexter Rodriguez RN  02/06/20 [id number]

## 2020-02-07 ENCOUNTER — TELEPHONE (OUTPATIENT)
Dept: PULMONOLOGY | Age: 60
End: 2020-02-07

## 2020-02-07 PROBLEM — G47.30 SLEEP APNEA: Status: ACTIVE | Noted: 2020-02-07

## 2020-02-07 LAB
ADENOVIRUS BY PCR: NOT DETECTED
ALBUMIN SERPL-MCNC: 4.2 G/DL (ref 3.5–5.2)
ALP BLD-CCNC: 111 U/L (ref 40–129)
ALT SERPL-CCNC: 18 U/L (ref 0–40)
ANION GAP SERPL CALCULATED.3IONS-SCNC: 13 MMOL/L (ref 7–16)
AST SERPL-CCNC: 17 U/L (ref 0–39)
BASOPHILS ABSOLUTE: 0 E9/L (ref 0–0.2)
BASOPHILS RELATIVE PERCENT: 0 % (ref 0–2)
BILIRUB SERPL-MCNC: 0.3 MG/DL (ref 0–1.2)
BORDETELLA PARAPERTUSSIS BY PCR: NOT DETECTED
BORDETELLA PERTUSSIS BY PCR: NOT DETECTED
BUN BLDV-MCNC: 9 MG/DL (ref 8–23)
CALCIUM SERPL-MCNC: 9.3 MG/DL (ref 8.6–10.2)
CHLAMYDOPHILIA PNEUMONIAE BY PCR: NOT DETECTED
CHLORIDE BLD-SCNC: 96 MMOL/L (ref 98–107)
CO2: 28 MMOL/L (ref 22–29)
CORONAVIRUS 229E BY PCR: NOT DETECTED
CORONAVIRUS HKU1 BY PCR: NOT DETECTED
CORONAVIRUS NL63 BY PCR: NOT DETECTED
CORONAVIRUS OC43 BY PCR: NOT DETECTED
CREAT SERPL-MCNC: 0.8 MG/DL (ref 0.7–1.2)
EKG ATRIAL RATE: 70 BPM
EKG ATRIAL RATE: 82 BPM
EKG P AXIS: 75 DEGREES
EKG P AXIS: 76 DEGREES
EKG P-R INTERVAL: 170 MS
EKG P-R INTERVAL: 180 MS
EKG Q-T INTERVAL: 392 MS
EKG Q-T INTERVAL: 438 MS
EKG QRS DURATION: 106 MS
EKG QRS DURATION: 98 MS
EKG QTC CALCULATION (BAZETT): 457 MS
EKG QTC CALCULATION (BAZETT): 473 MS
EKG R AXIS: -30 DEGREES
EKG R AXIS: -35 DEGREES
EKG T AXIS: 44 DEGREES
EKG T AXIS: 56 DEGREES
EKG VENTRICULAR RATE: 70 BPM
EKG VENTRICULAR RATE: 82 BPM
EOSINOPHILS ABSOLUTE: 0 E9/L (ref 0.05–0.5)
EOSINOPHILS RELATIVE PERCENT: 0 % (ref 0–6)
GFR AFRICAN AMERICAN: >60
GFR NON-AFRICAN AMERICAN: >60 ML/MIN/1.73
GLUCOSE BLD-MCNC: 217 MG/DL (ref 74–99)
HCT VFR BLD CALC: 43.3 % (ref 37–54)
HEMOGLOBIN: 13.6 G/DL (ref 12.5–16.5)
HUMAN METAPNEUMOVIRUS BY PCR: NOT DETECTED
HUMAN RHINOVIRUS/ENTEROVIRUS BY PCR: NOT DETECTED
IMMATURE GRANULOCYTES #: 0.03 E9/L
IMMATURE GRANULOCYTES %: 0.5 % (ref 0–5)
INFLUENZA A BY PCR: NOT DETECTED
INFLUENZA B BY PCR: NOT DETECTED
LV EF: 60 %
LVEF MODALITY: NORMAL
LYMPHOCYTES ABSOLUTE: 1.23 E9/L (ref 1.5–4)
LYMPHOCYTES RELATIVE PERCENT: 19.1 % (ref 20–42)
MAGNESIUM: 2 MG/DL (ref 1.6–2.6)
MCH RBC QN AUTO: 28.9 PG (ref 26–35)
MCHC RBC AUTO-ENTMCNC: 31.4 % (ref 32–34.5)
MCV RBC AUTO: 92.1 FL (ref 80–99.9)
MONOCYTES ABSOLUTE: 0.06 E9/L (ref 0.1–0.95)
MONOCYTES RELATIVE PERCENT: 0.9 % (ref 2–12)
MYCOPLASMA PNEUMONIAE BY PCR: NOT DETECTED
NEUTROPHILS ABSOLUTE: 5.11 E9/L (ref 1.8–7.3)
NEUTROPHILS RELATIVE PERCENT: 79.5 % (ref 43–80)
PARAINFLUENZA VIRUS 1 BY PCR: NOT DETECTED
PARAINFLUENZA VIRUS 2 BY PCR: NOT DETECTED
PARAINFLUENZA VIRUS 3 BY PCR: NOT DETECTED
PARAINFLUENZA VIRUS 4 BY PCR: NOT DETECTED
PDW BLD-RTO: 13.5 FL (ref 11.5–15)
PLATELET # BLD: 167 E9/L (ref 130–450)
PMV BLD AUTO: 12.2 FL (ref 7–12)
POTASSIUM SERPL-SCNC: 3.7 MMOL/L (ref 3.5–5)
PROCALCITONIN: 0.03 NG/ML (ref 0–0.08)
RBC # BLD: 4.7 E12/L (ref 3.8–5.8)
RESPIRATORY SYNCYTIAL VIRUS BY PCR: NOT DETECTED
SODIUM BLD-SCNC: 137 MMOL/L (ref 132–146)
TOTAL PROTEIN: 7.4 G/DL (ref 6.4–8.3)
TROPONIN: <0.01 NG/ML (ref 0–0.03)
WBC # BLD: 6.4 E9/L (ref 4.5–11.5)

## 2020-02-07 PROCEDURE — 6370000000 HC RX 637 (ALT 250 FOR IP): Performed by: INTERNAL MEDICINE

## 2020-02-07 PROCEDURE — 85025 COMPLETE CBC W/AUTO DIFF WBC: CPT

## 2020-02-07 PROCEDURE — 2700000000 HC OXYGEN THERAPY PER DAY

## 2020-02-07 PROCEDURE — 83735 ASSAY OF MAGNESIUM: CPT

## 2020-02-07 PROCEDURE — 94660 CPAP INITIATION&MGMT: CPT

## 2020-02-07 PROCEDURE — 84484 ASSAY OF TROPONIN QUANT: CPT

## 2020-02-07 PROCEDURE — 93005 ELECTROCARDIOGRAM TRACING: CPT | Performed by: INTERNAL MEDICINE

## 2020-02-07 PROCEDURE — 80053 COMPREHEN METABOLIC PANEL: CPT

## 2020-02-07 PROCEDURE — 99233 SBSQ HOSP IP/OBS HIGH 50: CPT | Performed by: INTERNAL MEDICINE

## 2020-02-07 PROCEDURE — 1200000000 HC SEMI PRIVATE

## 2020-02-07 PROCEDURE — 93306 TTE W/DOPPLER COMPLETE: CPT

## 2020-02-07 PROCEDURE — 6360000002 HC RX W HCPCS: Performed by: INTERNAL MEDICINE

## 2020-02-07 PROCEDURE — 36415 COLL VENOUS BLD VENIPUNCTURE: CPT

## 2020-02-07 PROCEDURE — 0100U HC RESPIRPTHGN MULT REV TRANS & AMP PRB TECH 21 TRGT: CPT

## 2020-02-07 PROCEDURE — APPSS30 APP SPLIT SHARED TIME 16-30 MINUTES: Performed by: NURSE PRACTITIONER

## 2020-02-07 PROCEDURE — 93010 ELECTROCARDIOGRAM REPORT: CPT | Performed by: INTERNAL MEDICINE

## 2020-02-07 PROCEDURE — 2580000003 HC RX 258: Performed by: INTERNAL MEDICINE

## 2020-02-07 PROCEDURE — 94640 AIRWAY INHALATION TREATMENT: CPT

## 2020-02-07 PROCEDURE — 6360000002 HC RX W HCPCS: Performed by: NURSE PRACTITIONER

## 2020-02-07 RX ORDER — CARVEDILOL 25 MG/1
25 TABLET ORAL 2 TIMES DAILY
Status: DISCONTINUED | OUTPATIENT
Start: 2020-02-07 | End: 2020-02-11 | Stop reason: HOSPADM

## 2020-02-07 RX ORDER — DULOXETIN HYDROCHLORIDE 60 MG/1
60 CAPSULE, DELAYED RELEASE ORAL 2 TIMES DAILY
Status: DISCONTINUED | OUTPATIENT
Start: 2020-02-07 | End: 2020-02-11

## 2020-02-07 RX ORDER — TAMSULOSIN HYDROCHLORIDE 0.4 MG/1
0.4 CAPSULE ORAL DAILY
Status: DISCONTINUED | OUTPATIENT
Start: 2020-02-07 | End: 2020-02-11 | Stop reason: HOSPADM

## 2020-02-07 RX ORDER — EMTRICITABINE 200 MG/1
200 CAPSULE ORAL DAILY
Status: DISCONTINUED | OUTPATIENT
Start: 2020-02-07 | End: 2020-02-08 | Stop reason: SDUPTHER

## 2020-02-07 RX ORDER — ARFORMOTEROL TARTRATE 15 UG/2ML
15 SOLUTION RESPIRATORY (INHALATION) 2 TIMES DAILY
Status: DISCONTINUED | OUTPATIENT
Start: 2020-02-07 | End: 2020-02-11 | Stop reason: HOSPADM

## 2020-02-07 RX ORDER — IPRATROPIUM BROMIDE AND ALBUTEROL SULFATE 2.5; .5 MG/3ML; MG/3ML
1 SOLUTION RESPIRATORY (INHALATION) EVERY 4 HOURS
Status: DISCONTINUED | OUTPATIENT
Start: 2020-02-07 | End: 2020-02-11 | Stop reason: HOSPADM

## 2020-02-07 RX ORDER — LEVETIRACETAM 500 MG/1
500 TABLET ORAL 2 TIMES DAILY
Status: DISCONTINUED | OUTPATIENT
Start: 2020-02-07 | End: 2020-02-11 | Stop reason: HOSPADM

## 2020-02-07 RX ORDER — FUROSEMIDE 40 MG/1
40 TABLET ORAL DAILY
Status: DISCONTINUED | OUTPATIENT
Start: 2020-02-07 | End: 2020-02-11 | Stop reason: HOSPADM

## 2020-02-07 RX ORDER — AMLODIPINE BESYLATE 5 MG/1
5 TABLET ORAL DAILY
Status: DISCONTINUED | OUTPATIENT
Start: 2020-02-07 | End: 2020-02-11 | Stop reason: HOSPADM

## 2020-02-07 RX ORDER — LISINOPRIL 20 MG/1
20 TABLET ORAL DAILY
Status: DISCONTINUED | OUTPATIENT
Start: 2020-02-07 | End: 2020-02-11 | Stop reason: HOSPADM

## 2020-02-07 RX ORDER — CLONIDINE HYDROCHLORIDE 0.2 MG/1
0.2 TABLET ORAL 2 TIMES DAILY
Status: DISCONTINUED | OUTPATIENT
Start: 2020-02-07 | End: 2020-02-11 | Stop reason: HOSPADM

## 2020-02-07 RX ORDER — AZITHROMYCIN 250 MG/1
250 TABLET, FILM COATED ORAL DAILY
Status: DISCONTINUED | OUTPATIENT
Start: 2020-02-07 | End: 2020-02-11 | Stop reason: HOSPADM

## 2020-02-07 RX ORDER — METHYLPREDNISOLONE SODIUM SUCCINATE 40 MG/ML
40 INJECTION, POWDER, LYOPHILIZED, FOR SOLUTION INTRAMUSCULAR; INTRAVENOUS EVERY 6 HOURS
Status: DISCONTINUED | OUTPATIENT
Start: 2020-02-07 | End: 2020-02-10

## 2020-02-07 RX ADMIN — IPRATROPIUM BROMIDE AND ALBUTEROL SULFATE 1 AMPULE: .5; 3 SOLUTION RESPIRATORY (INHALATION) at 18:16

## 2020-02-07 RX ADMIN — TENOFOVIR ALAFENAMIDE 25 MG: 25 TABLET ORAL at 08:41

## 2020-02-07 RX ADMIN — METHYLPREDNISOLONE SODIUM SUCCINATE 40 MG: 40 INJECTION, POWDER, FOR SOLUTION INTRAMUSCULAR; INTRAVENOUS at 11:16

## 2020-02-07 RX ADMIN — SODIUM CHLORIDE, PRESERVATIVE FREE 10 ML: 5 INJECTION INTRAVENOUS at 08:43

## 2020-02-07 RX ADMIN — ARFORMOTEROL TARTRATE 15 MCG: 15 SOLUTION RESPIRATORY (INHALATION) at 09:57

## 2020-02-07 RX ADMIN — CARVEDILOL 25 MG: 25 TABLET, FILM COATED ORAL at 08:40

## 2020-02-07 RX ADMIN — LISINOPRIL 20 MG: 20 TABLET ORAL at 08:41

## 2020-02-07 RX ADMIN — ALBUTEROL SULFATE 2.5 MG: 2.5 SOLUTION RESPIRATORY (INHALATION) at 14:21

## 2020-02-07 RX ADMIN — SODIUM CHLORIDE, PRESERVATIVE FREE 10 ML: 5 INJECTION INTRAVENOUS at 01:19

## 2020-02-07 RX ADMIN — METHYLPREDNISOLONE SODIUM SUCCINATE 40 MG: 40 INJECTION, POWDER, FOR SOLUTION INTRAMUSCULAR; INTRAVENOUS at 17:32

## 2020-02-07 RX ADMIN — CLONIDINE HYDROCHLORIDE 0.2 MG: 0.2 TABLET ORAL at 08:41

## 2020-02-07 RX ADMIN — CARVEDILOL 25 MG: 25 TABLET, FILM COATED ORAL at 21:25

## 2020-02-07 RX ADMIN — CLONIDINE HYDROCHLORIDE 0.2 MG: 0.2 TABLET ORAL at 21:26

## 2020-02-07 RX ADMIN — ARFORMOTEROL TARTRATE 15 MCG: 15 SOLUTION RESPIRATORY (INHALATION) at 18:16

## 2020-02-07 RX ADMIN — LEVETIRACETAM 500 MG: 500 TABLET ORAL at 21:26

## 2020-02-07 RX ADMIN — IPRATROPIUM BROMIDE AND ALBUTEROL SULFATE 1 AMPULE: .5; 3 SOLUTION RESPIRATORY (INHALATION) at 23:01

## 2020-02-07 RX ADMIN — FUROSEMIDE 40 MG: 40 TABLET ORAL at 08:40

## 2020-02-07 RX ADMIN — METHYLPREDNISOLONE SODIUM SUCCINATE 40 MG: 40 INJECTION, POWDER, FOR SOLUTION INTRAMUSCULAR; INTRAVENOUS at 23:20

## 2020-02-07 RX ADMIN — METHYLPREDNISOLONE SODIUM SUCCINATE 40 MG: 40 INJECTION, POWDER, FOR SOLUTION INTRAMUSCULAR; INTRAVENOUS at 03:55

## 2020-02-07 RX ADMIN — SODIUM CHLORIDE, PRESERVATIVE FREE 10 ML: 5 INJECTION INTRAVENOUS at 21:25

## 2020-02-07 RX ADMIN — AMLODIPINE BESYLATE 5 MG: 5 TABLET ORAL at 08:40

## 2020-02-07 RX ADMIN — TAMSULOSIN HYDROCHLORIDE 0.4 MG: 0.4 CAPSULE ORAL at 08:41

## 2020-02-07 RX ADMIN — ENOXAPARIN SODIUM 40 MG: 40 INJECTION SUBCUTANEOUS at 08:40

## 2020-02-07 RX ADMIN — LEVETIRACETAM 500 MG: 500 TABLET ORAL at 08:41

## 2020-02-07 RX ADMIN — ALBUTEROL SULFATE 2.5 MG: 2.5 SOLUTION RESPIRATORY (INHALATION) at 06:20

## 2020-02-07 RX ADMIN — AZITHROMYCIN MONOHYDRATE 250 MG: 250 TABLET ORAL at 17:32

## 2020-02-07 RX ADMIN — ALBUTEROL SULFATE 2.5 MG: 2.5 SOLUTION RESPIRATORY (INHALATION) at 09:58

## 2020-02-07 ASSESSMENT — PAIN SCALES - GENERAL
PAINLEVEL_OUTOF10: 0

## 2020-02-07 NOTE — CARE COORDINATION
CM Note: 2/7/2020 at 3:33pm: Received a call from ELICIA CHILDS JR. [facility] at [facility] who states that she called care giver Melvina Lechuga and per ELICIA [facility]ILDS JR[facility], they have a  coming out to Mr. Johnson's home now and they are going to check the concentrator and switch out the tanks.  Tuyet Pry informed of this and expressed his gratitude.  Kiana Cruz RN

## 2020-02-07 NOTE — H&P
5/9/18   Historical Provider, MD   lisinopril (PRINIVIL;ZESTRIL) 20 MG tablet Take 1 tablet by mouth daily 2/15/18   April MD Argelia   DULoxetine (CYMBALTA) 20 MG extended release capsule Take 3 capsules by mouth 2 times daily 2/14/18 April MD Argelia   emtricitabine-tenofovir alafenamide (DESCOVY) 200-25 MG TABS tablet Take 1 tablet by mouth daily 2/11/18 10/23/18  Yoana Pina MD   carvedilol (COREG) 25 MG tablet Take 25 mg by mouth 2 times daily    Historical Provider, MD   ranitidine (ZANTAC) 150 MG tablet Take 150 mg by mouth 2 times daily    Historical Provider, MD   ipratropium-albuterol (DUONEB) 0.5-2.5 (3) MG/3ML SOLN nebulizer solution Inhale 3 mLs into the lungs every 6 hours as needed for Shortness of Breath 12/18/17   Sandeep Bruno DO   albuterol sulfate  (90 BASE) MCG/ACT inhaler Inhale 2 puffs into the lungs every 6 hours as needed for Wheezing    Historical Provider, MD   cloNIDine (CATAPRES) 0.1 MG tablet 2 in the morning and one at at bedtime  Patient taking differently: Take 0.2 mg by mouth 2 times daily  5/29/15   Margareth Valle DO       Allergies:    Ketorolac tromethamine    Social History:    reports that he quit smoking about 2 years ago. His smoking use included cigarettes. He has a 17.50 pack-year smoking history. He has never used smokeless tobacco. He reports current alcohol use. He reports that he does not use drugs.     Family History:       Problem Relation Age of Onset    Alcohol Abuse Mother     Alcohol Abuse Father        PHYSICAL EXAM:  Vitals:  /67   Pulse 84   Temp 98.2 °F (36.8 °C) (Oral)   Resp 18   Ht 5' 6\" (1.676 m)   Wt 194 lb 12.8 oz (88.4 kg)   SpO2 96%   BMI 31.44 kg/m²   General Appearance: alert and oriented to person, place and time, well developed and well- nourished, in no acute distress  Skin: warm and dry, no rash or erythema  Head: normocephalic and atraumatic  Eyes: pupils equal, round, and reactive to light, extraocular eye movements intact, conjunctivae normal  ENT: tympanic membrane, external ear and ear canal normal bilaterally, nose without deformity, nasal mucosa and turbinates normal without polyps  Neck: supple and non-tender without mass, no thyromegaly or thyroid nodules, no cervical lymphadenopathy  Pulmonary/Chest: clear to auscultation bilaterally-lateral expiratory wheezing  Cardiovascular: normal rate, regular rhythm, normal S1 and S2, no murmurs, 2+ pitting edema up to the knees bilaterally  Abdomen: soft, non-tender, non-distended, normal bowel sounds, no masses or organomegaly  Extremities: no cyanosis, clubbing or edema  Musculoskeletal: normal range of motion, no joint swelling, deformity or tenderness  Neurologic: reflexes normal and symmetric, no cranial nerve deficit, gait, coordination and speech normal      LABS:  Recent Labs     02/06/20  1745      K 4.7      CO2 31*   BUN 6*   CREATININE 1.0   GLUCOSE 90   CALCIUM 8.8       Recent Labs     02/06/20  1745   WBC 8.9   RBC 4.86   HGB 14.1   HCT 44.0   MCV 90.5   MCH 29.0   MCHC 32.0   RDW 13.4      MPV 12.3*       No results for input(s): POCGLU in the last 72 hours.     CBC with Differential:    Lab Results   Component Value Date    WBC 8.9 02/06/2020    RBC 4.86 02/06/2020    HGB 14.1 02/06/2020    HCT 44.0 02/06/2020     02/06/2020    MCV 90.5 02/06/2020    MCH 29.0 02/06/2020    MCHC 32.0 02/06/2020    RDW 13.4 02/06/2020    SEGSPCT 55 09/01/2012    METASPCT 0.5 05/02/2018    LYMPHOPCT 37.4 02/06/2020    MONOPCT 11.4 02/06/2020    MYELOPCT 0.5 05/02/2018    BASOPCT 0.2 02/06/2020    MONOSABS 1.01 02/06/2020    LYMPHSABS 3.32 02/06/2020    EOSABS 0.20 02/06/2020    BASOSABS 0.02 02/06/2020     CMP:    Lab Results   Component Value Date     02/06/2020    K 4.7 02/06/2020     02/06/2020    CO2 31 02/06/2020    BUN 6 02/06/2020    CREATININE 1.0 02/06/2020    GFRAA >60 02/06/2020    LABGLOM >60 02/06/2020    GLUCOSE 90 02/06/2020    GLUCOSE 95 05/24/2012    PROT 7.4 02/06/2020    LABALBU 4.1 02/06/2020    LABALBU 4.3 05/24/2012    CALCIUM 8.8 02/06/2020    BILITOT 0.4 02/06/2020    ALKPHOS 110 02/06/2020    AST 35 02/06/2020    ALT 20 02/06/2020       Radiology: Xr Chest Standard (2 Vw)    Result Date: 2/6/2020  Reading location: 200 Indication: Shortness of breath Comparison: Prior chest radiograph from 10/23/2018 Technique: Chest PA and lateral radiographs were obtained. Findings: The cardiomediastinal silhouette is stable in size and contours. There is minimal linear left basilar subsegmental atelectasis. No focal consolidation. Costophrenic angles are clear. There is no evidence of pneumothorax. Minimal linear left basilar subsegmental atelectasis. Ct Head Wo Contrast    Result Date: 2/6/2020  Location: 200 Indication: Confusion Comparison: MRI brain from 2/22/2019. Technique: Multidetector CT imaging was obtained from the skull base to vertex without the administration of intravenous contrast. Coronal and sagittal reformatted images were obtained. Automated dose exposure control was used for this exam. FINDINGS: Prominence of ventricles and sulci is compatible with age-related volume loss. No extra-axial collection. No acute intracranial hemorrhage. No cerebral edema or mass effect. No CT evidence of acute, large territorial infarction. There are patchy areas of hypoattenuation within the cerebral white matter, which are nonspecific but most compatible with mild changes of microangiopathy. There is remote lacunar infarct within the right aspect of the timo. There is mild right maxillary sinus mucosal thickening. Visualized mastoid air cells are well aerated. The calvarium is intact. 1. No acute intracranial hemorrhage or mass effect. 2. Patchy areas of hypoattenuation within the cerebral white matter, which are nonspecific but most compatible with mild changes of microangiopathy.  3. Remote lacunar infarct within

## 2020-02-07 NOTE — CONSULTS
apnea, and choking. Gastrointestinal: Denies nausea, vomiting, poor appetite, diarrhea, heartburn or reflux  Genitourinary: Denies dysuria, frequency, urgency or hematuria  Musculoskeletal: Denies myalgias, muscle weakness, and bone pain  Neurological: Denies dizziness, vertigo, headache, and focal weakness  Psychological: Denies anxiety and depression  Endocrine: Denies heat intolerance and cold intolerance  Hematopoietic/Lymphatic: Denies bleeding problems and blood transfusions    PHYSICAL EXAM:  Vitals:    02/07/20 0830   BP: (!) 167/88   Pulse: 88   Resp: 16   Temp: 98.1 °F (36.7 °C)   SpO2: 95%     FiO2 : 50 %  O2 Flow Rate (L/min): 4 L/min  O2 Device: Nasal cannula    Constitutional: No fever, chills, diaphoresis  Skin: Can rash, no skin breakdown  HEENT: Unremarkable  Neck: No JVD, lymphadenopathy, thyromegaly  Cardiovascular: S1, S2 normal  Respiratory: Decreased breath sounds bilaterally. Expiratory wheezes throughout all lung fields with impaired air movement/exit  Gastrointestinal: Soft, mildly obese, nontender  Genitourinary: No CVA tenderness  Extremities: No clubbing, cyanosis, or edema  Neurological: Awake, alert, oriented x3. No evidence of focal motor or sensory deficits  Psychological: In appropriate spirits.   Good affect    LABS:  WBC   Date Value Ref Range Status   02/07/2020 6.4 4.5 - 11.5 E9/L Final   02/06/2020 8.9 4.5 - 11.5 E9/L Final   07/23/2018 9.5 4.5 - 11.5 E9/L Final     Hemoglobin   Date Value Ref Range Status   02/07/2020 13.6 12.5 - 16.5 g/dL Final   02/06/2020 14.1 12.5 - 16.5 g/dL Final   07/23/2018 11.1 (L) 12.5 - 16.5 g/dL Final     Hematocrit   Date Value Ref Range Status   02/07/2020 43.3 37.0 - 54.0 % Final   02/06/2020 44.0 37.0 - 54.0 % Final   07/23/2018 35.8 (L) 37.0 - 54.0 % Final     MCV   Date Value Ref Range Status   02/07/2020 92.1 80.0 - 99.9 fL Final   02/06/2020 90.5 80.0 - 99.9 fL Final   07/23/2018 93.5 80.0 - 99.9 fL Final     Platelets   Date Value Ref Range Status   02/07/2020 167 130 - 450 E9/L Final   02/06/2020 204 130 - 450 E9/L Final   07/23/2018 262 130 - 450 E9/L Final     Sodium   Date Value Ref Range Status   02/07/2020 137 132 - 146 mmol/L Final   02/06/2020 140 132 - 146 mmol/L Final   07/23/2018 142 132 - 146 mmol/L Final     Potassium   Date Value Ref Range Status   02/07/2020 3.7 3.5 - 5.0 mmol/L Final   07/23/2018 3.9 3.5 - 5.0 mmol/L Final   05/22/2018 4.9 3.5 - 5.0 mmol/L Final     Potassium reflex Magnesium   Date Value Ref Range Status   02/06/2020 4.7 3.5 - 5.0 mmol/L Final     Comment:     Specimen is moderately Hemolyzed. Result may be artificially increased.    05/16/2018 4.5 3.5 - 5.0 mmol/L Final   02/14/2018 3.8 3.5 - 5.0 mmol/L Final     Chloride   Date Value Ref Range Status   02/07/2020 96 (L) 98 - 107 mmol/L Final   02/06/2020 100 98 - 107 mmol/L Final   07/23/2018 101 98 - 107 mmol/L Final     CO2   Date Value Ref Range Status   02/07/2020 28 22 - 29 mmol/L Final   02/06/2020 31 (H) 22 - 29 mmol/L Final   07/23/2018 30 (H) 22 - 29 mmol/L Final     BUN   Date Value Ref Range Status   02/07/2020 9 8 - 23 mg/dL Final   02/06/2020 6 (L) 8 - 23 mg/dL Final   07/23/2018 7 (L) 8 - 23 mg/dL Final     CREATININE   Date Value Ref Range Status   02/07/2020 0.8 0.7 - 1.2 mg/dL Final   02/06/2020 1.0 0.7 - 1.2 mg/dL Final   07/23/2018 0.8 0.7 - 1.2 mg/dL Final     Glucose   Date Value Ref Range Status   02/07/2020 217 (H) 74 - 99 mg/dL Final   02/06/2020 90 74 - 99 mg/dL Final   07/23/2018 104 74 - 109 mg/dL Final   05/24/2012 95 70 - 110 mg/dL Final   03/01/2012 78 70 - 110 mg/dL Final   11/11/2011 77 70 - 110 mg/dL Final     Calcium   Date Value Ref Range Status   02/07/2020 9.3 8.6 - 10.2 mg/dL Final   02/06/2020 8.8 8.6 - 10.2 mg/dL Final   07/23/2018 9.4 8.6 - 10.2 mg/dL Final     Total Protein   Date Value Ref Range Status   02/07/2020 7.4 6.4 - 8.3 g/dL Final   02/06/2020 7.4 6.4 - 8.3 g/dL Final   07/23/2018 7.4 6.4 - 8.3 g/dL Final Albumin   Date Value Ref Range Status   05/24/2012 4.3 3.2 - 4.8 g/dL Final   03/01/2012 4.9 (H) 3.2 - 4.8 g/dL Final   11/11/2011 5.1 (H) 3.2 - 4.8 g/dL Final     Alb   Date Value Ref Range Status   02/07/2020 4.2 3.5 - 5.2 g/dL Final   02/06/2020 4.1 3.5 - 5.2 g/dL Final   07/23/2018 4.0 3.5 - 5.2 g/dL Final     Total Bilirubin   Date Value Ref Range Status   02/07/2020 0.3 0.0 - 1.2 mg/dL Final   02/06/2020 0.4 0.0 - 1.2 mg/dL Final   07/23/2018 0.4 0.0 - 1.2 mg/dL Final     Alkaline Phosphatase   Date Value Ref Range Status   02/07/2020 111 40 - 129 U/L Final   02/06/2020 110 40 - 129 U/L Final     Comment:     Specimen is moderately Hemolyzed. Result may be artificially decreased. 07/23/2018 84 40 - 129 U/L Final     AST   Date Value Ref Range Status   02/07/2020 17 0 - 39 U/L Final   02/06/2020 35 0 - 39 U/L Final     Comment:     Specimen is moderately Hemolyzed. Result may be artificially increased. 07/23/2018 21 0 - 39 U/L Final     ALT   Date Value Ref Range Status   02/07/2020 18 0 - 40 U/L Final   02/06/2020 20 0 - 40 U/L Final     Comment:     Specimen is moderately Hemolyzed. Result may be artificially increased. 07/23/2018 21 0 - 40 U/L Final     GFR Non-   Date Value Ref Range Status   02/07/2020 >60 >=60 mL/min/1.73 Final     Comment:     Chronic Kidney Disease: less than 60 ml/min/1.73 sq.m. Kidney Failure: less than 15 ml/min/1.73 sq.m. Results valid for patients 18 years and older. 02/06/2020 >60 >=60 mL/min/1.73 Final     Comment:     Chronic Kidney Disease: less than 60 ml/min/1.73 sq.m. Kidney Failure: less than 15 ml/min/1.73 sq.m. Results valid for patients 18 years and older. 07/23/2018 >60 >=60 mL/min/1.73 Final     Comment:     Chronic Kidney Disease: less than 60 ml/min/1.73 sq.m. Kidney Failure: less than 15 ml/min/1.73 sq.m. Results valid for patients 18 years and older.        GFR    Date Value Ref Range

## 2020-02-07 NOTE — PROGRESS NOTES
or organomegaly  Extremities: no cyanosis, no clubbing and 1+ bilateral lower extremity edema  Neurologic: no cranial nerve deficit and speech normal      Recent Labs     02/06/20  1745 02/07/20  0525    137   K 4.7 3.7    96*   CO2 31* 28   BUN 6* 9   CREATININE 1.0 0.8   GLUCOSE 90 217*   CALCIUM 8.8 9.3       Recent Labs     02/06/20  1745 02/07/20  0525   WBC 8.9 6.4   RBC 4.86 4.70   HGB 14.1 13.6   HCT 44.0 43.3   MCV 90.5 92.1   MCH 29.0 28.9   MCHC 32.0 31.4*   RDW 13.4 13.5    167   MPV 12.3* 12.2*         Radiology:   CT Head WO Contrast   Final Result   1. No acute intracranial hemorrhage or mass effect. 2. Patchy areas of hypoattenuation within the cerebral white matter,   which are nonspecific but most compatible with mild changes of   microangiopathy. 3. Remote lacunar infarct within the right aspect of the timo. XR CHEST STANDARD (2 VW)   Final Result   Minimal linear left basilar subsegmental atelectasis. Assessment:  Principal Problem:    COPD exacerbation (HCC)  Active Problems:    HIV (human immunodeficiency virus infection) ([facility]    Essential hypertension    Sleep apnea  Resolved Problems:    * No resolved hospital problems. *      Plan:  1. Chronic respiratory failure with hypoxia:  Risk factors:  O2 dependent COPD 4 liters continuous. Clinical indicators:  SPO2 % on 4 liters. ABGs on admission: pH 7.358, PCO2 57, pO2 69.5, HCO3 32.1. on 4 liters nasal canula. Treatment:  O2 4 liters as at home, IV Solumedrol, aerosols, and add inhaled LABA. Respiratory array panel pending. 2. HIV:  Continue home medications. 3. Hypertension:  Continue home medications, may need adjusted. 4. Sleep apnea:  Per patient's caregiver, his home Cpap was taken back by the company. He will need a repeat sleep study as an outpatient. 5. Diastolic dysfunction: Echo on 2/9/18 with an EF of 02% and diastolic dysfunction. Repeat Echo ordered.        NOTE: This

## 2020-02-07 NOTE — CARE COORDINATION
CM Note: 2/7/2020 at 2:45pm: CM met with patient at the bedside for transition of care needs. He states he lives with Edil Alicia in a 2 story home and his bedroom and bathroom are on the first level of the home. He states he has a caregiver Alona Orourke who takes care of him. He states he has a hx of HHC - per previous notes had [facility]. He states he has never been to a SNF in the past. DME: [employer], cane, [employer]. PCP: TIN Landry-NP. He states he has oxygen at home with [employer] that he wears at 4L continuous but he said there is something wrong with the concentrator, but not sure what it is and gave CM permission to call his caregiver Alona Orourke to call and ask. KENRICK called caregiver Alona Orourke who states that his concentrator is not working right and he is now living on the portable tanks- which she states he only has 3 tanks left. She states she called [employer] and they can order more portable tanks, but won't be able to deliver them until Tuesday. She also states he had a CPAP machine about a year ago, but someone came and picked it up. She states she thinks the pt needs a CPAP. CM called [employer] and talked with rep Grimm. She states that they did receive a call from Alona Orourke but per [facility] rep, she did not mention that something was wrong with the concentrator. Per [employer]ady Downs, she did ask for more tanks, but did not say that it was urgent. I gave [facility] caregiver Mariah's phone number and she states she was going to call her as soon as she got off the phone with KENRICK and see if they can set up a time to come out to the home today. Per [facility], she states they always have a service rep 24/7 and someone could come out to the home today to check the concentrator and if the concentrator is not working, they could switch it out to one that is working and deliver the portable tanks. She states [employer] was not the company that provided the CPAP and in order to get a new one, would need a sleep study.  Updated NP Evelina Tate on the above

## 2020-02-07 NOTE — ED NOTES
Report to [facility] at this time.       Sherine Bridges RN  02/06/20 2130       Sherine Bridges RN  02/06/20 2136

## 2020-02-07 NOTE — PLAN OF CARE
Problem: Falls - Risk of:  Goal: Will remain free from falls  Description  Will remain free from falls  2/7/2020 1756 by Eliot Barrera RN  Outcome: Met This Shift  2/7/2020 0451 by Yana Grossman RN  Outcome: Met This Shift  Goal: Absence of physical injury  Description  Absence of physical injury  2/7/2020 1756 by Eliot Barrera RN  Outcome: Met This Shift  2/7/2020 0451 by Yana Grossman RN  Outcome: Met This Shift

## 2020-02-07 NOTE — TELEPHONE ENCOUNTER
Call returned to Dejah BATISTA advising pt has been scheudled for May 14th@ 9am here in our office; advised that appt card will be mailed and that if pt is unable to keep this appt to please contact our office as soon as possible. Reminded on multiple no shows to previous appointments and advised office will not reschedule no shows. Beatriz Snow to also contact office with pt discharge from [address] [place] [address],[address] so office can arrange for pt to resume Xolair injections. Barb Zuniga verbalized understanding.

## 2020-02-08 ENCOUNTER — APPOINTMENT (OUTPATIENT)
Dept: GENERAL RADIOLOGY | Age: 60
DRG: 140 | End: 2020-02-08
Payer: MEDICAID

## 2020-02-08 LAB
ALBUMIN SERPL-MCNC: 4.1 G/DL (ref 3.5–5.2)
ALP BLD-CCNC: 100 U/L (ref 40–129)
ALT SERPL-CCNC: 16 U/L (ref 0–40)
ANION GAP SERPL CALCULATED.3IONS-SCNC: 12 MMOL/L (ref 7–16)
AST SERPL-CCNC: 14 U/L (ref 0–39)
BASOPHILS ABSOLUTE: 0.01 E9/L (ref 0–0.2)
BASOPHILS RELATIVE PERCENT: 0.1 % (ref 0–2)
BILIRUB SERPL-MCNC: 0.5 MG/DL (ref 0–1.2)
BUN BLDV-MCNC: 10 MG/DL (ref 8–23)
CALCIUM SERPL-MCNC: 9 MG/DL (ref 8.6–10.2)
CHLORIDE BLD-SCNC: 97 MMOL/L (ref 98–107)
CO2: 27 MMOL/L (ref 22–29)
CREAT SERPL-MCNC: 0.8 MG/DL (ref 0.7–1.2)
EOSINOPHILS ABSOLUTE: 0 E9/L (ref 0.05–0.5)
EOSINOPHILS RELATIVE PERCENT: 0 % (ref 0–6)
GFR AFRICAN AMERICAN: >60
GFR NON-AFRICAN AMERICAN: >60 ML/MIN/1.73
GLUCOSE BLD-MCNC: 182 MG/DL (ref 74–99)
HCT VFR BLD CALC: 42.2 % (ref 37–54)
HEMOGLOBIN: 13.6 G/DL (ref 12.5–16.5)
IMMATURE GRANULOCYTES #: 0.13 E9/L
IMMATURE GRANULOCYTES %: 0.8 % (ref 0–5)
LYMPHOCYTES ABSOLUTE: 1.64 E9/L (ref 1.5–4)
LYMPHOCYTES RELATIVE PERCENT: 10.5 % (ref 20–42)
MAGNESIUM: 2.3 MG/DL (ref 1.6–2.6)
MCH RBC QN AUTO: 29 PG (ref 26–35)
MCHC RBC AUTO-ENTMCNC: 32.2 % (ref 32–34.5)
MCV RBC AUTO: 90 FL (ref 80–99.9)
MONOCYTES ABSOLUTE: 0.69 E9/L (ref 0.1–0.95)
MONOCYTES RELATIVE PERCENT: 4.4 % (ref 2–12)
NEUTROPHILS ABSOLUTE: 13.18 E9/L (ref 1.8–7.3)
NEUTROPHILS RELATIVE PERCENT: 84.2 % (ref 43–80)
PDW BLD-RTO: 13.3 FL (ref 11.5–15)
PLATELET # BLD: 178 E9/L (ref 130–450)
PMV BLD AUTO: 11.9 FL (ref 7–12)
POTASSIUM SERPL-SCNC: 3.7 MMOL/L (ref 3.5–5)
PRO-BNP: 87 PG/ML (ref 0–125)
RBC # BLD: 4.69 E12/L (ref 3.8–5.8)
SODIUM BLD-SCNC: 136 MMOL/L (ref 132–146)
TOTAL PROTEIN: 7.1 G/DL (ref 6.4–8.3)
WBC # BLD: 15.7 E9/L (ref 4.5–11.5)

## 2020-02-08 PROCEDURE — 6370000000 HC RX 637 (ALT 250 FOR IP): Performed by: INTERNAL MEDICINE

## 2020-02-08 PROCEDURE — 94660 CPAP INITIATION&MGMT: CPT

## 2020-02-08 PROCEDURE — 6360000002 HC RX W HCPCS: Performed by: INTERNAL MEDICINE

## 2020-02-08 PROCEDURE — 99232 SBSQ HOSP IP/OBS MODERATE 35: CPT | Performed by: INTERNAL MEDICINE

## 2020-02-08 PROCEDURE — 2580000003 HC RX 258: Performed by: INTERNAL MEDICINE

## 2020-02-08 PROCEDURE — 80053 COMPREHEN METABOLIC PANEL: CPT

## 2020-02-08 PROCEDURE — 94640 AIRWAY INHALATION TREATMENT: CPT

## 2020-02-08 PROCEDURE — 71046 X-RAY EXAM CHEST 2 VIEWS: CPT

## 2020-02-08 PROCEDURE — APPSS30 APP SPLIT SHARED TIME 16-30 MINUTES: Performed by: NURSE PRACTITIONER

## 2020-02-08 PROCEDURE — 83880 ASSAY OF NATRIURETIC PEPTIDE: CPT

## 2020-02-08 PROCEDURE — 1200000000 HC SEMI PRIVATE

## 2020-02-08 PROCEDURE — 2700000000 HC OXYGEN THERAPY PER DAY

## 2020-02-08 PROCEDURE — 97530 THERAPEUTIC ACTIVITIES: CPT

## 2020-02-08 PROCEDURE — 36415 COLL VENOUS BLD VENIPUNCTURE: CPT

## 2020-02-08 PROCEDURE — 83735 ASSAY OF MAGNESIUM: CPT

## 2020-02-08 PROCEDURE — 97161 PT EVAL LOW COMPLEX 20 MIN: CPT

## 2020-02-08 PROCEDURE — 6360000002 HC RX W HCPCS: Performed by: NURSE PRACTITIONER

## 2020-02-08 PROCEDURE — 94669 MECHANICAL CHEST WALL OSCILL: CPT

## 2020-02-08 PROCEDURE — 85025 COMPLETE CBC W/AUTO DIFF WBC: CPT

## 2020-02-08 RX ORDER — EMTRICITABINE 200 MG/1
200 CAPSULE ORAL ONCE
Status: COMPLETED | OUTPATIENT
Start: 2020-02-08 | End: 2020-02-08

## 2020-02-08 RX ORDER — EMTRICITABINE 200 MG/1
200 CAPSULE ORAL DAILY
Status: DISCONTINUED | OUTPATIENT
Start: 2020-02-09 | End: 2020-02-10 | Stop reason: ALTCHOICE

## 2020-02-08 RX ADMIN — LISINOPRIL 20 MG: 20 TABLET ORAL at 08:54

## 2020-02-08 RX ADMIN — CARVEDILOL 25 MG: 25 TABLET, FILM COATED ORAL at 20:02

## 2020-02-08 RX ADMIN — METHYLPREDNISOLONE SODIUM SUCCINATE 40 MG: 40 INJECTION, POWDER, FOR SOLUTION INTRAMUSCULAR; INTRAVENOUS at 17:41

## 2020-02-08 RX ADMIN — IPRATROPIUM BROMIDE AND ALBUTEROL SULFATE 1 AMPULE: .5; 3 SOLUTION RESPIRATORY (INHALATION) at 18:45

## 2020-02-08 RX ADMIN — METHYLPREDNISOLONE SODIUM SUCCINATE 40 MG: 40 INJECTION, POWDER, FOR SOLUTION INTRAMUSCULAR; INTRAVENOUS at 05:55

## 2020-02-08 RX ADMIN — IPRATROPIUM BROMIDE AND ALBUTEROL SULFATE 1 AMPULE: .5; 3 SOLUTION RESPIRATORY (INHALATION) at 02:27

## 2020-02-08 RX ADMIN — TAMSULOSIN HYDROCHLORIDE 0.4 MG: 0.4 CAPSULE ORAL at 08:54

## 2020-02-08 RX ADMIN — LEVETIRACETAM 500 MG: 500 TABLET ORAL at 20:02

## 2020-02-08 RX ADMIN — LEVETIRACETAM 500 MG: 500 TABLET ORAL at 08:53

## 2020-02-08 RX ADMIN — SODIUM CHLORIDE, PRESERVATIVE FREE 10 ML: 5 INJECTION INTRAVENOUS at 08:56

## 2020-02-08 RX ADMIN — AMLODIPINE BESYLATE 5 MG: 5 TABLET ORAL at 09:02

## 2020-02-08 RX ADMIN — TENOFOVIR ALAFENAMIDE 25 MG: 25 TABLET ORAL at 08:53

## 2020-02-08 RX ADMIN — FUROSEMIDE 40 MG: 40 TABLET ORAL at 08:53

## 2020-02-08 RX ADMIN — IPRATROPIUM BROMIDE AND ALBUTEROL SULFATE 1 AMPULE: .5; 3 SOLUTION RESPIRATORY (INHALATION) at 05:14

## 2020-02-08 RX ADMIN — IPRATROPIUM BROMIDE AND ALBUTEROL SULFATE 1 AMPULE: .5; 3 SOLUTION RESPIRATORY (INHALATION) at 10:12

## 2020-02-08 RX ADMIN — CLONIDINE HYDROCHLORIDE 0.2 MG: 0.2 TABLET ORAL at 20:01

## 2020-02-08 RX ADMIN — AZITHROMYCIN MONOHYDRATE 250 MG: 250 TABLET ORAL at 08:53

## 2020-02-08 RX ADMIN — IPRATROPIUM BROMIDE AND ALBUTEROL SULFATE 1 AMPULE: .5; 3 SOLUTION RESPIRATORY (INHALATION) at 21:37

## 2020-02-08 RX ADMIN — CLONIDINE HYDROCHLORIDE 0.2 MG: 0.2 TABLET ORAL at 08:53

## 2020-02-08 RX ADMIN — METHYLPREDNISOLONE SODIUM SUCCINATE 40 MG: 40 INJECTION, POWDER, FOR SOLUTION INTRAMUSCULAR; INTRAVENOUS at 22:53

## 2020-02-08 RX ADMIN — SODIUM CHLORIDE, PRESERVATIVE FREE 10 ML: 5 INJECTION INTRAVENOUS at 20:02

## 2020-02-08 RX ADMIN — ARFORMOTEROL TARTRATE 15 MCG: 15 SOLUTION RESPIRATORY (INHALATION) at 05:14

## 2020-02-08 RX ADMIN — ARFORMOTEROL TARTRATE 15 MCG: 15 SOLUTION RESPIRATORY (INHALATION) at 18:45

## 2020-02-08 RX ADMIN — EMTRICITABINE 200 MG: 200 CAPSULE ORAL at 09:51

## 2020-02-08 RX ADMIN — CARVEDILOL 25 MG: 25 TABLET, FILM COATED ORAL at 08:54

## 2020-02-08 RX ADMIN — DULOXETINE HYDROCHLORIDE 60 MG: 60 CAPSULE, DELAYED RELEASE ORAL at 20:01

## 2020-02-08 RX ADMIN — METHYLPREDNISOLONE SODIUM SUCCINATE 40 MG: 40 INJECTION, POWDER, FOR SOLUTION INTRAMUSCULAR; INTRAVENOUS at 11:26

## 2020-02-08 RX ADMIN — ENOXAPARIN SODIUM 40 MG: 40 INJECTION SUBCUTANEOUS at 08:56

## 2020-02-08 RX ADMIN — IPRATROPIUM BROMIDE AND ALBUTEROL SULFATE 1 AMPULE: .5; 3 SOLUTION RESPIRATORY (INHALATION) at 13:11

## 2020-02-08 ASSESSMENT — PAIN SCALES - GENERAL
PAINLEVEL_OUTOF10: 0

## 2020-02-08 NOTE — PLAN OF CARE
Problem: Falls - Risk of:  Goal: Will remain free from falls  Description  Will remain free from falls  2/8/2020 0435 by Maren Aschoff, RN  Outcome: Met This Shift     Problem: Falls - Risk of:  Goal: Absence of physical injury  Description  Absence of physical injury  2/8/2020 0435 by Maren Aschoff, RN  Outcome: Met This Shift     Problem: Falls - Risk of:  Goal: Absence of physical injury  Description  Absence of physical injury  2/8/2020 0435 by Maren Aschoff, RN  Outcome: Met This Shift

## 2020-02-08 NOTE — PROGRESS NOTES
Physical Therapy  Physical Therapy Initial Assessment     Name: Johana Amezquita  : 1960  MRN: [id number]    Referring Provider:  LAVON Gunderson CNP    Date of Service: 2020    Evaluating PT:  Shelley Mercado PT   Room #:  3011/1404-98  Diagnosis:  COPD exacerbation  PMHx/PSHx:        Diagnosis Date    Asthma     since childhood    [facility]     COPD [facility]chronic obstructive [facility] disease) ([facility]     Depression     Emphysema of lung ([facility]     History of echocardiogram 2018    EF 62%    HIV (human immunodeficiency virus infection) ([facility]     Hypertension 2008    Sleep apnea     no cpap     Procedure/Surgery:  N/A  Precautions:  Falls, O2 4L , FWB (full weight bearing),   Equipment Needs:  none    SUBJECTIVE:    Patient lives with spouse  in a two story home with 1 step to enter. Bed is on 1 floor and bath is on 1 floor. Patient ambulated independently with cane or fww PTA. Equipment owned: [address] [place] [address] and [employer]mond      OBJECTIVE:   Initial Evaluation  Date: 2020 Treatment Short Term/ Long Term   Goals   AM-PAC 6 Clicks      Was pt agreeable to Eval/treatment? yes     Does pt have pain? none     Bed Mobility  Rolling: Ind  Supine to sit: Ind  Sit to supine: Ind  Scooting: Ind  Rolling: Ind  Supine to sit: Ind  Sit to supine: Ind  Scooting: Ind   Transfers Sit to stand: SBA  Stand to sit: SBA  Stand pivot: SBA  Sit to stand: Mod Ind  Stand to sit: Mod Ind  Stand pivot: Mod Ind   Ambulation    100 feet with fww SBA  150 feet with fww Mod Ind   Stair negotiation: ascended and descended  NT  1 steps    ROM BUE:  See OT eval  BLE:  wfl     Strength BUE: See OT eval   RLE:  4+/5  LLE:   4+/5  5/5   Balance Sitting EOB:  ind  Dynamic Standing:  SBA with fww  Sitting EOB:  ind  Dynamic Standing:   Mod Ind     Pt is A & O x 3  Sensation:  Pt denies numbness and tingling to extremities  Edema:  none    Therapeutic Exercises:  Reviewed BLE  AROM exercises to be completed while EOB. Patient education  Pt educated on role of PT    Patient response to education:   Pt verbalized understanding Pt demonstrated skill Pt requires further education in this area   yes       ASSESSMENT:    Comments:  Patient was seen this date for PT evaluation. Patient was agreeable to evaluation. Results of the functional assessment are noted above. Upon entering the room patient was found supine in bed. . Sat EOB x 5 minutes to increase dynamic sitting balance and activity tolerance. Gait then completed with fww with 4 L O2. At end of session, patient in bed with  call light and phone within reach,  all lines and tubes intact, nursing notified. This patient can benefit from the continuation of skilled PT  to maximize functional level and return to PLOF. Treatment:  Patient practiced and was instructed in the following treatment:     Therapeutic activities - Completed gait with cues for safe use of fww. Pt's/ family goals   1. home    Patient and or family understand(s) diagnosis, prognosis, and plan of care. yes    PLAN:    PT care will be provided in accordance with the objectives noted above. Exercises and functional mobility practice will be used as well as appropriate assistive devices or modalities to obtain goals. Patient and family education will also be administered as needed. Frequency of treatments: 2-5x/week x 1-2 weeks. Time in  1035  Time out  1100    Total Treatment Time  12 minutes     Evaluation Time includes thorough review of current medical information, gathering information on past medical history/social history and prior level of function, completion of standardized testing/informal observation of tasks, assessment of data and education on plan of care and goals.     CPT codes:  [x] Low Complexity PT evaluation 87509  [] Moderate Complexity PT evaluation 34714  [] High Complexity PT evaluation 77740  [] PT Re-evaluation 48664  [] Gait training

## 2020-02-08 NOTE — PLAN OF CARE
Problem: Falls - Risk of:  Goal: Will remain free from falls  Description  Will remain free from falls  2/8/2020 1003 by Anjum Tsai RN  Outcome: Met This Shift     Problem: Falls - Risk of:  Goal: Absence of physical injury  Description  Absence of physical injury  2/8/2020 1003 by Anjum Tsai RN  Outcome: Met This Shift     Problem:  Activity:  Goal: Fatigue will decrease  Description  Fatigue will decrease  Outcome: Met This Shift     Problem: Respiratory:  Goal: Ability to maintain a clear airway will improve  Description  Ability to maintain a clear airway will improve  Outcome: Met This Shift     Problem: Respiratory:  Goal: Ability to maintain adequate ventilation will improve  Description  Ability to maintain adequate ventilation will improve  Outcome: Met This Shift     Problem: Skin Integrity:  Goal: Risk for impaired skin integrity will decrease  Description  Risk for impaired skin integrity will decrease  Outcome: Met This Shift

## 2020-02-08 NOTE — PROGRESS NOTES
normal      Recent Labs     02/06/20  1745 02/07/20  0525 02/08/20  0511    137 136   K 4.7 3.7 3.7    96* 97*   CO2 31* 28 27   BUN 6* 9 10   CREATININE 1.0 0.8 0.8   GLUCOSE 90 217* 182*   CALCIUM 8.8 9.3 9.0       Recent Labs     02/06/20  1745 02/07/20  0525 02/08/20  0511   WBC 8.9 6.4 15.7*   RBC 4.86 4.70 4.69   HGB 14.1 13.6 13.6   HCT 44.0 43.3 42.2   MCV 90.5 92.1 90.0   MCH 29.0 28.9 29.0   MCHC 32.0 31.4* 32.2   RDW 13.4 13.5 13.3    167 178   MPV 12.3* 12.2* 11.9         Radiology:   CT Head WO Contrast   Final Result   1. No acute intracranial hemorrhage or mass effect. 2. Patchy areas of hypoattenuation within the cerebral white matter,   which are nonspecific but most compatible with mild changes of   microangiopathy. 3. Remote lacunar infarct within the right aspect of the timo. XR CHEST STANDARD (2 VW)   Final Result   Minimal linear left basilar subsegmental atelectasis. XR CHEST STANDARD (2 VW)    (Results Pending)       Assessment:  Principal Problem:    Acute exacerbation of chronic obstructive pulmonary disease (COPD) (HCC)  Active Problems:    HIV (human immunodeficiency virus infection) ([facility]    Essential hypertension    Sleep apnea    Acute on chronic respiratory failure with hypercapnia (HCC)  Resolved Problems:    * No resolved hospital problems. *      Plan:  1. Chronic respiratory failure with hypoxia:  Risk factors:  O2 dependent COPD 4 liters continuous. Clinical indicators:  SPO2 % on 4 liters. ABGs on admission: pH 7.358, PCO2 57, pO2 69.5, HCO3 32.1. Treatment:  O2 3 liters as at home, IV Solumedrol - increased from every 8 hours to every 6 hours by Pulmonology, aerosols, and LABA. Respiratory array panel negative. Zithromax added by Pulmonology. 2. HIV:  Continue home medications. 3. Hypertension:  Continue home medications, may need adjusted.    4. Sleep apnea:  Per patient's caregiver, his home Cpap was taken back by the company. He will need a repeat sleep study as an outpatient. 5. Diastolic heart failure: Echo on 2/9/18 with an EF of 65% and diastolic dysfunction. Repeat Echo ordered and revealed an EF of 60% and stage I diastolic dysfunction. Crackles heard today. Check pro-BNP and repeat chest xray. 6. PT/OT. NOTE: This report was transcribed using voice recognition software.  Every effort was made to ensure accuracy; however, inadvertent computerized transcription errors may be present.     Electronically signed by LAVON Gastelum CNP on 2/8/2020 at 8:44 AM

## 2020-02-09 LAB
ALBUMIN SERPL-MCNC: 4.3 G/DL (ref 3.5–5.2)
ALP BLD-CCNC: 97 U/L (ref 40–129)
ALT SERPL-CCNC: 19 U/L (ref 0–40)
ANION GAP SERPL CALCULATED.3IONS-SCNC: 11 MMOL/L (ref 7–16)
AST SERPL-CCNC: 18 U/L (ref 0–39)
BASOPHILS ABSOLUTE: 0.01 E9/L (ref 0–0.2)
BASOPHILS RELATIVE PERCENT: 0.1 % (ref 0–2)
BILIRUB SERPL-MCNC: 0.4 MG/DL (ref 0–1.2)
BUN BLDV-MCNC: 12 MG/DL (ref 8–23)
CALCIUM SERPL-MCNC: 9.1 MG/DL (ref 8.6–10.2)
CHLORIDE BLD-SCNC: 96 MMOL/L (ref 98–107)
CO2: 32 MMOL/L (ref 22–29)
CREAT SERPL-MCNC: 0.7 MG/DL (ref 0.7–1.2)
EOSINOPHILS ABSOLUTE: 0 E9/L (ref 0.05–0.5)
EOSINOPHILS RELATIVE PERCENT: 0 % (ref 0–6)
GFR AFRICAN AMERICAN: >60
GFR NON-AFRICAN AMERICAN: >60 ML/MIN/1.73
GLUCOSE BLD-MCNC: 191 MG/DL (ref 74–99)
HCT VFR BLD CALC: 45.1 % (ref 37–54)
HEMOGLOBIN: 14.4 G/DL (ref 12.5–16.5)
IMMATURE GRANULOCYTES #: 0.16 E9/L
IMMATURE GRANULOCYTES %: 0.9 % (ref 0–5)
LYMPHOCYTES ABSOLUTE: 1.14 E9/L (ref 1.5–4)
LYMPHOCYTES RELATIVE PERCENT: 6.5 % (ref 20–42)
MAGNESIUM: 2.5 MG/DL (ref 1.6–2.6)
MCH RBC QN AUTO: 29.3 PG (ref 26–35)
MCHC RBC AUTO-ENTMCNC: 31.9 % (ref 32–34.5)
MCV RBC AUTO: 91.7 FL (ref 80–99.9)
MONOCYTES ABSOLUTE: 0.86 E9/L (ref 0.1–0.95)
MONOCYTES RELATIVE PERCENT: 4.9 % (ref 2–12)
NEUTROPHILS ABSOLUTE: 15.38 E9/L (ref 1.8–7.3)
NEUTROPHILS RELATIVE PERCENT: 87.6 % (ref 43–80)
PDW BLD-RTO: 13.7 FL (ref 11.5–15)
PLATELET # BLD: 181 E9/L (ref 130–450)
PMV BLD AUTO: 12.3 FL (ref 7–12)
POTASSIUM SERPL-SCNC: 3.9 MMOL/L (ref 3.5–5)
RBC # BLD: 4.92 E12/L (ref 3.8–5.8)
SODIUM BLD-SCNC: 139 MMOL/L (ref 132–146)
TOTAL PROTEIN: 7.4 G/DL (ref 6.4–8.3)
WBC # BLD: 17.6 E9/L (ref 4.5–11.5)

## 2020-02-09 PROCEDURE — 2580000003 HC RX 258: Performed by: INTERNAL MEDICINE

## 2020-02-09 PROCEDURE — 6360000002 HC RX W HCPCS: Performed by: NURSE PRACTITIONER

## 2020-02-09 PROCEDURE — 94640 AIRWAY INHALATION TREATMENT: CPT

## 2020-02-09 PROCEDURE — 94669 MECHANICAL CHEST WALL OSCILL: CPT

## 2020-02-09 PROCEDURE — 94660 CPAP INITIATION&MGMT: CPT

## 2020-02-09 PROCEDURE — 2700000000 HC OXYGEN THERAPY PER DAY

## 2020-02-09 PROCEDURE — 6360000002 HC RX W HCPCS: Performed by: INTERNAL MEDICINE

## 2020-02-09 PROCEDURE — 80053 COMPREHEN METABOLIC PANEL: CPT

## 2020-02-09 PROCEDURE — 99232 SBSQ HOSP IP/OBS MODERATE 35: CPT | Performed by: INTERNAL MEDICINE

## 2020-02-09 PROCEDURE — 6370000000 HC RX 637 (ALT 250 FOR IP): Performed by: INTERNAL MEDICINE

## 2020-02-09 PROCEDURE — 1200000000 HC SEMI PRIVATE

## 2020-02-09 PROCEDURE — 83735 ASSAY OF MAGNESIUM: CPT

## 2020-02-09 PROCEDURE — 36415 COLL VENOUS BLD VENIPUNCTURE: CPT

## 2020-02-09 PROCEDURE — APPSS30 APP SPLIT SHARED TIME 16-30 MINUTES: Performed by: NURSE PRACTITIONER

## 2020-02-09 PROCEDURE — 85025 COMPLETE CBC W/AUTO DIFF WBC: CPT

## 2020-02-09 RX ADMIN — CARVEDILOL 25 MG: 25 TABLET, FILM COATED ORAL at 09:07

## 2020-02-09 RX ADMIN — LEVETIRACETAM 500 MG: 500 TABLET ORAL at 09:07

## 2020-02-09 RX ADMIN — IPRATROPIUM BROMIDE AND ALBUTEROL SULFATE 1 AMPULE: .5; 3 SOLUTION RESPIRATORY (INHALATION) at 16:53

## 2020-02-09 RX ADMIN — TAMSULOSIN HYDROCHLORIDE 0.4 MG: 0.4 CAPSULE ORAL at 09:07

## 2020-02-09 RX ADMIN — IPRATROPIUM BROMIDE AND ALBUTEROL SULFATE 1 AMPULE: .5; 3 SOLUTION RESPIRATORY (INHALATION) at 13:16

## 2020-02-09 RX ADMIN — EMTRICITABINE 200 MG: 200 CAPSULE ORAL at 09:07

## 2020-02-09 RX ADMIN — IPRATROPIUM BROMIDE AND ALBUTEROL SULFATE 1 AMPULE: .5; 3 SOLUTION RESPIRATORY (INHALATION) at 04:06

## 2020-02-09 RX ADMIN — METHYLPREDNISOLONE SODIUM SUCCINATE 40 MG: 40 INJECTION, POWDER, FOR SOLUTION INTRAMUSCULAR; INTRAVENOUS at 05:31

## 2020-02-09 RX ADMIN — LEVETIRACETAM 500 MG: 500 TABLET ORAL at 20:27

## 2020-02-09 RX ADMIN — ARFORMOTEROL TARTRATE 15 MCG: 15 SOLUTION RESPIRATORY (INHALATION) at 16:53

## 2020-02-09 RX ADMIN — IPRATROPIUM BROMIDE AND ALBUTEROL SULFATE 1 AMPULE: .5; 3 SOLUTION RESPIRATORY (INHALATION) at 09:34

## 2020-02-09 RX ADMIN — SODIUM CHLORIDE, PRESERVATIVE FREE 10 ML: 5 INJECTION INTRAVENOUS at 20:28

## 2020-02-09 RX ADMIN — IPRATROPIUM BROMIDE AND ALBUTEROL SULFATE 1 AMPULE: .5; 3 SOLUTION RESPIRATORY (INHALATION) at 21:09

## 2020-02-09 RX ADMIN — METHYLPREDNISOLONE SODIUM SUCCINATE 40 MG: 40 INJECTION, POWDER, FOR SOLUTION INTRAMUSCULAR; INTRAVENOUS at 23:47

## 2020-02-09 RX ADMIN — AZITHROMYCIN MONOHYDRATE 250 MG: 250 TABLET ORAL at 09:07

## 2020-02-09 RX ADMIN — METHYLPREDNISOLONE SODIUM SUCCINATE 40 MG: 40 INJECTION, POWDER, FOR SOLUTION INTRAMUSCULAR; INTRAVENOUS at 11:21

## 2020-02-09 RX ADMIN — METHYLPREDNISOLONE SODIUM SUCCINATE 40 MG: 40 INJECTION, POWDER, FOR SOLUTION INTRAMUSCULAR; INTRAVENOUS at 16:41

## 2020-02-09 RX ADMIN — CLONIDINE HYDROCHLORIDE 0.2 MG: 0.2 TABLET ORAL at 20:27

## 2020-02-09 RX ADMIN — ENOXAPARIN SODIUM 40 MG: 40 INJECTION SUBCUTANEOUS at 09:52

## 2020-02-09 RX ADMIN — LISINOPRIL 20 MG: 20 TABLET ORAL at 09:07

## 2020-02-09 RX ADMIN — ARFORMOTEROL TARTRATE 15 MCG: 15 SOLUTION RESPIRATORY (INHALATION) at 04:07

## 2020-02-09 RX ADMIN — TENOFOVIR ALAFENAMIDE 25 MG: 25 TABLET ORAL at 09:07

## 2020-02-09 RX ADMIN — CLONIDINE HYDROCHLORIDE 0.2 MG: 0.2 TABLET ORAL at 09:07

## 2020-02-09 RX ADMIN — CARVEDILOL 25 MG: 25 TABLET, FILM COATED ORAL at 20:27

## 2020-02-09 RX ADMIN — AMLODIPINE BESYLATE 5 MG: 5 TABLET ORAL at 09:07

## 2020-02-09 RX ADMIN — FUROSEMIDE 40 MG: 40 TABLET ORAL at 09:07

## 2020-02-09 RX ADMIN — SODIUM CHLORIDE, PRESERVATIVE FREE 10 ML: 5 INJECTION INTRAVENOUS at 09:07

## 2020-02-09 ASSESSMENT — PAIN SCALES - GENERAL
PAINLEVEL_OUTOF10: 0

## 2020-02-09 NOTE — PROGRESS NOTES
edema  Neurologic: no cranial nerve deficit and speech normal      Recent Labs     02/07/20  0525 02/08/20  0511 02/09/20  0525    136 139   K 3.7 3.7 3.9   CL 96* 97* 96*   CO2 28 27 32*   BUN 9 10 12   CREATININE 0.8 0.8 0.7   GLUCOSE 217* 182* 191*   CALCIUM 9.3 9.0 9.1       Recent Labs     02/07/20  0525 02/08/20  0511 02/09/20  0525   WBC 6.4 15.7* 17.6*   RBC 4.70 4.69 4.92   HGB 13.6 13.6 14.4   HCT 43.3 42.2 45.1   MCV 92.1 90.0 91.7   MCH 28.9 29.0 29.3   MCHC 31.4* 32.2 31.9*   RDW 13.5 13.3 13.7    178 181   MPV 12.2* 11.9 12.3*         Radiology:   XR CHEST STANDARD (2 VW)   Final Result   1. No pulmonary vascular congestion. 2. Minimal left basilar subsegmental atelectasis. CT Head WO Contrast   Final Result   1. No acute intracranial hemorrhage or mass effect. 2. Patchy areas of hypoattenuation within the cerebral white matter,   which are nonspecific but most compatible with mild changes of   microangiopathy. 3. Remote lacunar infarct within the right aspect of the timo. XR CHEST STANDARD (2 VW)   Final Result   Minimal linear left basilar subsegmental atelectasis. Assessment:  Principal Problem:    Acute exacerbation of chronic obstructive pulmonary disease (COPD) ([facility])  Active Problems:    HIV (human immunodeficiency virus infection) ([facility]    Essential hypertension    Sleep apnea    Acute on chronic respiratory failure with hypercapnia ([facility])    Diastolic heart failure ([facility])  Resolved Problems:    * No resolved hospital problems. *      Plan:  1. Chronic respiratory failure with hypoxia:  Risk factors:  O2 dependent COPD 4 liters continuous. Clinical indicators:  SPO2 % on 4 liters. ABGs on admission: pH 7.358, PCO2 57, pO2 69.5, HCO3 32.1. O2 3 liters as at home, IV Solumedrol -40mg  increased from every 8 hours to every 6 hours by Pulmonology, aerosols, and LABA. Respiratory array panel negative. Zithromax added by Pulmonology.

## 2020-02-10 LAB
ALBUMIN SERPL-MCNC: 4.2 G/DL (ref 3.5–5.2)
ALP BLD-CCNC: 92 U/L (ref 40–129)
ALT SERPL-CCNC: 26 U/L (ref 0–40)
ANION GAP SERPL CALCULATED.3IONS-SCNC: 11 MMOL/L (ref 7–16)
AST SERPL-CCNC: 22 U/L (ref 0–39)
BASOPHILS ABSOLUTE: 0.01 E9/L (ref 0–0.2)
BASOPHILS RELATIVE PERCENT: 0.1 % (ref 0–2)
BILIRUB SERPL-MCNC: 0.4 MG/DL (ref 0–1.2)
BUN BLDV-MCNC: 14 MG/DL (ref 8–23)
CALCIUM SERPL-MCNC: 9 MG/DL (ref 8.6–10.2)
CHLORIDE BLD-SCNC: 96 MMOL/L (ref 98–107)
CO2: 32 MMOL/L (ref 22–29)
CREAT SERPL-MCNC: 0.7 MG/DL (ref 0.7–1.2)
EOSINOPHILS ABSOLUTE: 0 E9/L (ref 0.05–0.5)
EOSINOPHILS RELATIVE PERCENT: 0 % (ref 0–6)
GFR AFRICAN AMERICAN: >60
GFR NON-AFRICAN AMERICAN: >60 ML/MIN/1.73
GLUCOSE BLD-MCNC: 134 MG/DL (ref 74–99)
HCT VFR BLD CALC: 45.4 % (ref 37–54)
HEMOGLOBIN: 14.5 G/DL (ref 12.5–16.5)
IMMATURE GRANULOCYTES #: 0.13 E9/L
IMMATURE GRANULOCYTES %: 0.9 % (ref 0–5)
LYMPHOCYTES ABSOLUTE: 0.87 E9/L (ref 1.5–4)
LYMPHOCYTES RELATIVE PERCENT: 6 % (ref 20–42)
MAGNESIUM: 2.6 MG/DL (ref 1.6–2.6)
MCH RBC QN AUTO: 29.2 PG (ref 26–35)
MCHC RBC AUTO-ENTMCNC: 31.9 % (ref 32–34.5)
MCV RBC AUTO: 91.5 FL (ref 80–99.9)
MONOCYTES ABSOLUTE: 1.03 E9/L (ref 0.1–0.95)
MONOCYTES RELATIVE PERCENT: 7.1 % (ref 2–12)
NEUTROPHILS ABSOLUTE: 12.54 E9/L (ref 1.8–7.3)
NEUTROPHILS RELATIVE PERCENT: 85.9 % (ref 43–80)
PDW BLD-RTO: 13.5 FL (ref 11.5–15)
PLATELET # BLD: 185 E9/L (ref 130–450)
PMV BLD AUTO: 12.3 FL (ref 7–12)
POTASSIUM SERPL-SCNC: 4 MMOL/L (ref 3.5–5)
RBC # BLD: 4.96 E12/L (ref 3.8–5.8)
SODIUM BLD-SCNC: 139 MMOL/L (ref 132–146)
TOTAL PROTEIN: 7.1 G/DL (ref 6.4–8.3)
WBC # BLD: 14.6 E9/L (ref 4.5–11.5)

## 2020-02-10 PROCEDURE — 2580000003 HC RX 258: Performed by: INTERNAL MEDICINE

## 2020-02-10 PROCEDURE — 1200000000 HC SEMI PRIVATE

## 2020-02-10 PROCEDURE — 85025 COMPLETE CBC W/AUTO DIFF WBC: CPT

## 2020-02-10 PROCEDURE — 6370000000 HC RX 637 (ALT 250 FOR IP): Performed by: INTERNAL MEDICINE

## 2020-02-10 PROCEDURE — 94660 CPAP INITIATION&MGMT: CPT

## 2020-02-10 PROCEDURE — 36415 COLL VENOUS BLD VENIPUNCTURE: CPT

## 2020-02-10 PROCEDURE — 6360000002 HC RX W HCPCS: Performed by: INTERNAL MEDICINE

## 2020-02-10 PROCEDURE — 97165 OT EVAL LOW COMPLEX 30 MIN: CPT

## 2020-02-10 PROCEDURE — APPSS30 APP SPLIT SHARED TIME 16-30 MINUTES: Performed by: NURSE PRACTITIONER

## 2020-02-10 PROCEDURE — 97535 SELF CARE MNGMENT TRAINING: CPT

## 2020-02-10 PROCEDURE — 83735 ASSAY OF MAGNESIUM: CPT

## 2020-02-10 PROCEDURE — 97530 THERAPEUTIC ACTIVITIES: CPT

## 2020-02-10 PROCEDURE — 94640 AIRWAY INHALATION TREATMENT: CPT

## 2020-02-10 PROCEDURE — 97110 THERAPEUTIC EXERCISES: CPT

## 2020-02-10 PROCEDURE — 2700000000 HC OXYGEN THERAPY PER DAY

## 2020-02-10 PROCEDURE — 6360000002 HC RX W HCPCS: Performed by: NURSE PRACTITIONER

## 2020-02-10 PROCEDURE — 80053 COMPREHEN METABOLIC PANEL: CPT

## 2020-02-10 PROCEDURE — 94669 MECHANICAL CHEST WALL OSCILL: CPT

## 2020-02-10 PROCEDURE — 99233 SBSQ HOSP IP/OBS HIGH 50: CPT | Performed by: INTERNAL MEDICINE

## 2020-02-10 RX ORDER — METHYLPREDNISOLONE SODIUM SUCCINATE 40 MG/ML
40 INJECTION, POWDER, LYOPHILIZED, FOR SOLUTION INTRAMUSCULAR; INTRAVENOUS EVERY 8 HOURS
Status: DISCONTINUED | OUTPATIENT
Start: 2020-02-10 | End: 2020-02-11

## 2020-02-10 RX ADMIN — SODIUM CHLORIDE, PRESERVATIVE FREE 10 ML: 5 INJECTION INTRAVENOUS at 19:55

## 2020-02-10 RX ADMIN — IPRATROPIUM BROMIDE AND ALBUTEROL SULFATE 1 AMPULE: .5; 3 SOLUTION RESPIRATORY (INHALATION) at 23:37

## 2020-02-10 RX ADMIN — AZITHROMYCIN MONOHYDRATE 250 MG: 250 TABLET ORAL at 09:31

## 2020-02-10 RX ADMIN — CARVEDILOL 25 MG: 25 TABLET, FILM COATED ORAL at 19:48

## 2020-02-10 RX ADMIN — LISINOPRIL 20 MG: 20 TABLET ORAL at 09:30

## 2020-02-10 RX ADMIN — SODIUM CHLORIDE, PRESERVATIVE FREE 10 ML: 5 INJECTION INTRAVENOUS at 09:32

## 2020-02-10 RX ADMIN — CARVEDILOL 25 MG: 25 TABLET, FILM COATED ORAL at 09:32

## 2020-02-10 RX ADMIN — SODIUM CHLORIDE, PRESERVATIVE FREE 10 ML: 5 INJECTION INTRAVENOUS at 05:21

## 2020-02-10 RX ADMIN — FUROSEMIDE 40 MG: 40 TABLET ORAL at 09:31

## 2020-02-10 RX ADMIN — CLONIDINE HYDROCHLORIDE 0.2 MG: 0.2 TABLET ORAL at 19:50

## 2020-02-10 RX ADMIN — EMTRICITABINE 200 MG: 200 CAPSULE ORAL at 09:30

## 2020-02-10 RX ADMIN — IPRATROPIUM BROMIDE AND ALBUTEROL SULFATE 1 AMPULE: .5; 3 SOLUTION RESPIRATORY (INHALATION) at 09:43

## 2020-02-10 RX ADMIN — TAMSULOSIN HYDROCHLORIDE 0.4 MG: 0.4 CAPSULE ORAL at 09:32

## 2020-02-10 RX ADMIN — IPRATROPIUM BROMIDE AND ALBUTEROL SULFATE 1 AMPULE: .5; 3 SOLUTION RESPIRATORY (INHALATION) at 12:50

## 2020-02-10 RX ADMIN — IPRATROPIUM BROMIDE AND ALBUTEROL SULFATE 1 AMPULE: .5; 3 SOLUTION RESPIRATORY (INHALATION) at 05:59

## 2020-02-10 RX ADMIN — IPRATROPIUM BROMIDE AND ALBUTEROL SULFATE 1 AMPULE: .5; 3 SOLUTION RESPIRATORY (INHALATION) at 00:55

## 2020-02-10 RX ADMIN — ARFORMOTEROL TARTRATE 15 MCG: 15 SOLUTION RESPIRATORY (INHALATION) at 06:00

## 2020-02-10 RX ADMIN — LEVETIRACETAM 500 MG: 500 TABLET ORAL at 19:50

## 2020-02-10 RX ADMIN — LEVETIRACETAM 500 MG: 500 TABLET ORAL at 09:30

## 2020-02-10 RX ADMIN — ENOXAPARIN SODIUM 40 MG: 40 INJECTION SUBCUTANEOUS at 09:32

## 2020-02-10 RX ADMIN — SODIUM CHLORIDE, PRESERVATIVE FREE 10 ML: 5 INJECTION INTRAVENOUS at 11:34

## 2020-02-10 RX ADMIN — ARFORMOTEROL TARTRATE 15 MCG: 15 SOLUTION RESPIRATORY (INHALATION) at 16:47

## 2020-02-10 RX ADMIN — METHYLPREDNISOLONE SODIUM SUCCINATE 40 MG: 40 INJECTION, POWDER, FOR SOLUTION INTRAMUSCULAR; INTRAVENOUS at 05:21

## 2020-02-10 RX ADMIN — IPRATROPIUM BROMIDE AND ALBUTEROL SULFATE 1 AMPULE: .5; 3 SOLUTION RESPIRATORY (INHALATION) at 16:47

## 2020-02-10 RX ADMIN — TENOFOVIR ALAFENAMIDE 25 MG: 25 TABLET ORAL at 09:31

## 2020-02-10 RX ADMIN — CLONIDINE HYDROCHLORIDE 0.2 MG: 0.2 TABLET ORAL at 09:32

## 2020-02-10 RX ADMIN — METHYLPREDNISOLONE SODIUM SUCCINATE 40 MG: 40 INJECTION, POWDER, FOR SOLUTION INTRAMUSCULAR; INTRAVENOUS at 11:34

## 2020-02-10 RX ADMIN — METHYLPREDNISOLONE SODIUM SUCCINATE 40 MG: 40 INJECTION, POWDER, FOR SOLUTION INTRAMUSCULAR; INTRAVENOUS at 19:51

## 2020-02-10 RX ADMIN — AMLODIPINE BESYLATE 5 MG: 5 TABLET ORAL at 09:31

## 2020-02-10 ASSESSMENT — PAIN SCALES - GENERAL
PAINLEVEL_OUTOF10: 0
PAINLEVEL_OUTOF10: 0

## 2020-02-10 NOTE — PROGRESS NOTES
Supine to sit: Independent   Sit to supine: Independent    Functional Transfers Supervision for sit to stand from EOB, vc's for safety and hand placement  Independent    Functional Mobility Supervision with walker for functional distance with in room, assist for line management  Modified [address]    Balance Sitting:     Static:  good    Dynamic:good  Standing: fair     Activity Tolerance Fair-, spo2 96% at rest and 92% following activity, on 3L     Visual/  Perceptual Glasses: ?                   Strength ROM Additional Info:    RUE  4-/5  WFL good  and wfl FMC/dexterity noted during ADL tasks     LUE 4-/5  WFL good  and wfl FMC/dexterity noted during ADL tasks         Hearing:  [facility]   Sensation:  No c/o numbness or tingling   Tone:  WFL  Edema: none noted                            Comments: Upon arrival patient supine in bed. Pt educated on the role of Occupational Therapy. pt seated on EOB At end of session, patient with call light and phone within reach, all lines and tubes intact. Pt would benefit from continued skilled OT to increase safety and independence with completion of ADL/IADL tasks for functional independence and quality of life. Treatment: OT facilitated, Bed Mobility, sitting balance at EOB for 5 minutes with no assist, transfer training with verbal cues for hand placement, static standing balance with supervision, functional mobility with supervision, in addition services provided include Instruction/training on safety and adapted techniques for completion of transfers, verbal cues for walker sequence and safety. Simulated LE dressing/toileting; pt educated on potential ECTs to utilize at home, pt also required additional cues with reference to walker safteyFacilitated development of skills for increased participation for completion of ADLs/IADLs.      Eval Complexity: Low      Assessment of current deficits   Functional mobility [x]  ADLs [x] Strength [x]  Cognition []  Functional transfers  [x] IADLs [x] Safety Awareness [x]  Endurance [x]  Fine Motor Coordination [] Balance [x] Vision/perception [] Sensation []   Gross Motor Coordination [] ROM [] Delirium []                  Motor Control []    Plan of Care:   ADL retraining [x]   Equipment needs [x]   Neuromuscular re-education [x] Energy Conservation Techniques [x]  Functional Transfer training [x] Patient and/or Family Education [x]  Functional Mobility training [x]  Environmental Modifications [x]  Cognitive re-training []   Compensatory techniques for ADLs [x]  Splinting Needs []   Positioning to improve overall function [x]   Therapeutic Activity [x]  Therapeutic Exercise  [x]  Visual/Perceptual: []    Delirium prevention/treatment  []   Other:  []    Rehab Potential: Good for established goals     Patient / Family Goal: return home      Patient and/or family were instructed diagnosis, prognosis/goals and plan of care. Demonstrated good understanding.       Treatment Time In:1108            Treatment Time Out: 5022               Treatment Charges: Mins Units   Ther Ex  93286     Manual Therapy Gregorio Clayton [phone]    ADL/Home Mgt [id number] 11 1   Neuro Re-ed 03109     Orthotic manage/training  26952     Total Timed Treatment 19 [address] OTR/L [id number]

## 2020-02-10 NOTE — PLAN OF CARE
Problem: Falls - Risk of:  Goal: Will remain free from falls  Description  Will remain free from falls  2/10/2020 1542 by Mindy Saleem RN  Outcome: Met This Shift     Problem: Falls - Risk of:  Goal: Absence of physical injury  Description  Absence of physical injury  2/10/2020 1542 by Mindy Saleem RN  Outcome: Met This Shift     Problem:  Activity:  Goal: Fatigue will decrease  Description  Fatigue will decrease  2/10/2020 1542 by Mindy Saleem RN  Outcome: Met This Shift     Problem: Respiratory:  Goal: Ability to maintain a clear airway will improve  Description  Ability to maintain a clear airway will improve  2/10/2020 1542 by Mindy Saleem RN  Outcome: Met This Shift     Problem: Respiratory:  Goal: Ability to maintain adequate ventilation will improve  Description  Ability to maintain adequate ventilation will improve  2/10/2020 1542 by Mindy Saleem RN  Outcome: Met This Shift     Problem: Skin Integrity:  Goal: Risk for impaired skin integrity will decrease  Description  Risk for impaired skin integrity will decrease  2/10/2020 1542 by Mindy Saleem RN  Outcome: Met This Shift     Problem: Musculor/Skeletal Functional Status  Goal: Highest potential functional level  2/10/2020 1542 by Mindy Saleem RN  Outcome: Met This Shift     Problem: Musculor/Skeletal Functional Status  Goal: Absence of falls  2/10/2020 1542 by Mindy Saleem RN  Outcome: Met This Shift

## 2020-02-10 NOTE — PLAN OF CARE
Problem: Falls - Risk of:  Goal: Will remain free from falls  Description  Will remain free from falls  Outcome: Met This Shift  Goal: Absence of physical injury  Description  Absence of physical injury  Outcome: Met This Shift     Problem:  Activity:  Goal: Fatigue will decrease  Description  Fatigue will decrease  Outcome: Met This Shift     Problem: Respiratory:  Goal: Ability to maintain a clear airway will improve  Description  Ability to maintain a clear airway will improve  Outcome: Met This Shift  Goal: Ability to maintain adequate ventilation will improve  Description  Ability to maintain adequate ventilation will improve  Outcome: Met This Shift     Problem: Skin Integrity:  Goal: Risk for impaired skin integrity will decrease  Description  Risk for impaired skin integrity will decrease  Outcome: Met This Shift     Problem: Musculor/Skeletal Functional Status  Goal: Highest potential functional level  Outcome: Met This Shift  Goal: Absence of falls  Outcome: Met This Shift

## 2020-02-10 NOTE — PROGRESS NOTES
[facility] Quality Flow/Interdisciplinary Rounds Progress Note        Quality Flow Rounds held on February 10, 2020    Disciplines Attending:  Bedside Nurse, ,  and Nursing Unit Leadership    Bethany Meek was admitted on 2/6/2020  4:27 PM    Anticipated Discharge Date:  Expected Discharge Date: 02/08/20    Disposition:    Martin Score:  Martin Scale Score: 21    Readmission Risk              Risk of Unplanned Readmission:        16           Discussed patient goal for the day, patient clinical progression, and barriers to discharge.   The following Goal(s) of the Day/Commitment(s) have been identified:  Activity progression, Tx from 6th floor, EF 60%, PT/OT, IV Sherlyn VAN  February 10, 2020

## 2020-02-10 NOTE — CARE COORDINATION
Ss note:2/10/2020.12:34 PM Per chart review pt resides with his spouse in 2 story home with first floor set up. Pt has a caregiver, Laura Zuluaga. Pt has cane, ww, BSC and home oxygen from [address]. If pt requires a CPAP, Jayna relays they were not the ones who provided the CPAP and pt would require a Sleep Study. Pt has hx of [facility], PCP is Nano Caballero.  MONIQUE Matos

## 2020-02-10 NOTE — PROGRESS NOTES
Physical Therapy  Physical Therapy Note    Name: Robby Lazaro  : 1960  MRN: [id number]    Referring Provider:  LAVON Handy CNP    Date of Service: 2/10/2020    Evaluating PT:  Imelda Ling PT   Room #:  8214/0649-04  Diagnosis:  COPD exacerbation  PMHx/PSHx:        Diagnosis Date    Asthma     since childhood    [facility]     COPD [facility]chronic obstructive [facility] disease) [facility]     Depression     Emphysema of lung ([facility]     History of echocardiogram 2018    EF 62%    HIV (human immunodeficiency virus infection) ([facility]     Hypertension     Sleep apnea     no cpap     Procedure/Surgery:  N/A  Precautions:  Falls, O2 4L , FWB (full weight bearing),   Equipment Needs:  none    SUBJECTIVE:    Patient lives with spouse  in a two story home with 1 step to enter. Bed is on 1 floor and bath is on 1 floor. Patient ambulated independently with cane or fww PTA. Equipment owned: [employer] and Lisa Soto,      OBJECTIVE:   Initial Evaluation  Date: 2020 Treatment  02/10/2020 Short Term/ Long Term   Goals   AM-PAC 6 Clicks     Was pt agreeable to Eval/treatment? yes yes    Does pt have pain? none none    Bed Mobility  Rolling: Ind  Supine to sit: Ind  Sit to supine: Ind  Scooting: Ind Rolling: Independent   Supine to sit: Independent  Sit to supine: Independent   Scooting: Independent    Rolling: Ind  Supine to sit: Ind  Sit to supine: Ind  Scooting: Ind   Transfers Sit to stand: SBA  Stand to sit: SBA  Stand pivot: SBA Sit to stand: Supervision    Stand to sit: Minimal assist   Stand pivot: Not assessed     Sit to stand: Mod Ind  Stand to sit: Mod Ind  Stand pivot:  Mod Ind   Ambulation    100 feet with fww SBA 2 x 75 feet using  wheeled walker with Minimal assist   150 feet with fww Mod Ind   Stair negotiation: ascended and descended  NT NT 1 steps    ROM BUE:  See OT eval  BLE:  wfl     Strength BUE: See OT eval   RLE:  4+/5  LLE:   4+/5  5/5 Balance Sitting EOB:  ind  Dynamic Standing:  SBA with fww  Sitting EOB:  ind  Dynamic Standing: Mod Ind     Pt is A & O x 3  Therapeutic Exercises:  Reviewed BLE  AROM exercises were completed while EOB. Sriram ankle pumps, laq and toe raises, marching x 15 reps each. Patient education  Pt instructed in ambulation using a wheeled walker and performed  Exercises as above. Patient response to education:   Pt verbalized understanding Pt demonstrated skill Pt requires further education in this area   yes yes NT     ASSESSMENT:    Comments:  Patient was seen this date for PT treatment. Patient was agreeable to ambulation and exercises. Upon entering the room patient was found supine in bed. . Sat EOB x 5 minutes to increase dynamic sitting balance and activity tolerance. Gait then completed with fww with 3 L O2. At end of session, patient in bed with  call light and phone within reach,  all lines and tubes intact, nursing notified. This patient can benefit from the continuation of skilled PT  to maximize functional level and return to PLOF. Treatment:  Patient practiced and was instructed in the following treatment:     Bed mobility, Ambulation and exercises. Pt's/ family goals   1. home    Patient and or family understand(s) diagnosis, prognosis, and plan of care. yes    PLAN:    PT care will be provided in accordance with the objectives noted above. Exercises and functional mobility practice will be used as well as appropriate assistive devices or modalities to obtain goals. Patient and family education will also be administered as needed. Frequency of treatments: 2-5x/week x 1-2 weeks.     Time in 09:03  Time out 09:26   Total Treatment: Time 23 minutes     CPT codes:  [] Low Complexity PT evaluation 91335  [] Moderate Complexity PT evaluation 65348  [] High Complexity PT evaluation 74505  [] PT Re-evaluation 19324  [] Gait training 93963 - minutes  [] Manual therapy 92601 - minutes  [x]

## 2020-02-11 VITALS
OXYGEN SATURATION: 96 % | BODY MASS INDEX: 31.31 KG/M2 | DIASTOLIC BLOOD PRESSURE: 88 MMHG | RESPIRATION RATE: 18 BRPM | SYSTOLIC BLOOD PRESSURE: 169 MMHG | HEIGHT: 66 IN | WEIGHT: 194.8 LBS | HEART RATE: 81 BPM | TEMPERATURE: 97.4 F

## 2020-02-11 LAB
ALBUMIN SERPL-MCNC: 4.2 G/DL (ref 3.5–5.2)
ALP BLD-CCNC: 90 U/L (ref 40–129)
ALT SERPL-CCNC: 31 U/L (ref 0–40)
ANION GAP SERPL CALCULATED.3IONS-SCNC: 10 MMOL/L (ref 7–16)
AST SERPL-CCNC: 31 U/L (ref 0–39)
BASOPHILS ABSOLUTE: 0.01 E9/L (ref 0–0.2)
BASOPHILS RELATIVE PERCENT: 0.1 % (ref 0–2)
BILIRUB SERPL-MCNC: 0.4 MG/DL (ref 0–1.2)
BUN BLDV-MCNC: 17 MG/DL (ref 8–23)
CALCIUM SERPL-MCNC: 8.8 MG/DL (ref 8.6–10.2)
CHLORIDE BLD-SCNC: 94 MMOL/L (ref 98–107)
CO2: 35 MMOL/L (ref 22–29)
CREAT SERPL-MCNC: 0.7 MG/DL (ref 0.7–1.2)
EOSINOPHILS ABSOLUTE: 0 E9/L (ref 0.05–0.5)
EOSINOPHILS RELATIVE PERCENT: 0 % (ref 0–6)
GFR AFRICAN AMERICAN: >60
GFR NON-AFRICAN AMERICAN: >60 ML/MIN/1.73
GLUCOSE BLD-MCNC: 192 MG/DL (ref 74–99)
HCT VFR BLD CALC: 47.2 % (ref 37–54)
HEMOGLOBIN: 14.6 G/DL (ref 12.5–16.5)
IMMATURE GRANULOCYTES #: 0.08 E9/L
IMMATURE GRANULOCYTES %: 0.7 % (ref 0–5)
LYMPHOCYTES ABSOLUTE: 0.79 E9/L (ref 1.5–4)
LYMPHOCYTES RELATIVE PERCENT: 6.8 % (ref 20–42)
MAGNESIUM: 2.8 MG/DL (ref 1.6–2.6)
MCH RBC QN AUTO: 28.5 PG (ref 26–35)
MCHC RBC AUTO-ENTMCNC: 30.9 % (ref 32–34.5)
MCV RBC AUTO: 92.2 FL (ref 80–99.9)
MONOCYTES ABSOLUTE: 0.98 E9/L (ref 0.1–0.95)
MONOCYTES RELATIVE PERCENT: 8.4 % (ref 2–12)
NEUTROPHILS ABSOLUTE: 9.78 E9/L (ref 1.8–7.3)
NEUTROPHILS RELATIVE PERCENT: 84 % (ref 43–80)
PDW BLD-RTO: 13.5 FL (ref 11.5–15)
PLATELET # BLD: 183 E9/L (ref 130–450)
PMV BLD AUTO: 11.9 FL (ref 7–12)
POTASSIUM SERPL-SCNC: 4.6 MMOL/L (ref 3.5–5)
RBC # BLD: 5.12 E12/L (ref 3.8–5.8)
SODIUM BLD-SCNC: 139 MMOL/L (ref 132–146)
TOTAL PROTEIN: 7.3 G/DL (ref 6.4–8.3)
WBC # BLD: 11.6 E9/L (ref 4.5–11.5)

## 2020-02-11 PROCEDURE — 80053 COMPREHEN METABOLIC PANEL: CPT

## 2020-02-11 PROCEDURE — 2580000003 HC RX 258: Performed by: INTERNAL MEDICINE

## 2020-02-11 PROCEDURE — 6360000002 HC RX W HCPCS: Performed by: NURSE PRACTITIONER

## 2020-02-11 PROCEDURE — 6360000002 HC RX W HCPCS: Performed by: INTERNAL MEDICINE

## 2020-02-11 PROCEDURE — 97530 THERAPEUTIC ACTIVITIES: CPT

## 2020-02-11 PROCEDURE — 36415 COLL VENOUS BLD VENIPUNCTURE: CPT

## 2020-02-11 PROCEDURE — 6370000000 HC RX 637 (ALT 250 FOR IP): Performed by: INTERNAL MEDICINE

## 2020-02-11 PROCEDURE — 99239 HOSP IP/OBS DSCHRG MGMT >30: CPT | Performed by: INTERNAL MEDICINE

## 2020-02-11 PROCEDURE — 94640 AIRWAY INHALATION TREATMENT: CPT

## 2020-02-11 PROCEDURE — 83735 ASSAY OF MAGNESIUM: CPT

## 2020-02-11 PROCEDURE — 94660 CPAP INITIATION&MGMT: CPT

## 2020-02-11 PROCEDURE — 85025 COMPLETE CBC W/AUTO DIFF WBC: CPT

## 2020-02-11 PROCEDURE — APPSS30 APP SPLIT SHARED TIME 16-30 MINUTES: Performed by: NURSE PRACTITIONER

## 2020-02-11 RX ORDER — PREDNISONE 20 MG/1
40 TABLET ORAL DAILY
Status: DISCONTINUED | OUTPATIENT
Start: 2020-02-12 | End: 2020-02-11 | Stop reason: HOSPADM

## 2020-02-11 RX ORDER — ESCITALOPRAM OXALATE 20 MG/1
20 TABLET ORAL DAILY
Qty: 30 TABLET | Refills: 0 | Status: ON HOLD | DISCHARGE
Start: 2020-02-11 | End: 2021-11-09 | Stop reason: HOSPADM

## 2020-02-11 RX ORDER — METHYLPREDNISOLONE SODIUM SUCCINATE 40 MG/ML
40 INJECTION, POWDER, LYOPHILIZED, FOR SOLUTION INTRAMUSCULAR; INTRAVENOUS EVERY 12 HOURS
Status: DISCONTINUED | OUTPATIENT
Start: 2020-02-11 | End: 2020-02-11 | Stop reason: HOSPADM

## 2020-02-11 RX ORDER — METHYLPREDNISOLONE SODIUM SUCCINATE 40 MG/ML
40 INJECTION, POWDER, LYOPHILIZED, FOR SOLUTION INTRAMUSCULAR; INTRAVENOUS EVERY 12 HOURS
Status: DISCONTINUED | OUTPATIENT
Start: 2020-02-11 | End: 2020-02-11

## 2020-02-11 RX ADMIN — LEVETIRACETAM 500 MG: 500 TABLET ORAL at 08:50

## 2020-02-11 RX ADMIN — LISINOPRIL 20 MG: 20 TABLET ORAL at 08:50

## 2020-02-11 RX ADMIN — FUROSEMIDE 40 MG: 40 TABLET ORAL at 08:50

## 2020-02-11 RX ADMIN — AZITHROMYCIN MONOHYDRATE 250 MG: 250 TABLET ORAL at 08:50

## 2020-02-11 RX ADMIN — IPRATROPIUM BROMIDE AND ALBUTEROL SULFATE 1 AMPULE: .5; 3 SOLUTION RESPIRATORY (INHALATION) at 09:04

## 2020-02-11 RX ADMIN — CARVEDILOL 25 MG: 25 TABLET, FILM COATED ORAL at 08:50

## 2020-02-11 RX ADMIN — ARFORMOTEROL TARTRATE 15 MCG: 15 SOLUTION RESPIRATORY (INHALATION) at 05:26

## 2020-02-11 RX ADMIN — IPRATROPIUM BROMIDE AND ALBUTEROL SULFATE 1 AMPULE: .5; 3 SOLUTION RESPIRATORY (INHALATION) at 15:35

## 2020-02-11 RX ADMIN — SODIUM CHLORIDE, PRESERVATIVE FREE 10 ML: 5 INJECTION INTRAVENOUS at 08:50

## 2020-02-11 RX ADMIN — IPRATROPIUM BROMIDE AND ALBUTEROL SULFATE 1 AMPULE: .5; 3 SOLUTION RESPIRATORY (INHALATION) at 05:26

## 2020-02-11 RX ADMIN — TAMSULOSIN HYDROCHLORIDE 0.4 MG: 0.4 CAPSULE ORAL at 08:50

## 2020-02-11 RX ADMIN — METHYLPREDNISOLONE SODIUM SUCCINATE 40 MG: 40 INJECTION, POWDER, FOR SOLUTION INTRAMUSCULAR; INTRAVENOUS at 11:55

## 2020-02-11 RX ADMIN — SODIUM CHLORIDE, PRESERVATIVE FREE 10 ML: 5 INJECTION INTRAVENOUS at 11:55

## 2020-02-11 RX ADMIN — CLONIDINE HYDROCHLORIDE 0.2 MG: 0.2 TABLET ORAL at 08:50

## 2020-02-11 RX ADMIN — ENOXAPARIN SODIUM 40 MG: 40 INJECTION SUBCUTANEOUS at 08:50

## 2020-02-11 RX ADMIN — IPRATROPIUM BROMIDE AND ALBUTEROL SULFATE 1 AMPULE: .5; 3 SOLUTION RESPIRATORY (INHALATION) at 03:10

## 2020-02-11 RX ADMIN — AMLODIPINE BESYLATE 5 MG: 5 TABLET ORAL at 08:50

## 2020-02-11 RX ADMIN — METHYLPREDNISOLONE SODIUM SUCCINATE 40 MG: 40 INJECTION, POWDER, FOR SOLUTION INTRAMUSCULAR; INTRAVENOUS at 03:53

## 2020-02-11 RX ADMIN — DULOXETINE HYDROCHLORIDE 60 MG: 60 CAPSULE, DELAYED RELEASE ORAL at 08:51

## 2020-02-11 ASSESSMENT — PAIN SCALES - GENERAL: PAINLEVEL_OUTOF10: 0

## 2020-02-11 NOTE — PROGRESS NOTES
Patient refused the cymbalta again today. He stated he does not take that medication no more and he cannot understand why everyone keeps trying to give the medication to him.   Removed the medication from the patient home medication list.

## 2020-02-11 NOTE — PLAN OF CARE
Problem: Falls - Risk of:  Goal: Will remain free from falls  Description  Will remain free from falls  2/10/2020 2242 by Corbin Leal RN  Outcome: Met This Shift     Problem: Falls - Risk of:  Goal: Absence of physical injury  Description  Absence of physical injury  2/10/2020 2242 by Corbin Leal RN  Outcome: Met This Shift

## 2020-02-11 NOTE — DISCHARGE INSTR - COC
Continuity of Care Form    Patient Name: Salazar Truong   :  1960  MRN:  [id number]    Admit date:  2020  Discharge date:  20    Code Status Order: Full Code   Advance Directives:   Advance Care Flowsheet Documentation     Date/Time Healthcare Directive Type of Healthcare Directive Copy in [address] Agent's Name Healthcare Agent's Phone Number    20 [phone]  No, patient does not have an advance directive for healthcare treatment -- -- -- -- --          Admitting Physician:  Sierra Hein MD  PCP: LAVON Samuel NP    Discharging Nurse: Erika Anderson RN  [address] Unit/Room#: 9847/4545-43  Discharging Unit Phone Number: [phone]      Emergency Contact:   Extended Emergency Contact Information  Primary Emergency Contact: [facility]  Mobile Phone: [phone]  Relation: Other  Secondary Emergency Contact: [facility]  Address: [address] Phone: [phone]  Mobile Phone: [phone]  Relation: Brother/Sister    Past Surgical History:  Past Surgical History:   Procedure Laterality Date    OTHER SURGICAL HISTORY  2017    exc abd neoplasm    ROTATOR CUFF REPAIR  2011    left shoulder in [facility]       Immunization History:   Immunization History   Administered Date(s) Administered    PPD Test 2018    Td, unspecified formulation 2015       Active Problems:  Patient Active Problem List   Diagnosis Code    Depression F32.9    Bronchospasm, acute J98.01    SIRS (systemic inflammatory response syndrome) ([facility]) R65.10    Acute on chronic diastolic CHF (congestive heart failure) ([facility]) I50.33    Lactic acidosis E87.2    Poorly-controlled hypertension I10    Alcohol abuse F10.10    HIV (human immunodeficiency virus infection) ([facility] B20    Tobacco abuse Z72.0    Asthma exacerbation with COPD (chronic obstructive pulmonary disease) ([facility] David Walker

## 2020-02-11 NOTE — CARE COORDINATION
ADDENDUM;2/11/2020.3:02 PM  LOC has been obtained for pt to admit to [facility]. Will await discharge order to arrange transfer. Pts caregiver/POA Yossi Cat it the ONLY one to transport pt. Facility has a bed available today if pt is medically stable. MONIQUE Iverson    Ss note:2/11/2020.2:21 PM Per Leah Jason at [facility] for LOC, she needs additional information regarding pt Keppra. Sw faxed info, will await LOC result for admission to [facility] for pulm rehab. Caregiver Yossi Cat updated.  MONIQUE Iverson

## 2020-02-11 NOTE — PROGRESS NOTES
3. Hypertension:  Continue home medications, may need adjusted. 4. Sleep apnea:  Per patient's caregiver, his home Cpap was taken back by the company. He will need a repeat sleep study as an outpatient. 5. Diastolic heart failure: Echo on 2/9/18 with an EF of 95% and diastolic dysfunction. Repeat Echo ordered and revealed an EF of 60% and stage I diastolic dysfunction. Repeat chest x ray with minimal left basilar subsegmental atelectasis. Encouraged incentive spirometer. 6. History of seizures:  Continue oral Keppra. 7. Discharge plan:  [facility]. NOTE: This report was transcribed using voice recognition software.  Every effort was made to ensure accuracy; however, inadvertent computerized transcription errors may be present.     Electronically signed by LAVON Gunderson CNP on 2/11/2020 at 12:38 PM

## 2020-02-11 NOTE — CARE COORDINATION
Ss note:2/11/2020.3:14 PM Discharge order noted. LOC obtained for [facility] SW spoke with Azul Ivey she will transport pt to [facility] today at 5:00 pm via private car. Facility liaison, nursing and pt aware.  MONIQUE Carranza

## 2020-02-11 NOTE — PLAN OF CARE
Problem: Falls - Risk of:  Goal: Will remain free from falls  Description  Will remain free from falls  2/11/2020 1214 by Nisreen Swift RN  Outcome: Met This Shift  Goal: Absence of physical injury  Description  Absence of physical injury  2/11/2020 1214 by Nisreen Swift RN  Outcome: Met This Shift     Problem:  Activity:  Goal: Fatigue will decrease  Description  Fatigue will decrease  2/11/2020 1214 by Nisreen Swift RN  Outcome: Met This Shift  Problem: Respiratory:  Goal: Ability to maintain a clear airway will improve  Description  Ability to maintain a clear airway will improve  Outcome: Met This Shift  Goal: Ability to maintain adequate ventilation will improve  Description  Ability to maintain adequate ventilation will improve  Outcome: Met This Shift     Problem: Skin Integrity:  Goal: Risk for impaired skin integrity will decrease  Description  Risk for impaired skin integrity will decrease  2/11/2020 1214 by Nisreen Swift RN  Outcome: Met This Shift     Problem: Musculor/Skeletal Functional Status  Goal: Highest potential functional level  Outcome: Met This Shift  Goal: Absence of falls  Outcome: Met This Shift

## 2020-02-11 NOTE — PROGRESS NOTES
[facility] Quality Flow/Interdisciplinary Rounds Progress Note        Quality Flow Rounds held on February 11, 2020    Disciplines Attending:  Bedside Nurse, ,  and Nursing Unit Leadership    Tatianna Lan was admitted on 2/6/2020  4:27 PM    Anticipated Discharge Date:  Expected Discharge Date: 02/08/20    Disposition:    Martin Score:  Martin Scale Score: 21    Readmission Risk              Risk of Unplanned Readmission:        16           Discussed patient goal for the day, patient clinical progression, and barriers to discharge.   The following Goal(s) of the Day/Commitment(s) have been identified:  4L NC at Home, [facility], plan is [facility] Fernando Mckeon  February 11, 2020

## 2020-02-11 NOTE — DISCHARGE SUMMARY
[facility]– [place] Physician Discharge Summary       LAVON Fair - NP  [address] [facility] [address]  [address]  [address] [address]  [phone]          Activity level: Activity as tolerated    Diet: DIET GENERAL;    Labs:CBC and BMP in one week    Condition at discharge: Stable    Dispo:Discharge to SNF    Continue supplemental oxygen via nasal canula @ 3-4 LPM round-the-clock. Continue CPAP / BiPAP during sleep as prior to admission. Patient ID:  Jareth Barger  [id number]  16 y.o.  1960    Admit date: 2/6/2020    Discharge date and time:  2/11/2020  3:14 PM    Admission Diagnoses: Principal Problem:    Acute exacerbation of chronic obstructive pulmonary disease (COPD) ([facility] [address]  Active Problems:    HIV (human immunodeficiency virus infection) ([facility] [address]    Essential hypertension    Sleep apnea    Acute on chronic respiratory failure with hypercapnia (HCC)    Diastolic heart failure (HCC)  Resolved Problems:    * No resolved hospital problems. *      Discharge Diagnoses: Principal Problem:    Acute exacerbation of chronic obstructive pulmonary disease (COPD) ([address]  Active Problems:    HIV (human immunodeficiency virus infection) ([facility] [address]    Essential hypertension    Sleep apnea    Acute on chronic respiratory failure with hypercapnia (HCC)    Diastolic heart failure (HCC)  Resolved Problems:    * No resolved hospital problems. *      Consults:  IP CONSULT TO PULMONOLOGY    Procedures: None    Hospital Course: Jareth Barger is a 62year old male with a history of HIV, CHF, COPD, HTN that presented to the Emergency Department with shortness of breath. His caregiver had also reported some confusion. CT of the head showed patchy areas of hypoattenuation within the cerebral  White matter, remote lacunar infarct within the right aspect of the timo, no acute intracranial hemorrhage or mass effect.   ABGs on admission were: pH 7.358, PCO2 57, pO2 69.5, HCO3 32.1. He was admitted with COPD exacerbation and chronic respiratory failure with hypoxia and hypercapnia. He was started on IV Solu-Medrol and aerosols. Pulmonology was consulted. Solu-Medrol frequency was increased due to impaired air exchange and he was started on azithromycin. Rapid flu was negative. Respiratory array was negative. Chest x ray showed minimal left basilar subsegmental atelectasis. An Echo was done and revealed an EF of 60% and stage I diastolic dysfunction. Patent responded well to treatment, but remained dyspneic with activity. Inpatient Pulmonary Rehab was discussed with the patient and he is agreeable. He will be transferred today. Per patient's caregiver, his home Cpap was taken back by the company. He will need a repeat sleep study as an outpatient. Discharge Exam:  Vitals:    02/10/20 0115 02/10/20 0744 02/10/20 1948 02/11/20 0816   BP:  (!) 168/90 137/84 (!) 169/88   Pulse: 74 68 67 81   Resp:  18 18 18   Temp:  97.6 °F (36.4 °C) 97.8 °F (36.6 °C) 97.4 °F (36.3 °C)   TempSrc:  Oral Oral Oral   SpO2:  97% 98% 96%   Weight:       Height:         General Appearance: alert and oriented to person, place and time and in no acute distress  Skin: warm and dry  Head: normocephalic and atraumatic  Eyes: pupils equal, round, and reactive to light, extraocular eye movements intact, conjunctivae normal  Neck: neck supple and non tender without mass   Pulmonary/Chest: scattered wheezes, diminished,  no respiratory distress  Cardiovascular: normal rate, normal S1 and S2   Abdomen: soft, non-tender, non-distended, normal bowel sounds, no masses or organomegaly  Extremities: no cyanosis, no clubbing and 1+ bilateral lower extremity edema  Neurologic: no cranial nerve deficit and speech normal    I/O last 3 completed shifts: In: 500 [P.O.:500]  Out: -   No intake/output data recorded.       LABS:  Recent Labs     02/09/20  0836 02/10/20  0738 02/11/20  0924    139 139   K 3.9 4.0 4.6   CL 96* 96* 94*   CO2 32* 32* 35*   BUN 12 14 17   CREATININE 0.7 0.7 0.7   GLUCOSE 191* 134* 192*   CALCIUM 9.1 9.0 8.8       Recent Labs     02/09/20  0525 02/10/20  0738 02/11/20  0924   WBC 17.6* 14.6* 11.6*   RBC 4.92 4.96 5.12   HGB 14.4 14.5 14.6   HCT 45.1 45.4 47.2   MCV 91.7 91.5 92.2   MCH 29.3 29.2 28.5   MCHC 31.9* 31.9* 30.9*   RDW 13.7 13.5 13.5    185 183   MPV 12.3* 12.3* 11.9       No results for input(s): POCGLU in the last 72 hours. Imaging:  Xr Chest Standard (2 Vw)    Result Date: 2/6/2020  Reading location: 200 Indication: Shortness of breath Comparison: Prior chest radiograph from 10/23/2018 Technique: Chest PA and lateral radiographs were obtained. Findings: The cardiomediastinal silhouette is stable in size and contours. There is minimal linear left basilar subsegmental atelectasis. No focal consolidation. Costophrenic angles are clear. There is no evidence of pneumothorax. Minimal linear left basilar subsegmental atelectasis. Ct Head Wo Contrast    Result Date: 2/6/2020  Location: 200 Indication: Confusion Comparison: MRI brain from 2/22/2019. Technique: Multidetector CT imaging was obtained from the skull base to vertex without the administration of intravenous contrast. Coronal and sagittal reformatted images were obtained. Automated dose exposure control was used for this exam. FINDINGS: Prominence of ventricles and sulci is compatible with age-related volume loss. No extra-axial collection. No acute intracranial hemorrhage. No cerebral edema or mass effect. No CT evidence of acute, large territorial infarction. There are patchy areas of hypoattenuation within the cerebral white matter, which are nonspecific but most compatible with mild changes of microangiopathy. There is remote lacunar infarct within the right aspect of the timo. There is mild right maxillary sinus mucosal thickening.  Visualized levETIRAcetam (KEPPRA) 500 MG tablet TAKE 1 TABLET BY ORAL ROUTE 2 TIMES EVERY DAY  Refills: 3      OXYGEN Inhale 2 L into the lungs      omalizumab (XOLAIR) 150 MG injection Inject 375 mg into the skin every 14 days  Qty: 1 vial, Refills: 3      amLODIPine (NORVASC) 5 MG tablet Take by mouth      potassium chloride (MICRO-K) 10 MEQ extended release capsule Take 10 mEq by mouth daily       tamsulosin (FLOMAX) 0.4 MG capsule Take 0.4 mg by mouth daily       lisinopril (PRINIVIL;ZESTRIL) 20 MG tablet Take 1 tablet by mouth daily  Qty: 30 tablet, Refills: 0      carvedilol (COREG) 25 MG tablet Take 25 mg by mouth 2 times daily      ranitidine (ZANTAC) 150 MG tablet Take 150 mg by mouth 2 times daily      ipratropium-albuterol (DUONEB) 0.5-2.5 (3) MG/3ML SOLN nebulizer solution Inhale 3 mLs into the lungs every 6 hours as needed for Shortness of Breath  Qty: 360 mL, Refills: 0      albuterol sulfate  (90 BASE) MCG/ACT inhaler Inhale 2 puffs into the lungs every 6 hours as needed for Wheezing         STOP taking these medications       DULoxetine (CYMBALTA) 20 MG extended release capsule Comments:   Reason for Stopping:                 Note that more than 30 minutes was spent in preparing discharge papers, discussing discharge with patient, medication review, etc.    NOTE: This report was transcribed using voice recognition software.  Every effort was made to ensure accuracy; however, inadvertent computerized transcription errors may be present.     Signed:  Electronically signed by LAVON Renteria CNP on 2/11/2020 at 3:14 PM

## 2020-02-11 NOTE — PLAN OF CARE
Problem: Falls - Risk of:  Goal: Will remain free from falls  Description  Will remain free from falls  2/11/2020 0200 by Stoney Morrow RN  Outcome: Met This Shift     Problem: Falls - Risk of:  Goal: Absence of physical injury  Description  Absence of physical injury  2/11/2020 3640 by Stoney Morrow RN  Outcome: Met This Shift     Problem:  Activity:  Goal: Fatigue will decrease  Description  Fatigue will decrease  Outcome: Met This Shift     Problem: Skin Integrity:  Goal: Risk for impaired skin integrity will decrease  Description  Risk for impaired skin integrity will decrease  Outcome: Met This Shift

## 2020-02-11 NOTE — CARE COORDINATION
Ss note: 2/11/2020.11:19 AM SW has completed a PASSR and submitted at [facility]. Will need LOC result prior to pt discharge. Sw will document when the level is received. Pt caregiver Melvina Lechuga to transport.  MONIQUE Valencia

## 2020-02-11 NOTE — PROGRESS NOTES
Physical Therapy  Physical Therapy Note    Name: Salazar Truong  : 1960  MRN: [id number]    Referring Provider:  LAVON Castorena CNP    Date of Service: 2020    Evaluating PT:  Lesa Martinez PT   Room #:  6669/3760-95  Diagnosis:  COPD exacerbation  PMHx/PSHx:        Diagnosis Date    Asthma     since childhood    [facility]est[facility]) ([facility]     COPD (chronic obstructive pulmonary disease) ([facility]     Depression     Emphysema of lung ([facility]     History of echocardiogram 2018    EF 62%    HIV (human immunodeficiency virus infection) ([facility]     Hypertension 2008    Sleep apnea     no cpap     Procedure/Surgery:  N/A  Precautions:  Falls, O2 4L , FWB (full weight bearing),   Equipment Needs:  none    SUBJECTIVE:    Patient lives with spouse  in a two story home with 1 step to enter. Bed is on 1 floor and bath is on 1 floor. Patient ambulated independently with cane or fww PTA. Equipment owned: Kena Hampton,      OBJECTIVE:   Initial Evaluation  Date: 2020 Treatment  2020 Short Term/ Long Term   Goals   AM-PAC 6 Clicks 49/92     Was pt agreeable to Eval/treatment? yes yes    Does pt have pain? none none    Bed Mobility  Rolling: Ind  Supine to sit: Ind  Sit to supine: Ind  Scooting: Ind Rolling: Independent   Supine to sit: Independent  Sit to supine: Independent   Scooting: Independent    Rolling: Ind  Supine to sit: Ind  Sit to supine: Ind  Scooting: Ind   Transfers Sit to stand: SBA  Stand to sit: SBA  Stand pivot: SBA Sit to stand: Supervision    Stand to sit: Minimal assist   Stand pivot: Not assessed     Sit to stand: Mod Ind  Stand to sit: Mod Ind  Stand pivot:  Mod Ind   Ambulation    100 feet with fww SBA 2 x 75 feet using  wheeled walker with Minimal assist   150 feet with fww Mod Ind   Stair negotiation: ascended and descended  NT NT 1 steps    ROM BUE:  See OT eval  BLE:  wfl     Strength BUE: See OT eval   RLE:  4+/5  LLE:   4+/5  5/5 Balance Sitting EOB:  ind  Dynamic Standing:  SBA with fww  Sitting EOB:  ind  Dynamic Standing: Mod Ind     Pt is A & O x 3  Therapeutic Exercises:  Reviewed BLE  AROM exercises were completed while EOB. Sriram ankle pumps, laq and toe raises, marching x 15 reps each. Patient education  Pt instructed in ambulation using a wheeled walker and performed  Exercises as above. Patient response to education:   Pt verbalized understanding Pt demonstrated skill Pt requires further education in this area   yes yes NT     ASSESSMENT:    Comments:  Patient was seen this date for PT treatment. Patient was agreeable to ambulation and exercises. Upon entering the room patient was found sitting EOB. Sat EOB x 5 minutes to increase dynamic sitting balance and activity tolerance. Gait then completed with fww with 4 L O2. At end of session, patient sitting edge of bed with  call light and phone within reach,  all lines and tubes intact, nursing notified. SpO2 at 94% with activity. This patient can benefit from the continuation of skilled PT  to maximize functional level and return to PLOF. Treatment:  Patient practiced and was instructed in the following treatment:     Bed mobility, Ambulation and exercises. Pt. performed seated: ankle pumps, laq and heel raises x 15 reps each. Pt's/ family goals   1. home    Patient and or family understand(s) diagnosis, prognosis, and plan of care. yes    PLAN:    PT care will be provided in accordance with the objectives noted above. Exercises and functional mobility practice will be used as well as appropriate assistive devices or modalities to obtain goals. Patient and family education will also be administered as needed. Frequency of treatments: 2-5x/week x 1-2 weeks.     Time in 11:00  Time out 11:14   Total Treatment: Time 14 minutes     CPT codes:  [] Low Complexity PT evaluation 61294  [] Moderate Complexity PT evaluation 62134  [] High Complexity PT evaluation

## 2020-05-26 ENCOUNTER — HOSPITAL ENCOUNTER (OUTPATIENT)
Age: 60
Setting detail: OBSERVATION
Discharge: HOME | End: 2020-05-29
Attending: EMERGENCY MEDICINE | Admitting: INTERNAL MEDICINE
Payer: MEDICAID

## 2020-05-26 ENCOUNTER — APPOINTMENT (OUTPATIENT)
Dept: GENERAL RADIOLOGY | Age: 60
End: 2020-05-26
Payer: MEDICAID

## 2020-05-26 PROBLEM — R07.9 CHEST PAIN ON EXERTION: Status: ACTIVE | Noted: 2020-05-26

## 2020-05-26 PROBLEM — R07.9 CHEST PAIN: Status: ACTIVE | Noted: 2020-05-26

## 2020-05-26 PROBLEM — R07.89 ATYPICAL CHEST PAIN: Status: ACTIVE | Noted: 2020-05-26

## 2020-05-26 PROBLEM — J44.9 CHRONIC OBSTRUCTIVE PULMONARY DISEASE (HCC): Chronic | Status: ACTIVE | Noted: 2018-05-30

## 2020-05-26 PROBLEM — I10 ESSENTIAL HYPERTENSION: Chronic | Status: ACTIVE | Noted: 2018-05-30

## 2020-05-26 LAB
ALBUMIN SERPL-MCNC: 4.3 G/DL (ref 3.5–5.2)
ALP BLD-CCNC: 96 U/L (ref 40–129)
ALT SERPL-CCNC: 24 U/L (ref 0–40)
ANION GAP SERPL CALCULATED.3IONS-SCNC: 12 MMOL/L (ref 7–16)
AST SERPL-CCNC: 18 U/L (ref 0–39)
BASOPHILS ABSOLUTE: 0.05 E9/L (ref 0–0.2)
BASOPHILS RELATIVE PERCENT: 0.5 % (ref 0–2)
BILIRUB SERPL-MCNC: 0.3 MG/DL (ref 0–1.2)
BUN BLDV-MCNC: 15 MG/DL (ref 8–23)
CALCIUM SERPL-MCNC: 9.2 MG/DL (ref 8.6–10.2)
CHLORIDE BLD-SCNC: 103 MMOL/L (ref 98–107)
CO2: 29 MMOL/L (ref 22–29)
CREAT SERPL-MCNC: 0.9 MG/DL (ref 0.7–1.2)
EKG ATRIAL RATE: 89 BPM
EKG P AXIS: 64 DEGREES
EKG P-R INTERVAL: 156 MS
EKG Q-T INTERVAL: 392 MS
EKG QRS DURATION: 102 MS
EKG QTC CALCULATION (BAZETT): 476 MS
EKG R AXIS: -55 DEGREES
EKG T AXIS: 59 DEGREES
EKG VENTRICULAR RATE: 89 BPM
EOSINOPHILS ABSOLUTE: 0.06 E9/L (ref 0.05–0.5)
EOSINOPHILS RELATIVE PERCENT: 0.6 % (ref 0–6)
GFR AFRICAN AMERICAN: >60
GFR NON-AFRICAN AMERICAN: >60 ML/MIN/1.73
GLUCOSE BLD-MCNC: 90 MG/DL (ref 74–99)
HCT VFR BLD CALC: 44 % (ref 37–54)
HEMOGLOBIN: 13.6 G/DL (ref 12.5–16.5)
IMMATURE GRANULOCYTES #: 0.05 E9/L
IMMATURE GRANULOCYTES %: 0.5 % (ref 0–5)
LIPASE: 34 U/L (ref 13–60)
LYMPHOCYTES ABSOLUTE: 3.52 E9/L (ref 1.5–4)
LYMPHOCYTES RELATIVE PERCENT: 33.1 % (ref 20–42)
MCH RBC QN AUTO: 27.5 PG (ref 26–35)
MCHC RBC AUTO-ENTMCNC: 30.9 % (ref 32–34.5)
MCV RBC AUTO: 88.9 FL (ref 80–99.9)
MONOCYTES ABSOLUTE: 1.04 E9/L (ref 0.1–0.95)
MONOCYTES RELATIVE PERCENT: 9.8 % (ref 2–12)
NEUTROPHILS ABSOLUTE: 5.9 E9/L (ref 1.8–7.3)
NEUTROPHILS RELATIVE PERCENT: 55.5 % (ref 43–80)
PDW BLD-RTO: 13.8 FL (ref 11.5–15)
PLATELET # BLD: 203 E9/L (ref 130–450)
PMV BLD AUTO: 11.7 FL (ref 7–12)
POTASSIUM REFLEX MAGNESIUM: 3.6 MMOL/L (ref 3.5–5)
PRO-BNP: 39 PG/ML (ref 0–125)
RBC # BLD: 4.95 E12/L (ref 3.8–5.8)
SODIUM BLD-SCNC: 144 MMOL/L (ref 132–146)
TOTAL PROTEIN: 7.3 G/DL (ref 6.4–8.3)
TROPONIN: <0.01 NG/ML (ref 0–0.03)
TROPONIN: <0.01 NG/ML (ref 0–0.03)
WBC # BLD: 10.6 E9/L (ref 4.5–11.5)

## 2020-05-26 PROCEDURE — 83880 ASSAY OF NATRIURETIC PEPTIDE: CPT

## 2020-05-26 PROCEDURE — 6370000000 HC RX 637 (ALT 250 FOR IP): Performed by: STUDENT IN AN ORGANIZED HEALTH CARE EDUCATION/TRAINING PROGRAM

## 2020-05-26 PROCEDURE — G0378 HOSPITAL OBSERVATION PER HR: HCPCS

## 2020-05-26 PROCEDURE — 85025 COMPLETE CBC W/AUTO DIFF WBC: CPT

## 2020-05-26 PROCEDURE — 94760 N-INVAS EAR/PLS OXIMETRY 1: CPT

## 2020-05-26 PROCEDURE — 80053 COMPREHEN METABOLIC PANEL: CPT

## 2020-05-26 PROCEDURE — 83690 ASSAY OF LIPASE: CPT

## 2020-05-26 PROCEDURE — 93005 ELECTROCARDIOGRAM TRACING: CPT | Performed by: STUDENT IN AN ORGANIZED HEALTH CARE EDUCATION/TRAINING PROGRAM

## 2020-05-26 PROCEDURE — 96372 THER/PROPH/DIAG INJ SC/IM: CPT

## 2020-05-26 PROCEDURE — 36415 COLL VENOUS BLD VENIPUNCTURE: CPT

## 2020-05-26 PROCEDURE — 99220 PR INITIAL OBSERVATION CARE/DAY 70 MINUTES: CPT | Performed by: INTERNAL MEDICINE

## 2020-05-26 PROCEDURE — 84484 ASSAY OF TROPONIN QUANT: CPT

## 2020-05-26 PROCEDURE — 6370000000 HC RX 637 (ALT 250 FOR IP): Performed by: INTERNAL MEDICINE

## 2020-05-26 PROCEDURE — 6360000002 HC RX W HCPCS: Performed by: INTERNAL MEDICINE

## 2020-05-26 PROCEDURE — 93010 ELECTROCARDIOGRAM REPORT: CPT | Performed by: INTERNAL MEDICINE

## 2020-05-26 PROCEDURE — 99285 EMERGENCY DEPT VISIT HI MDM: CPT

## 2020-05-26 PROCEDURE — 71045 X-RAY EXAM CHEST 1 VIEW: CPT

## 2020-05-26 RX ORDER — BUDESONIDE AND FORMOTEROL FUMARATE DIHYDRATE 160; 4.5 UG/1; UG/1
2 AEROSOL RESPIRATORY (INHALATION) 2 TIMES DAILY
Status: DISCONTINUED | OUTPATIENT
Start: 2020-05-26 | End: 2020-05-26 | Stop reason: CLARIF

## 2020-05-26 RX ORDER — LEVETIRACETAM 500 MG/1
500 TABLET ORAL 2 TIMES DAILY
Status: DISCONTINUED | OUTPATIENT
Start: 2020-05-26 | End: 2020-05-29 | Stop reason: HOSPADM

## 2020-05-26 RX ORDER — AMLODIPINE BESYLATE 5 MG/1
5 TABLET ORAL DAILY
Status: DISCONTINUED | OUTPATIENT
Start: 2020-05-26 | End: 2020-05-29 | Stop reason: HOSPADM

## 2020-05-26 RX ORDER — ACETAMINOPHEN 650 MG/1
650 SUPPOSITORY RECTAL EVERY 6 HOURS PRN
Status: DISCONTINUED | OUTPATIENT
Start: 2020-05-26 | End: 2020-05-29 | Stop reason: HOSPADM

## 2020-05-26 RX ORDER — ASPIRIN 81 MG/1
324 TABLET, CHEWABLE ORAL ONCE
Status: COMPLETED | OUTPATIENT
Start: 2020-05-26 | End: 2020-05-26

## 2020-05-26 RX ORDER — ACETAMINOPHEN 325 MG/1
650 TABLET ORAL EVERY 6 HOURS PRN
Status: DISCONTINUED | OUTPATIENT
Start: 2020-05-26 | End: 2020-05-29 | Stop reason: HOSPADM

## 2020-05-26 RX ORDER — ARFORMOTEROL TARTRATE 15 UG/2ML
15 SOLUTION RESPIRATORY (INHALATION) 2 TIMES DAILY
Status: DISCONTINUED | OUTPATIENT
Start: 2020-05-26 | End: 2020-05-29 | Stop reason: HOSPADM

## 2020-05-26 RX ORDER — PROMETHAZINE HYDROCHLORIDE 25 MG/1
12.5 TABLET ORAL EVERY 6 HOURS PRN
Status: DISCONTINUED | OUTPATIENT
Start: 2020-05-26 | End: 2020-05-29 | Stop reason: HOSPADM

## 2020-05-26 RX ORDER — ESCITALOPRAM OXALATE 10 MG/1
20 TABLET ORAL DAILY
Status: DISCONTINUED | OUTPATIENT
Start: 2020-05-26 | End: 2020-05-29 | Stop reason: HOSPADM

## 2020-05-26 RX ORDER — CLONIDINE HYDROCHLORIDE 0.2 MG/1
0.2 TABLET ORAL 2 TIMES DAILY
Status: DISCONTINUED | OUTPATIENT
Start: 2020-05-26 | End: 2020-05-29 | Stop reason: HOSPADM

## 2020-05-26 RX ORDER — LISINOPRIL 20 MG/1
20 TABLET ORAL DAILY
Status: DISCONTINUED | OUTPATIENT
Start: 2020-05-26 | End: 2020-05-29 | Stop reason: HOSPADM

## 2020-05-26 RX ORDER — POLYETHYLENE GLYCOL 3350 17 G/17G
17 POWDER, FOR SOLUTION ORAL DAILY PRN
Status: DISCONTINUED | OUTPATIENT
Start: 2020-05-26 | End: 2020-05-29 | Stop reason: HOSPADM

## 2020-05-26 RX ORDER — HYDROXYZINE HYDROCHLORIDE 25 MG/1
25 TABLET, FILM COATED ORAL 3 TIMES DAILY PRN
Status: DISCONTINUED | OUTPATIENT
Start: 2020-05-26 | End: 2020-05-29 | Stop reason: HOSPADM

## 2020-05-26 RX ORDER — IPRATROPIUM BROMIDE AND ALBUTEROL SULFATE 2.5; .5 MG/3ML; MG/3ML
1 SOLUTION RESPIRATORY (INHALATION) EVERY 6 HOURS PRN
Status: DISCONTINUED | OUTPATIENT
Start: 2020-05-26 | End: 2020-05-29 | Stop reason: HOSPADM

## 2020-05-26 RX ORDER — FUROSEMIDE 40 MG/1
40 TABLET ORAL DAILY
Status: DISCONTINUED | OUTPATIENT
Start: 2020-05-27 | End: 2020-05-29

## 2020-05-26 RX ORDER — TAMSULOSIN HYDROCHLORIDE 0.4 MG/1
0.4 CAPSULE ORAL DAILY
Status: DISCONTINUED | OUTPATIENT
Start: 2020-05-26 | End: 2020-05-29 | Stop reason: HOSPADM

## 2020-05-26 RX ORDER — POTASSIUM CHLORIDE 750 MG/1
10 TABLET, EXTENDED RELEASE ORAL DAILY
Status: DISCONTINUED | OUTPATIENT
Start: 2020-05-26 | End: 2020-05-29 | Stop reason: HOSPADM

## 2020-05-26 RX ORDER — HYDROCHLOROTHIAZIDE 25 MG/1
25 TABLET ORAL DAILY
Status: DISCONTINUED | OUTPATIENT
Start: 2020-05-27 | End: 2020-05-29 | Stop reason: HOSPADM

## 2020-05-26 RX ORDER — BUDESONIDE 0.5 MG/2ML
0.5 INHALANT ORAL 2 TIMES DAILY
Status: DISCONTINUED | OUTPATIENT
Start: 2020-05-26 | End: 2020-05-29 | Stop reason: HOSPADM

## 2020-05-26 RX ORDER — CARVEDILOL 25 MG/1
25 TABLET ORAL 2 TIMES DAILY
Status: DISCONTINUED | OUTPATIENT
Start: 2020-05-26 | End: 2020-05-29 | Stop reason: HOSPADM

## 2020-05-26 RX ORDER — FLUTICASONE PROPIONATE 50 MCG
1 SPRAY, SUSPENSION (ML) NASAL DAILY
Status: DISCONTINUED | OUTPATIENT
Start: 2020-05-27 | End: 2020-05-29 | Stop reason: HOSPADM

## 2020-05-26 RX ORDER — ATORVASTATIN CALCIUM 40 MG/1
40 TABLET, FILM COATED ORAL NIGHTLY
Status: DISCONTINUED | OUTPATIENT
Start: 2020-05-26 | End: 2020-05-29 | Stop reason: HOSPADM

## 2020-05-26 RX ORDER — ASPIRIN 81 MG/1
81 TABLET, CHEWABLE ORAL DAILY
Status: DISCONTINUED | OUTPATIENT
Start: 2020-05-27 | End: 2020-05-29 | Stop reason: HOSPADM

## 2020-05-26 RX ORDER — ONDANSETRON 2 MG/ML
4 INJECTION INTRAMUSCULAR; INTRAVENOUS EVERY 6 HOURS PRN
Status: DISCONTINUED | OUTPATIENT
Start: 2020-05-26 | End: 2020-05-29 | Stop reason: HOSPADM

## 2020-05-26 RX ORDER — FAMOTIDINE 20 MG/1
20 TABLET, FILM COATED ORAL DAILY
Status: DISCONTINUED | OUTPATIENT
Start: 2020-05-26 | End: 2020-05-29 | Stop reason: HOSPADM

## 2020-05-26 RX ADMIN — CLONIDINE HYDROCHLORIDE 0.2 MG: 0.2 TABLET ORAL at 22:19

## 2020-05-26 RX ADMIN — POTASSIUM CHLORIDE 10 MEQ: 10 TABLET, EXTENDED RELEASE ORAL at 22:20

## 2020-05-26 RX ADMIN — ESCITALOPRAM OXALATE 20 MG: 10 TABLET ORAL at 22:19

## 2020-05-26 RX ADMIN — LEVETIRACETAM 500 MG: 500 TABLET ORAL at 22:18

## 2020-05-26 RX ADMIN — AMLODIPINE BESYLATE 5 MG: 5 TABLET ORAL at 22:18

## 2020-05-26 RX ADMIN — FAMOTIDINE 20 MG: 20 TABLET, FILM COATED ORAL at 22:19

## 2020-05-26 RX ADMIN — NITROGLYCERIN 1 INCH: 20 OINTMENT TOPICAL at 22:20

## 2020-05-26 RX ADMIN — ATORVASTATIN CALCIUM 40 MG: 40 TABLET, FILM COATED ORAL at 22:20

## 2020-05-26 RX ADMIN — LISINOPRIL 20 MG: 20 TABLET ORAL at 22:20

## 2020-05-26 RX ADMIN — ENOXAPARIN SODIUM 40 MG: 40 INJECTION SUBCUTANEOUS at 22:20

## 2020-05-26 RX ADMIN — ASPIRIN 81 MG 324 MG: 81 TABLET ORAL at 15:33

## 2020-05-26 RX ADMIN — TAMSULOSIN HYDROCHLORIDE 0.4 MG: 0.4 CAPSULE ORAL at 22:19

## 2020-05-26 RX ADMIN — CARVEDILOL 25 MG: 25 TABLET, FILM COATED ORAL at 22:19

## 2020-05-26 ASSESSMENT — PAIN DESCRIPTION - DESCRIPTORS: DESCRIPTORS: ACHING;DULL;DISCOMFORT

## 2020-05-26 ASSESSMENT — PAIN DESCRIPTION - PROGRESSION: CLINICAL_PROGRESSION: GRADUALLY WORSENING

## 2020-05-26 ASSESSMENT — PAIN DESCRIPTION - LOCATION: LOCATION: CHEST

## 2020-05-26 ASSESSMENT — ENCOUNTER SYMPTOMS
VOMITING: 0
COUGH: 0
TROUBLE SWALLOWING: 0
EYE REDNESS: 0
CHEST TIGHTNESS: 0
EYE PAIN: 0
SHORTNESS OF BREATH: 1
WHEEZING: 0
DIARRHEA: 0
PHOTOPHOBIA: 0
RHINORRHEA: 0
ABDOMINAL PAIN: 0
BACK PAIN: 0
CONSTIPATION: 0
APNEA: 0
SORE THROAT: 0
NAUSEA: 0

## 2020-05-26 ASSESSMENT — PAIN DESCRIPTION - PAIN TYPE: TYPE: ACUTE PAIN

## 2020-05-26 ASSESSMENT — PAIN SCALES - GENERAL
PAINLEVEL_OUTOF10: 8
PAINLEVEL_OUTOF10: 0

## 2020-05-26 ASSESSMENT — PAIN DESCRIPTION - ORIENTATION: ORIENTATION: MID

## 2020-05-26 ASSESSMENT — PAIN DESCRIPTION - FREQUENCY: FREQUENCY: INTERMITTENT

## 2020-05-26 ASSESSMENT — PAIN DESCRIPTION - ONSET: ONSET: GRADUAL

## 2020-05-26 ASSESSMENT — PAIN - FUNCTIONAL ASSESSMENT: PAIN_FUNCTIONAL_ASSESSMENT: PREVENTS OR INTERFERES SOME ACTIVE ACTIVITIES AND ADLS

## 2020-05-26 NOTE — H&P
5/9/18  Yes Historical Provider, MD   lisinopril (PRINIVIL;ZESTRIL) 20 MG tablet Take 1 tablet by mouth daily 2/15/18  Yes Leigha Major MD   emtricitabine-tenofovir alafenamide (DESCOVY) 200-25 MG TABS tablet Take 1 tablet by mouth daily 2/11/18 5/26/20 Yes Britton Louie MD   carvedilol (COREG) 25 MG tablet Take 25 mg by mouth 2 times daily   Yes Historical Provider, MD   ranitidine (ZANTAC) 150 MG tablet Take 150 mg by mouth 2 times daily   Yes Historical Provider, MD   EPINEPHrine (EPIPEN) 0.3 MG/0.3ML SOAJ injection USE AS DIRECTED FOR ALLERGIC REACTION TO Santiago Freitas 10/30/19   Zeke Valenzuela MD   mometasone-formoterol (DULERA) 100-5 MCG/ACT inhaler Inhale 2 puffs into the lungs 2 times daily 2/27/19   Zeke Valenzuela MD   tiotropium (SPIRIVA HANDIHALER) 18 MCG inhalation capsule Inhale 1 capsule into the lungs daily  Patient taking differently: Inhale 800 mcg into the lungs daily  2/27/19   Aleksandar Galaviz MD   OXYGEN Inhale 2 L into the lungs    Historical Provider, MD   omalizumab Hanh Kisha) 150 MG injection Inject 375 mg into the skin every 14 days 8/2/18   Zeke Valenzuela MD   ipratropium-albuterol (DUONEB) 0.5-2.5 (3) MG/3ML SOLN nebulizer solution Inhale 3 mLs into the lungs every 6 hours as needed for Shortness of Breath 12/18/17   Sandeep Bruno DO   albuterol sulfate  (90 BASE) MCG/ACT inhaler Inhale 2 puffs into the lungs every 6 hours as needed for Wheezing    Historical Provider, MD       Allergies:    Ketorolac tromethamine    Social History:    reports that he quit smoking about 2 years ago. His smoking use included cigarettes. He has a 17.50 pack-year smoking history. He has never used smokeless tobacco. He reports current alcohol use. He reports that he does not use drugs. Family History:   family history includes Alcohol Abuse in his father and mother.      PHYSICAL EXAM:  Vitals:  BP (!) 146/76   Pulse 81   Temp 98 °F (36.7 °C) (Oral)   Resp 22   Ht 5' 6\" (1.676 m)   Wt 195 lb (88.5 kg)   SpO2 96%   BMI 31.47 kg/m²   General Appearance: AOx3 and NAD  Skin: Warm and dry  Head: Normocephalic and atraumatic, mucous membranes well hydrated   Eyes: Pupils equal, round, and reactive to light  Neck: Supple and non tender without mass   Pulmonary/Chest: Clear to auscultation bilaterally- mild wheezes, no crackle, not in respiratory distress  Cardiovascular: Normal rate, normal S1 and S2 and no carotid bruits  Abdomen: Soft, non-tender, non-distended, no rebound, no rigidity, + bowel sounds  Extremities: No cyanosis, no clubbing and no edema  Neurologic: No neurologic focal deficits, speech is normal, coherent     LABS:  CBC  Recent Labs     05/26/20  1535   WBC 10.6   HGB 13.6   HCT 44.0   MCV 88.9        BMP  Recent Labs     05/26/20  1535      K 3.6      CO2 29   BUN 15   CREATININE 0.9   LABGLOM >60   GLUCOSE 90   CALCIUM 9.2     Lab Results   Component Value Date    MG 2.8 (H) 02/11/2020    PHOS 4.2 05/16/2018     LFT  Recent Labs     05/26/20  1535   ALT 24   AST 18   ALKPHOS 96   BILITOT 0.3     Albumin  Lab Results   Component Value Date    LABALBU 4.3 05/26/2020    LABALBU 4.2 02/11/2020    LABALBU 4.2 02/10/2020     Lactic acid   No results for input(s): LACTSEPSIS in the last 72 hours. Cardiac  Troponin   Lab Results   Component Value Date    TROPONINI <0.01 05/26/2020    TROPONINI <0.01 02/07/2020    TROPONINI <0.01 02/06/2020     Pro-BNP   Lab Results   Component Value Date    PROBNP 39 05/26/2020    PROBNP 87 02/08/2020    PROBNP 54 02/06/2020     Iron studies  No results found for: FERRITIN, IRON, TIBC    EKG:  Left anterior fascicular block, no significant ST changes or T wave in versions.      ASSESSMENT and PLAN:    Problem   Chest Pain On Exertion   Chronic Obstructive Pulmonary Disease (Hcc)   HIV (Human Immunodeficiency Virus Infection) (Hcc)   Seizure (Hcc)   Benign Prostatic Hyperplasia Without Lower Urinary Tract Symptoms   Diastolic Heart Failure (Hcc) Essential Hypertension   Depression     Typicl chest pain on exertion   · Given the description of his chest pain, getting better at rest worse on exercise and radiates to left arm, in addition to multiple risk factors (+ smoker, age, HTN, HIV, depression) we will seek exercise stress test to rule out CAD as source of his chest pain   · Repeat another troponin   · C.w aspirin and atorvastatin  · Check lipid panel     HTN, resistant   · C.w Clonidine   · C.w coreg  · C.w Lisinopril   · C.w amlodipine   · Hold lasix (patient never had pro-BNP >100 since 2015, will watch off loop diuretics) may be switched to HCTZ   · S.w HCTZ 25 mg daily     HIV  · C.w anti-HIV meds (BIKTRAVY)    COPD  · C.w bronchodilators  · O2    Anxiety and depression   · Escitalpram   · C.w hydroxyzine    # Seizure disorder : C.w keppra  # BPH: C.w tamsulosin   # GERD: famotidine     Code Status: Full   DVT prophylaxis: Lovenox   Diet: NPO after midnight       PLEASE NOTE:    This report was transcribed using typing and voice recognition software. Every effort was made to ensure accuracy; however, inadvertent computerized transcription errors may be present.  More than 50 minutes have been spent in evaluating the patient, reviewing records and results, discussing with staff / family and other treating physicians.     Segun Cochran MD, MSc   [facility]

## 2020-05-26 NOTE — ED PROVIDER NOTES
[address]      Pt Name: Kathya Meier  MRN: [id number]  [place] 1960  Date of evaluation: 5/26/2020      CHIEF COMPLAINT       Chief Complaint   Patient presents with    Chest Pain    Shortness of Breath        HPI  Kathya Meier is a 56 y.o. male with a history of COPD, hypertension, diastolic heart failure, HIV, who presents to the emergency department with complaints of chest pain and shortness of breath. Patient states that 3 hours ago he was sitting on his couch when he began to have substernal chest pain. Describes as a constant achy pain. The pain radiates into his neck. States that nothing seems to make the pain better or worse. He has never had chest pain like this before. He reports associated mild shortness of breath. States that he is unsure if this is his COPD. He denies any leg swelling or leg pain. Denies any lightheadedness, syncope, diaphoresis, fevers, chills, cough, abdominal pain. He is never had a heart attack in the past.  Previous echocardiogram from earlier this year showed mild diastolic heart failure. Patient is a former smoker. Except as noted above the remainder of the review of systems was reviewed and negative. Review of Systems   Constitutional: Negative for activity change, chills, diaphoresis, fatigue and fever. HENT: Negative for rhinorrhea, sore throat and trouble swallowing. Eyes: Negative for photophobia, pain and redness. Respiratory: Positive for shortness of breath. Negative for apnea, cough, chest tightness and wheezing. Cardiovascular: Positive for chest pain. Negative for palpitations and leg swelling. Gastrointestinal: Negative for abdominal pain, constipation, diarrhea, nausea and vomiting. Endocrine: Negative for polyuria. Genitourinary: Negative for difficulty urinating and dysuria.    Musculoskeletal: Negative for back pain, neck pain shortness of breath. Patient does have an underlying history of COPD and supposed be wearing supplemental oxygen. The patient is a extremely poor historian. He states that he started having intermittent chest pain yesterday and then had persistent substernal chest pain that was radiating to his right neck. His girlfriend was on the phone and states that he has chronic issues with intermittent chest pains and shortness of breath. As he does have a history of COPD. He does require oxygen, but he was staying with his sister who smokes and he is not able to tolerate the smoke. So he has not been taking all of his medicines as he is supposed to and has not been on his oxygen. The patient has not had any history of MIs in the past, no recent stress test.  He has never seen a cardiologist.  The girlfriend states that he is on the waiting list to get into an assisted living facility, she request that he would be admitted to receive his medications and oxygen. The patient is resting comfortably in bed. He does have faint expiratory wheezes. No acute respiratory distress. Pulse ox 94%. No abdominal tenderness palpation. Regular rate and rhythm of the heart. Trace pitting edema of the lower extremities. Shawanda Cueto DO      [MS]      ED Course User Index  [LM] Francia Del Valle DO  [MS] Clarke Nova DO           --------------------------------------------- PAST HISTORY ---------------------------------------------  Past Medical History:  has a past medical history of Asthma, CHF (congestive heart failure) ([facility] COPD (chronic obstructive pulmonary disease) ([facility] Depression, Emphysema of lung ([facility] History of echocardiogram, HIV (human immunodeficiency virus infection) ([facility] Hypertension, Seizure ([facility] Seizure ([facility] and Sleep apnea. Past Surgical History:  has a past surgical history that includes Rotator cuff repair (2011) and other surgical history (05/09/2017).     Social History:  reports that he quit smoking about 2 years ago. His smoking use included cigarettes. He has a 17.50 pack-year smoking history. He has never used smokeless tobacco. He reports current alcohol use. He reports that he does not use drugs. Family History: family history includes Alcohol Abuse in his father and mother. The patients home medications have been reviewed.     Allergies: Ketorolac tromethamine    -------------------------------------------------- RESULTS -------------------------------------------------    LABS:  Results for orders placed or performed during the hospital encounter of 05/26/20   CBC Auto Differential   Result Value Ref Range    WBC 10.6 4.5 - 11.5 E9/L    RBC 4.95 3.80 - 5.80 E12/L    Hemoglobin 13.6 12.5 - 16.5 g/dL    Hematocrit 44.0 37.0 - 54.0 %    MCV 88.9 80.0 - 99.9 fL    MCH 27.5 26.0 - 35.0 pg    MCHC 30.9 (L) 32.0 - 34.5 %    RDW 13.8 11.5 - 15.0 fL    Platelets 884 949 - 569 E9/L    MPV 11.7 7.0 - 12.0 fL    Neutrophils % 55.5 43.0 - 80.0 %    Immature Granulocytes % 0.5 0.0 - 5.0 %    Lymphocytes % 33.1 20.0 - 42.0 %    Monocytes % 9.8 2.0 - 12.0 %    Eosinophils % 0.6 0.0 - 6.0 %    Basophils % 0.5 0.0 - 2.0 %    Neutrophils Absolute 5.90 1.80 - 7.30 E9/L    Immature Granulocytes # 0.05 E9/L    Lymphocytes Absolute 3.52 1.50 - 4.00 E9/L    Monocytes Absolute 1.04 (H) 0.10 - 0.95 E9/L    Eosinophils Absolute 0.06 0.05 - 0.50 E9/L    Basophils Absolute 0.05 0.00 - 0.20 E9/L   Comprehensive Metabolic Panel w/ Reflex to MG   Result Value Ref Range    Sodium 144 132 - 146 mmol/L    Potassium reflex Magnesium 3.6 3.5 - 5.0 mmol/L    Chloride 103 98 - 107 mmol/L    CO2 29 22 - 29 mmol/L    Anion Gap 12 7 - 16 mmol/L    Glucose 90 74 - 99 mg/dL    BUN 15 8 - 23 mg/dL    CREATININE 0.9 0.7 - 1.2 mg/dL    GFR Non-African American >60 >=60 mL/min/1.73    GFR African American >60     Calcium 9.2 8.6 - 10.2 mg/dL    Total Protein 7.3 6.4 - 8.3 g/dL    Alb 4.3 3.5 - 5.2 g/dL    Total Bilirubin 0.3 0.0 - 1.2 mg/dL    Alkaline Phosphatase 96 40 - 129 U/L    ALT 24 0 - 40 U/L    AST 18 0 - 39 U/L   Lipase   Result Value Ref Range    Lipase 34 13 - 60 U/L   Troponin   Result Value Ref Range    Troponin <0.01 0.00 - 0.03 ng/mL   Brain Natriuretic Peptide   Result Value Ref Range    Pro-BNP 39 0 - 125 pg/mL   Troponin   Result Value Ref Range    Troponin <0.01 0.00 - 0.03 ng/mL   EKG 12 Lead   Result Value Ref Range    Ventricular Rate 89 BPM    Atrial Rate 89 BPM    P-R Interval 156 ms    QRS Duration 102 ms    Q-T Interval 392 ms    QTc Calculation (Bazett) 476 ms    P Axis 64 degrees    R Axis -55 degrees    T Axis 59 degrees       RADIOLOGY:  XR CHEST PORTABLE   Final Result   No definite acute findings of the chest.             EKG:  This EKG is signed and interpreted by me. Rate: 89bpm  Rhythm: Sinus  Interpretation: Left anterior fascicular block. Nonspecific T wave changes. No ST segment elevation or depression. Comparison: Compared to previous EKG from February 6, 2020 no significant changes. ------------------------- NURSING NOTES AND VITALS REVIEWED ---------------------------  Date / Time Roomed:  5/26/2020  2:49 PM  ED Bed Assignment:  [id number]    The nursing notes within the ED encounter and vital signs as below have been reviewed.      Patient Vitals for the past 24 hrs:   BP Temp Temp src Pulse Resp SpO2 Height Weight   05/26/20 1947 (!) 146/76 98 °F (36.7 °C) Oral 81 22 96 % 5' 6\" (1.676 m) 195 lb (88.5 kg)   05/26/20 1923 -- -- -- 81 19 -- -- --   05/26/20 1729 (!) 165/87 -- -- 85 21 -- -- --   05/26/20 1616 -- -- -- 87 16 -- -- --   05/26/20 1457 (!) 147/88 98.1 °F (36.7 °C) Oral 91 20 94 % 5' 6\" (1.676 m) 195 lb (88.5 kg)       Oxygen Saturation Interpretation: Normal    ------------------------------------------ PROGRESS NOTES ------------------------------------------        Counseling:  I have spoken with the patient and discussed todays results, in addition to providing

## 2020-05-26 NOTE — ED NOTES
Bed: 04  Expected date:   Expected time:   Means of arrival:   Comments:  Via Stephanie Rocha RN  05/26/20 [id number]

## 2020-05-27 ENCOUNTER — APPOINTMENT (OUTPATIENT)
Dept: NUCLEAR MEDICINE | Age: 60
End: 2020-05-27
Payer: MEDICAID

## 2020-05-27 LAB
ANION GAP SERPL CALCULATED.3IONS-SCNC: 12 MMOL/L (ref 7–16)
BUN BLDV-MCNC: 14 MG/DL (ref 8–23)
CALCIUM SERPL-MCNC: 8.9 MG/DL (ref 8.6–10.2)
CHLORIDE BLD-SCNC: 103 MMOL/L (ref 98–107)
CHOLESTEROL, TOTAL: 186 MG/DL (ref 0–199)
CO2: 26 MMOL/L (ref 22–29)
CREAT SERPL-MCNC: 0.8 MG/DL (ref 0.7–1.2)
GFR AFRICAN AMERICAN: >60
GFR NON-AFRICAN AMERICAN: >60 ML/MIN/1.73
GLUCOSE BLD-MCNC: 126 MG/DL (ref 74–99)
HCT VFR BLD CALC: 44 % (ref 37–54)
HDLC SERPL-MCNC: 54 MG/DL
HEMOGLOBIN: 13.4 G/DL (ref 12.5–16.5)
LDL CHOLESTEROL CALCULATED: 112 MG/DL (ref 0–99)
LV EF: 62 %
LVEF MODALITY: NORMAL
MCH RBC QN AUTO: 27.2 PG (ref 26–35)
MCHC RBC AUTO-ENTMCNC: 30.5 % (ref 32–34.5)
MCV RBC AUTO: 89.4 FL (ref 80–99.9)
PDW BLD-RTO: 13.6 FL (ref 11.5–15)
PLATELET # BLD: 192 E9/L (ref 130–450)
PMV BLD AUTO: 11.7 FL (ref 7–12)
POTASSIUM REFLEX MAGNESIUM: 3.9 MMOL/L (ref 3.5–5)
RBC # BLD: 4.92 E12/L (ref 3.8–5.8)
SODIUM BLD-SCNC: 141 MMOL/L (ref 132–146)
TRIGL SERPL-MCNC: 102 MG/DL (ref 0–149)
VLDLC SERPL CALC-MCNC: 20 MG/DL
WBC # BLD: 8.9 E9/L (ref 4.5–11.5)

## 2020-05-27 PROCEDURE — G0378 HOSPITAL OBSERVATION PER HR: HCPCS

## 2020-05-27 PROCEDURE — 3430000000 HC RX DIAGNOSTIC RADIOPHARMACEUTICAL: Performed by: RADIOLOGY

## 2020-05-27 PROCEDURE — 6370000000 HC RX 637 (ALT 250 FOR IP): Performed by: INTERNAL MEDICINE

## 2020-05-27 PROCEDURE — 93017 CV STRESS TEST TRACING ONLY: CPT

## 2020-05-27 PROCEDURE — A9500 TC99M SESTAMIBI: HCPCS | Performed by: RADIOLOGY

## 2020-05-27 PROCEDURE — 99226 PR SBSQ OBSERVATION CARE/DAY 35 MINUTES: CPT | Performed by: INTERNAL MEDICINE

## 2020-05-27 PROCEDURE — 93018 CV STRESS TEST I&R ONLY: CPT | Performed by: INTERNAL MEDICINE

## 2020-05-27 PROCEDURE — 94640 AIRWAY INHALATION TREATMENT: CPT

## 2020-05-27 PROCEDURE — 96372 THER/PROPH/DIAG INJ SC/IM: CPT

## 2020-05-27 PROCEDURE — 93016 CV STRESS TEST SUPVJ ONLY: CPT | Performed by: INTERNAL MEDICINE

## 2020-05-27 PROCEDURE — 6360000002 HC RX W HCPCS: Performed by: INTERNAL MEDICINE

## 2020-05-27 PROCEDURE — 78452 HT MUSCLE IMAGE SPECT MULT: CPT

## 2020-05-27 PROCEDURE — 85027 COMPLETE CBC AUTOMATED: CPT

## 2020-05-27 PROCEDURE — 93005 ELECTROCARDIOGRAM TRACING: CPT | Performed by: INTERNAL MEDICINE

## 2020-05-27 PROCEDURE — 80048 BASIC METABOLIC PNL TOTAL CA: CPT

## 2020-05-27 PROCEDURE — 36415 COLL VENOUS BLD VENIPUNCTURE: CPT

## 2020-05-27 PROCEDURE — 80061 LIPID PANEL: CPT

## 2020-05-27 RX ADMIN — IPRATROPIUM BROMIDE 0.5 MG: 0.5 SOLUTION RESPIRATORY (INHALATION) at 06:07

## 2020-05-27 RX ADMIN — ENOXAPARIN SODIUM 40 MG: 40 INJECTION SUBCUTANEOUS at 20:25

## 2020-05-27 RX ADMIN — NITROGLYCERIN 1 INCH: 20 OINTMENT TOPICAL at 23:41

## 2020-05-27 RX ADMIN — NITROGLYCERIN 1 INCH: 20 OINTMENT TOPICAL at 05:22

## 2020-05-27 RX ADMIN — LISINOPRIL 20 MG: 20 TABLET ORAL at 14:39

## 2020-05-27 RX ADMIN — ESCITALOPRAM OXALATE 20 MG: 10 TABLET ORAL at 14:36

## 2020-05-27 RX ADMIN — CARVEDILOL 25 MG: 25 TABLET, FILM COATED ORAL at 20:26

## 2020-05-27 RX ADMIN — CLONIDINE HYDROCHLORIDE 0.2 MG: 0.2 TABLET ORAL at 20:26

## 2020-05-27 RX ADMIN — LEVETIRACETAM 500 MG: 500 TABLET ORAL at 20:26

## 2020-05-27 RX ADMIN — POTASSIUM CHLORIDE 10 MEQ: 10 TABLET, EXTENDED RELEASE ORAL at 14:48

## 2020-05-27 RX ADMIN — ATORVASTATIN CALCIUM 40 MG: 40 TABLET, FILM COATED ORAL at 20:26

## 2020-05-27 RX ADMIN — FAMOTIDINE 20 MG: 20 TABLET, FILM COATED ORAL at 14:36

## 2020-05-27 RX ADMIN — Medication 30 MILLICURIE: at 12:13

## 2020-05-27 RX ADMIN — IPRATROPIUM BROMIDE 0.5 MG: 0.5 SOLUTION RESPIRATORY (INHALATION) at 18:03

## 2020-05-27 RX ADMIN — REGADENOSON 0.4 MG: 0.08 INJECTION, SOLUTION INTRAVENOUS at 12:12

## 2020-05-27 RX ADMIN — AMLODIPINE BESYLATE 5 MG: 5 TABLET ORAL at 14:40

## 2020-05-27 RX ADMIN — TAMSULOSIN HYDROCHLORIDE 0.4 MG: 0.4 CAPSULE ORAL at 14:37

## 2020-05-27 RX ADMIN — ARFORMOTEROL TARTRATE 15 MCG: 15 SOLUTION RESPIRATORY (INHALATION) at 06:07

## 2020-05-27 RX ADMIN — NITROGLYCERIN 1 INCH: 20 OINTMENT TOPICAL at 20:26

## 2020-05-27 RX ADMIN — CLONIDINE HYDROCHLORIDE 0.2 MG: 0.2 TABLET ORAL at 14:40

## 2020-05-27 RX ADMIN — BUDESONIDE 500 MCG: 0.5 INHALANT RESPIRATORY (INHALATION) at 06:07

## 2020-05-27 RX ADMIN — HYDROCHLOROTHIAZIDE 25 MG: 25 TABLET ORAL at 14:47

## 2020-05-27 RX ADMIN — ARFORMOTEROL TARTRATE 15 MCG: 15 SOLUTION RESPIRATORY (INHALATION) at 18:03

## 2020-05-27 RX ADMIN — ASPIRIN 81 MG 81 MG: 81 TABLET ORAL at 14:38

## 2020-05-27 RX ADMIN — IPRATROPIUM BROMIDE 0.5 MG: 0.5 SOLUTION RESPIRATORY (INHALATION) at 14:44

## 2020-05-27 RX ADMIN — POTASSIUM CHLORIDE 10 MEQ: 10 TABLET, EXTENDED RELEASE ORAL at 14:41

## 2020-05-27 RX ADMIN — BUDESONIDE 500 MCG: 0.5 INHALANT RESPIRATORY (INHALATION) at 18:03

## 2020-05-27 RX ADMIN — Medication 10 MILLICURIE: at 09:02

## 2020-05-27 RX ADMIN — BICTEGRAVIR SODIUM, EMTRICITABINE, AND TENOFOVIR ALAFENAMIDE FUMARATE 1 TABLET: 50; 200; 25 TABLET ORAL at 20:25

## 2020-05-27 ASSESSMENT — PAIN SCALES - GENERAL
PAINLEVEL_OUTOF10: 0
PAINLEVEL_OUTOF10: 0

## 2020-05-27 NOTE — PROGRESS NOTES
stress test negative anticipate discharge later today  2. COPD-O2 PRN and selective beta-2 sympathomimetic bronchodilators  3. HIV-continue with home medications of BIKTRAVY  4. Seizure disorder-continue with Keppra  5. Hypertension-resistant-resume home medications of clonidine and Coreg lisinopril and amlodipine. We will institute hydrochlorothiazide and discontinue the Lasix  6. Major depressive disorder-continue with hydroxyzine. Add Lexapro  7. Dyslipidemia--we will institute Lipitor 40 mg daily    NOTE: This report was transcribed using voice recognition software.  Every effort was made to ensure accuracy; however, inadvertent computerized transcription errors may be present.     Electronically signed by Filomena Johnson DO on 5/27/2020 at 8:07 AM

## 2020-05-27 NOTE — PROGRESS NOTES
Lexiscan Stress Test:    Lexiscan stress completed. No chest pain, no ischemia or arrhythmia on ECG. Nuclear SPECT images pending.        Electronically signed by Darrius Nazario MD on 5/27/2020 at 12:31 PM

## 2020-05-28 LAB
ANION GAP SERPL CALCULATED.3IONS-SCNC: 11 MMOL/L (ref 7–16)
BUN BLDV-MCNC: 13 MG/DL (ref 8–23)
CALCIUM SERPL-MCNC: 8.9 MG/DL (ref 8.6–10.2)
CHLORIDE BLD-SCNC: 99 MMOL/L (ref 98–107)
CO2: 27 MMOL/L (ref 22–29)
CREAT SERPL-MCNC: 0.9 MG/DL (ref 0.7–1.2)
GFR AFRICAN AMERICAN: >60
GFR NON-AFRICAN AMERICAN: >60 ML/MIN/1.73
GLUCOSE BLD-MCNC: 125 MG/DL (ref 74–99)
HCT VFR BLD CALC: 41.4 % (ref 37–54)
HEMOGLOBIN: 13.2 G/DL (ref 12.5–16.5)
MCH RBC QN AUTO: 27.6 PG (ref 26–35)
MCHC RBC AUTO-ENTMCNC: 31.9 % (ref 32–34.5)
MCV RBC AUTO: 86.6 FL (ref 80–99.9)
PDW BLD-RTO: 13.3 FL (ref 11.5–15)
PLATELET # BLD: 195 E9/L (ref 130–450)
PMV BLD AUTO: 11.6 FL (ref 7–12)
POTASSIUM REFLEX MAGNESIUM: 4 MMOL/L (ref 3.5–5)
RBC # BLD: 4.78 E12/L (ref 3.8–5.8)
SODIUM BLD-SCNC: 137 MMOL/L (ref 132–146)
WBC # BLD: 8.8 E9/L (ref 4.5–11.5)

## 2020-05-28 PROCEDURE — 96372 THER/PROPH/DIAG INJ SC/IM: CPT

## 2020-05-28 PROCEDURE — 6360000002 HC RX W HCPCS: Performed by: INTERNAL MEDICINE

## 2020-05-28 PROCEDURE — 99233 SBSQ HOSP IP/OBS HIGH 50: CPT | Performed by: INTERNAL MEDICINE

## 2020-05-28 PROCEDURE — 6370000000 HC RX 637 (ALT 250 FOR IP): Performed by: INTERNAL MEDICINE

## 2020-05-28 PROCEDURE — 94640 AIRWAY INHALATION TREATMENT: CPT

## 2020-05-28 PROCEDURE — 36415 COLL VENOUS BLD VENIPUNCTURE: CPT

## 2020-05-28 PROCEDURE — G0378 HOSPITAL OBSERVATION PER HR: HCPCS

## 2020-05-28 PROCEDURE — 85027 COMPLETE CBC AUTOMATED: CPT

## 2020-05-28 PROCEDURE — 80048 BASIC METABOLIC PNL TOTAL CA: CPT

## 2020-05-28 RX ADMIN — ACETAMINOPHEN 650 MG: 325 TABLET, FILM COATED ORAL at 06:05

## 2020-05-28 RX ADMIN — ESCITALOPRAM OXALATE 20 MG: 10 TABLET ORAL at 09:23

## 2020-05-28 RX ADMIN — CARVEDILOL 25 MG: 25 TABLET, FILM COATED ORAL at 20:27

## 2020-05-28 RX ADMIN — NITROGLYCERIN 1 INCH: 20 OINTMENT TOPICAL at 12:50

## 2020-05-28 RX ADMIN — ARFORMOTEROL TARTRATE 15 MCG: 15 SOLUTION RESPIRATORY (INHALATION) at 19:27

## 2020-05-28 RX ADMIN — TAMSULOSIN HYDROCHLORIDE 0.4 MG: 0.4 CAPSULE ORAL at 09:23

## 2020-05-28 RX ADMIN — HYDROCHLOROTHIAZIDE 25 MG: 25 TABLET ORAL at 09:23

## 2020-05-28 RX ADMIN — FLUTICASONE PROPIONATE 1 SPRAY: 50 SPRAY, METERED NASAL at 09:26

## 2020-05-28 RX ADMIN — ASPIRIN 81 MG 81 MG: 81 TABLET ORAL at 09:24

## 2020-05-28 RX ADMIN — IPRATROPIUM BROMIDE 0.5 MG: 0.5 SOLUTION RESPIRATORY (INHALATION) at 19:27

## 2020-05-28 RX ADMIN — CARVEDILOL 25 MG: 25 TABLET, FILM COATED ORAL at 09:22

## 2020-05-28 RX ADMIN — LEVETIRACETAM 500 MG: 500 TABLET ORAL at 20:27

## 2020-05-28 RX ADMIN — IPRATROPIUM BROMIDE 0.5 MG: 0.5 SOLUTION RESPIRATORY (INHALATION) at 15:12

## 2020-05-28 RX ADMIN — CLONIDINE HYDROCHLORIDE 0.2 MG: 0.2 TABLET ORAL at 20:27

## 2020-05-28 RX ADMIN — ENOXAPARIN SODIUM 40 MG: 40 INJECTION SUBCUTANEOUS at 19:15

## 2020-05-28 RX ADMIN — IPRATROPIUM BROMIDE 0.5 MG: 0.5 SOLUTION RESPIRATORY (INHALATION) at 10:01

## 2020-05-28 RX ADMIN — NITROGLYCERIN 1 INCH: 20 OINTMENT TOPICAL at 06:05

## 2020-05-28 RX ADMIN — CLONIDINE HYDROCHLORIDE 0.2 MG: 0.2 TABLET ORAL at 09:23

## 2020-05-28 RX ADMIN — LISINOPRIL 20 MG: 20 TABLET ORAL at 09:29

## 2020-05-28 RX ADMIN — ATORVASTATIN CALCIUM 40 MG: 40 TABLET, FILM COATED ORAL at 20:30

## 2020-05-28 RX ADMIN — BUDESONIDE 500 MCG: 0.5 INHALANT RESPIRATORY (INHALATION) at 06:39

## 2020-05-28 RX ADMIN — IPRATROPIUM BROMIDE AND ALBUTEROL SULFATE 3 ML: .5; 3 SOLUTION RESPIRATORY (INHALATION) at 06:38

## 2020-05-28 RX ADMIN — AMLODIPINE BESYLATE 5 MG: 5 TABLET ORAL at 09:23

## 2020-05-28 RX ADMIN — BICTEGRAVIR SODIUM, EMTRICITABINE, AND TENOFOVIR ALAFENAMIDE FUMARATE 1 TABLET: 50; 200; 25 TABLET ORAL at 20:27

## 2020-05-28 RX ADMIN — FAMOTIDINE 20 MG: 20 TABLET, FILM COATED ORAL at 09:23

## 2020-05-28 RX ADMIN — ARFORMOTEROL TARTRATE 15 MCG: 15 SOLUTION RESPIRATORY (INHALATION) at 06:38

## 2020-05-28 RX ADMIN — POTASSIUM CHLORIDE 10 MEQ: 10 TABLET, EXTENDED RELEASE ORAL at 09:24

## 2020-05-28 RX ADMIN — BUDESONIDE 500 MCG: 0.5 INHALANT RESPIRATORY (INHALATION) at 19:27

## 2020-05-28 RX ADMIN — LEVETIRACETAM 500 MG: 500 TABLET ORAL at 09:22

## 2020-05-28 ASSESSMENT — PAIN DESCRIPTION - LOCATION
LOCATION: HEAD
LOCATION: HEAD

## 2020-05-28 ASSESSMENT — PAIN SCALES - GENERAL
PAINLEVEL_OUTOF10: 7
PAINLEVEL_OUTOF10: 0
PAINLEVEL_OUTOF10: 3

## 2020-05-28 ASSESSMENT — PAIN DESCRIPTION - PAIN TYPE
TYPE: ACUTE PAIN
TYPE: ACUTE PAIN

## 2020-05-28 ASSESSMENT — PAIN DESCRIPTION - DESCRIPTORS
DESCRIPTORS: ACHING;DISCOMFORT
DESCRIPTORS: ACHING;DISCOMFORT

## 2020-05-28 NOTE — CARE COORDINATION
Ss note:5/28/2020.12:18 PM Per Titus Ceballos, pts friend. an apt has been secured for the pt per his wishes. Titus Ceballos has obtained the keys for this apt and she can transport pt tomorrow after 4:30 when she is off of work. Pt is agreeable and prefers this discharge plan. Sw updated Dr. Thomas Hester, referral to [facility] cancelled. Anticipate discharge tomorrow per Physician.  MONIQUE Velásquez

## 2020-05-28 NOTE — PROGRESS NOTES
c/o numbness or tingling     Comments:   Upon arrival, patient lying in bed . At end of session, patient returned to bed  with call light and phone within reach, all lines and tubes intact. . Overall patient demonstrated  decreased independence and safety during completion of ADL/functional transfer/mobility tasks. .  Based on patient's functional performance as documented above  and prior level of function, patient would benefit from continued skilled OT during/following hospital stay in an effort to increase functional safety, independence with ADLS/IADLS, and overall quality of life. Assessment of current deficits   Functional mobility [x]  ADLs [x] Strength [x]  Cognition []  Functional transfers  [x] IADLs [x] Safety Awareness [x]  Endurance [x]  Fine Motor Coordination [] Balance [x] Vision/perception [] Sensation []   Gross Motor Coordination [] ROM [] Delirium []                  Motor Control []    Plan of Care: OT 1-3x/week PRN  ADL retraining [x]   Equipment needs [x]   Neuromuscular re-education [x] Energy Conservation Techniques [x]  Functional Transfer training [x] Patient and/or Family Education [x]  Functional Mobility training [x]  Environmental Modifications [x]  Cognitive re-training []   Compensatory techniques for ADLs [x]  Splinting Needs []   Positioning to improve overall function [x]   Therapeutic Activity [x]  Therapeutic Exercise  [x]  Visual/Perceptual: []    Delirium prevention/treatment  []   Other:  []    Rehab Potential: Good for established goals     Patient / Family Goal: get stronger       Patient and/or family were instructed on functional diagnosis, prognosis/goals and OT plan of care. Demonstrated good  understanding.     Low  Evaluation   Tx Time IN:1019  Tx Time OUT:   1030                                                                                                   Evaluation time includes thorough review of current medical information, gathering information on past medical history/social history and prior level of function, completion of standardized testing/informal observation of tasks, assessment of data, and development of POC/Goals      Michelle Rogers OTR/L [id number]

## 2020-05-29 VITALS
SYSTOLIC BLOOD PRESSURE: 140 MMHG | OXYGEN SATURATION: 94 % | TEMPERATURE: 97.8 F | HEIGHT: 66 IN | HEART RATE: 75 BPM | DIASTOLIC BLOOD PRESSURE: 69 MMHG | WEIGHT: 211.2 LBS | RESPIRATION RATE: 18 BRPM | BODY MASS INDEX: 33.94 KG/M2

## 2020-05-29 LAB
ANION GAP SERPL CALCULATED.3IONS-SCNC: 11 MMOL/L (ref 7–16)
BUN BLDV-MCNC: 13 MG/DL (ref 8–23)
CALCIUM SERPL-MCNC: 9.1 MG/DL (ref 8.6–10.2)
CHLORIDE BLD-SCNC: 99 MMOL/L (ref 98–107)
CO2: 29 MMOL/L (ref 22–29)
CREAT SERPL-MCNC: 0.9 MG/DL (ref 0.7–1.2)
EKG ATRIAL RATE: 74 BPM
EKG P AXIS: 47 DEGREES
EKG P-R INTERVAL: 166 MS
EKG Q-T INTERVAL: 436 MS
EKG QRS DURATION: 98 MS
EKG QTC CALCULATION (BAZETT): 483 MS
EKG R AXIS: -8 DEGREES
EKG T AXIS: 30 DEGREES
EKG VENTRICULAR RATE: 74 BPM
GFR AFRICAN AMERICAN: >60
GFR NON-AFRICAN AMERICAN: >60 ML/MIN/1.73
GLUCOSE BLD-MCNC: 106 MG/DL (ref 74–99)
HCT VFR BLD CALC: 41.1 % (ref 37–54)
HEMOGLOBIN: 13 G/DL (ref 12.5–16.5)
MCH RBC QN AUTO: 27.2 PG (ref 26–35)
MCHC RBC AUTO-ENTMCNC: 31.6 % (ref 32–34.5)
MCV RBC AUTO: 86 FL (ref 80–99.9)
PDW BLD-RTO: 13.4 FL (ref 11.5–15)
PLATELET # BLD: 209 E9/L (ref 130–450)
PMV BLD AUTO: 11.7 FL (ref 7–12)
POTASSIUM REFLEX MAGNESIUM: 3.7 MMOL/L (ref 3.5–5)
RBC # BLD: 4.78 E12/L (ref 3.8–5.8)
SODIUM BLD-SCNC: 139 MMOL/L (ref 132–146)
WBC # BLD: 8.7 E9/L (ref 4.5–11.5)

## 2020-05-29 PROCEDURE — 96372 THER/PROPH/DIAG INJ SC/IM: CPT

## 2020-05-29 PROCEDURE — 94640 AIRWAY INHALATION TREATMENT: CPT

## 2020-05-29 PROCEDURE — 6370000000 HC RX 637 (ALT 250 FOR IP): Performed by: INTERNAL MEDICINE

## 2020-05-29 PROCEDURE — 6360000002 HC RX W HCPCS: Performed by: INTERNAL MEDICINE

## 2020-05-29 PROCEDURE — G0378 HOSPITAL OBSERVATION PER HR: HCPCS

## 2020-05-29 PROCEDURE — 85027 COMPLETE CBC AUTOMATED: CPT

## 2020-05-29 PROCEDURE — 99239 HOSP IP/OBS DSCHRG MGMT >30: CPT | Performed by: INTERNAL MEDICINE

## 2020-05-29 PROCEDURE — 93010 ELECTROCARDIOGRAM REPORT: CPT | Performed by: INTERNAL MEDICINE

## 2020-05-29 PROCEDURE — 97161 PT EVAL LOW COMPLEX 20 MIN: CPT | Performed by: PHYSICAL THERAPIST

## 2020-05-29 PROCEDURE — 80048 BASIC METABOLIC PNL TOTAL CA: CPT

## 2020-05-29 PROCEDURE — 36415 COLL VENOUS BLD VENIPUNCTURE: CPT

## 2020-05-29 RX ORDER — ASPIRIN 81 MG/1
81 TABLET ORAL DAILY
Qty: 30 TABLET | Refills: 0 | Status: SHIPPED | OUTPATIENT
Start: 2020-05-29

## 2020-05-29 RX ORDER — HYDROCHLOROTHIAZIDE 25 MG/1
25 TABLET ORAL DAILY
Qty: 30 TABLET | Refills: 0 | Status: SHIPPED | OUTPATIENT
Start: 2020-05-30

## 2020-05-29 RX ORDER — ATORVASTATIN CALCIUM 40 MG/1
40 TABLET, FILM COATED ORAL NIGHTLY
Qty: 30 TABLET | Refills: 0 | Status: SHIPPED | OUTPATIENT
Start: 2020-05-29

## 2020-05-29 RX ADMIN — IPRATROPIUM BROMIDE 0.5 MG: 0.5 SOLUTION RESPIRATORY (INHALATION) at 09:15

## 2020-05-29 RX ADMIN — ARFORMOTEROL TARTRATE 15 MCG: 15 SOLUTION RESPIRATORY (INHALATION) at 06:03

## 2020-05-29 RX ADMIN — LEVETIRACETAM 500 MG: 500 TABLET ORAL at 08:12

## 2020-05-29 RX ADMIN — ENOXAPARIN SODIUM 40 MG: 40 INJECTION SUBCUTANEOUS at 08:13

## 2020-05-29 RX ADMIN — ASPIRIN 81 MG 81 MG: 81 TABLET ORAL at 08:14

## 2020-05-29 RX ADMIN — TAMSULOSIN HYDROCHLORIDE 0.4 MG: 0.4 CAPSULE ORAL at 08:14

## 2020-05-29 RX ADMIN — AMLODIPINE BESYLATE 5 MG: 5 TABLET ORAL at 08:15

## 2020-05-29 RX ADMIN — IPRATROPIUM BROMIDE 0.5 MG: 0.5 SOLUTION RESPIRATORY (INHALATION) at 06:03

## 2020-05-29 RX ADMIN — HYDROCHLOROTHIAZIDE 25 MG: 25 TABLET ORAL at 08:14

## 2020-05-29 RX ADMIN — BUDESONIDE 500 MCG: 0.5 INHALANT RESPIRATORY (INHALATION) at 06:04

## 2020-05-29 RX ADMIN — CLONIDINE HYDROCHLORIDE 0.2 MG: 0.2 TABLET ORAL at 08:13

## 2020-05-29 RX ADMIN — FAMOTIDINE 20 MG: 20 TABLET, FILM COATED ORAL at 08:13

## 2020-05-29 RX ADMIN — CARVEDILOL 25 MG: 25 TABLET, FILM COATED ORAL at 08:12

## 2020-05-29 RX ADMIN — LISINOPRIL 20 MG: 20 TABLET ORAL at 08:14

## 2020-05-29 RX ADMIN — POTASSIUM CHLORIDE 10 MEQ: 10 TABLET, EXTENDED RELEASE ORAL at 08:14

## 2020-05-29 RX ADMIN — ESCITALOPRAM OXALATE 20 MG: 10 TABLET ORAL at 08:13

## 2020-05-29 ASSESSMENT — PAIN SCALES - GENERAL
PAINLEVEL_OUTOF10: 0
PAINLEVEL_OUTOF10: 0

## 2020-05-29 NOTE — DISCHARGE SUMMARY
and in no acute distress  Skin: warm and dry  Head: normocephalic and atraumatic  Eyes: pupils equal, round, and reactive to light, extraocular eye movements intact, conjunctivae normal  Neck: neck supple and non tender without mass   Pulmonary/Chest: clear to auscultation bilaterally- no wheezes, rales or rhonchi, normal air movement, no respiratory distress  Cardiovascular: normal rate, normal S1 and S2 and no carotid bruits no distinct murmur. Abdomen: soft, non-tender, mildly distended, normal bowel sounds, no masses or organomegaly  Extremities: no cyanosis, no clubbing and no edema  Neurologic: no cranial nerve deficit and speech normal.   I/O last 3 completed shifts: In: 1360 [P.O.:1360]  Out: 0   I/O this shift:  In: 240 [P.O.:240]  Out: -       LABS:  Recent Labs     05/27/20  0650 05/28/20  0734 05/29/20  0551    137 139   K 3.9 4.0 3.7    99 99   CO2 26 27 29   BUN 14 13 13   CREATININE 0.8 0.9 0.9   GLUCOSE 126* 125* 106*   CALCIUM 8.9 8.9 9.1       Recent Labs     05/27/20  0650 05/28/20  0734 05/29/20  0551   WBC 8.9 8.8 8.7   RBC 4.92 4.78 4.78   HGB 13.4 13.2 13.0   HCT 44.0 41.4 41.1   MCV 89.4 86.6 86.0   MCH 27.2 27.6 27.2   MCHC 30.5* 31.9* 31.6*   RDW 13.6 13.3 13.4    195 209   MPV 11.7 11.6 11.7          Ref.  Range 5/26/2020 15:35 5/26/2020 20:38 5/27/2020 06:50   Pro-BNP Latest Ref Range: 0 - 125 pg/mL 39     Troponin Latest Ref Range: 0.00 - 0.03 ng/mL <0.01 <0.01    Cholesterol, Total Latest Ref Range: 0 - 199 mg/dL   186   HDL Cholesterol Latest Ref Range: >40 mg/dL   54   LDL Calculated Latest Ref Range: 0 - 99 mg/dL   112 (H)   Triglycerides Latest Ref Range: 0 - 149 mg/dL   102   VLDL Cholesterol Calculated Latest Units: mg/dL   20       Imaging:      Xr Chest Portable    Result Date: 5/26/2020  EXAMINATION: ONE XRAY VIEW OF THE CHEST 5/26/2020 3:20 pm COMPARISON: February 2020 HISTORY: ORDERING SYSTEM PROVIDED HISTORY: chest pain TECHNOLOGIST PROVIDED HISTORY: aspirin EC 81 MG EC tablet Take 1 tablet by mouth daily  Qty: 30 tablet, Refills: 0         CONTINUE these medications which have NOT CHANGED    Details   escitalopram (LEXAPRO) 20 MG tablet Take 1 tablet by mouth daily  Qty: 30 tablet, Refills: 0      bictegravir-emtricitab-tenofovir alafenamide (BIKTARVY) -25 MG TABS per tablet Take 1 tablet by mouth daily      cloNIDine (CATAPRES) 0.2 MG tablet TAKE 1 TABLET BY ORAL ROUTE 2 TIMES EVERY DAY  Refills: 3      fluticasone (FLONASE) 50 MCG/ACT nasal spray INHALE 1 SPRAY BY INTRANASAL ROUTE EVERY DAY IN EACH NOSTRIL  Refills: 3      hydrOXYzine (ATARAX) 50 MG tablet TAKE 1 TABLET BY ORAL ROUTE 4 TIMES EVERY DAY   MAY CAUSE DROWSINESS  Refills: 3      levETIRAcetam (KEPPRA) 1000 MG tablet 1,000 mg   Refills: 3      amLODIPine (NORVASC) 5 MG tablet Take by mouth      potassium chloride (MICRO-K) 10 MEQ extended release capsule Take 10 mEq by mouth daily       tamsulosin (FLOMAX) 0.4 MG capsule Take 0.4 mg by mouth daily       lisinopril (PRINIVIL;ZESTRIL) 20 MG tablet Take 1 tablet by mouth daily  Qty: 30 tablet, Refills: 0      carvedilol (COREG) 25 MG tablet Take 25 mg by mouth 2 times daily      ranitidine (ZANTAC) 150 MG tablet Take 150 mg by mouth 2 times daily      EPINEPHrine (EPIPEN) 0.3 MG/0.3ML SOAJ injection USE AS DIRECTED FOR ALLERGIC REACTION TO Xavier Kaveh  Qty: 1 each, Refills: 1    Associated Diagnoses: Severe persistent extrinsic asthma with acute exacerbation      mometasone-formoterol (DULERA) 100-5 MCG/ACT inhaler Inhale 2 puffs into the lungs 2 times daily  Qty: 1 Inhaler, Refills: 3    Associated Diagnoses: Severe persistent extrinsic asthma with acute exacerbation      tiotropium (SPIRIVA HANDIHALER) 18 MCG inhalation capsule Inhale 1 capsule into the lungs daily  Qty: 30 capsule, Refills: 3    Associated Diagnoses: Severe persistent extrinsic asthma with acute exacerbation      OXYGEN Inhale 2 L into the lungs      omalizumab [place]) 150 MG injection Inject 375 mg into the skin every 14 days  Qty: 1 vial, Refills: 3      ipratropium-albuterol (DUONEB) 0.5-2.5 (3) MG/3ML SOLN nebulizer solution Inhale 3 mLs into the lungs every 6 hours as needed for Shortness of Breath  Qty: 360 mL, Refills: 0      albuterol sulfate  (90 BASE) MCG/ACT inhaler Inhale 2 puffs into the lungs every 6 hours as needed for Wheezing         STOP taking these medications       furosemide (LASIX) 40 MG tablet Comments:   Reason for Stopping:         emtricitabine-tenofovir alafenamide (DESCOVY) 200-25 MG TABS tablet Comments:   Reason for Stopping:               Note that more than 30 minutes was spent in preparing discharge papers, discussing discharge with patient, medication review, etc.    NOTE: This report was transcribed using voice recognition software.  Every effort was made to ensure accuracy; however, inadvertent computerized transcription errors may be present.     Signed:  Electronically signed by Star Diana MD on 5/29/2020 at 11:49 AM

## 2020-05-29 NOTE — CARE COORDINATION
SS note:5/29/2020.10:25 AM received call from friend Se Martinez, apt has been obtained and she is available to transport pt  Home asap today, Physician made aware and will discharge. SW will contact Se Martinez to inform of discharge time.  MONIQUE Valera

## 2020-05-29 NOTE — CARE COORDINATION
Ss note:5/29/2020 .11:10 AM Discharge order noted. HHA order noted and provided to friend Marcell Urban. Pt prefers to use [facility] on [address]. [facility] [address] [place] [address]. Marcell Urban is picking up clothes for pt and will transport pt home today at noon.  MONIQUE Couch

## 2020-05-29 NOTE — PROGRESS NOTES
immobility and plan of care, safety  and stair training       Patient response to education:   Pt verbalized understanding Pt demonstrated skill Pt requires further education in this area   Yes Partial Yes         Treatment:  Patient practiced and was instructed in the following treatment:     Therapist educated and facilitated patient on techniques to increase safety and independence with bed mobility, balance, functional transfers, and functional mobility. Sat edge of bed 5 minutes with No assist to increase dynamic sitting balance and activity tolerance. Ambulated in hallway, performed stairs, returned to room. Patient's/ family goals   home        Patient and or family understand(s) diagnosis, prognosis, and plan of care. ASSESSMENT  No PT needs at this time. Nursing to ambulate patient every shift. Time in  925  Time out  931    Total Treatment Time  0 minutes    Evaluation time includes thorough review of current medical information, gathering information on past medical history/social history and prior level of function, completion of standardized testing/informal observation of tasks, assessment of data, and development of Plan of care and goals.      CPT codes:  Low Complexity PT evaluation (96238)      Sandi Campbell, PT

## 2020-07-03 ENCOUNTER — APPOINTMENT (OUTPATIENT)
Dept: GENERAL RADIOLOGY | Age: 60
End: 2020-07-03
Payer: MEDICAID

## 2020-07-03 ENCOUNTER — HOSPITAL ENCOUNTER (EMERGENCY)
Age: 60
Discharge: HOME | End: 2020-07-03
Attending: EMERGENCY MEDICINE
Payer: MEDICAID

## 2020-07-03 VITALS
WEIGHT: 209.56 LBS | OXYGEN SATURATION: 99 % | HEIGHT: 66 IN | BODY MASS INDEX: 33.68 KG/M2 | RESPIRATION RATE: 17 BRPM | SYSTOLIC BLOOD PRESSURE: 138 MMHG | TEMPERATURE: 98.2 F | DIASTOLIC BLOOD PRESSURE: 84 MMHG | HEART RATE: 72 BPM

## 2020-07-03 LAB
ANION GAP SERPL CALCULATED.3IONS-SCNC: 8 MMOL/L (ref 7–16)
BASOPHILS ABSOLUTE: 0.03 E9/L (ref 0–0.2)
BASOPHILS RELATIVE PERCENT: 0.3 % (ref 0–2)
BUN BLDV-MCNC: 12 MG/DL (ref 8–23)
CALCIUM SERPL-MCNC: 8.7 MG/DL (ref 8.6–10.2)
CHLORIDE BLD-SCNC: 104 MMOL/L (ref 98–107)
CO2: 31 MMOL/L (ref 22–29)
CREAT SERPL-MCNC: 0.8 MG/DL (ref 0.7–1.2)
EOSINOPHILS ABSOLUTE: 0.24 E9/L (ref 0.05–0.5)
EOSINOPHILS RELATIVE PERCENT: 2.5 % (ref 0–6)
GFR AFRICAN AMERICAN: >60
GFR NON-AFRICAN AMERICAN: >60 ML/MIN/1.73
GLUCOSE BLD-MCNC: 88 MG/DL (ref 74–99)
HCT VFR BLD CALC: 39.1 % (ref 37–54)
HEMOGLOBIN: 11.9 G/DL (ref 12.5–16.5)
IMMATURE GRANULOCYTES #: 0.03 E9/L
IMMATURE GRANULOCYTES %: 0.3 % (ref 0–5)
LYMPHOCYTES ABSOLUTE: 2.79 E9/L (ref 1.5–4)
LYMPHOCYTES RELATIVE PERCENT: 28.9 % (ref 20–42)
MCH RBC QN AUTO: 28.3 PG (ref 26–35)
MCHC RBC AUTO-ENTMCNC: 30.4 % (ref 32–34.5)
MCV RBC AUTO: 93.1 FL (ref 80–99.9)
MONOCYTES ABSOLUTE: 1.1 E9/L (ref 0.1–0.95)
MONOCYTES RELATIVE PERCENT: 11.4 % (ref 2–12)
NEUTROPHILS ABSOLUTE: 5.45 E9/L (ref 1.8–7.3)
NEUTROPHILS RELATIVE PERCENT: 56.6 % (ref 43–80)
PDW BLD-RTO: 15 FL (ref 11.5–15)
PLATELET # BLD: 165 E9/L (ref 130–450)
PMV BLD AUTO: 11.8 FL (ref 7–12)
POTASSIUM REFLEX MAGNESIUM: 4.2 MMOL/L (ref 3.5–5)
PRO-BNP: 40 PG/ML (ref 0–125)
RBC # BLD: 4.2 E12/L (ref 3.8–5.8)
SARS-COV-2, NAAT: NOT DETECTED
SODIUM BLD-SCNC: 143 MMOL/L (ref 132–146)
TROPONIN: <0.01 NG/ML (ref 0–0.03)
WBC # BLD: 9.6 E9/L (ref 4.5–11.5)

## 2020-07-03 PROCEDURE — 6370000000 HC RX 637 (ALT 250 FOR IP): Performed by: STUDENT IN AN ORGANIZED HEALTH CARE EDUCATION/TRAINING PROGRAM

## 2020-07-03 PROCEDURE — 99285 EMERGENCY DEPT VISIT HI MDM: CPT

## 2020-07-03 PROCEDURE — 71045 X-RAY EXAM CHEST 1 VIEW: CPT

## 2020-07-03 PROCEDURE — 96374 THER/PROPH/DIAG INJ IV PUSH: CPT

## 2020-07-03 PROCEDURE — 84484 ASSAY OF TROPONIN QUANT: CPT

## 2020-07-03 PROCEDURE — 83880 ASSAY OF NATRIURETIC PEPTIDE: CPT

## 2020-07-03 PROCEDURE — 93005 ELECTROCARDIOGRAM TRACING: CPT | Performed by: STUDENT IN AN ORGANIZED HEALTH CARE EDUCATION/TRAINING PROGRAM

## 2020-07-03 PROCEDURE — 80048 BASIC METABOLIC PNL TOTAL CA: CPT

## 2020-07-03 PROCEDURE — 2700000000 HC OXYGEN THERAPY PER DAY

## 2020-07-03 PROCEDURE — U0002 COVID-19 LAB TEST NON-CDC: HCPCS

## 2020-07-03 PROCEDURE — 85025 COMPLETE CBC W/AUTO DIFF WBC: CPT

## 2020-07-03 PROCEDURE — 94644 CONT INHLJ TX 1ST HOUR: CPT

## 2020-07-03 PROCEDURE — 36415 COLL VENOUS BLD VENIPUNCTURE: CPT

## 2020-07-03 PROCEDURE — 6360000002 HC RX W HCPCS: Performed by: STUDENT IN AN ORGANIZED HEALTH CARE EDUCATION/TRAINING PROGRAM

## 2020-07-03 RX ORDER — ERYTHROMYCIN 5 MG/G
OINTMENT OPHTHALMIC
Qty: 1 TUBE | Refills: 0 | Status: SHIPPED | OUTPATIENT
Start: 2020-07-03 | End: 2020-07-13

## 2020-07-03 RX ORDER — ERYTHROMYCIN 5 MG/G
OINTMENT OPHTHALMIC ONCE
Status: COMPLETED | OUTPATIENT
Start: 2020-07-03 | End: 2020-07-03

## 2020-07-03 RX ORDER — PREDNISONE 20 MG/1
20 TABLET ORAL 2 TIMES DAILY
Qty: 10 TABLET | Refills: 0 | Status: SHIPPED | OUTPATIENT
Start: 2020-07-03 | End: 2020-07-08

## 2020-07-03 RX ORDER — METHYLPREDNISOLONE SODIUM SUCCINATE 125 MG/2ML
125 INJECTION, POWDER, LYOPHILIZED, FOR SOLUTION INTRAMUSCULAR; INTRAVENOUS ONCE
Status: COMPLETED | OUTPATIENT
Start: 2020-07-03 | End: 2020-07-03

## 2020-07-03 RX ORDER — IPRATROPIUM BROMIDE AND ALBUTEROL SULFATE 2.5; .5 MG/3ML; MG/3ML
3 SOLUTION RESPIRATORY (INHALATION) ONCE
Status: COMPLETED | OUTPATIENT
Start: 2020-07-03 | End: 2020-07-03

## 2020-07-03 RX ORDER — METHYLPREDNISOLONE SODIUM SUCCINATE 40 MG/ML
40 INJECTION, POWDER, LYOPHILIZED, FOR SOLUTION INTRAMUSCULAR; INTRAVENOUS DAILY
Status: DISCONTINUED | OUTPATIENT
Start: 2020-07-03 | End: 2020-07-03

## 2020-07-03 RX ORDER — AZITHROMYCIN 250 MG/1
TABLET, FILM COATED ORAL
Qty: 1 PACKET | Refills: 0 | Status: SHIPPED | OUTPATIENT
Start: 2020-07-03 | End: 2020-07-07

## 2020-07-03 RX ADMIN — METHYLPREDNISOLONE SODIUM SUCCINATE 125 MG: 125 INJECTION, POWDER, FOR SOLUTION INTRAMUSCULAR; INTRAVENOUS at 15:45

## 2020-07-03 RX ADMIN — IPRATROPIUM BROMIDE AND ALBUTEROL SULFATE 3 AMPULE: .5; 3 SOLUTION RESPIRATORY (INHALATION) at 14:54

## 2020-07-03 RX ADMIN — ERYTHROMYCIN: 5 OINTMENT OPHTHALMIC at 15:45

## 2020-07-03 ASSESSMENT — ENCOUNTER SYMPTOMS
SHORTNESS OF BREATH: 1
ABDOMINAL PAIN: 0
SINUS PRESSURE: 0
BACK PAIN: 0
EYE DISCHARGE: 0
EYE PAIN: 0
COUGH: 0
VOMITING: 0
EYE REDNESS: 1
DIARRHEA: 0
WHEEZING: 1
SORE THROAT: 0
NAUSEA: 0

## 2020-07-03 NOTE — ED PROVIDER NOTES
Chief Complaint   Patient presents with    Shortness of Breath     hx of COPD, wears 5L cont at home; wheezing noted upon EMS arrival, aerosol given en route; patient states it has been very warm in his apt. Scottie Mcclrue 78-year-old male with a past medical history of COPD who wears 4 L oxygen at all times, CHF who presents today for shortness of breath. Patient states symptoms have been present for the past several days, but have continued to worsen. He states he has been using inhalers at home as prescribed with minimal relief of his symptoms. EMS arrived on scene, gave the patient a breathing treatment. Patient is stable upon presentation with a O2 saturation of 99% on his home 4 L. Patient denies chest pain, cough, fever or chills. Patient states he lives at home by himself, has not had any recent travel. Patient denies recent surgeries, history of DVT or PE. Symptoms not made better or worse by anything in specific. The history is provided by the patient. No  was used. Review of Systems   Constitutional: Negative for chills and fever. HENT: Negative for ear pain, sinus pressure and sore throat. Eyes: Positive for redness. Negative for pain and discharge. Respiratory: Positive for shortness of breath and wheezing. Negative for cough. Cardiovascular: Positive for leg swelling. Negative for chest pain. Gastrointestinal: Negative for abdominal pain, diarrhea, nausea and vomiting. Genitourinary: Negative for dysuria and frequency. Musculoskeletal: Negative for arthralgias and back pain. Skin: Negative for rash and wound. Neurological: Negative for dizziness, syncope, weakness and headaches. Hematological: Negative for adenopathy. Psychiatric/Behavioral: Negative for behavioral problems and confusion. All other systems reviewed and are negative. Physical Exam  Vitals signs and nursing note reviewed.    Constitutional:       General: He is not in acute distress. Appearance: Normal appearance. He is well-developed. He is not ill-appearing. HENT:      Head: Normocephalic and atraumatic. Mouth/Throat:      Mouth: Mucous membranes are moist.   Eyes:      Extraocular Movements: Extraocular movements intact. Pupils: Pupils are equal, round, and reactive to light. Comments: Bilateral conjunctivitis    Neck:      Musculoskeletal: Normal range of motion and neck supple. No neck rigidity or muscular tenderness. Cardiovascular:      Rate and Rhythm: Normal rate and regular rhythm. Pulses: Normal pulses. Heart sounds: Normal heart sounds. No murmur. Pulmonary:      Effort: No respiratory distress. Breath sounds: Wheezing (expiratory) present. No rales. Abdominal:      General: Bowel sounds are normal.      Palpations: Abdomen is soft. Tenderness: There is no abdominal tenderness. There is no guarding or rebound. Musculoskeletal:      Right lower leg: Edema present. Left lower leg: Edema present. Skin:     General: Skin is warm and dry. Capillary Refill: Capillary refill takes less than 2 seconds. Neurological:      General: No focal deficit present. Mental Status: He is alert and oriented to person, place, and time. Cranial Nerves: No cranial nerve deficit.       Coordination: Coordination normal.   Psychiatric:         Mood and Affect: Mood normal.         Behavior: Behavior normal.          Procedures     Labs Reviewed   CBC WITH AUTO DIFFERENTIAL - Abnormal; Notable for the following components:       Result Value    Hemoglobin 11.9 (*)     MCHC 30.4 (*)     Monocytes Absolute 1.10 (*)     All other components within normal limits   BASIC METABOLIC PANEL W/ REFLEX TO MG FOR LOW K - Abnormal; Notable for the following components:    CO2 31 (*)     All other components within normal limits   TROPONIN   COVID-19   BRAIN NATRIURETIC PEPTIDE     XR CHEST PORTABLE   Preliminary Result   Stable borderline enlarged cardiac silhouette. 1. Pulmonary venous hypertension with no overt congestive heart failure or   pneumonia. EKG #1:  Interpreted by emergency department physician unless otherwise noted. Time:  1417    Rate: 73  Rhythm: Sinus. Interpretation: Normal sinus rhythm, normal axis, normal no ST elevation or T wave inversion. MDM  Number of Diagnoses or Management Options  Diagnosis management comments: Patient is a 78-year-old male presents today for shortness of breath. Physical exam shows expiratory wheezing to the lobes bilaterally. Remainder physical exam is within normal limits. Patient's vitals are stable, he is afebrile and department, O2 is stable on his home 4 L. Lab work and imaging was obtained. Lab work shows no elevated white white count, COVID-19 is negative, BNP is normal.  Chest x-ray does not show evidence of pneumonia. Patient was given DuoNeb and steroids in the department. Patient was able to ambulate well on his 4 L with no injury to his O2 saturation. Was bilateral conjunctivitis, for which she will be sent home with the rest of my steroid prescription. Patient is stable to be discharged home and instructed to follow-up with his PCP. Strict return precautions were given. Patient understands agrees with this plan. Amount and/or Complexity of Data Reviewed  Clinical lab tests: reviewed  Tests in the radiology section of CPT®: reviewed           ED Course as of Jul 03 1609   Fri Jul 03, 2020   1439   ATTENDING PROVIDER ATTESTATION:     I have personally performed and/or participated in the history, exam, medical decision making, and procedures and agree with all pertinent clinical information unless otherwise noted. I have also reviewed and agree with the past medical, family and social history unless otherwise noted.     I have discussed this patient in detail with the resident and provided the instruction and education regarding the evidence-based evaluation and treatment of [unfilled]  History: patient presents with complaint of SOB. He states he was walking up the steps and became very winded. He also noted that his eyelids are swollen with drainage and crusting. No chest pain. Mild peripheral edema. My findings: Negro Armstrong is a 56 y.o. male whom is in no distress. Physical exam reveals edematous and slightly erythematous eyelids. Conjunctival injection with yellow crusting. Heart RRR, lungs with diffuse tight wheezes. 1+ b/l peripheral edema. My plan: Symptomatic and supportive care. Will evaluate with CXR, labs and give breathing treatments as well as treat the conjunctivitis. Electronically signed by Cherelle Tsai DO on 7/3/20 at 2:39 PM EDT          [JS]      ED Course User Index  [JS] Cherelle Tsai DO       --------------------------------------------- PAST HISTORY ---------------------------------------------  Past Medical History:  has a past medical history of Asthma, CHF (congestive heart failure) ([facility] COPD (chronic obstructive pulmonary disease) ([facility] Depression, Emphysema of lung ([facility] H/O cardiovascular stress test, History of echocardiogram, HIV (human immunodeficiency virus infection) ([facility] Hypertension, Seizure ([facility] Seizure ([facility] and Sleep apnea. Past Surgical History:  has a past surgical history that includes Rotator cuff repair (2011) and other surgical history (05/09/2017). Social History:  reports that he has been smoking cigarettes. He has a 17.50 pack-year smoking history. He has never used smokeless tobacco. He reports current alcohol use. He reports that he does not use drugs. Family History: family history includes Alcohol Abuse in his father and mother. The patients home medications have been reviewed.     Allergies: Ketorolac tromethamine    -------------------------------------------------- RESULTS -------------------------------------------------  Labs:  Results for orders placed or performed during the hospital encounter of 07/03/20   CBC Auto Differential   Result Value Ref Range    WBC 9.6 4.5 - 11.5 E9/L    RBC 4.20 3.80 - 5.80 E12/L    Hemoglobin 11.9 (L) 12.5 - 16.5 g/dL    Hematocrit 39.1 37.0 - 54.0 %    MCV 93.1 80.0 - 99.9 fL    MCH 28.3 26.0 - 35.0 pg    MCHC 30.4 (L) 32.0 - 34.5 %    RDW 15.0 11.5 - 15.0 fL    Platelets 962 763 - 910 E9/L    MPV 11.8 7.0 - 12.0 fL    Neutrophils % 56.6 43.0 - 80.0 %    Immature Granulocytes % 0.3 0.0 - 5.0 %    Lymphocytes % 28.9 20.0 - 42.0 %    Monocytes % 11.4 2.0 - 12.0 %    Eosinophils % 2.5 0.0 - 6.0 %    Basophils % 0.3 0.0 - 2.0 %    Neutrophils Absolute 5.45 1.80 - 7.30 E9/L    Immature Granulocytes # 0.03 E9/L    Lymphocytes Absolute 2.79 1.50 - 4.00 E9/L    Monocytes Absolute 1.10 (H) 0.10 - 0.95 E9/L    Eosinophils Absolute 0.24 0.05 - 0.50 E9/L    Basophils Absolute 0.03 0.00 - 0.20 W7/L   Basic Metabolic Panel w/ Reflex to MG   Result Value Ref Range    Sodium 143 132 - 146 mmol/L    Potassium reflex Magnesium 4.2 3.5 - 5.0 mmol/L    Chloride 104 98 - 107 mmol/L    CO2 31 (H) 22 - 29 mmol/L    Anion Gap 8 7 - 16 mmol/L    Glucose 88 74 - 99 mg/dL    BUN 12 8 - 23 mg/dL    CREATININE 0.8 0.7 - 1.2 mg/dL    GFR Non-African American >60 >=60 mL/min/1.73    GFR African American >60     Calcium 8.7 8.6 - 10.2 mg/dL   Troponin   Result Value Ref Range    Troponin <0.01 0.00 - 0.03 ng/mL   COVID-19   Result Value Ref Range    SARS-CoV-2, NAAT Not Detected Not Detected   Brain Natriuretic Peptide   Result Value Ref Range    Pro-BNP 40 0 - 125 pg/mL       Radiology:  XR CHEST PORTABLE   Preliminary Result   Stable borderline enlarged cardiac silhouette.       1. Pulmonary venous hypertension with no overt congestive heart failure or   pneumonia.             ------------------------- NURSING NOTES AND VITALS REVIEWED ---------------------------  Date / Time Socrates Lester, DO  Resident  07/03/20 8310

## 2020-07-03 NOTE — ED NOTES
Bed: 04  Expected date:   Expected time:   Means of arrival:   Comments:  Via Stephanie Rocha RN  07/03/20 1250

## 2020-07-03 NOTE — ED NOTES
Patient ambulated on home 4L n/c. Maintained pulse ox of 98-99% while ambulating.  Patient has no complaints      Kt Simon RN  07/03/20 9799

## 2020-07-04 ENCOUNTER — CARE COORDINATION (OUTPATIENT)
Dept: CARE COORDINATION | Age: 60
End: 2020-07-04

## 2020-07-04 LAB
EKG ATRIAL RATE: 73 BPM
EKG P AXIS: 69 DEGREES
EKG P-R INTERVAL: 168 MS
EKG Q-T INTERVAL: 422 MS
EKG QRS DURATION: 100 MS
EKG QTC CALCULATION (BAZETT): 464 MS
EKG R AXIS: 75 DEGREES
EKG T AXIS: 55 DEGREES
EKG VENTRICULAR RATE: 73 BPM

## 2020-07-04 PROCEDURE — 93010 ELECTROCARDIOGRAM REPORT: CPT | Performed by: INTERNAL MEDICINE

## 2020-07-04 NOTE — CARE COORDINATION
Patient contacted regarding COVID-19. DX: COPD Exacerbation   Discussed COVID-19 related testing which was available at this time. Test results were negative. Patient informed of results, if available? Patient reports he was informed of results at ED visit. Care Transition Nurse/ Ambulatory Care Manager contacted the patient by telephone to perform post discharge assessment. Verified name and  with patient as identifiers. Provided introduction to self, and explanation of the CTN/ACM role, and reason for call due to risk factors for infection and/or exposure to COVID-19. Symptoms reviewed with patient who verbalized the following symptoms: shortness of breath, no new symptoms and no worsening symptoms.  -Patient reports shortness of breath is at baseline; oxygen at 5 LNC continuously. Due to no new or worsening symptoms encounter was not routed to provider for escalation. Discussed follow-up appointments. If no appointment was previously scheduled, appointment scheduling offered: Patient agrees to call Monday to schedule f/u appointment with CARLYLE Chavarria.      [facility] follow up appointment(s):   Future Appointments   Date Time Provider Edith Cantor   10/22/2020 10:00 AM Margie Adams MD [facility]          Patient has following risk factors of: heart failure, COPD and CVD. CTN/ACM reviewed discharge instructions, medical action plan and red flags such as increased shortness of breath, increasing fever and signs of decompensation with patient who verbalized understanding. Discussed exposure protocols and quarantine with CDC Guidelines What to do if you are sick with coronavirus disease .  Patient was given an opportunity for questions and concerns. The patient agrees to contact the [facility] exposure line [phone], [facility] department [place] [facility]: ([phone]) and PCP office for questions related to their healthcare.  CTN/ACM provided contact information for future needs. Reviewed and educated patient on any new and changed medications related to discharge diagnosis   -Patient to  medications today that were prescribed at discharge. CTN confirmed patient's pharmacy [facility] is open from 10 am until 6 pm today. Patient/family/caregiver given information for [employer] and agrees to enroll yes  Patient's preferred e-mail: N/A    Pt will be further monitored by COVID Loop Team based on severity of symptoms and risk factors. CTN reviewed My Chart sign-up with the patient; patient politely declines at this time. Healthcare Decision Makers reviewed with the patient.

## 2020-08-07 ENCOUNTER — APPOINTMENT (OUTPATIENT)
Dept: GENERAL RADIOLOGY | Age: 60
DRG: 892 | End: 2020-08-07
Payer: MEDICAID

## 2020-08-07 ENCOUNTER — HOSPITAL ENCOUNTER (INPATIENT)
Age: 60
LOS: 3 days | Discharge: HOME HEALTH | DRG: 892 | End: 2020-08-10
Attending: EMERGENCY MEDICINE | Admitting: INTERNAL MEDICINE
Payer: MEDICAID

## 2020-08-07 PROBLEM — J44.1 COPD EXACERBATION (HCC): Status: ACTIVE | Noted: 2020-08-07

## 2020-08-07 PROBLEM — J96.21 ACUTE ON CHRONIC RESPIRATORY FAILURE WITH HYPOXEMIA (HCC): Status: ACTIVE | Noted: 2020-08-07

## 2020-08-07 LAB
ANION GAP SERPL CALCULATED.3IONS-SCNC: 7 MMOL/L (ref 7–16)
B.E.: 5.9 MMOL/L (ref -3–3)
BASOPHILS ABSOLUTE: 0.04 E9/L (ref 0–0.2)
BASOPHILS RELATIVE PERCENT: 0.4 % (ref 0–2)
BUN BLDV-MCNC: 9 MG/DL (ref 8–23)
CALCIUM SERPL-MCNC: 8.7 MG/DL (ref 8.6–10.2)
CHLORIDE BLD-SCNC: 101 MMOL/L (ref 98–107)
CO2: 35 MMOL/L (ref 22–29)
COHB: 0.5 % (ref 0–1.5)
COMMENT: ABNORMAL
CREAT SERPL-MCNC: 0.7 MG/DL (ref 0.7–1.2)
CRITICAL: ABNORMAL
DATE ANALYZED: ABNORMAL
DATE OF COLLECTION: ABNORMAL
EKG ATRIAL RATE: 82 BPM
EKG P AXIS: 67 DEGREES
EKG P-R INTERVAL: 160 MS
EKG Q-T INTERVAL: 390 MS
EKG QRS DURATION: 88 MS
EKG QTC CALCULATION (BAZETT): 455 MS
EKG R AXIS: -17 DEGREES
EKG T AXIS: 64 DEGREES
EKG VENTRICULAR RATE: 82 BPM
EOSINOPHILS ABSOLUTE: 0.19 E9/L (ref 0.05–0.5)
EOSINOPHILS RELATIVE PERCENT: 2 % (ref 0–6)
GFR AFRICAN AMERICAN: >60
GFR NON-AFRICAN AMERICAN: >60 ML/MIN/1.73
GLUCOSE BLD-MCNC: 109 MG/DL (ref 74–99)
HCO3: 34.1 MMOL/L (ref 22–26)
HCT VFR BLD CALC: 39.8 % (ref 37–54)
HEMOGLOBIN: 12.3 G/DL (ref 12.5–16.5)
HHB: 8.8 % (ref 0–5)
IMMATURE GRANULOCYTES #: 0.04 E9/L
IMMATURE GRANULOCYTES %: 0.4 % (ref 0–5)
LAB: ABNORMAL
LACTIC ACID: 1 MMOL/L (ref 0.5–2.2)
LYMPHOCYTES ABSOLUTE: 2.67 E9/L (ref 1.5–4)
LYMPHOCYTES RELATIVE PERCENT: 28.6 % (ref 20–42)
Lab: ABNORMAL
MCH RBC QN AUTO: 29 PG (ref 26–35)
MCHC RBC AUTO-ENTMCNC: 30.9 % (ref 32–34.5)
MCV RBC AUTO: 93.9 FL (ref 80–99.9)
METHB: 0.3 % (ref 0–1.5)
MODE: ABNORMAL
MONOCYTES ABSOLUTE: 0.98 E9/L (ref 0.1–0.95)
MONOCYTES RELATIVE PERCENT: 10.5 % (ref 2–12)
NEUTROPHILS ABSOLUTE: 5.41 E9/L (ref 1.8–7.3)
NEUTROPHILS RELATIVE PERCENT: 58.1 % (ref 43–80)
O2 CONTENT: 17.2 ML/DL
O2 SATURATION: 91.1 % (ref 92–98.5)
O2HB: 90.4 % (ref 94–97)
OPERATOR ID: ABNORMAL
PATIENT TEMP: 37 C
PCO2: 67.1 MMHG (ref 35–45)
PDW BLD-RTO: 14.9 FL (ref 11.5–15)
PH BLOOD GAS: 7.32 (ref 7.35–7.45)
PLATELET # BLD: 167 E9/L (ref 130–450)
PMV BLD AUTO: 11.4 FL (ref 7–12)
PO2: 60.4 MMHG (ref 75–100)
POTASSIUM REFLEX MAGNESIUM: 4 MMOL/L (ref 3.5–5)
PRO-BNP: 48 PG/ML (ref 0–125)
RBC # BLD: 4.24 E12/L (ref 3.8–5.8)
SARS-COV-2, NAAT: NOT DETECTED
SODIUM BLD-SCNC: 143 MMOL/L (ref 132–146)
SOURCE, BLOOD GAS: ABNORMAL
THB: 13.5 G/DL (ref 11.5–16.5)
TIME ANALYZED: 1424
TROPONIN: <0.01 NG/ML (ref 0–0.03)
WBC # BLD: 9.3 E9/L (ref 4.5–11.5)

## 2020-08-07 PROCEDURE — 71045 X-RAY EXAM CHEST 1 VIEW: CPT

## 2020-08-07 PROCEDURE — 94664 DEMO&/EVAL PT USE INHALER: CPT

## 2020-08-07 PROCEDURE — 87450 HC DIRECT STREP B ANTIGEN: CPT

## 2020-08-07 PROCEDURE — 6370000000 HC RX 637 (ALT 250 FOR IP): Performed by: INTERNAL MEDICINE

## 2020-08-07 PROCEDURE — 6360000002 HC RX W HCPCS: Performed by: INTERNAL MEDICINE

## 2020-08-07 PROCEDURE — 84484 ASSAY OF TROPONIN QUANT: CPT

## 2020-08-07 PROCEDURE — 6360000002 HC RX W HCPCS: Performed by: STUDENT IN AN ORGANIZED HEALTH CARE EDUCATION/TRAINING PROGRAM

## 2020-08-07 PROCEDURE — 93005 ELECTROCARDIOGRAM TRACING: CPT | Performed by: STUDENT IN AN ORGANIZED HEALTH CARE EDUCATION/TRAINING PROGRAM

## 2020-08-07 PROCEDURE — 93010 ELECTROCARDIOGRAM REPORT: CPT | Performed by: INTERNAL MEDICINE

## 2020-08-07 PROCEDURE — 94644 CONT INHLJ TX 1ST HOUR: CPT

## 2020-08-07 PROCEDURE — 82805 BLOOD GASES W/O2 SATURATION: CPT

## 2020-08-07 PROCEDURE — 94667 MNPJ CHEST WALL 1ST: CPT

## 2020-08-07 PROCEDURE — 6370000000 HC RX 637 (ALT 250 FOR IP): Performed by: STUDENT IN AN ORGANIZED HEALTH CARE EDUCATION/TRAINING PROGRAM

## 2020-08-07 PROCEDURE — 2500000003 HC RX 250 WO HCPCS: Performed by: INTERNAL MEDICINE

## 2020-08-07 PROCEDURE — U0002 COVID-19 LAB TEST NON-CDC: HCPCS

## 2020-08-07 PROCEDURE — 99285 EMERGENCY DEPT VISIT HI MDM: CPT

## 2020-08-07 PROCEDURE — 80048 BASIC METABOLIC PNL TOTAL CA: CPT

## 2020-08-07 PROCEDURE — 2580000003 HC RX 258: Performed by: INTERNAL MEDICINE

## 2020-08-07 PROCEDURE — 94660 CPAP INITIATION&MGMT: CPT

## 2020-08-07 PROCEDURE — 99223 1ST HOSP IP/OBS HIGH 75: CPT | Performed by: INTERNAL MEDICINE

## 2020-08-07 PROCEDURE — 2060000000 HC ICU INTERMEDIATE R&B

## 2020-08-07 PROCEDURE — 96374 THER/PROPH/DIAG INJ IV PUSH: CPT

## 2020-08-07 PROCEDURE — 85025 COMPLETE CBC W/AUTO DIFF WBC: CPT

## 2020-08-07 PROCEDURE — 83605 ASSAY OF LACTIC ACID: CPT

## 2020-08-07 PROCEDURE — 87070 CULTURE OTHR SPECIMN AEROBIC: CPT

## 2020-08-07 PROCEDURE — 83880 ASSAY OF NATRIURETIC PEPTIDE: CPT

## 2020-08-07 RX ORDER — METHYLPREDNISOLONE SODIUM SUCCINATE 125 MG/2ML
80 INJECTION, POWDER, LYOPHILIZED, FOR SOLUTION INTRAMUSCULAR; INTRAVENOUS EVERY 8 HOURS
Status: DISCONTINUED | OUTPATIENT
Start: 2020-08-07 | End: 2020-08-08

## 2020-08-07 RX ORDER — MAGNESIUM SULFATE IN WATER 40 MG/ML
2 INJECTION, SOLUTION INTRAVENOUS ONCE
Status: COMPLETED | OUTPATIENT
Start: 2020-08-07 | End: 2020-08-07

## 2020-08-07 RX ORDER — LANOLIN ALCOHOL/MO/W.PET/CERES
400 CREAM (GRAM) TOPICAL 2 TIMES DAILY
Status: DISCONTINUED | OUTPATIENT
Start: 2020-08-07 | End: 2020-08-10 | Stop reason: HOSPADM

## 2020-08-07 RX ORDER — ONDANSETRON 2 MG/ML
4 INJECTION INTRAMUSCULAR; INTRAVENOUS EVERY 6 HOURS PRN
Status: DISCONTINUED | OUTPATIENT
Start: 2020-08-07 | End: 2020-08-10 | Stop reason: HOSPADM

## 2020-08-07 RX ORDER — NICOTINE 21 MG/24HR
1 PATCH, TRANSDERMAL 24 HOURS TRANSDERMAL DAILY
Status: DISCONTINUED | OUTPATIENT
Start: 2020-08-07 | End: 2020-08-10 | Stop reason: HOSPADM

## 2020-08-07 RX ORDER — PROMETHAZINE HYDROCHLORIDE 25 MG/1
12.5 TABLET ORAL EVERY 6 HOURS PRN
Status: DISCONTINUED | OUTPATIENT
Start: 2020-08-07 | End: 2020-08-10 | Stop reason: HOSPADM

## 2020-08-07 RX ORDER — CLONIDINE HYDROCHLORIDE 0.2 MG/1
0.2 TABLET ORAL 2 TIMES DAILY
Status: DISCONTINUED | OUTPATIENT
Start: 2020-08-07 | End: 2020-08-10 | Stop reason: HOSPADM

## 2020-08-07 RX ORDER — ACETAMINOPHEN 650 MG/1
650 SUPPOSITORY RECTAL EVERY 6 HOURS PRN
Status: DISCONTINUED | OUTPATIENT
Start: 2020-08-07 | End: 2020-08-10 | Stop reason: HOSPADM

## 2020-08-07 RX ORDER — SODIUM CHLORIDE 0.9 % (FLUSH) 0.9 %
10 SYRINGE (ML) INJECTION PRN
Status: DISCONTINUED | OUTPATIENT
Start: 2020-08-07 | End: 2020-08-10 | Stop reason: HOSPADM

## 2020-08-07 RX ORDER — ACETAMINOPHEN 325 MG/1
650 TABLET ORAL EVERY 6 HOURS PRN
Status: DISCONTINUED | OUTPATIENT
Start: 2020-08-07 | End: 2020-08-10 | Stop reason: HOSPADM

## 2020-08-07 RX ORDER — LISINOPRIL 10 MG/1
10 TABLET ORAL DAILY
Status: DISCONTINUED | OUTPATIENT
Start: 2020-08-07 | End: 2020-08-10 | Stop reason: HOSPADM

## 2020-08-07 RX ORDER — HYDROXYZINE HYDROCHLORIDE 25 MG/1
25 TABLET, FILM COATED ORAL 3 TIMES DAILY PRN
Status: DISCONTINUED | OUTPATIENT
Start: 2020-08-07 | End: 2020-08-10 | Stop reason: HOSPADM

## 2020-08-07 RX ORDER — LEVETIRACETAM 500 MG/1
1000 TABLET ORAL 2 TIMES DAILY
Status: DISCONTINUED | OUTPATIENT
Start: 2020-08-07 | End: 2020-08-10 | Stop reason: HOSPADM

## 2020-08-07 RX ORDER — IPRATROPIUM BROMIDE AND ALBUTEROL SULFATE 2.5; .5 MG/3ML; MG/3ML
2 SOLUTION RESPIRATORY (INHALATION) ONCE
Status: COMPLETED | OUTPATIENT
Start: 2020-08-07 | End: 2020-08-07

## 2020-08-07 RX ORDER — CARVEDILOL 6.25 MG/1
6.25 TABLET ORAL 2 TIMES DAILY WITH MEALS
Status: DISCONTINUED | OUTPATIENT
Start: 2020-08-07 | End: 2020-08-10 | Stop reason: HOSPADM

## 2020-08-07 RX ORDER — IPRATROPIUM BROMIDE AND ALBUTEROL SULFATE 2.5; .5 MG/3ML; MG/3ML
1 SOLUTION RESPIRATORY (INHALATION)
Status: DISCONTINUED | OUTPATIENT
Start: 2020-08-07 | End: 2020-08-10 | Stop reason: HOSPADM

## 2020-08-07 RX ORDER — ATORVASTATIN CALCIUM 40 MG/1
40 TABLET, FILM COATED ORAL NIGHTLY
Status: DISCONTINUED | OUTPATIENT
Start: 2020-08-07 | End: 2020-08-10 | Stop reason: HOSPADM

## 2020-08-07 RX ORDER — SODIUM CHLORIDE 0.9 % (FLUSH) 0.9 %
10 SYRINGE (ML) INJECTION EVERY 12 HOURS SCHEDULED
Status: DISCONTINUED | OUTPATIENT
Start: 2020-08-07 | End: 2020-08-10 | Stop reason: HOSPADM

## 2020-08-07 RX ORDER — POLYETHYLENE GLYCOL 3350 17 G/17G
17 POWDER, FOR SOLUTION ORAL DAILY PRN
Status: DISCONTINUED | OUTPATIENT
Start: 2020-08-07 | End: 2020-08-10 | Stop reason: HOSPADM

## 2020-08-07 RX ORDER — TAMSULOSIN HYDROCHLORIDE 0.4 MG/1
0.4 CAPSULE ORAL DAILY
Status: DISCONTINUED | OUTPATIENT
Start: 2020-08-07 | End: 2020-08-10 | Stop reason: HOSPADM

## 2020-08-07 RX ORDER — ESCITALOPRAM OXALATE 10 MG/1
20 TABLET ORAL DAILY
Status: DISCONTINUED | OUTPATIENT
Start: 2020-08-08 | End: 2020-08-10 | Stop reason: HOSPADM

## 2020-08-07 RX ORDER — AMLODIPINE BESYLATE 10 MG/1
10 TABLET ORAL DAILY
Status: DISCONTINUED | OUTPATIENT
Start: 2020-08-07 | End: 2020-08-10 | Stop reason: HOSPADM

## 2020-08-07 RX ADMIN — ATORVASTATIN CALCIUM 40 MG: 40 TABLET, FILM COATED ORAL at 21:23

## 2020-08-07 RX ADMIN — CLONIDINE HYDROCHLORIDE 0.2 MG: 0.2 TABLET ORAL at 21:24

## 2020-08-07 RX ADMIN — SODIUM CHLORIDE, PRESERVATIVE FREE 10 ML: 5 INJECTION INTRAVENOUS at 21:24

## 2020-08-07 RX ADMIN — Medication 400 MG: at 21:23

## 2020-08-07 RX ADMIN — AMLODIPINE BESYLATE 10 MG: 10 TABLET ORAL at 19:48

## 2020-08-07 RX ADMIN — TAMSULOSIN HYDROCHLORIDE 0.4 MG: 0.4 CAPSULE ORAL at 21:23

## 2020-08-07 RX ADMIN — METHYLPREDNISOLONE SODIUM SUCCINATE 80 MG: 125 INJECTION, POWDER, FOR SOLUTION INTRAMUSCULAR; INTRAVENOUS at 16:42

## 2020-08-07 RX ADMIN — WATER 2 G: 1 INJECTION INTRAMUSCULAR; INTRAVENOUS; SUBCUTANEOUS at 16:48

## 2020-08-07 RX ADMIN — LISINOPRIL 10 MG: 10 TABLET ORAL at 19:48

## 2020-08-07 RX ADMIN — HYDROXYZINE HYDROCHLORIDE 25 MG: 25 TABLET, FILM COATED ORAL at 23:25

## 2020-08-07 RX ADMIN — MAGNESIUM SULFATE HEPTAHYDRATE 2 G: 40 INJECTION, SOLUTION INTRAVENOUS at 14:33

## 2020-08-07 RX ADMIN — CARVEDILOL 6.25 MG: 6.25 TABLET, FILM COATED ORAL at 19:48

## 2020-08-07 RX ADMIN — ENOXAPARIN SODIUM 40 MG: 40 INJECTION SUBCUTANEOUS at 16:45

## 2020-08-07 RX ADMIN — IPRATROPIUM BROMIDE AND ALBUTEROL SULFATE 2 AMPULE: .5; 3 SOLUTION RESPIRATORY (INHALATION) at 14:18

## 2020-08-07 RX ADMIN — IPRATROPIUM BROMIDE AND ALBUTEROL SULFATE 1 AMPULE: .5; 3 SOLUTION RESPIRATORY (INHALATION) at 17:46

## 2020-08-07 RX ADMIN — DOXYCYCLINE 100 MG: 100 INJECTION, POWDER, LYOPHILIZED, FOR SOLUTION INTRAVENOUS at 17:11

## 2020-08-07 RX ADMIN — LEVETIRACETAM 1000 MG: 500 TABLET ORAL at 21:23

## 2020-08-07 ASSESSMENT — PAIN SCALES - GENERAL
PAINLEVEL_OUTOF10: 0
PAINLEVEL_OUTOF10: 8

## 2020-08-07 ASSESSMENT — ENCOUNTER SYMPTOMS
WHEEZING: 1
COUGH: 1
BACK PAIN: 0
BLOOD IN STOOL: 0
VOMITING: 0
ABDOMINAL PAIN: 0
NAUSEA: 0
DIARRHEA: 0
SHORTNESS OF BREATH: 1
CONSTIPATION: 0
RHINORRHEA: 0
SORE THROAT: 0

## 2020-08-07 ASSESSMENT — PAIN DESCRIPTION - PAIN TYPE: TYPE: ACUTE PAIN

## 2020-08-07 ASSESSMENT — PAIN DESCRIPTION - DESCRIPTORS: DESCRIPTORS: TIGHTNESS

## 2020-08-07 ASSESSMENT — PAIN DESCRIPTION - LOCATION: LOCATION: GENERALIZED;CHEST

## 2020-08-07 NOTE — ED NOTES
Bed: 08  Expected date:   Expected time:   Means of arrival:   Comments:     Bubba Cordoba RN  08/07/20 1400

## 2020-08-07 NOTE — H&P
[address] [place], [place] [address]ist Group   History and Physical      CHIEF COMPLAINT: Shortness of breath    History of Present Illness:  56 y.o. male with a history of hypoxemic and hypercapnic respiratory failure on chronic  oxygen presents to the hospital with 2-day history of progressive shortness of breath on exertion associated with dry cough, diffuse body aches , seen has been taking his nebulizers more frequently but with no significant improvement, was diagnosed with eosinophilic asthma is on Xolair injections monthly , denies fevers or chills, denies GI symptoms, patient also has HIV disease , CD4 count was 221, and is currently on Biktarvy, also has advanced COPD last pulmonary function test showed an FEV1 of 31% he follows with Dr. Jefm Boast , on presentation to the emergency room O2 sat was 88% on 3 L patient was afebrile gaseous ABG showed a pH of 7.3 with a CO2 of 67 and oxygen  of 60 chest x ray showed bronchial wall thickening suspicious for bronchitis,   was admitted for further evaluation and treatment  Informant(s) for H&P: Patient    REVIEW OF SYSTEMS:  no fevers, chills, cp, sob, n/v, ha, vision/hearing changes, wt changes, hot/cold flashes, other open skin lesions, diarrhea, constipation, dysuria/hematuria unless noted in HPI. Complete ROS performed with the patient and is otherwise negative.       PMH:  Past Medical History:   Diagnosis Date    Asthma     since childhood    CHF (congestive heart failure) ([address]     COPD (chronic obstructive pulmonary disease) ([facility])     Depression     Emphysema of lung ([address]     H/O cardiovascular stress test 05/27/2020    Lexiscan    History of echocardiogram 04/19/2018    EF 62%    HIV (human immunodeficiency virus infection) ([address]     Hypertension 2008    Seizure ([address]     [facility]     Sleep apnea     no cpap       Surgical History:  Past Surgical History:   Procedure Laterality Date    OTHER SURGICAL HISTORY  05/09/2017    exc abd by mouth 3/28/18   Historical Provider, MD   potassium chloride (MICRO-K) 10 MEQ extended release capsule Take 10 mEq by mouth daily  4/25/18   Historical Provider, MD   tamsulosin (FLOMAX) 0.4 MG capsule Take 0.4 mg by mouth daily  5/9/18   Historical Provider, MD   lisinopril (PRINIVIL;ZESTRIL) 20 MG tablet Take 1 tablet by mouth daily 2/15/18   Tess Osei MD   carvedilol (COREG) 25 MG tablet Take 25 mg by mouth 2 times daily    Historical Provider, MD   ranitidine (ZANTAC) 150 MG tablet Take 150 mg by mouth 2 times daily    Historical Provider, MD   ipratropium-albuterol (DUONEB) 0.5-2.5 (3) MG/3ML SOLN nebulizer solution Inhale 3 mLs into the lungs every 6 hours as needed for Shortness of Breath 12/18/17   Sandeep Bruno DO   albuterol sulfate  (90 BASE) MCG/ACT inhaler Inhale 2 puffs into the lungs every 6 hours as needed for Wheezing    Historical Provider, MD       Allergies:    Ketorolac tromethamine    Social History:    reports that he has been smoking cigarettes. He has a 17.50 pack-year smoking history. He has never used smokeless tobacco. He reports current alcohol use. He reports that he does not use drugs. Family History:   family history includes Alcohol Abuse in his father and mother.   Hypertension in mother    PHYSICAL EXAM:  Vitals:  /70   Pulse 88   Temp 98.8 °F (37.1 °C) (Oral)   Resp 24   Ht 5' 6\" (1.676 m)   Wt 239 lb (108.4 kg)   SpO2 98%   BMI 38.58 kg/m²     General Appearance: alert and oriented to person, place and time and in no acute distress  Skin: warm and dry  Head: normocephalic and atraumatic  Eyes: pupils equal, round, and reactive to light, extraocular eye movements intact, conjunctivae normal  Neck: neck supple and non tender without mass   Pulmonary/Chest: Expiratory wheezes  Cardiovascular: normal rate, normal S1 and S2 and no carotid bruits, second pulmonary sound  Abdomen: soft, non-tender, non-distended, normal bowel sounds, no masses or organomegaly  Extremities: no cyanosis, no clubbing and no edema  Neurologic: no cranial nerve deficit and speech normal    LABS:  Recent Labs     08/07/20  1428      K 4.0      CO2 35*   BUN 9   CREATININE 0.7   GLUCOSE 109*   CALCIUM 8.7       Recent Labs     08/07/20  1428   WBC 9.3   RBC 4.24   HGB 12.3*   HCT 39.8   MCV 93.9   MCH 29.0   MCHC 30.9*   RDW 14.9      MPV 11.4       No results for input(s): POCGLU in the last 72 hours. CBC:   Lab Results   Component Value Date    WBC 9.3 08/07/2020    RBC 4.24 08/07/2020    HGB 12.3 08/07/2020    HCT 39.8 08/07/2020    MCV 93.9 08/07/2020    MCH 29.0 08/07/2020    MCHC 30.9 08/07/2020    RDW 14.9 08/07/2020     08/07/2020    MPV 11.4 08/07/2020     BMP:    Lab Results   Component Value Date     08/07/2020    K 4.0 08/07/2020     08/07/2020    CO2 35 08/07/2020    BUN 9 08/07/2020    LABALBU 4.3 05/26/2020    LABALBU 4.3 05/24/2012    CREATININE 0.7 08/07/2020    CALCIUM 8.7 08/07/2020    GFRAA >60 08/07/2020    LABGLOM >60 08/07/2020    GLUCOSE 109 08/07/2020    GLUCOSE 95 05/24/2012       Radiology: Xr Chest Portable    Result Date: 8/7/2020  EXAMINATION: ONE XRAY VIEW OF THE CHEST 8/7/2020 2:48 pm COMPARISON: July 3, 2020 HISTORY: ORDERING SYSTEM PROVIDED HISTORY: shortness of breath TECHNOLOGIST PROVIDED HISTORY: Reason for exam:->shortness of breath FINDINGS: Heart size is unable to be accurately assessed on this single portable view of the chest but it appears to be stable. There appears to be some degree of mild bronchial wall thickening in the lower lung zones. This is nonspecific but typically seen with bronchitis. No evidence of a focal airspace opacity, pneumothorax or pleural effusion. No acute osseous abnormality. Suture anchors are noted in the left humeral head. Suggestion of at least mild bronchial wall thickening in the lower lung zones, a nonspecific finding that can be seen with bronchitis.

## 2020-08-07 NOTE — ED PROVIDER NOTES
increased swelling of the legs and history of CHF. My findings: Debbie Goodell is a 56 y.o. male whom is in moderate distress. Physical exam reveals tachypnea, tachycardia, hypoxia on room air. Audible expiratory wheezes in the room. Pitting edema of the b/l lower extremities. My plan: Symptomatic and supportive care. Will treat and likely admit. Electronically signed by Radha Montes De Oca DO on 8/7/20 at 2:06 PM EDT          [JS]   1522 EKG: This EKG is signed and interpreted by me. Rate: 82  Rhythm: Sinus  Interpretation: non-specific EKG, No ST-Elevations or Depressions  Comparison: Non-specific changes compared to previous EKG 07/03/2020      [BB]   1527 Went to re-evaluate the patient. Continues to have wheezing on exam, but does feel improved. [BB]      ED Course User Index  [BB] Mittie Litten, DO  [JS] Radha Montes De Oca DO      Debbie Goodell presents to the ED for evaluation of shortness of breath. Patient with worsening symptoms the last 3 days. History of asthma and COPD, states that he has been using his treatments at home without much resolution of symptoms. Upon arrival he has audible wheezing from standing next to him. He was given Solu-Medrol and 1 DuoNeb in route therefore is not given additional steroids here. . Differential diagnoses included but not limited to COPD exacerbation, asthma, anemia, myocardial infarction, MI. Workup in the ED revealed ABG demonstrating hypercapnia, patient still audibly wheezing, therefore he was given magnesium as well as started placed on BiPAP. DuoNeb treatments were administered through the BiPAP. On repeat evaluation he continues to have wheezing and diminished breath sounds throughout, but states he feels improved.   Patient requires continued workup and management of their symptoms and will be admitted to the hospital for further evaluation and treatment.      --------------------------------------------- PAST HISTORY ---------------------------------------------  Past Medical History:  has a past medical history of Asthma, CHF (congestive heart failure) ([facility] COPD (chronic obstructive pulmonary disease) ([facility] Depression, Emphysema of lung ([place] [address] H/O cardiovascular stress test, History of echocardiogram, HIV (human immunodeficiency virus infection) ([place] [address] Hypertension, Seizure ([place] [address] Seizure ([place] [address] and Sleep apnea. Past Surgical History:  has a past surgical history that includes Rotator cuff repair (2011) and other surgical history (05/09/2017). Social History:  reports that he has been smoking cigarettes. He has a 17.50 pack-year smoking history. He has never used smokeless tobacco. He reports current alcohol use. He reports that he does not use drugs. Family History: family history includes Alcohol Abuse in his father and mother. The patients home medications have been reviewed.     Allergies: Ketorolac tromethamine    -------------------------------------------------- RESULTS -------------------------------------------------    LABS:  Results for orders placed or performed during the hospital encounter of 08/07/20   CBC Auto Differential   Result Value Ref Range    WBC 9.3 4.5 - 11.5 E9/L    RBC 4.24 3.80 - 5.80 E12/L    Hemoglobin 12.3 (L) 12.5 - 16.5 g/dL    Hematocrit 39.8 37.0 - 54.0 %    MCV 93.9 80.0 - 99.9 fL    MCH 29.0 26.0 - 35.0 pg    MCHC 30.9 (L) 32.0 - 34.5 %    RDW 14.9 11.5 - 15.0 fL    Platelets 037 786 - 500 E9/L    MPV 11.4 7.0 - 12.0 fL    Neutrophils % 58.1 43.0 - 80.0 %    Immature Granulocytes % 0.4 0.0 - 5.0 %    Lymphocytes % 28.6 20.0 - 42.0 %    Monocytes % 10.5 2.0 - 12.0 %    Eosinophils % 2.0 0.0 - 6.0 %    Basophils % 0.4 0.0 - 2.0 %    Neutrophils Absolute 5.41 1.80 - 7.30 E9/L    Immature Granulocytes # 0.04 E9/L    Lymphocytes Absolute 2.67 1.50 - 4.00 E9/L    Monocytes Absolute 0.98 (H) 0.10 - 0.95 E9/L    Eosinophils Absolute 0.19 0.05 - 0.50 E9/L    Basophils Absolute 0.04 ---------------------------  Date / Time Roomed:  8/7/2020  1:59 PM  ED Bed Assignment:  08/08    The nursing notes within the ED encounter and vital signs as below have been reviewed. Patient Vitals for the past 24 hrs:   BP Temp Temp src Pulse Resp SpO2 Height Weight   08/07/20 1600 134/70 98.8 °F (37.1 °C) Oral 88 24 (!) 88 % -- --   08/07/20 1530 (!) 146/75 -- -- 79 -- 100 % -- --   08/07/20 1515 (!) 141/76 -- -- 89 -- 99 % -- --   08/07/20 1501 129/82 -- -- 94 -- 100 % -- --   08/07/20 1419 135/72 -- -- 80 19 99 % -- --   08/07/20 1415 128/69 -- -- 94 -- 100 % -- --   08/07/20 1408 -- -- -- -- -- 91 % -- --   08/07/20 1406 134/83 98.8 °F (37.1 °C) Oral 88 28 (!) 88 % 5' 6\" (1.676 m) 239 lb (108.4 kg)       Oxygen Saturation Interpretation: Normal    ------------------------------------------ PROGRESS NOTES ------------------------------------------  Counseling:  I have spoken with the patient and discussed todays results, in addition to providing specific details for the plan of care and counseling regarding the diagnosis and prognosis. Their questions are answered at this time and they are agreeable with the plan of admission.    --------------------------------- ADDITIONAL PROVIDER NOTES ---------------------------------  Consultations:  Spoke with Dr. Cody Brooke. Discussed case. They will admit the patient. This patient's ED course included: a personal history and physicial examination, re-evaluation prior to disposition, multiple bedside re-evaluations, cardiac monitoring, continuous pulse oximetry and complex medical decision making and emergency management    This patient has remained hemodynamically stable during their ED course. Diagnosis:  1. COPD exacerbation ([address]    2. Hypercapnia        Disposition:  Patient's disposition: Admit to telemetry  Patient's condition is stable.            Albertina Conway DO  Resident  08/07/20 9081

## 2020-08-07 NOTE — ED NOTES
Dr. Sandra Cornejo at the bedside, she asked that the BIPAP be discontinued.       Santa Rubin RN  08/07/20 6428

## 2020-08-07 NOTE — ED NOTES
Nursing report given to Kiana Bowie from [facility].       Jazmin Ocampo RN  08/07/20 0703

## 2020-08-07 NOTE — PLAN OF CARE
Problem: Falls - Risk of:  Goal: Will remain free from falls  Outcome: Met This Shift     Problem: Gas Exchange - Impaired:  Goal: Levels of oxygenation will improve  Outcome: Met This Shift

## 2020-08-08 LAB
ANION GAP SERPL CALCULATED.3IONS-SCNC: 9 MMOL/L (ref 7–16)
B.E.: 1.7 MMOL/L (ref -3–3)
BUN BLDV-MCNC: 14 MG/DL (ref 8–23)
CALCIUM SERPL-MCNC: 8.8 MG/DL (ref 8.6–10.2)
CHLORIDE BLD-SCNC: 96 MMOL/L (ref 98–107)
CO2: 32 MMOL/L (ref 22–29)
COHB: 0.4 % (ref 0–1.5)
CREAT SERPL-MCNC: 0.9 MG/DL (ref 0.7–1.2)
CRITICAL: ABNORMAL
DATE ANALYZED: ABNORMAL
DATE OF COLLECTION: ABNORMAL
GFR AFRICAN AMERICAN: >60
GFR NON-AFRICAN AMERICAN: >60 ML/MIN/1.73
GLUCOSE BLD-MCNC: 191 MG/DL (ref 74–99)
HBA1C MFR BLD: 6 % (ref 4–5.6)
HCO3: 29.3 MMOL/L (ref 22–26)
HCT VFR BLD CALC: 43.3 % (ref 37–54)
HEMOGLOBIN: 13.1 G/DL (ref 12.5–16.5)
HHB: 6.6 % (ref 0–5)
L. PNEUMOPHILA SEROGP 1 UR AG: NORMAL
LAB: ABNORMAL
Lab: ABNORMAL
MCH RBC QN AUTO: 28.4 PG (ref 26–35)
MCHC RBC AUTO-ENTMCNC: 30.3 % (ref 32–34.5)
MCV RBC AUTO: 93.9 FL (ref 80–99.9)
METER GLUCOSE: 228 MG/DL (ref 74–99)
METER GLUCOSE: 232 MG/DL (ref 74–99)
METER GLUCOSE: 282 MG/DL (ref 74–99)
METHB: 0.2 % (ref 0–1.5)
MODE: ABNORMAL
O2 CONTENT: 17.2 ML/DL
O2 SATURATION: 93.4 % (ref 92–98.5)
O2HB: 92.8 % (ref 94–97)
OPERATOR ID: ABNORMAL
PATIENT TEMP: 37 C
PCO2: 59.3 MMHG (ref 35–45)
PDW BLD-RTO: 14.7 FL (ref 11.5–15)
PH BLOOD GAS: 7.31 (ref 7.35–7.45)
PLATELET # BLD: 180 E9/L (ref 130–450)
PMV BLD AUTO: 11.9 FL (ref 7–12)
PO2: 67.2 MMHG (ref 75–100)
POTASSIUM SERPL-SCNC: 4.4 MMOL/L (ref 3.5–5)
RBC # BLD: 4.61 E12/L (ref 3.8–5.8)
SODIUM BLD-SCNC: 137 MMOL/L (ref 132–146)
SOURCE, BLOOD GAS: ABNORMAL
STREP PNEUMONIAE ANTIGEN, URINE: NORMAL
THB: 13.2 G/DL (ref 11.5–16.5)
TIME ANALYZED: 1011
WBC # BLD: 13.1 E9/L (ref 4.5–11.5)

## 2020-08-08 PROCEDURE — 2700000000 HC OXYGEN THERAPY PER DAY

## 2020-08-08 PROCEDURE — 83036 HEMOGLOBIN GLYCOSYLATED A1C: CPT

## 2020-08-08 PROCEDURE — 94640 AIRWAY INHALATION TREATMENT: CPT

## 2020-08-08 PROCEDURE — 99232 SBSQ HOSP IP/OBS MODERATE 35: CPT | Performed by: INTERNAL MEDICINE

## 2020-08-08 PROCEDURE — 2060000000 HC ICU INTERMEDIATE R&B

## 2020-08-08 PROCEDURE — 2580000003 HC RX 258: Performed by: INTERNAL MEDICINE

## 2020-08-08 PROCEDURE — 6370000000 HC RX 637 (ALT 250 FOR IP): Performed by: INTERNAL MEDICINE

## 2020-08-08 PROCEDURE — 6360000002 HC RX W HCPCS: Performed by: INTERNAL MEDICINE

## 2020-08-08 PROCEDURE — 94669 MECHANICAL CHEST WALL OSCILL: CPT

## 2020-08-08 PROCEDURE — 82962 GLUCOSE BLOOD TEST: CPT

## 2020-08-08 PROCEDURE — 2500000003 HC RX 250 WO HCPCS: Performed by: INTERNAL MEDICINE

## 2020-08-08 PROCEDURE — 80048 BASIC METABOLIC PNL TOTAL CA: CPT

## 2020-08-08 PROCEDURE — 36415 COLL VENOUS BLD VENIPUNCTURE: CPT

## 2020-08-08 PROCEDURE — 82805 BLOOD GASES W/O2 SATURATION: CPT

## 2020-08-08 PROCEDURE — 36600 WITHDRAWAL OF ARTERIAL BLOOD: CPT

## 2020-08-08 PROCEDURE — 85027 COMPLETE CBC AUTOMATED: CPT

## 2020-08-08 PROCEDURE — 94660 CPAP INITIATION&MGMT: CPT

## 2020-08-08 RX ORDER — DEXTROSE MONOHYDRATE 25 G/50ML
12.5 INJECTION, SOLUTION INTRAVENOUS PRN
Status: DISCONTINUED | OUTPATIENT
Start: 2020-08-08 | End: 2020-08-10 | Stop reason: HOSPADM

## 2020-08-08 RX ORDER — IPRATROPIUM BROMIDE AND ALBUTEROL SULFATE 2.5; .5 MG/3ML; MG/3ML
1 SOLUTION RESPIRATORY (INHALATION) ONCE
Status: COMPLETED | OUTPATIENT
Start: 2020-08-08 | End: 2020-08-08

## 2020-08-08 RX ORDER — DEXTROSE MONOHYDRATE 50 MG/ML
100 INJECTION, SOLUTION INTRAVENOUS PRN
Status: DISCONTINUED | OUTPATIENT
Start: 2020-08-08 | End: 2020-08-10 | Stop reason: HOSPADM

## 2020-08-08 RX ORDER — BUDESONIDE 0.5 MG/2ML
500 INHALANT ORAL 2 TIMES DAILY
Status: DISCONTINUED | OUTPATIENT
Start: 2020-08-08 | End: 2020-08-10 | Stop reason: HOSPADM

## 2020-08-08 RX ORDER — NICOTINE POLACRILEX 4 MG
15 LOZENGE BUCCAL PRN
Status: DISCONTINUED | OUTPATIENT
Start: 2020-08-08 | End: 2020-08-10 | Stop reason: HOSPADM

## 2020-08-08 RX ORDER — INSULIN GLARGINE 100 [IU]/ML
8 INJECTION, SOLUTION SUBCUTANEOUS NIGHTLY
Status: DISCONTINUED | OUTPATIENT
Start: 2020-08-08 | End: 2020-08-10 | Stop reason: HOSPADM

## 2020-08-08 RX ORDER — METHYLPREDNISOLONE SODIUM SUCCINATE 40 MG/ML
40 INJECTION, POWDER, LYOPHILIZED, FOR SOLUTION INTRAMUSCULAR; INTRAVENOUS EVERY 8 HOURS
Status: DISCONTINUED | OUTPATIENT
Start: 2020-08-08 | End: 2020-08-09

## 2020-08-08 RX ADMIN — LEVETIRACETAM 1000 MG: 500 TABLET ORAL at 08:07

## 2020-08-08 RX ADMIN — AMLODIPINE BESYLATE 10 MG: 10 TABLET ORAL at 08:08

## 2020-08-08 RX ADMIN — IPRATROPIUM BROMIDE AND ALBUTEROL SULFATE 1 AMPULE: .5; 3 SOLUTION RESPIRATORY (INHALATION) at 04:01

## 2020-08-08 RX ADMIN — DOXYCYCLINE 100 MG: 100 INJECTION, POWDER, LYOPHILIZED, FOR SOLUTION INTRAVENOUS at 05:32

## 2020-08-08 RX ADMIN — DOXYCYCLINE 100 MG: 100 INJECTION, POWDER, LYOPHILIZED, FOR SOLUTION INTRAVENOUS at 17:16

## 2020-08-08 RX ADMIN — ATORVASTATIN CALCIUM 40 MG: 40 TABLET, FILM COATED ORAL at 20:46

## 2020-08-08 RX ADMIN — INSULIN LISPRO 3 UNITS: 100 INJECTION, SOLUTION INTRAVENOUS; SUBCUTANEOUS at 11:23

## 2020-08-08 RX ADMIN — TAMSULOSIN HYDROCHLORIDE 0.4 MG: 0.4 CAPSULE ORAL at 08:07

## 2020-08-08 RX ADMIN — INSULIN LISPRO 1 UNITS: 100 INJECTION, SOLUTION INTRAVENOUS; SUBCUTANEOUS at 20:48

## 2020-08-08 RX ADMIN — INSULIN GLARGINE 8 UNITS: 100 INJECTION, SOLUTION SUBCUTANEOUS at 20:48

## 2020-08-08 RX ADMIN — METHYLPREDNISOLONE SODIUM SUCCINATE 40 MG: 40 INJECTION, POWDER, FOR SOLUTION INTRAMUSCULAR; INTRAVENOUS at 16:20

## 2020-08-08 RX ADMIN — Medication 400 MG: at 20:46

## 2020-08-08 RX ADMIN — SODIUM CHLORIDE, PRESERVATIVE FREE 10 ML: 5 INJECTION INTRAVENOUS at 20:48

## 2020-08-08 RX ADMIN — LISINOPRIL 10 MG: 10 TABLET ORAL at 08:08

## 2020-08-08 RX ADMIN — CARVEDILOL 6.25 MG: 6.25 TABLET, FILM COATED ORAL at 08:08

## 2020-08-08 RX ADMIN — CLONIDINE HYDROCHLORIDE 0.2 MG: 0.2 TABLET ORAL at 08:07

## 2020-08-08 RX ADMIN — HYDROXYZINE HYDROCHLORIDE 25 MG: 25 TABLET, FILM COATED ORAL at 18:44

## 2020-08-08 RX ADMIN — BUDESONIDE 500 MCG: 0.5 INHALANT RESPIRATORY (INHALATION) at 16:41

## 2020-08-08 RX ADMIN — ESCITALOPRAM OXALATE 20 MG: 10 TABLET ORAL at 08:08

## 2020-08-08 RX ADMIN — BUDESONIDE 500 MCG: 0.5 INHALANT RESPIRATORY (INHALATION) at 10:21

## 2020-08-08 RX ADMIN — METHYLPREDNISOLONE SODIUM SUCCINATE 80 MG: 125 INJECTION, POWDER, FOR SOLUTION INTRAMUSCULAR; INTRAVENOUS at 00:31

## 2020-08-08 RX ADMIN — LEVETIRACETAM 1000 MG: 500 TABLET ORAL at 20:46

## 2020-08-08 RX ADMIN — INSULIN LISPRO 2 UNITS: 100 INJECTION, SOLUTION INTRAVENOUS; SUBCUTANEOUS at 15:59

## 2020-08-08 RX ADMIN — IPRATROPIUM BROMIDE AND ALBUTEROL SULFATE 1 AMPULE: .5; 3 SOLUTION RESPIRATORY (INHALATION) at 10:08

## 2020-08-08 RX ADMIN — WATER 2 G: 1 INJECTION INTRAMUSCULAR; INTRAVENOUS; SUBCUTANEOUS at 16:20

## 2020-08-08 RX ADMIN — Medication 400 MG: at 08:07

## 2020-08-08 RX ADMIN — IPRATROPIUM BROMIDE AND ALBUTEROL SULFATE 1 AMPULE: .5; 3 SOLUTION RESPIRATORY (INHALATION) at 07:13

## 2020-08-08 RX ADMIN — CARVEDILOL 6.25 MG: 6.25 TABLET, FILM COATED ORAL at 17:15

## 2020-08-08 RX ADMIN — CLONIDINE HYDROCHLORIDE 0.2 MG: 0.2 TABLET ORAL at 20:46

## 2020-08-08 RX ADMIN — SODIUM CHLORIDE, PRESERVATIVE FREE 10 ML: 5 INJECTION INTRAVENOUS at 08:05

## 2020-08-08 RX ADMIN — ENOXAPARIN SODIUM 40 MG: 40 INJECTION SUBCUTANEOUS at 08:05

## 2020-08-08 RX ADMIN — IPRATROPIUM BROMIDE AND ALBUTEROL SULFATE 1 AMPULE: .5; 3 SOLUTION RESPIRATORY (INHALATION) at 16:41

## 2020-08-08 RX ADMIN — IPRATROPIUM BROMIDE AND ALBUTEROL SULFATE 1 AMPULE: .5; 3 SOLUTION RESPIRATORY (INHALATION) at 13:00

## 2020-08-08 RX ADMIN — METHYLPREDNISOLONE SODIUM SUCCINATE 80 MG: 125 INJECTION, POWDER, FOR SOLUTION INTRAMUSCULAR; INTRAVENOUS at 08:06

## 2020-08-08 ASSESSMENT — PAIN SCALES - GENERAL: PAINLEVEL_OUTOF10: 0

## 2020-08-08 NOTE — CARE COORDINATION
SOCIAL WORK / DISCHARGE PLANNING:  COVID negative 8/7. Sw consult noted for dc planning. Sw met with pt at bedside, he reports to reside alone in a 5th floor apt with no elevator, uses quad cane on steps, wheeled walker in apartment. He has aides 5 days/week who assist with cooking, cleaning, personal care and shopping. He does not know what agency from but states his  is Ena Griffin. Has O2 from [address], does not have portable tank here will need one for transport home. PCP Julian Decker at [facility]. He had cpap in the past but it didn't work. Hx [facility]. Dc plan is to return home as previous, he states he will need assist with transport home.                Electronically signed by MONIQUE Manuel on 8/8/2020 at 1:59 PM

## 2020-08-08 NOTE — PROGRESS NOTES
[address] [place], [place] [address]   Progress Note    Admitting Date and Time: 8/7/2020  1:59 PM  Admit Dx: COPD exacerbation ([facility] [J44.1]  COPD exacerbation ([facility] [J44.1]    Subjective:    Pt feels feels better shortness of breath much improved  Per RN: No events overnight no fever    ROS: denies fever, chills, cp, sob, n/v, HA unless stated above.      methylPREDNISolone  40 mg Intravenous Q8H    budesonide  500 mcg Nebulization BID    amLODIPine  10 mg Oral Daily    carvedilol  6.25 mg Oral BID WC    cloNIDine  0.2 mg Oral BID    escitalopram  20 mg Oral Daily    levETIRAcetam  1,000 mg Oral BID    lisinopril  10 mg Oral Daily    tamsulosin  0.4 mg Oral Daily    atorvastatin  40 mg Oral Nightly    sodium chloride flush  10 mL Intravenous 2 times per day    enoxaparin  40 mg Subcutaneous Daily    ipratropium-albuterol  1 ampule Inhalation Q4H WA    cefTRIAXone (ROCEPHIN) IV  2 g Intravenous Q24H    doxycycline (VIBRAMYCIN) IV  100 mg Intravenous Q12H    magnesium oxide  400 mg Oral BID    nicotine  1 patch Transdermal Daily    bictegravir-emtricitab-tenofovir alafenamide  1 tablet Oral Daily     sodium chloride flush, 10 mL, PRN  acetaminophen, 650 mg, Q6H PRN    Or  acetaminophen, 650 mg, Q6H PRN  polyethylene glycol, 17 g, Daily PRN  promethazine, 12.5 mg, Q6H PRN    Or  ondansetron, 4 mg, Q6H PRN  hydrOXYzine, 25 mg, TID PRN         Objective:    BP (!) 144/72   Pulse 90   Temp 98.4 °F (36.9 °C) (Infrared)   Resp 20   Ht 5' 6\" (1.676 m)   Wt 239 lb (108.4 kg)   SpO2 96%   BMI 38.58 kg/m²   General Appearance: alert and oriented to person, place and time and in no acute distress  Skin: warm and dry  Head: normocephalic and atraumatic  Eyes: pupils equal, round, and reactive to light, extraocular eye movements intact, conjunctivae normal  Neck: neck supple and non tender without mass   Pulmonary/Chest: Scattered expiratory wheezes  Cardiovascular: normal rate, normal S1 and S2 and dysfunction  Uncontrolled hypertension  History of seizure disorder  Noncompliance  Hyperglycemia possible steroid-induced  Dyslipidemia  History of eosinophilic asthma         Plan:  1. Due to acute COPD exacerbation overall improving O2 requirements are at baseline we will decrease dose of IV steroids continue med nebs incentive pulmonary toilet, obese infiltrate on chest x-ray COVID B19 negative  2. On CPAP  3. Advised on lifestyle changes especially weight loss  4. Continue antiretroviral therapy last  CD19 was 83  5. Advised on tobacco cessation  6. Compensated  7. Uncontrolled hypertension we will titrate medications  8. Continue Keppra seizure precautions  9. Check hemoglobin A1c  10. Has received Xolair 3 weeks ago    NOTE: This report was transcribed using voice recognition software. Every effort was made to ensure accuracy; however, inadvertent computerized transcription errors may be present.      Electronically signed by Ari Rg MD on 8/8/2020 at 9:14 AM

## 2020-08-08 NOTE — PLAN OF CARE
Problem: Falls - Risk of:  Goal: Will remain free from falls  Description: Will remain free from falls  8/7/2020 2157 by Joi Davis RN  Outcome: Met This Shift  8/7/2020 1853 by Chin Mathews RN  Outcome: Met This Shift  Goal: Absence of physical injury  Description: Absence of physical injury  Outcome: Met This Shift

## 2020-08-09 ENCOUNTER — APPOINTMENT (OUTPATIENT)
Dept: GENERAL RADIOLOGY | Age: 60
DRG: 892 | End: 2020-08-09
Payer: MEDICAID

## 2020-08-09 LAB
METER GLUCOSE: 132 MG/DL (ref 74–99)
METER GLUCOSE: 166 MG/DL (ref 74–99)
METER GLUCOSE: 179 MG/DL (ref 74–99)
METER GLUCOSE: 275 MG/DL (ref 74–99)

## 2020-08-09 PROCEDURE — 82962 GLUCOSE BLOOD TEST: CPT

## 2020-08-09 PROCEDURE — 99232 SBSQ HOSP IP/OBS MODERATE 35: CPT | Performed by: INTERNAL MEDICINE

## 2020-08-09 PROCEDURE — 6360000002 HC RX W HCPCS: Performed by: INTERNAL MEDICINE

## 2020-08-09 PROCEDURE — 71045 X-RAY EXAM CHEST 1 VIEW: CPT

## 2020-08-09 PROCEDURE — 87070 CULTURE OTHR SPECIMN AEROBIC: CPT

## 2020-08-09 PROCEDURE — 6370000000 HC RX 637 (ALT 250 FOR IP): Performed by: INTERNAL MEDICINE

## 2020-08-09 PROCEDURE — 2580000003 HC RX 258: Performed by: INTERNAL MEDICINE

## 2020-08-09 PROCEDURE — 94660 CPAP INITIATION&MGMT: CPT

## 2020-08-09 PROCEDURE — 94640 AIRWAY INHALATION TREATMENT: CPT

## 2020-08-09 PROCEDURE — 2060000000 HC ICU INTERMEDIATE R&B

## 2020-08-09 PROCEDURE — 94669 MECHANICAL CHEST WALL OSCILL: CPT

## 2020-08-09 PROCEDURE — 87206 SMEAR FLUORESCENT/ACID STAI: CPT

## 2020-08-09 PROCEDURE — 2500000003 HC RX 250 WO HCPCS: Performed by: INTERNAL MEDICINE

## 2020-08-09 PROCEDURE — 2700000000 HC OXYGEN THERAPY PER DAY

## 2020-08-09 RX ORDER — ARFORMOTEROL TARTRATE 15 UG/2ML
15 SOLUTION RESPIRATORY (INHALATION) 2 TIMES DAILY
Status: DISCONTINUED | OUTPATIENT
Start: 2020-08-09 | End: 2020-08-10 | Stop reason: HOSPADM

## 2020-08-09 RX ORDER — HYDROCHLOROTHIAZIDE 25 MG/1
25 TABLET ORAL DAILY
Status: DISCONTINUED | OUTPATIENT
Start: 2020-08-09 | End: 2020-08-10 | Stop reason: HOSPADM

## 2020-08-09 RX ORDER — PREDNISONE 20 MG/1
40 TABLET ORAL DAILY
Status: DISCONTINUED | OUTPATIENT
Start: 2020-08-09 | End: 2020-08-10 | Stop reason: HOSPADM

## 2020-08-09 RX ORDER — DOXYCYCLINE HYCLATE 100 MG/1
100 CAPSULE ORAL EVERY 12 HOURS SCHEDULED
Status: DISCONTINUED | OUTPATIENT
Start: 2020-08-09 | End: 2020-08-10 | Stop reason: HOSPADM

## 2020-08-09 RX ADMIN — LEVETIRACETAM 1000 MG: 500 TABLET ORAL at 08:27

## 2020-08-09 RX ADMIN — PREDNISONE 40 MG: 20 TABLET ORAL at 11:55

## 2020-08-09 RX ADMIN — CLONIDINE HYDROCHLORIDE 0.2 MG: 0.2 TABLET ORAL at 08:28

## 2020-08-09 RX ADMIN — IPRATROPIUM BROMIDE AND ALBUTEROL SULFATE 1 AMPULE: .5; 3 SOLUTION RESPIRATORY (INHALATION) at 13:21

## 2020-08-09 RX ADMIN — IPRATROPIUM BROMIDE AND ALBUTEROL SULFATE 1 AMPULE: .5; 3 SOLUTION RESPIRATORY (INHALATION) at 09:43

## 2020-08-09 RX ADMIN — ATORVASTATIN CALCIUM 40 MG: 40 TABLET, FILM COATED ORAL at 22:49

## 2020-08-09 RX ADMIN — SODIUM CHLORIDE, PRESERVATIVE FREE 10 ML: 5 INJECTION INTRAVENOUS at 08:32

## 2020-08-09 RX ADMIN — INSULIN LISPRO 3 UNITS: 100 INJECTION, SOLUTION INTRAVENOUS; SUBCUTANEOUS at 11:55

## 2020-08-09 RX ADMIN — IPRATROPIUM BROMIDE AND ALBUTEROL SULFATE 1 AMPULE: .5; 3 SOLUTION RESPIRATORY (INHALATION) at 06:05

## 2020-08-09 RX ADMIN — SODIUM CHLORIDE, PRESERVATIVE FREE 10 ML: 5 INJECTION INTRAVENOUS at 22:58

## 2020-08-09 RX ADMIN — INSULIN LISPRO 1 UNITS: 100 INJECTION, SOLUTION INTRAVENOUS; SUBCUTANEOUS at 08:26

## 2020-08-09 RX ADMIN — METHYLPREDNISOLONE SODIUM SUCCINATE 40 MG: 40 INJECTION, POWDER, FOR SOLUTION INTRAMUSCULAR; INTRAVENOUS at 00:05

## 2020-08-09 RX ADMIN — INSULIN LISPRO 1 UNITS: 100 INJECTION, SOLUTION INTRAVENOUS; SUBCUTANEOUS at 16:36

## 2020-08-09 RX ADMIN — LISINOPRIL 10 MG: 10 TABLET ORAL at 08:27

## 2020-08-09 RX ADMIN — ESCITALOPRAM OXALATE 20 MG: 10 TABLET ORAL at 08:28

## 2020-08-09 RX ADMIN — Medication 400 MG: at 22:48

## 2020-08-09 RX ADMIN — Medication 400 MG: at 08:28

## 2020-08-09 RX ADMIN — DOXYCYCLINE 100 MG: 100 INJECTION, POWDER, LYOPHILIZED, FOR SOLUTION INTRAVENOUS at 06:14

## 2020-08-09 RX ADMIN — SODIUM CHLORIDE, PRESERVATIVE FREE 10 ML: 5 INJECTION INTRAVENOUS at 00:05

## 2020-08-09 RX ADMIN — INSULIN GLARGINE 8 UNITS: 100 INJECTION, SOLUTION SUBCUTANEOUS at 22:49

## 2020-08-09 RX ADMIN — LEVETIRACETAM 1000 MG: 500 TABLET ORAL at 22:49

## 2020-08-09 RX ADMIN — TAMSULOSIN HYDROCHLORIDE 0.4 MG: 0.4 CAPSULE ORAL at 08:28

## 2020-08-09 RX ADMIN — METHYLPREDNISOLONE SODIUM SUCCINATE 40 MG: 40 INJECTION, POWDER, FOR SOLUTION INTRAMUSCULAR; INTRAVENOUS at 08:27

## 2020-08-09 RX ADMIN — BUDESONIDE 500 MCG: 0.5 INHALANT RESPIRATORY (INHALATION) at 06:05

## 2020-08-09 RX ADMIN — AMLODIPINE BESYLATE 10 MG: 10 TABLET ORAL at 08:28

## 2020-08-09 RX ADMIN — CARVEDILOL 6.25 MG: 6.25 TABLET, FILM COATED ORAL at 16:36

## 2020-08-09 RX ADMIN — ENOXAPARIN SODIUM 40 MG: 40 INJECTION SUBCUTANEOUS at 08:26

## 2020-08-09 RX ADMIN — CARVEDILOL 6.25 MG: 6.25 TABLET, FILM COATED ORAL at 08:27

## 2020-08-09 RX ADMIN — DOXYCYCLINE HYCLATE 100 MG: 100 CAPSULE ORAL at 22:48

## 2020-08-09 RX ADMIN — HYDROCHLOROTHIAZIDE 25 MG: 25 TABLET ORAL at 11:55

## 2020-08-09 RX ADMIN — CLONIDINE HYDROCHLORIDE 0.2 MG: 0.2 TABLET ORAL at 22:49

## 2020-08-09 ASSESSMENT — PAIN SCALES - GENERAL
PAINLEVEL_OUTOF10: 0
PAINLEVEL_OUTOF10: 0

## 2020-08-10 VITALS
BODY MASS INDEX: 38.41 KG/M2 | OXYGEN SATURATION: 95 % | TEMPERATURE: 97.9 F | RESPIRATION RATE: 18 BRPM | HEIGHT: 66 IN | HEART RATE: 76 BPM | SYSTOLIC BLOOD PRESSURE: 144 MMHG | WEIGHT: 239 LBS | DIASTOLIC BLOOD PRESSURE: 74 MMHG

## 2020-08-10 PROBLEM — E11.9 TYPE 2 DIABETES MELLITUS, WITHOUT LONG-TERM CURRENT USE OF INSULIN (HCC): Status: ACTIVE | Noted: 2020-08-10

## 2020-08-10 LAB
METER GLUCOSE: 172 MG/DL (ref 74–99)
METER GLUCOSE: 284 MG/DL (ref 74–99)
METER GLUCOSE: 314 MG/DL (ref 74–99)

## 2020-08-10 PROCEDURE — 94669 MECHANICAL CHEST WALL OSCILL: CPT

## 2020-08-10 PROCEDURE — 6360000002 HC RX W HCPCS: Performed by: INTERNAL MEDICINE

## 2020-08-10 PROCEDURE — 94660 CPAP INITIATION&MGMT: CPT

## 2020-08-10 PROCEDURE — 6370000000 HC RX 637 (ALT 250 FOR IP): Performed by: INTERNAL MEDICINE

## 2020-08-10 PROCEDURE — 2700000000 HC OXYGEN THERAPY PER DAY

## 2020-08-10 PROCEDURE — 94640 AIRWAY INHALATION TREATMENT: CPT

## 2020-08-10 PROCEDURE — APPSS45 APP SPLIT SHARED TIME 31-45 MINUTES: Performed by: NURSE PRACTITIONER

## 2020-08-10 PROCEDURE — 99239 HOSP IP/OBS DSCHRG MGMT >30: CPT | Performed by: INTERNAL MEDICINE

## 2020-08-10 PROCEDURE — 82962 GLUCOSE BLOOD TEST: CPT

## 2020-08-10 RX ORDER — AMLODIPINE BESYLATE 10 MG/1
10 TABLET ORAL DAILY
Qty: 30 TABLET | Refills: 0 | Status: SHIPPED | OUTPATIENT
Start: 2020-08-11

## 2020-08-10 RX ORDER — DOXYCYCLINE HYCLATE 100 MG/1
100 CAPSULE ORAL EVERY 12 HOURS SCHEDULED
Qty: 4 CAPSULE | Refills: 0 | Status: SHIPPED | OUTPATIENT
Start: 2020-08-10 | End: 2020-08-12

## 2020-08-10 RX ORDER — PREDNISONE 20 MG/1
40 TABLET ORAL DAILY
Qty: 6 TABLET | Refills: 0 | Status: SHIPPED | OUTPATIENT
Start: 2020-08-11 | End: 2020-08-14

## 2020-08-10 RX ORDER — CARVEDILOL 6.25 MG/1
6.25 TABLET ORAL 2 TIMES DAILY WITH MEALS
Qty: 60 TABLET | Refills: 0 | Status: SHIPPED | OUTPATIENT
Start: 2020-08-10

## 2020-08-10 RX ORDER — HYDROXYZINE HYDROCHLORIDE 25 MG/1
25 TABLET, FILM COATED ORAL 3 TIMES DAILY PRN
Qty: 30 TABLET | Refills: 0 | Status: SHIPPED | OUTPATIENT
Start: 2020-08-10 | End: 2020-08-20

## 2020-08-10 RX ORDER — LISINOPRIL 10 MG/1
10 TABLET ORAL DAILY
Qty: 30 TABLET | Refills: 0 | Status: SHIPPED | OUTPATIENT
Start: 2020-08-11

## 2020-08-10 RX ORDER — LEVETIRACETAM 1000 MG/1
1000 TABLET ORAL 2 TIMES DAILY
Qty: 60 TABLET | Refills: 0 | Status: SHIPPED | OUTPATIENT
Start: 2020-08-10

## 2020-08-10 RX ORDER — LANOLIN ALCOHOL/MO/W.PET/CERES
400 CREAM (GRAM) TOPICAL 2 TIMES DAILY
Qty: 30 TABLET | Refills: 0 | Status: SHIPPED | OUTPATIENT
Start: 2020-08-10

## 2020-08-10 RX ADMIN — BUDESONIDE 500 MCG: 0.5 INHALANT RESPIRATORY (INHALATION) at 16:06

## 2020-08-10 RX ADMIN — AMLODIPINE BESYLATE 10 MG: 10 TABLET ORAL at 08:32

## 2020-08-10 RX ADMIN — INSULIN LISPRO 1 UNITS: 100 INJECTION, SOLUTION INTRAVENOUS; SUBCUTANEOUS at 08:37

## 2020-08-10 RX ADMIN — ESCITALOPRAM OXALATE 20 MG: 10 TABLET ORAL at 08:32

## 2020-08-10 RX ADMIN — IPRATROPIUM BROMIDE AND ALBUTEROL SULFATE 1 AMPULE: .5; 3 SOLUTION RESPIRATORY (INHALATION) at 04:23

## 2020-08-10 RX ADMIN — ARFORMOTEROL TARTRATE 15 MCG: 15 SOLUTION RESPIRATORY (INHALATION) at 16:06

## 2020-08-10 RX ADMIN — IPRATROPIUM BROMIDE AND ALBUTEROL SULFATE 1 AMPULE: .5; 3 SOLUTION RESPIRATORY (INHALATION) at 09:18

## 2020-08-10 RX ADMIN — BUDESONIDE 500 MCG: 0.5 INHALANT RESPIRATORY (INHALATION) at 04:23

## 2020-08-10 RX ADMIN — LEVETIRACETAM 1000 MG: 500 TABLET ORAL at 08:32

## 2020-08-10 RX ADMIN — CLONIDINE HYDROCHLORIDE 0.2 MG: 0.2 TABLET ORAL at 08:32

## 2020-08-10 RX ADMIN — PREDNISONE 40 MG: 20 TABLET ORAL at 08:31

## 2020-08-10 RX ADMIN — INSULIN LISPRO 4 UNITS: 100 INJECTION, SOLUTION INTRAVENOUS; SUBCUTANEOUS at 11:02

## 2020-08-10 RX ADMIN — TAMSULOSIN HYDROCHLORIDE 0.4 MG: 0.4 CAPSULE ORAL at 08:33

## 2020-08-10 RX ADMIN — IPRATROPIUM BROMIDE AND ALBUTEROL SULFATE 1 AMPULE: .5; 3 SOLUTION RESPIRATORY (INHALATION) at 16:06

## 2020-08-10 RX ADMIN — ARFORMOTEROL TARTRATE 15 MCG: 15 SOLUTION RESPIRATORY (INHALATION) at 04:23

## 2020-08-10 RX ADMIN — INSULIN LISPRO 3 UNITS: 100 INJECTION, SOLUTION INTRAVENOUS; SUBCUTANEOUS at 16:37

## 2020-08-10 RX ADMIN — IPRATROPIUM BROMIDE AND ALBUTEROL SULFATE 1 AMPULE: .5; 3 SOLUTION RESPIRATORY (INHALATION) at 12:54

## 2020-08-10 RX ADMIN — LISINOPRIL 10 MG: 10 TABLET ORAL at 08:32

## 2020-08-10 RX ADMIN — ENOXAPARIN SODIUM 40 MG: 40 INJECTION SUBCUTANEOUS at 08:31

## 2020-08-10 RX ADMIN — CARVEDILOL 6.25 MG: 6.25 TABLET, FILM COATED ORAL at 08:32

## 2020-08-10 RX ADMIN — Medication 400 MG: at 08:33

## 2020-08-10 RX ADMIN — DOXYCYCLINE HYCLATE 100 MG: 100 CAPSULE ORAL at 08:32

## 2020-08-10 RX ADMIN — CARVEDILOL 6.25 MG: 6.25 TABLET, FILM COATED ORAL at 16:38

## 2020-08-10 RX ADMIN — HYDROCHLOROTHIAZIDE 25 MG: 25 TABLET ORAL at 08:33

## 2020-08-10 ASSESSMENT — PAIN SCALES - GENERAL: PAINLEVEL_OUTOF10: 0

## 2020-08-10 NOTE — PROGRESS NOTES
Reason for consult:Uncontrolled DM  A1C:   9%                 Patient states the following concerns/barriers to diabetes self-management:       [] None       [] Medication cost   [] Food cost/availability          [] Reading  [] Hearing   [] Vision                  [] Work    [] Transportation  [] No insurance    [] Physical limitations    [x] Other:Speech somewhat difficult to understand              Diabetes survival packet provided to:   [x] Patient          Information reviewed:   Definition of diabetes      Target glucose ranges/A1C      Self-monitoring of blood glucose      Prevention/symptoms/treatment of hypo-/hyperglycemia      Medication adherence      The plate method/meal planning guidelines      The benefits of exercise and recommendations per doctor      Reducing the risk of chronic complications            Comment:Met with patient after reviewing chart. New onset DM. Stated no PCP at this point. RN and I reviewed BG testing and meter shown. To call for instructions if needed. Insulin pen shown to pt. Nurse offered to have him do his own injection but he had her do it. To follow Dr instructions for home. He has my contact information. No added sugar meal plan reviewed with him and need to eat 3 meals spaced out every 5 hours. To test at least daily before breakfast and dinner if able. Log sheet given. I did encourage him to call with questions. He was receptive to me.     Diabetes medications reviewed (use, purpose, action, side effects):  Lantus and humalog    Evaluation/Plan/Recommendations:   Patient's understanding of diabetes:   [x]Poor   []Fair    []Good   [] Excellent     Outpatient diabetes education is recommended:   [x] Yes  [] No   [] As needed  Patient is interested in outpatient diabetes education:   [x] Yes    [] No    [] Unsure    Recommended:     [] Consult to social work; patient has no insurance or financial hardship      [x] Script for glucometer and supplies (per

## 2020-08-10 NOTE — PLAN OF CARE
Problem: Falls - Risk of:  Goal: Will remain free from falls  Description: Will remain free from falls  8/10/2020 0007 by Shauna Guerra RN  Outcome: Met This Shift  8/9/2020 1042 by Jose Angel Dockery RN  Outcome: Met This Shift  Goal: Absence of physical injury  Description: Absence of physical injury  8/10/2020 0007 by Shauna Guerra RN  Outcome: Met This Shift  8/9/2020 1042 by Jose Angel Dockery RN  Outcome: Met This Shift     Problem:  Activity Intolerance:  Goal: Ability to tolerate increased activity will improve  Description: Ability to tolerate increased activity will improve  8/10/2020 0007 by Shauna Guerra RN  Outcome: Met This Shift  8/9/2020 1042 by Jose Angel Dockery RN  Outcome: Met This Shift     Problem: Airway Clearance - Ineffective:  Goal: Ability to maintain a clear airway will improve  Description: Ability to maintain a clear airway will improve  8/10/2020 0007 by Shauna Guerra RN  Outcome: Met This Shift  8/9/2020 1042 by Jose Angel Dockery RN  Outcome: Met This Shift     Problem: Gas Exchange - Impaired:  Goal: Levels of oxygenation will improve  Description: Levels of oxygenation will improve  8/10/2020 0007 by Shauna Guerra RN  Outcome: Met This Shift  8/9/2020 1042 by Jose Angel Dockery RN  Outcome: Met This Shift

## 2020-08-10 NOTE — DISCHARGE INSTR - COC
Continuity of Care Form    Patient Name: Ely Echols   :  1960  MRN:  [id number]    Admit date:  2020  Discharge date:  ***    Code Status Order: Full Code   Advance Directives:   Advance Care Flowsheet Documentation     Date/Time Healthcare Directive Type of Healthcare Directive Copy in [address] Agent's Name Healthcare Agent's Phone Number    20 1804  No, patient does not have an advance directive for healthcare treatment -- -- -- -- --          Admitting Physician:  Wendy Clinton MD  PCP: LAVON Gilman NP    Discharging Nurse: [facility] Unit/Room#: 2829/2929-16  Discharging Unit Phone Number: ***    Emergency Contact:   Extended Emergency Contact Information  Primary Emergency Contact: [facility]  Mobile Phone: [phone]  Relation: Other  Secondary Emergency Contact: [facility]  Address: [address] [place] [address] Sanya Hurst           [address] [place] [address] Phone: [phone]  Mobile Phone: [phone]  Relation: Brother/Sister    Past Surgical History:  Past Surgical History:   Procedure Laterality Date    OTHER SURGICAL HISTORY  2017    exc abd neoplasm    ROTATOR CUFF REPAIR  2011    left shoulder in [facility]       Immunization History:   Immunization History   Administered Date(s) Administered    PPD Test 2018    Td, unspecified formulation 2015       Active Problems:  Patient Active Problem List   Diagnosis Code    Depression F32.9    Bronchospasm, acute J98.01    SIRS (systemic inflammatory response syndrome) ([facility]) R65.10    Acute on chronic diastolic CHF (congestive heart failure) ([facility]) I50.33    Lactic acidosis E87.2    Poorly-controlled hypertension I10    Alcohol abuse F10.10    HIV (human immunodeficiency virus infection) ([facility] B20    Tobacco abuse Z72.0    Asthma exacerbation with COPD (chronic obstructive pulmonary disease) ([facility] J44.1, J45.901    Essential hypertension I10    Chronic obstructive pulmonary disease ([facility]) J44.9    Asthma, intrinsic J45.909    Acute exacerbation of chronic obstructive pulmonary disease (COPD) ([facility]) J44.1    Sleep apnea G47.30    Acute on chronic respiratory failure with hypercapnia ([facility]) M67.60    Diastolic heart failure ([facility]) I50.30    Chest pain on exertion R07.9    Seizure ([facility]) R56.9    Benign prostatic hyperplasia without lower urinary tract symptoms N40.0    COPD exacerbation ([facility]) J44.1    Acute on chronic respiratory failure with hypoxemia ([facility]) J96.21       Isolation/Infection:   Isolation          No Isolation        Patient Infection Status     Infection Onset Added Last Indicated Last Indicated By Review Planned Expiration Resolved Resolved By    None active    Resolved    COVID-19 Rule Out 08/07/20 08/07/20 08/07/20 COVID-19 (Ordered)   08/07/20 Rule-Out Test Resulted    COVID-19 Rule Out 07/03/20 07/03/20 07/03/20 COVID-19 (Ordered)   07/03/20 Rule-Out Test Resulted          Nurse Assessment:  Last Vital Signs: BP (!) 144/74   Pulse 76   Temp 97.9 °F (36.6 °C) (Temporal)   Resp 18   Ht 5' 6\" (1.676 m)   Wt 239 lb (108.4 kg)   SpO2 95%   BMI 38.58 kg/m²     Last documented pain score (0-10 scale): Pain Level: 0  Last Weight:   Wt Readings from Last 1 Encounters:   08/07/20 239 lb (108.4 kg)     Mental Status:  {IP PT MENTAL STATUS:20030}    IV Access:  { AGNIESZKA IV ACCESS:161454343}    Nursing Mobility/ADLs:  Walking   {CHP DME NGZY:811583937}  Transfer  {CHP DME GKVU:705987301}  Bathing  {CHP DME LSDI:159776965}  Dressing  {CHP DME KXIB:666258954}  Toileting  {CHP DME HXQR:862982594}  Feeding  {CHP DME UVPC:955760322}  Med Admin  {CHP DME KCRR:907059664}  Med Delivery   { AGNIESZKA MED Delivery:861302000}    Wound Care Documentation and Therapy:        Elimination:  Continence:   · Bowel: {YES / XP:59779}  · Bladder: {YES / CJ:68076}  Urinary Catheter: {Urinary Catheter:159409856} {Prognosis:6031265343}    Recommended Labs or Other Treatments After Discharge: ***    Physician Certification: I certify the above information and transfer of Yuan Snell  is necessary for the continuing treatment of the diagnosis listed and that he requires {Admit to Appropriate Level of Care:52978} for {GREATER/LESS:425541032} 30 days.      Update Admission H&P: {CHP DME Changes in [facility]:395573465}    PHYSICIAN SIGNATURE:  {Esignature:654333304}

## 2020-08-10 NOTE — DISCHARGE SUMMARY
[facility] Physician Discharge Summary       Aislinn Debora, [address] [facility] [address]  [phone]      Call for follow up 1 week post hospitalization    [address] [place] [id number] [phone]            Activity level: Activity as tolerated    Diet: DIET CARDIAC;    Labs:CBC and BMP weekly    Condition at discharge: Stable    Dispo:Discharge home with home health    Continue supplemental oxygen via nasal canula @ 2 LPM round-the-clock. Continue CPAP / BiPAP during sleep as prior to admission. Patient ID:  Ravi Lozano  [id number]  1 y.o.  1960    Admit date: 8/7/2020    Discharge date and time:  8/10/2020  2:10 PM    Admission Diagnoses: Principal Problem:    Acute on chronic respiratory failure with hypoxemia (HCC)  Active Problems:    Depression    HIV (human immunodeficiency virus infection) ([address]    Tobacco abuse    Essential hypertension    Sleep apnea    Acute on chronic respiratory failure with hypercapnia (HCC)    Diastolic heart failure (HCC)    Seizure (HCC)    COPD exacerbation (HCC)    Type 2 diabetes mellitus, without long-term current use of insulin ([address]  Resolved Problems:    * No resolved hospital problems. *      Discharge Diagnoses: Principal Problem:    Acute on chronic respiratory failure with hypoxemia (HCC)  Active Problems:    Depression    HIV (human immunodeficiency virus infection) ([address]    Tobacco abuse    Essential hypertension    Sleep apnea    Acute on chronic respiratory failure with hypercapnia (HCC)    Diastolic heart failure (HCC)    Seizure (HCC)    COPD exacerbation (HCC)    Type 2 diabetes mellitus, without long-term current use of insulin ([address]  Resolved Problems:    * No resolved hospital problems.  *      Consults:  IP CONSULT TO SOCIAL WORK  IP CONSULT TO DIABETES EDUCATOR  IP CONSULT TO HOME CARE NEEDS    Procedures: None    Hospital Course: and reactive to light and conjunctivae normal  Neck: neck supple and non tender without mass   Pulmonary/Chest: decreased breath sounds noted bilaterally  Cardiovascular: normal rate, normal S1 and S2, no gallops and intact distal pulses  Abdomen: soft, non-tender, non-distended and bowel sounds normal  Extremities: no cyanosis and no clubbing  Neurologic: speech normal and no tremor  I/O last 3 completed shifts: In: 6668 [P.O.:1070]  Out: 1050 [Urine:1050]  No intake/output data recorded. LABS:  Recent Labs     08/07/20  1428 08/08/20  0555    137   K 4.0 4.4    96*   CO2 35* 32*   BUN 9 14   CREATININE 0.7 0.9   GLUCOSE 109* 191*   CALCIUM 8.7 8.8       Recent Labs     08/07/20  1428 08/08/20  0555   WBC 9.3 13.1*   RBC 4.24 4.61   HGB 12.3* 13.1   HCT 39.8 43.3   MCV 93.9 93.9   MCH 29.0 28.4   MCHC 30.9* 30.3*   RDW 14.9 14.7    180   MPV 11.4 11.9       No results for input(s): POCGLU in the last 72 hours. Imaging:  Xr Chest Portable    Result Date: 8/7/2020  EXAMINATION: ONE XRAY VIEW OF THE CHEST 8/7/2020 2:48 pm COMPARISON: July 3, 2020 HISTORY: ORDERING SYSTEM PROVIDED HISTORY: shortness of breath TECHNOLOGIST PROVIDED HISTORY: Reason for exam:->shortness of breath FINDINGS: Heart size is unable to be accurately assessed on this single portable view of the chest but it appears to be stable. There appears to be some degree of mild bronchial wall thickening in the lower lung zones. This is nonspecific but typically seen with bronchitis. No evidence of a focal airspace opacity, pneumothorax or pleural effusion. No acute osseous abnormality. Suture anchors are noted in the left humeral head. Suggestion of at least mild bronchial wall thickening in the lower lung zones, a nonspecific finding that can be seen with bronchitis.          Patient Instructions:   Current Discharge Medication List      START taking these medications    Details   metFORMIN (GLUCOPHAGE) 500 MG tablet Take 1 tablet by mouth daily (with breakfast)  Qty: 60 tablet, Refills: 0      predniSONE (DELTASONE) 20 MG tablet Take 2 tablets by mouth daily for 3 days  Qty: 6 tablet, Refills: 0      magnesium oxide (MAG-OX) 400 (240 Mg) MG tablet Take 1 tablet by mouth 2 times daily  Qty: 30 tablet, Refills: 0      doxycycline hyclate (VIBRAMYCIN) 100 MG capsule Take 1 capsule by mouth every 12 hours for 2 days  Qty: 4 capsule, Refills: 0         CONTINUE these medications which have CHANGED    Details   hydrOXYzine (ATARAX) 25 MG tablet Take 1 tablet by mouth 3 times daily as needed for Anxiety  Qty: 30 tablet, Refills: 0      levETIRAcetam (KEPPRA) 1000 MG tablet Take 1 tablet by mouth 2 times daily  Qty: 60 tablet, Refills: 0      lisinopril (PRINIVIL;ZESTRIL) 10 MG tablet Take 1 tablet by mouth daily  Qty: 30 tablet, Refills: 0      amLODIPine (NORVASC) 10 MG tablet Take 1 tablet by mouth daily  Qty: 30 tablet, Refills: 0      carvedilol (COREG) 6.25 MG tablet Take 1 tablet by mouth 2 times daily (with meals)  Qty: 60 tablet, Refills: 0         CONTINUE these medications which have NOT CHANGED    Details   hydroCHLOROthiazide (HYDRODIURIL) 25 MG tablet Take 1 tablet by mouth daily New BP pill replaces Lasix. Qty: 30 tablet, Refills: 0      atorvastatin (LIPITOR) 40 MG tablet Take 1 tablet by mouth nightly New cholesterol pill.   Qty: 30 tablet, Refills: 0      aspirin EC 81 MG EC tablet Take 1 tablet by mouth daily  Qty: 30 tablet, Refills: 0      escitalopram (LEXAPRO) 20 MG tablet Take 1 tablet by mouth daily  Qty: 30 tablet, Refills: 0      bictegravir-emtricitab-tenofovir alafenamide (BIKTARVY) -25 MG TABS per tablet Take 1 tablet by mouth daily      EPINEPHrine (EPIPEN) 0.3 MG/0.3ML SOAJ injection USE AS DIRECTED FOR ALLERGIC REACTION TO Larinda Copier  Qty: 1 each, Refills: 1    Associated Diagnoses: Severe persistent extrinsic asthma with acute exacerbation      mometasone-formoterol (DULERA) 100-5 MCG/ACT inhaler Inhale 2 puffs into the lungs 2 times daily  Qty: 1 Inhaler, Refills: 3    Associated Diagnoses: Severe persistent extrinsic asthma with acute exacerbation      tiotropium (SPIRIVA HANDIHALER) 18 MCG inhalation capsule Inhale 1 capsule into the lungs daily  Qty: 30 capsule, Refills: 3    Associated Diagnoses: Severe persistent extrinsic asthma with acute exacerbation      cloNIDine (CATAPRES) 0.2 MG tablet TAKE 1 TABLET BY ORAL ROUTE 2 TIMES EVERY DAY  Refills: 3      fluticasone (FLONASE) 50 MCG/ACT nasal spray INHALE 1 SPRAY BY INTRANASAL ROUTE EVERY DAY IN EACH NOSTRIL  Refills: 3      OXYGEN Inhale 2 L into the lungs      omalizumab (XOLAIR) 150 MG injection Inject 375 mg into the skin every 14 days  Qty: 1 vial, Refills: 3      tamsulosin (FLOMAX) 0.4 MG capsule Take 0.4 mg by mouth daily       ipratropium-albuterol (DUONEB) 0.5-2.5 (3) MG/3ML SOLN nebulizer solution Inhale 3 mLs into the lungs every 6 hours as needed for Shortness of Breath  Qty: 360 mL, Refills: 0      albuterol sulfate  (90 BASE) MCG/ACT inhaler Inhale 2 puffs into the lungs every 6 hours as needed for Wheezing         STOP taking these medications       potassium chloride (MICRO-K) 10 MEQ extended release capsule Comments:   Reason for Stopping:         ranitidine (ZANTAC) 150 MG tablet Comments:   Reason for Stopping:                 Note that more than 30 minutes was spent in preparing discharge papers, discussing discharge with patient, medication review, etc.    NOTE: This report was transcribed using voice recognition software.  Every effort was made to ensure accuracy; however, inadvertent computerized transcription errors may be present.     Signed:  Electronically signed by LAVON Carreon CNP on 8/10/2020 at 2:10 PM

## 2020-08-11 ENCOUNTER — CARE COORDINATION (OUTPATIENT)
Dept: CASE MANAGEMENT | Age: 60
End: 2020-08-11

## 2020-08-11 LAB
CULTURE, RESPIRATORY: NORMAL
SMEAR, RESPIRATORY: NORMAL

## 2020-08-12 ENCOUNTER — CARE COORDINATION (OUTPATIENT)
Dept: CASE MANAGEMENT | Age: 60
End: 2020-08-12

## 2020-08-13 ENCOUNTER — HOSPITAL ENCOUNTER (OUTPATIENT)
Age: 60
Discharge: HOME | End: 2020-08-15
Payer: MEDICAID

## 2020-08-13 LAB
ALBUMIN SERPL-MCNC: 4.1 G/DL (ref 3.5–5.2)
ALP BLD-CCNC: 74 U/L (ref 40–129)
ALT SERPL-CCNC: 25 U/L (ref 0–40)
ANION GAP SERPL CALCULATED.3IONS-SCNC: 10 MMOL/L (ref 7–16)
AST SERPL-CCNC: 14 U/L (ref 0–39)
BILIRUB SERPL-MCNC: 0.3 MG/DL (ref 0–1.2)
BUN BLDV-MCNC: 14 MG/DL (ref 8–23)
CALCIUM SERPL-MCNC: 8.8 MG/DL (ref 8.6–10.2)
CHLORIDE BLD-SCNC: 94 MMOL/L (ref 98–107)
CO2: 33 MMOL/L (ref 22–29)
CREAT SERPL-MCNC: 0.7 MG/DL (ref 0.7–1.2)
GFR AFRICAN AMERICAN: >60
GFR NON-AFRICAN AMERICAN: >60 ML/MIN/1.73
GLUCOSE BLD-MCNC: 128 MG/DL (ref 74–99)
HCT VFR BLD CALC: 44.2 % (ref 37–54)
HEMOGLOBIN: 13.5 G/DL (ref 12.5–16.5)
MCH RBC QN AUTO: 28.7 PG (ref 26–35)
MCHC RBC AUTO-ENTMCNC: 30.5 % (ref 32–34.5)
MCV RBC AUTO: 94 FL (ref 80–99.9)
PDW BLD-RTO: 14.8 FL (ref 11.5–15)
PLATELET # BLD: 199 E9/L (ref 130–450)
PMV BLD AUTO: 12.3 FL (ref 7–12)
POTASSIUM SERPL-SCNC: 4.5 MMOL/L (ref 3.5–5)
RBC # BLD: 4.7 E12/L (ref 3.8–5.8)
SODIUM BLD-SCNC: 137 MMOL/L (ref 132–146)
TOTAL PROTEIN: 6.8 G/DL (ref 6.4–8.3)
WBC # BLD: 12 E9/L (ref 4.5–11.5)

## 2020-08-13 PROCEDURE — 85027 COMPLETE CBC AUTOMATED: CPT

## 2020-08-13 PROCEDURE — 80053 COMPREHEN METABOLIC PANEL: CPT

## 2020-08-13 PROCEDURE — 36415 COLL VENOUS BLD VENIPUNCTURE: CPT

## 2020-08-24 ENCOUNTER — HOSPITAL ENCOUNTER (OUTPATIENT)
Age: 60
Discharge: HOME | End: 2020-08-26
Payer: MEDICAID

## 2020-08-24 LAB
ALBUMIN SERPL-MCNC: 4.3 G/DL (ref 3.5–5.2)
ALP BLD-CCNC: 83 U/L (ref 40–129)
ALT SERPL-CCNC: 29 U/L (ref 0–40)
ANION GAP SERPL CALCULATED.3IONS-SCNC: 18 MMOL/L (ref 7–16)
AST SERPL-CCNC: 24 U/L (ref 0–39)
BILIRUB SERPL-MCNC: 0.4 MG/DL (ref 0–1.2)
BUN BLDV-MCNC: 6 MG/DL (ref 8–23)
CALCIUM SERPL-MCNC: 9.5 MG/DL (ref 8.6–10.2)
CHLORIDE BLD-SCNC: 98 MMOL/L (ref 98–107)
CO2: 23 MMOL/L (ref 22–29)
CREAT SERPL-MCNC: 0.9 MG/DL (ref 0.7–1.2)
GFR AFRICAN AMERICAN: >60
GFR NON-AFRICAN AMERICAN: >60 ML/MIN/1.73
GLUCOSE BLD-MCNC: 99 MG/DL (ref 74–99)
HCT VFR BLD CALC: 47.9 % (ref 37–54)
HEMOGLOBIN: 14.3 G/DL (ref 12.5–16.5)
MCH RBC QN AUTO: 28.7 PG (ref 26–35)
MCHC RBC AUTO-ENTMCNC: 29.9 % (ref 32–34.5)
MCV RBC AUTO: 96 FL (ref 80–99.9)
PDW BLD-RTO: 14.8 FL (ref 11.5–15)
PLATELET # BLD: 196 E9/L (ref 130–450)
PMV BLD AUTO: 12 FL (ref 7–12)
POTASSIUM SERPL-SCNC: 4.1 MMOL/L (ref 3.5–5)
RBC # BLD: 4.99 E12/L (ref 3.8–5.8)
SODIUM BLD-SCNC: 139 MMOL/L (ref 132–146)
TOTAL PROTEIN: 7.5 G/DL (ref 6.4–8.3)
WBC # BLD: 9.9 E9/L (ref 4.5–11.5)

## 2020-08-24 PROCEDURE — 36415 COLL VENOUS BLD VENIPUNCTURE: CPT

## 2020-08-24 PROCEDURE — 80053 COMPREHEN METABOLIC PANEL: CPT

## 2020-08-24 PROCEDURE — 85027 COMPLETE CBC AUTOMATED: CPT

## 2020-08-31 ENCOUNTER — HOSPITAL ENCOUNTER (OUTPATIENT)
Age: 60
Discharge: HOME | End: 2020-09-02
Payer: MEDICAID

## 2020-08-31 LAB
ALBUMIN SERPL-MCNC: 4.6 G/DL (ref 3.5–5.2)
ALP BLD-CCNC: 90 U/L (ref 40–129)
ALT SERPL-CCNC: 27 U/L (ref 0–40)
ANION GAP SERPL CALCULATED.3IONS-SCNC: 14 MMOL/L (ref 7–16)
AST SERPL-CCNC: 20 U/L (ref 0–39)
BILIRUB SERPL-MCNC: 0.4 MG/DL (ref 0–1.2)
BUN BLDV-MCNC: 13 MG/DL (ref 8–23)
CALCIUM SERPL-MCNC: 9.8 MG/DL (ref 8.6–10.2)
CHLORIDE BLD-SCNC: 100 MMOL/L (ref 98–107)
CO2: 29 MMOL/L (ref 22–29)
CREAT SERPL-MCNC: 1 MG/DL (ref 0.7–1.2)
GFR AFRICAN AMERICAN: >60
GFR NON-AFRICAN AMERICAN: >60 ML/MIN/1.73
GLUCOSE BLD-MCNC: 100 MG/DL (ref 74–99)
HCT VFR BLD CALC: 47.9 % (ref 37–54)
HEMOGLOBIN: 14.3 G/DL (ref 12.5–16.5)
MCH RBC QN AUTO: 28.7 PG (ref 26–35)
MCHC RBC AUTO-ENTMCNC: 29.9 % (ref 32–34.5)
MCV RBC AUTO: 96.2 FL (ref 80–99.9)
PDW BLD-RTO: 14.4 FL (ref 11.5–15)
PLATELET # BLD: 201 E9/L (ref 130–450)
PMV BLD AUTO: 11.5 FL (ref 7–12)
POTASSIUM SERPL-SCNC: 4 MMOL/L (ref 3.5–5)
RBC # BLD: 4.98 E12/L (ref 3.8–5.8)
SODIUM BLD-SCNC: 143 MMOL/L (ref 132–146)
TOTAL PROTEIN: 7.7 G/DL (ref 6.4–8.3)
WBC # BLD: 9.5 E9/L (ref 4.5–11.5)

## 2020-08-31 PROCEDURE — 85027 COMPLETE CBC AUTOMATED: CPT

## 2020-08-31 PROCEDURE — 80053 COMPREHEN METABOLIC PANEL: CPT

## 2020-08-31 PROCEDURE — 36415 COLL VENOUS BLD VENIPUNCTURE: CPT

## 2020-09-14 ENCOUNTER — HOSPITAL ENCOUNTER (OUTPATIENT)
Age: 60
Discharge: HOME | End: 2020-09-16
Payer: MEDICAID

## 2020-09-14 LAB
ALBUMIN SERPL-MCNC: 4.5 G/DL (ref 3.5–5.2)
ALP BLD-CCNC: 96 U/L (ref 40–129)
ALT SERPL-CCNC: 26 U/L (ref 0–40)
ANION GAP SERPL CALCULATED.3IONS-SCNC: 13 MMOL/L (ref 7–16)
AST SERPL-CCNC: 18 U/L (ref 0–39)
BILIRUB SERPL-MCNC: 0.4 MG/DL (ref 0–1.2)
BUN BLDV-MCNC: 10 MG/DL (ref 8–23)
CALCIUM SERPL-MCNC: 9.9 MG/DL (ref 8.6–10.2)
CHLORIDE BLD-SCNC: 95 MMOL/L (ref 98–107)
CO2: 31 MMOL/L (ref 22–29)
CREAT SERPL-MCNC: 1 MG/DL (ref 0.7–1.2)
GFR AFRICAN AMERICAN: >60
GFR NON-AFRICAN AMERICAN: >60 ML/MIN/1.73
GLUCOSE BLD-MCNC: 91 MG/DL (ref 74–99)
POTASSIUM SERPL-SCNC: 4.2 MMOL/L (ref 3.5–5)
SODIUM BLD-SCNC: 139 MMOL/L (ref 132–146)
TOTAL PROTEIN: 7.6 G/DL (ref 6.4–8.3)

## 2020-09-14 PROCEDURE — 80053 COMPREHEN METABOLIC PANEL: CPT

## 2020-09-14 PROCEDURE — 36415 COLL VENOUS BLD VENIPUNCTURE: CPT

## 2020-09-28 ENCOUNTER — HOSPITAL ENCOUNTER (OUTPATIENT)
Age: 60
Discharge: HOME | End: 2020-09-30
Payer: MEDICAID

## 2020-09-28 LAB
ALBUMIN SERPL-MCNC: 4.2 G/DL (ref 3.5–5.2)
ALP BLD-CCNC: 102 U/L (ref 40–129)
ALT SERPL-CCNC: 24 U/L (ref 0–40)
ANION GAP SERPL CALCULATED.3IONS-SCNC: 15 MMOL/L (ref 7–16)
AST SERPL-CCNC: 21 U/L (ref 0–39)
BASOPHILS ABSOLUTE: 0.08 E9/L (ref 0–0.2)
BASOPHILS RELATIVE PERCENT: 0.7 % (ref 0–2)
BILIRUB SERPL-MCNC: 0.3 MG/DL (ref 0–1.2)
BUN BLDV-MCNC: 14 MG/DL (ref 8–23)
CALCIUM SERPL-MCNC: 9.6 MG/DL (ref 8.6–10.2)
CHLORIDE BLD-SCNC: 103 MMOL/L (ref 98–107)
CO2: 26 MMOL/L (ref 22–29)
CREAT SERPL-MCNC: 0.9 MG/DL (ref 0.7–1.2)
EOSINOPHILS ABSOLUTE: 0.13 E9/L (ref 0.05–0.5)
EOSINOPHILS RELATIVE PERCENT: 1.2 % (ref 0–6)
GFR AFRICAN AMERICAN: >60
GFR NON-AFRICAN AMERICAN: >60 ML/MIN/1.73
GLUCOSE BLD-MCNC: 140 MG/DL (ref 74–99)
HCT VFR BLD CALC: 48 % (ref 37–54)
HEMOGLOBIN: 14.7 G/DL (ref 12.5–16.5)
IMMATURE GRANULOCYTES #: 0.03 E9/L
IMMATURE GRANULOCYTES %: 0.3 % (ref 0–5)
LYMPHOCYTES ABSOLUTE: 2.98 E9/L (ref 1.5–4)
LYMPHOCYTES RELATIVE PERCENT: 27.4 % (ref 20–42)
MCH RBC QN AUTO: 28.1 PG (ref 26–35)
MCHC RBC AUTO-ENTMCNC: 30.6 % (ref 32–34.5)
MCV RBC AUTO: 91.6 FL (ref 80–99.9)
MONOCYTES ABSOLUTE: 1.1 E9/L (ref 0.1–0.95)
MONOCYTES RELATIVE PERCENT: 10.1 % (ref 2–12)
NEUTROPHILS ABSOLUTE: 6.55 E9/L (ref 1.8–7.3)
NEUTROPHILS RELATIVE PERCENT: 60.3 % (ref 43–80)
PDW BLD-RTO: 13.5 FL (ref 11.5–15)
PLATELET # BLD: 204 E9/L (ref 130–450)
PMV BLD AUTO: 12.9 FL (ref 7–12)
POTASSIUM SERPL-SCNC: 3.8 MMOL/L (ref 3.5–5)
RBC # BLD: 5.24 E12/L (ref 3.8–5.8)
SODIUM BLD-SCNC: 144 MMOL/L (ref 132–146)
TOTAL PROTEIN: 7.1 G/DL (ref 6.4–8.3)
WBC # BLD: 10.9 E9/L (ref 4.5–11.5)

## 2020-09-28 PROCEDURE — 85025 COMPLETE CBC W/AUTO DIFF WBC: CPT

## 2020-09-28 PROCEDURE — 36415 COLL VENOUS BLD VENIPUNCTURE: CPT

## 2020-09-28 PROCEDURE — 80053 COMPREHEN METABOLIC PANEL: CPT

## 2020-10-21 ENCOUNTER — HOSPITAL ENCOUNTER (OUTPATIENT)
Age: 60
Discharge: HOME | End: 2020-10-23
Payer: MEDICAID

## 2020-10-21 ENCOUNTER — HOSPITAL ENCOUNTER (OUTPATIENT)
Dept: GENERAL RADIOLOGY | Age: 60
Discharge: HOME | End: 2020-10-23
Payer: MEDICAID

## 2020-10-21 PROCEDURE — 71046 X-RAY EXAM CHEST 2 VIEWS: CPT

## 2020-10-22 ENCOUNTER — VIRTUAL VISIT (OUTPATIENT)
Dept: PULMONOLOGY | Age: 60
End: 2020-10-22
Payer: MEDICAID

## 2020-10-22 NOTE — PROGRESS NOTES
Melissa Draper is a 56 y.o. male evaluated via telephone on 10/22/2020. Consent:  He and/or health care decision maker is aware that that he may receive a bill for this telephone service, depending on his insurance coverage, and has provided verbal consent to proceed: Yes      Documentation:  I communicated with the patient and/or health care decision maker about Asthma/Lung Nodules. Details of this discussion including any medical advice provided: 1 year follow up via phone. Pt would like to resume Xolair injections . Office to work on [facility] [employer] required. Pt unsure of inhalers and pharmacy. Will follow up with review of chart and check with pharmacy to confirm inhalers pt has. Some changes with formulary meds need clarified before refilling. Pt to follow up in office in 6 mos; appt card to be mailed. I affirm this is a Patient Initiated Episode with a Patient who has not had a related appointment within my department in the past 7 days or scheduled within the next 24 hours.     Patient identification was verified at the start of the visit: Yes    Total Time: minutes: 11-20 minutes    Note: not billable if this call serves to triage the patient into an appointment for the relevant concern      Hortencia Richie

## 2020-10-22 NOTE — PROGRESS NOTES
Department of Internal Medicine  Division of Pulmonary, Critical Care & Sleep Medicine  Pulmonary [address] [facility]                                             Pulmonary Clinic Consult     I had the pleasure of seeing  Candance Brown in the [address] [facility] regarding their Hypoxia ,COPD      Chief Complaint   Patient presents with    Follow-up       HISTORY OF PRESENT ILLNESS:    Candance Brown is a 56y.o. year old  Who start smoking at age 13   And increase gradually 2 pack and then quit 7 years and then comes back smoking about 1 year and now quit 1 year ago       The Patient comes in with SOB that has been going on 2 years Associated with cough and he bring clear sputum ,He states that it get worse with exercise or walking long distance and he can walk less than 1/2 block And go 1- flight of stairs before get short winded    He has cough ,productive for  Clear  Sputum and it is more in the     Has history of lung disease include Asthma     Sleep history :  Snoring yes   Feel tired during the day yes   Wake up fresh no   excessive daytime sleepiness    He denies any hemoptysis and he has been gaining weight    he has CHF and has been losing some weight and he is on Lasix 80 bnid     He use ventolin and Breo and incruse and duoneb for neb   Today Visit:  He states that his SOB has got worse   There is no fever or chills   No chest pain   He has some wheezing  Has not been getting hios Xolair injection and it helped but he still ahs some issues with it  No fever or chills and no chest pain  No hemoptysis  He use his albuterol 4-5 times       ALLERGIES:    Allergies   Allergen Reactions    Ketorolac Tromethamine Itching and Nausea Only       PAST MEDICAL HISTORY:       Diagnosis Date    Asthma     since childhood    [facility]     COPD (chronic obstructive pulmonary disease) [facility]     Depression     Diabetes mellitus [facility]     Emphyse[facility] lung ([facility]     H/O cardiovascular stress test 05/27/2020    Lexiscan    History of echocardiogram 04/19/2018    EF 62%    HIV (human immunodeficiency virus infection) ([facility]     Hypertension 2008    Seizure ([facility]     Seizure ([facility]     Sleep apnea     no cpap       MEDICATIONS:   Current Outpatient Medications   Medication Sig Dispense Refill    levETIRAcetam (KEPPRA) 1000 MG tablet Take 1 tablet by mouth 2 times daily 60 tablet 0    metFORMIN (GLUCOPHAGE) 500 MG tablet Take 1 tablet by mouth daily (with breakfast) 60 tablet 0    lisinopril (PRINIVIL;ZESTRIL) 10 MG tablet Take 1 tablet by mouth daily 30 tablet 0    amLODIPine (NORVASC) 10 MG tablet Take 1 tablet by mouth daily 30 tablet 0    carvedilol (COREG) 6.25 MG tablet Take 1 tablet by mouth 2 times daily (with meals) 60 tablet 0    magnesium oxide (MAG-OX) 400 (240 Mg) MG tablet Take 1 tablet by mouth 2 times daily 30 tablet 0    hydroCHLOROthiazide (HYDRODIURIL) 25 MG tablet Take 1 tablet by mouth daily New BP pill replaces Lasix. 30 tablet 0    atorvastatin (LIPITOR) 40 MG tablet Take 1 tablet by mouth nightly New cholesterol pill.  30 tablet 0    aspirin EC 81 MG EC tablet Take 1 tablet by mouth daily 30 tablet 0    escitalopram (LEXAPRO) 20 MG tablet Take 1 tablet by mouth daily 30 tablet 0    bictegravir-emtricitab-tenofovir alafenamide (BIKTARVY) -25 MG TABS per tablet Take 1 tablet by mouth daily      EPINEPHrine (EPIPEN) 0.3 MG/0.3ML SOAJ injection USE AS DIRECTED FOR ALLERGIC REACTION TO XOLAIR 1 each 1    mometasone-formoterol (DULERA) 100-5 MCG/ACT inhaler Inhale 2 puffs into the lungs 2 times daily 1 Inhaler 3    tiotropium (SPIRIVA HANDIHALER) 18 MCG inhalation capsule Inhale 1 capsule into the lungs daily (Patient taking differently: Inhale 800 mcg into the lungs daily ) 30 capsule 3    cloNIDine (CATAPRES) 0.2 MG tablet TAKE 1 TABLET BY ORAL ROUTE 2 TIMES EVERY DAY  3    fluticasone (FLONASE) 50 MCG/ACT Hematopoietic:  No history of bleeding disorders or easy bruising. Rheumatic:  No connective tissue disease history or polyarthritis/inflammatory joint disease. PHYSICAL EXAMINATION:  There were no vitals filed for this visit. This phone    hiatric: The patient's general behavior, level of consciousness, thought content and emotional status is normal.  Cranial nerves II-XII are intact. DATA: Spirometry done in the office today demonstrates an FVC of 1.88  liters which is 54  % of predicted with an FEV1 of  0.87  liters which is  31 % of predicted. FEV1/FVC ratio is 46 %. Flow volume loop shows no signs of intrathoracic or extrathoracic obstruction. Impressions:   TLC 7.94  128  RV/TLC 74         IMPRESSION:    1-Very severe COPD   2-? CHEN   3-CHF   4-severe  air trapping  5- micro nodularities with possible atypical infection     6- HIV disease          PLAN:        Patient is very pleasant 58-year-old male with history of HIV, multiple years history of smoking, has been having worsening shortness of breath and has also congestive heart failure normal EF but also has possible pulmonary hypertension    The patient has been having worsening hypoxia in addition to his PFT showing very severe COPD along with cough  We'll proceed with the following workup     - Use Spiriva and Symbicort   -alpha antitrypsin normal   -eosinophilic count normal   - Respiratory allergen profile   - albuterol as needed   - sleep study down the road  His last CD4 5/17/2018 221  Still smoking but not regular bases   Will get CT as screeing and follow   Increase O2 level top 5 as he is not feeling well  And SOB ,he will get pulse Oximetry to check his levels     Has not been able to get his  Xolair 375 bid will check on that as he was feeling good on     Thank you for allowing me to participate in [facility].  I will keep following with you ,should you have any concerns ,please contact me at [phone] Sincerely,        Kane Hager MD  Pulmonary & Critical Care Medicine     NOTE: This report was transcribed using voice recognition software. Every effort was made to ensure accuracy; however, inadvertent computerized transcription errors may be present.

## 2020-10-23 RX ORDER — EPINEPHRINE 1 MG/ML
0.3 INJECTION, SOLUTION, CONCENTRATE INTRAVENOUS PRN
Status: CANCELLED | OUTPATIENT
Start: 2020-10-23

## 2020-10-23 RX ORDER — METHYLPREDNISOLONE SODIUM SUCCINATE 125 MG/2ML
125 INJECTION, POWDER, LYOPHILIZED, FOR SOLUTION INTRAMUSCULAR; INTRAVENOUS ONCE
Status: CANCELLED
Start: 2020-10-23

## 2020-10-23 RX ORDER — METHYLPREDNISOLONE SODIUM SUCCINATE 125 MG/2ML
125 INJECTION, POWDER, LYOPHILIZED, FOR SOLUTION INTRAMUSCULAR; INTRAVENOUS ONCE
Status: CANCELLED | OUTPATIENT
Start: 2020-10-23

## 2020-10-23 RX ORDER — SODIUM CHLORIDE 0.9 % (FLUSH) 0.9 %
5 SYRINGE (ML) INJECTION PRN
Status: CANCELLED | OUTPATIENT
Start: 2020-10-23

## 2020-10-23 RX ORDER — SODIUM CHLORIDE 9 MG/ML
INJECTION, SOLUTION INTRAVENOUS CONTINUOUS
Status: CANCELLED | OUTPATIENT
Start: 2020-10-23

## 2020-10-23 RX ORDER — DIPHENHYDRAMINE HYDROCHLORIDE 50 MG/ML
50 INJECTION INTRAMUSCULAR; INTRAVENOUS ONCE
Status: CANCELLED | OUTPATIENT
Start: 2020-10-23

## 2020-10-23 RX ORDER — HEPARIN SODIUM (PORCINE) LOCK FLUSH IV SOLN 100 UNIT/ML 100 UNIT/ML
500 SOLUTION INTRAVENOUS PRN
Status: CANCELLED | OUTPATIENT
Start: 2020-10-23

## 2020-10-23 RX ORDER — SODIUM CHLORIDE 0.9 % (FLUSH) 0.9 %
10 SYRINGE (ML) INJECTION PRN
Status: CANCELLED | OUTPATIENT
Start: 2020-10-23

## 2020-10-23 RX ORDER — DIPHENHYDRAMINE HCL 25 MG
25 TABLET ORAL ONCE
Status: CANCELLED
Start: 2020-10-23

## 2020-10-23 RX ORDER — ACETAMINOPHEN 325 MG/1
650 TABLET ORAL ONCE
Status: CANCELLED | OUTPATIENT
Start: 2020-10-23

## 2020-11-03 PROBLEM — I10 ESSENTIAL HYPERTENSION: Status: RESOLVED | Noted: 2018-05-30 | Resolved: 2020-11-03

## 2020-11-12 RX ORDER — EPINEPHRINE 0.3 MG/.3ML
INJECTION SUBCUTANEOUS
Qty: 1 EACH | Refills: 1 | Status: SHIPPED | OUTPATIENT
Start: 2020-11-12

## 2020-12-07 ENCOUNTER — HOSPITAL ENCOUNTER (OUTPATIENT)
Dept: INFUSION THERAPY | Age: 60
Setting detail: INFUSION SERIES
Discharge: HOME | End: 2020-12-07
Payer: MEDICAID

## 2020-12-07 VITALS
TEMPERATURE: 98.4 F | HEART RATE: 93 BPM | SYSTOLIC BLOOD PRESSURE: 148 MMHG | DIASTOLIC BLOOD PRESSURE: 71 MMHG | RESPIRATION RATE: 22 BRPM | OXYGEN SATURATION: 93 %

## 2020-12-07 DIAGNOSIS — J44.1 ASTHMA EXACERBATION WITH COPD (CHRONIC OBSTRUCTIVE PULMONARY DISEASE) (HCC): ICD-10-CM

## 2020-12-07 DIAGNOSIS — J45.901 ASTHMA EXACERBATION WITH COPD (CHRONIC OBSTRUCTIVE PULMONARY DISEASE) (HCC): ICD-10-CM

## 2020-12-07 DIAGNOSIS — J45.909 ASTHMA, INTRINSIC: Primary | ICD-10-CM

## 2020-12-07 PROCEDURE — 6370000000 HC RX 637 (ALT 250 FOR IP): Performed by: INTERNAL MEDICINE

## 2020-12-07 PROCEDURE — 96372 THER/PROPH/DIAG INJ SC/IM: CPT

## 2020-12-07 PROCEDURE — 6360000002 HC RX W HCPCS: Performed by: INTERNAL MEDICINE

## 2020-12-07 RX ORDER — DIPHENHYDRAMINE HCL 25 MG
25 TABLET ORAL ONCE
Status: COMPLETED | OUTPATIENT
Start: 2020-12-07 | End: 2020-12-07

## 2020-12-07 RX ORDER — EPINEPHRINE 1 MG/ML
0.3 INJECTION, SOLUTION, CONCENTRATE INTRAVENOUS PRN
Status: CANCELLED | OUTPATIENT
Start: 2020-12-21

## 2020-12-07 RX ORDER — SODIUM CHLORIDE 0.9 % (FLUSH) 0.9 %
10 SYRINGE (ML) INJECTION PRN
Status: CANCELLED | OUTPATIENT
Start: 2020-12-21

## 2020-12-07 RX ORDER — DIPHENHYDRAMINE HYDROCHLORIDE 50 MG/ML
50 INJECTION INTRAMUSCULAR; INTRAVENOUS ONCE
Status: CANCELLED | OUTPATIENT
Start: 2020-12-21

## 2020-12-07 RX ORDER — HEPARIN SODIUM (PORCINE) LOCK FLUSH IV SOLN 100 UNIT/ML 100 UNIT/ML
500 SOLUTION INTRAVENOUS PRN
Status: CANCELLED | OUTPATIENT
Start: 2020-12-21

## 2020-12-07 RX ORDER — METHYLPREDNISOLONE SODIUM SUCCINATE 125 MG/2ML
125 INJECTION, POWDER, LYOPHILIZED, FOR SOLUTION INTRAMUSCULAR; INTRAVENOUS ONCE
Status: CANCELLED
Start: 2020-12-21

## 2020-12-07 RX ORDER — METHYLPREDNISOLONE SODIUM SUCCINATE 125 MG/2ML
125 INJECTION, POWDER, LYOPHILIZED, FOR SOLUTION INTRAMUSCULAR; INTRAVENOUS ONCE
Status: COMPLETED | OUTPATIENT
Start: 2020-12-07 | End: 2020-12-07

## 2020-12-07 RX ORDER — METHYLPREDNISOLONE SODIUM SUCCINATE 125 MG/2ML
125 INJECTION, POWDER, LYOPHILIZED, FOR SOLUTION INTRAMUSCULAR; INTRAVENOUS ONCE
Status: CANCELLED | OUTPATIENT
Start: 2020-12-21

## 2020-12-07 RX ORDER — DIPHENHYDRAMINE HCL 25 MG
25 TABLET ORAL ONCE
Status: CANCELLED
Start: 2020-12-21

## 2020-12-07 RX ORDER — SODIUM CHLORIDE 0.9 % (FLUSH) 0.9 %
5 SYRINGE (ML) INJECTION PRN
Status: CANCELLED | OUTPATIENT
Start: 2020-12-21

## 2020-12-07 RX ORDER — SODIUM CHLORIDE 9 MG/ML
INJECTION, SOLUTION INTRAVENOUS CONTINUOUS
Status: CANCELLED | OUTPATIENT
Start: 2020-12-21

## 2020-12-07 RX ADMIN — OMALIZUMAB 375 MG: 150 INJECTION, SOLUTION SUBCUTANEOUS at 13:03

## 2020-12-07 RX ADMIN — METHYLPREDNISOLONE SODIUM SUCCINATE 125 MG: 125 INJECTION, POWDER, FOR SOLUTION INTRAMUSCULAR; INTRAVENOUS at 13:00

## 2020-12-07 RX ADMIN — DIPHENHYDRAMINE HCL 25 MG: 25 TABLET ORAL at 13:00

## 2020-12-14 ENCOUNTER — TELEPHONE (OUTPATIENT)
Dept: PULMONOLOGY | Age: 60
End: 2020-12-14

## 2020-12-14 RX ORDER — BUDESONIDE AND FORMOTEROL FUMARATE DIHYDRATE 160; 4.5 UG/1; UG/1
2 AEROSOL RESPIRATORY (INHALATION) 2 TIMES DAILY
Qty: 1 INHALER | Refills: 3 | Status: SHIPPED
Start: 2020-12-14 | End: 2021-05-24

## 2020-12-14 RX ORDER — PREDNISONE 10 MG/1
TABLET ORAL
Qty: 30 TABLET | Refills: 0 | Status: SHIPPED
Start: 2020-12-14 | End: 2021-06-22

## 2020-12-14 NOTE — TELEPHONE ENCOUNTER
Patient states that he is having difficulty breathing. Denies chest pain or tightness, excess coughing, fever,body aches. Patient is requesting a round of prednisone and a refill on his symbicort. Patient's med list was reviewed with him and he denies using dulera and states that he uses symbicort and spiriva. Scripts sent to Dr. Mary Nye for sign and send.

## 2020-12-21 ENCOUNTER — HOSPITAL ENCOUNTER (OUTPATIENT)
Dept: INFUSION THERAPY | Age: 60
Setting detail: INFUSION SERIES
Discharge: HOME | End: 2020-12-21
Payer: MEDICAID

## 2020-12-21 VITALS
HEART RATE: 76 BPM | DIASTOLIC BLOOD PRESSURE: 69 MMHG | OXYGEN SATURATION: 90 % | SYSTOLIC BLOOD PRESSURE: 145 MMHG | RESPIRATION RATE: 20 BRPM | TEMPERATURE: 98.6 F

## 2020-12-21 DIAGNOSIS — J45.901 ASTHMA EXACERBATION WITH COPD (CHRONIC OBSTRUCTIVE PULMONARY DISEASE) (HCC): ICD-10-CM

## 2020-12-21 DIAGNOSIS — J44.1 ASTHMA EXACERBATION WITH COPD (CHRONIC OBSTRUCTIVE PULMONARY DISEASE) (HCC): ICD-10-CM

## 2020-12-21 DIAGNOSIS — J45.909 ASTHMA, INTRINSIC: Primary | ICD-10-CM

## 2020-12-21 PROCEDURE — 6370000000 HC RX 637 (ALT 250 FOR IP): Performed by: INTERNAL MEDICINE

## 2020-12-21 PROCEDURE — 96372 THER/PROPH/DIAG INJ SC/IM: CPT

## 2020-12-21 PROCEDURE — 6360000002 HC RX W HCPCS: Performed by: INTERNAL MEDICINE

## 2020-12-21 RX ORDER — DIPHENHYDRAMINE HCL 25 MG
25 TABLET ORAL ONCE
Status: CANCELLED
Start: 2021-01-04 | End: 2021-01-04

## 2020-12-21 RX ORDER — SODIUM CHLORIDE 9 MG/ML
INJECTION, SOLUTION INTRAVENOUS CONTINUOUS
Status: CANCELLED | OUTPATIENT
Start: 2021-01-04

## 2020-12-21 RX ORDER — EPINEPHRINE 1 MG/ML
0.3 INJECTION, SOLUTION, CONCENTRATE INTRAVENOUS PRN
Status: CANCELLED | OUTPATIENT
Start: 2021-01-04

## 2020-12-21 RX ORDER — SODIUM CHLORIDE 0.9 % (FLUSH) 0.9 %
10 SYRINGE (ML) INJECTION PRN
Status: CANCELLED | OUTPATIENT
Start: 2021-01-04

## 2020-12-21 RX ORDER — METHYLPREDNISOLONE SODIUM SUCCINATE 125 MG/2ML
125 INJECTION, POWDER, LYOPHILIZED, FOR SOLUTION INTRAMUSCULAR; INTRAVENOUS ONCE
Status: CANCELLED | OUTPATIENT
Start: 2021-01-04 | End: 2021-01-04

## 2020-12-21 RX ORDER — METHYLPREDNISOLONE SODIUM SUCCINATE 125 MG/2ML
125 INJECTION, POWDER, LYOPHILIZED, FOR SOLUTION INTRAMUSCULAR; INTRAVENOUS ONCE
Status: CANCELLED
Start: 2021-01-04 | End: 2021-01-04

## 2020-12-21 RX ORDER — DIPHENHYDRAMINE HCL 25 MG
25 TABLET ORAL ONCE
Status: COMPLETED | OUTPATIENT
Start: 2020-12-21 | End: 2020-12-21

## 2020-12-21 RX ORDER — DIPHENHYDRAMINE HYDROCHLORIDE 50 MG/ML
50 INJECTION INTRAMUSCULAR; INTRAVENOUS ONCE
Status: CANCELLED | OUTPATIENT
Start: 2021-01-04 | End: 2021-01-04

## 2020-12-21 RX ORDER — METHYLPREDNISOLONE SODIUM SUCCINATE 125 MG/2ML
125 INJECTION, POWDER, LYOPHILIZED, FOR SOLUTION INTRAMUSCULAR; INTRAVENOUS ONCE
Status: COMPLETED | OUTPATIENT
Start: 2020-12-21 | End: 2020-12-21

## 2020-12-21 RX ORDER — SODIUM CHLORIDE 0.9 % (FLUSH) 0.9 %
5 SYRINGE (ML) INJECTION PRN
Status: CANCELLED | OUTPATIENT
Start: 2021-01-04

## 2020-12-21 RX ORDER — HEPARIN SODIUM (PORCINE) LOCK FLUSH IV SOLN 100 UNIT/ML 100 UNIT/ML
500 SOLUTION INTRAVENOUS PRN
Status: CANCELLED | OUTPATIENT
Start: 2021-01-04

## 2020-12-21 RX ADMIN — OMALIZUMAB 375 MG: 150 INJECTION, SOLUTION SUBCUTANEOUS at 12:28

## 2020-12-21 RX ADMIN — METHYLPREDNISOLONE SODIUM SUCCINATE 125 MG: 125 INJECTION, POWDER, FOR SOLUTION INTRAMUSCULAR; INTRAVENOUS at 12:27

## 2020-12-21 RX ADMIN — DIPHENHYDRAMINE HCL 25 MG: 25 TABLET ORAL at 12:26

## 2021-01-04 ENCOUNTER — HOSPITAL ENCOUNTER (OUTPATIENT)
Dept: INFUSION THERAPY | Age: 61
Setting detail: INFUSION SERIES
Discharge: HOME | End: 2021-01-04
Payer: MEDICAID

## 2021-01-04 VITALS — TEMPERATURE: 98.6 F | HEART RATE: 88 BPM | RESPIRATION RATE: 24 BRPM | OXYGEN SATURATION: 98 %

## 2021-01-04 DIAGNOSIS — J45.901 ASTHMA EXACERBATION WITH COPD (CHRONIC OBSTRUCTIVE PULMONARY DISEASE) (HCC): ICD-10-CM

## 2021-01-04 DIAGNOSIS — J44.1 ASTHMA EXACERBATION WITH COPD (CHRONIC OBSTRUCTIVE PULMONARY DISEASE) (HCC): ICD-10-CM

## 2021-01-04 DIAGNOSIS — J45.909 ASTHMA, INTRINSIC: Primary | ICD-10-CM

## 2021-01-04 PROCEDURE — 6360000002 HC RX W HCPCS: Performed by: INTERNAL MEDICINE

## 2021-01-04 PROCEDURE — 96372 THER/PROPH/DIAG INJ SC/IM: CPT

## 2021-01-04 PROCEDURE — 6370000000 HC RX 637 (ALT 250 FOR IP): Performed by: INTERNAL MEDICINE

## 2021-01-04 RX ORDER — SODIUM CHLORIDE 9 MG/ML
INJECTION, SOLUTION INTRAVENOUS CONTINUOUS
Status: CANCELLED | OUTPATIENT
Start: 2021-01-18

## 2021-01-04 RX ORDER — METHYLPREDNISOLONE SODIUM SUCCINATE 125 MG/2ML
125 INJECTION, POWDER, LYOPHILIZED, FOR SOLUTION INTRAMUSCULAR; INTRAVENOUS ONCE
Status: CANCELLED
Start: 2021-01-18 | End: 2021-01-18

## 2021-01-04 RX ORDER — EPINEPHRINE 1 MG/ML
0.3 INJECTION, SOLUTION, CONCENTRATE INTRAVENOUS PRN
Status: CANCELLED | OUTPATIENT
Start: 2021-01-18

## 2021-01-04 RX ORDER — METHYLPREDNISOLONE SODIUM SUCCINATE 125 MG/2ML
125 INJECTION, POWDER, LYOPHILIZED, FOR SOLUTION INTRAMUSCULAR; INTRAVENOUS ONCE
Status: COMPLETED | OUTPATIENT
Start: 2021-01-04 | End: 2021-01-04

## 2021-01-04 RX ORDER — DIPHENHYDRAMINE HCL 25 MG
25 TABLET ORAL ONCE
Status: COMPLETED | OUTPATIENT
Start: 2021-01-04 | End: 2021-01-04

## 2021-01-04 RX ORDER — METHYLPREDNISOLONE SODIUM SUCCINATE 125 MG/2ML
125 INJECTION, POWDER, LYOPHILIZED, FOR SOLUTION INTRAMUSCULAR; INTRAVENOUS ONCE
Status: CANCELLED | OUTPATIENT
Start: 2021-01-18 | End: 2021-01-18

## 2021-01-04 RX ORDER — DIPHENHYDRAMINE HYDROCHLORIDE 50 MG/ML
50 INJECTION INTRAMUSCULAR; INTRAVENOUS ONCE
Status: CANCELLED | OUTPATIENT
Start: 2021-01-18 | End: 2021-01-18

## 2021-01-04 RX ORDER — DIPHENHYDRAMINE HCL 25 MG
25 TABLET ORAL ONCE
Status: CANCELLED
Start: 2021-01-18 | End: 2021-01-18

## 2021-01-04 RX ORDER — HEPARIN SODIUM (PORCINE) LOCK FLUSH IV SOLN 100 UNIT/ML 100 UNIT/ML
500 SOLUTION INTRAVENOUS PRN
Status: CANCELLED | OUTPATIENT
Start: 2021-01-18

## 2021-01-04 RX ORDER — SODIUM CHLORIDE 0.9 % (FLUSH) 0.9 %
5 SYRINGE (ML) INJECTION PRN
Status: CANCELLED | OUTPATIENT
Start: 2021-01-18

## 2021-01-04 RX ORDER — SODIUM CHLORIDE 0.9 % (FLUSH) 0.9 %
10 SYRINGE (ML) INJECTION PRN
Status: CANCELLED | OUTPATIENT
Start: 2021-01-18

## 2021-01-04 RX ADMIN — METHYLPREDNISOLONE SODIUM SUCCINATE 125 MG: 125 INJECTION, POWDER, FOR SOLUTION INTRAMUSCULAR; INTRAVENOUS at 13:19

## 2021-01-04 RX ADMIN — DIPHENHYDRAMINE HCL 25 MG: 25 TABLET ORAL at 13:19

## 2021-01-04 RX ADMIN — OMALIZUMAB 375 MG: 150 INJECTION, SOLUTION SUBCUTANEOUS at 13:23

## 2021-01-18 ENCOUNTER — HOSPITAL ENCOUNTER (OUTPATIENT)
Dept: INFUSION THERAPY | Age: 61
Setting detail: INFUSION SERIES
Discharge: HOME | End: 2021-01-18
Payer: MEDICAID

## 2021-01-18 VITALS
TEMPERATURE: 98.9 F | HEART RATE: 88 BPM | DIASTOLIC BLOOD PRESSURE: 73 MMHG | SYSTOLIC BLOOD PRESSURE: 158 MMHG | RESPIRATION RATE: 20 BRPM | OXYGEN SATURATION: 97 %

## 2021-01-18 DIAGNOSIS — J45.909 ASTHMA, INTRINSIC: Primary | ICD-10-CM

## 2021-01-18 DIAGNOSIS — J45.901 ASTHMA EXACERBATION WITH COPD (CHRONIC OBSTRUCTIVE PULMONARY DISEASE) (HCC): ICD-10-CM

## 2021-01-18 DIAGNOSIS — J44.1 ASTHMA EXACERBATION WITH COPD (CHRONIC OBSTRUCTIVE PULMONARY DISEASE) (HCC): ICD-10-CM

## 2021-01-18 PROCEDURE — 96372 THER/PROPH/DIAG INJ SC/IM: CPT

## 2021-01-18 PROCEDURE — 6370000000 HC RX 637 (ALT 250 FOR IP): Performed by: INTERNAL MEDICINE

## 2021-01-18 PROCEDURE — 6360000002 HC RX W HCPCS: Performed by: INTERNAL MEDICINE

## 2021-01-18 RX ORDER — DIPHENHYDRAMINE HCL 25 MG
25 TABLET ORAL ONCE
Status: COMPLETED | OUTPATIENT
Start: 2021-01-18 | End: 2021-01-18

## 2021-01-18 RX ORDER — HEPARIN SODIUM (PORCINE) LOCK FLUSH IV SOLN 100 UNIT/ML 100 UNIT/ML
500 SOLUTION INTRAVENOUS PRN
Status: CANCELLED | OUTPATIENT
Start: 2021-02-01

## 2021-01-18 RX ORDER — SODIUM CHLORIDE 0.9 % (FLUSH) 0.9 %
5 SYRINGE (ML) INJECTION PRN
Status: DISCONTINUED | OUTPATIENT
Start: 2021-01-18 | End: 2021-01-19 | Stop reason: HOSPADM

## 2021-01-18 RX ORDER — EPINEPHRINE 1 MG/ML
0.3 INJECTION, SOLUTION, CONCENTRATE INTRAVENOUS PRN
Status: CANCELLED | OUTPATIENT
Start: 2021-02-01

## 2021-01-18 RX ORDER — DIPHENHYDRAMINE HCL 25 MG
25 TABLET ORAL ONCE
Status: CANCELLED
Start: 2021-02-01 | End: 2021-02-01

## 2021-01-18 RX ORDER — METHYLPREDNISOLONE SODIUM SUCCINATE 125 MG/2ML
125 INJECTION, POWDER, LYOPHILIZED, FOR SOLUTION INTRAMUSCULAR; INTRAVENOUS ONCE
Status: CANCELLED
Start: 2021-02-01 | End: 2021-02-01

## 2021-01-18 RX ORDER — SODIUM CHLORIDE 9 MG/ML
INJECTION, SOLUTION INTRAVENOUS CONTINUOUS
Status: CANCELLED | OUTPATIENT
Start: 2021-02-01

## 2021-01-18 RX ORDER — DIPHENHYDRAMINE HCL 25 MG
TABLET ORAL
Status: DISCONTINUED
Start: 2021-01-18 | End: 2021-01-19 | Stop reason: HOSPADM

## 2021-01-18 RX ORDER — SODIUM CHLORIDE 0.9 % (FLUSH) 0.9 %
5 SYRINGE (ML) INJECTION PRN
Status: CANCELLED | OUTPATIENT
Start: 2021-02-01

## 2021-01-18 RX ORDER — METHYLPREDNISOLONE SODIUM SUCCINATE 125 MG/2ML
125 INJECTION, POWDER, LYOPHILIZED, FOR SOLUTION INTRAMUSCULAR; INTRAVENOUS ONCE
Status: COMPLETED | OUTPATIENT
Start: 2021-01-18 | End: 2021-01-18

## 2021-01-18 RX ORDER — SODIUM CHLORIDE 0.9 % (FLUSH) 0.9 %
10 SYRINGE (ML) INJECTION PRN
Status: CANCELLED | OUTPATIENT
Start: 2021-02-01

## 2021-01-18 RX ORDER — METHYLPREDNISOLONE SODIUM SUCCINATE 125 MG/2ML
125 INJECTION, POWDER, LYOPHILIZED, FOR SOLUTION INTRAMUSCULAR; INTRAVENOUS ONCE
Status: CANCELLED | OUTPATIENT
Start: 2021-02-01 | End: 2021-02-01

## 2021-01-18 RX ORDER — DIPHENHYDRAMINE HYDROCHLORIDE 50 MG/ML
50 INJECTION INTRAMUSCULAR; INTRAVENOUS ONCE
Status: CANCELLED | OUTPATIENT
Start: 2021-02-01 | End: 2021-02-01

## 2021-01-18 RX ADMIN — METHYLPREDNISOLONE SODIUM SUCCINATE 125 MG: 125 INJECTION, POWDER, FOR SOLUTION INTRAMUSCULAR; INTRAVENOUS at 13:08

## 2021-01-18 RX ADMIN — DIPHENHYDRAMINE HCL 25 MG: 25 TABLET ORAL at 13:03

## 2021-01-18 RX ADMIN — OMALIZUMAB 375 MG: 150 INJECTION, SOLUTION SUBCUTANEOUS at 13:19

## 2021-01-18 ASSESSMENT — PAIN SCALES - GENERAL: PAINLEVEL_OUTOF10: 0

## 2021-02-01 ENCOUNTER — HOSPITAL ENCOUNTER (OUTPATIENT)
Dept: INFUSION THERAPY | Age: 61
Setting detail: INFUSION SERIES
Discharge: HOME | End: 2021-02-01
Payer: MEDICAID

## 2021-02-01 VITALS
SYSTOLIC BLOOD PRESSURE: 138 MMHG | OXYGEN SATURATION: 92 % | DIASTOLIC BLOOD PRESSURE: 80 MMHG | TEMPERATURE: 98.6 F | RESPIRATION RATE: 24 BRPM | HEART RATE: 100 BPM

## 2021-02-01 DIAGNOSIS — J45.901 ASTHMA EXACERBATION WITH COPD (CHRONIC OBSTRUCTIVE PULMONARY DISEASE) (HCC): ICD-10-CM

## 2021-02-01 DIAGNOSIS — J44.1 ASTHMA EXACERBATION WITH COPD (CHRONIC OBSTRUCTIVE PULMONARY DISEASE) (HCC): ICD-10-CM

## 2021-02-01 DIAGNOSIS — J45.909 ASTHMA, INTRINSIC: Primary | ICD-10-CM

## 2021-02-01 PROCEDURE — 6370000000 HC RX 637 (ALT 250 FOR IP): Performed by: INTERNAL MEDICINE

## 2021-02-01 PROCEDURE — 96372 THER/PROPH/DIAG INJ SC/IM: CPT

## 2021-02-01 PROCEDURE — 6360000002 HC RX W HCPCS: Performed by: INTERNAL MEDICINE

## 2021-02-01 RX ORDER — METHYLPREDNISOLONE SODIUM SUCCINATE 125 MG/2ML
125 INJECTION, POWDER, LYOPHILIZED, FOR SOLUTION INTRAMUSCULAR; INTRAVENOUS ONCE
Status: COMPLETED | OUTPATIENT
Start: 2021-02-01 | End: 2021-02-01

## 2021-02-01 RX ORDER — METHYLPREDNISOLONE SODIUM SUCCINATE 125 MG/2ML
125 INJECTION, POWDER, LYOPHILIZED, FOR SOLUTION INTRAMUSCULAR; INTRAVENOUS ONCE
Status: CANCELLED
Start: 2021-02-15 | End: 2021-02-15

## 2021-02-01 RX ORDER — SODIUM CHLORIDE 9 MG/ML
INJECTION, SOLUTION INTRAVENOUS CONTINUOUS
Status: CANCELLED | OUTPATIENT
Start: 2021-02-15

## 2021-02-01 RX ORDER — EPINEPHRINE 1 MG/ML
0.3 INJECTION, SOLUTION, CONCENTRATE INTRAVENOUS PRN
Status: CANCELLED | OUTPATIENT
Start: 2021-02-15

## 2021-02-01 RX ORDER — SODIUM CHLORIDE 0.9 % (FLUSH) 0.9 %
5 SYRINGE (ML) INJECTION PRN
Status: CANCELLED | OUTPATIENT
Start: 2021-02-15

## 2021-02-01 RX ORDER — DIPHENHYDRAMINE HYDROCHLORIDE 50 MG/ML
50 INJECTION INTRAMUSCULAR; INTRAVENOUS ONCE
Status: CANCELLED | OUTPATIENT
Start: 2021-02-15 | End: 2021-02-15

## 2021-02-01 RX ORDER — DIPHENHYDRAMINE HCL 25 MG
25 TABLET ORAL ONCE
Status: COMPLETED | OUTPATIENT
Start: 2021-02-01 | End: 2021-02-01

## 2021-02-01 RX ORDER — METHYLPREDNISOLONE SODIUM SUCCINATE 125 MG/2ML
125 INJECTION, POWDER, LYOPHILIZED, FOR SOLUTION INTRAMUSCULAR; INTRAVENOUS ONCE
Status: CANCELLED | OUTPATIENT
Start: 2021-02-15 | End: 2021-02-15

## 2021-02-01 RX ORDER — SODIUM CHLORIDE 0.9 % (FLUSH) 0.9 %
10 SYRINGE (ML) INJECTION PRN
Status: CANCELLED | OUTPATIENT
Start: 2021-02-15

## 2021-02-01 RX ORDER — DIPHENHYDRAMINE HCL 25 MG
25 TABLET ORAL ONCE
Status: CANCELLED
Start: 2021-02-15 | End: 2021-02-15

## 2021-02-01 RX ORDER — HEPARIN SODIUM (PORCINE) LOCK FLUSH IV SOLN 100 UNIT/ML 100 UNIT/ML
500 SOLUTION INTRAVENOUS PRN
Status: CANCELLED | OUTPATIENT
Start: 2021-02-15

## 2021-02-01 RX ADMIN — METHYLPREDNISOLONE SODIUM SUCCINATE 125 MG: 125 INJECTION, POWDER, FOR SOLUTION INTRAMUSCULAR; INTRAVENOUS at 12:45

## 2021-02-01 RX ADMIN — DIPHENHYDRAMINE HCL 25 MG: 25 TABLET ORAL at 12:45

## 2021-02-01 RX ADMIN — OMALIZUMAB 375 MG: 150 INJECTION, SOLUTION SUBCUTANEOUS at 12:45

## 2021-02-15 ENCOUNTER — HOSPITAL ENCOUNTER (OUTPATIENT)
Dept: INFUSION THERAPY | Age: 61
Setting detail: INFUSION SERIES
Discharge: HOME | End: 2021-02-15
Payer: MEDICAID

## 2021-02-15 VITALS
HEART RATE: 76 BPM | DIASTOLIC BLOOD PRESSURE: 80 MMHG | OXYGEN SATURATION: 95 % | TEMPERATURE: 98.6 F | SYSTOLIC BLOOD PRESSURE: 147 MMHG | RESPIRATION RATE: 24 BRPM

## 2021-02-15 DIAGNOSIS — J44.1 ASTHMA EXACERBATION WITH COPD (CHRONIC OBSTRUCTIVE PULMONARY DISEASE) (HCC): ICD-10-CM

## 2021-02-15 DIAGNOSIS — J45.901 ASTHMA EXACERBATION WITH COPD (CHRONIC OBSTRUCTIVE PULMONARY DISEASE) (HCC): ICD-10-CM

## 2021-02-15 DIAGNOSIS — J45.909 ASTHMA, INTRINSIC: Primary | ICD-10-CM

## 2021-02-15 PROCEDURE — 96372 THER/PROPH/DIAG INJ SC/IM: CPT

## 2021-02-15 PROCEDURE — 6370000000 HC RX 637 (ALT 250 FOR IP): Performed by: INTERNAL MEDICINE

## 2021-02-15 PROCEDURE — 6360000002 HC RX W HCPCS: Performed by: INTERNAL MEDICINE

## 2021-02-15 RX ORDER — HEPARIN SODIUM (PORCINE) LOCK FLUSH IV SOLN 100 UNIT/ML 100 UNIT/ML
500 SOLUTION INTRAVENOUS PRN
Status: CANCELLED | OUTPATIENT
Start: 2021-03-01

## 2021-02-15 RX ORDER — SODIUM CHLORIDE 9 MG/ML
INJECTION, SOLUTION INTRAVENOUS CONTINUOUS
Status: CANCELLED | OUTPATIENT
Start: 2021-03-01

## 2021-02-15 RX ORDER — SODIUM CHLORIDE 0.9 % (FLUSH) 0.9 %
10 SYRINGE (ML) INJECTION PRN
Status: CANCELLED | OUTPATIENT
Start: 2021-03-01

## 2021-02-15 RX ORDER — DIPHENHYDRAMINE HCL 25 MG
25 TABLET ORAL ONCE
Status: COMPLETED | OUTPATIENT
Start: 2021-02-15 | End: 2021-02-15

## 2021-02-15 RX ORDER — METHYLPREDNISOLONE SODIUM SUCCINATE 125 MG/2ML
125 INJECTION, POWDER, LYOPHILIZED, FOR SOLUTION INTRAMUSCULAR; INTRAVENOUS ONCE
Status: CANCELLED | OUTPATIENT
Start: 2021-03-01 | End: 2021-03-01

## 2021-02-15 RX ORDER — EPINEPHRINE 1 MG/ML
0.3 INJECTION, SOLUTION, CONCENTRATE INTRAVENOUS PRN
Status: CANCELLED | OUTPATIENT
Start: 2021-03-01

## 2021-02-15 RX ORDER — SODIUM CHLORIDE 0.9 % (FLUSH) 0.9 %
5 SYRINGE (ML) INJECTION PRN
Status: CANCELLED | OUTPATIENT
Start: 2021-03-01

## 2021-02-15 RX ORDER — DIPHENHYDRAMINE HCL 25 MG
25 TABLET ORAL ONCE
Status: CANCELLED
Start: 2021-03-01 | End: 2021-03-01

## 2021-02-15 RX ORDER — DIPHENHYDRAMINE HYDROCHLORIDE 50 MG/ML
50 INJECTION INTRAMUSCULAR; INTRAVENOUS ONCE
Status: CANCELLED | OUTPATIENT
Start: 2021-03-01 | End: 2021-03-01

## 2021-02-15 RX ORDER — METHYLPREDNISOLONE SODIUM SUCCINATE 125 MG/2ML
125 INJECTION, POWDER, LYOPHILIZED, FOR SOLUTION INTRAMUSCULAR; INTRAVENOUS ONCE
Status: COMPLETED | OUTPATIENT
Start: 2021-02-15 | End: 2021-02-15

## 2021-02-15 RX ORDER — METHYLPREDNISOLONE SODIUM SUCCINATE 125 MG/2ML
125 INJECTION, POWDER, LYOPHILIZED, FOR SOLUTION INTRAMUSCULAR; INTRAVENOUS ONCE
Status: CANCELLED
Start: 2021-03-01 | End: 2021-03-01

## 2021-02-15 RX ADMIN — METHYLPREDNISOLONE SODIUM SUCCINATE 125 MG: 125 INJECTION, POWDER, FOR SOLUTION INTRAMUSCULAR; INTRAVENOUS at 13:13

## 2021-02-15 RX ADMIN — OMALIZUMAB 375 MG: 150 INJECTION, SOLUTION SUBCUTANEOUS at 13:17

## 2021-02-15 RX ADMIN — DIPHENHYDRAMINE HCL 25 MG: 25 TABLET ORAL at 13:13

## 2021-03-01 ENCOUNTER — HOSPITAL ENCOUNTER (OUTPATIENT)
Dept: INFUSION THERAPY | Age: 61
Setting detail: INFUSION SERIES
Discharge: HOME | End: 2021-03-01
Payer: MEDICAID

## 2021-03-01 VITALS
OXYGEN SATURATION: 92 % | TEMPERATURE: 98.2 F | RESPIRATION RATE: 24 BRPM | SYSTOLIC BLOOD PRESSURE: 176 MMHG | HEART RATE: 85 BPM | DIASTOLIC BLOOD PRESSURE: 90 MMHG

## 2021-03-01 DIAGNOSIS — J44.1 ASTHMA EXACERBATION WITH COPD (CHRONIC OBSTRUCTIVE PULMONARY DISEASE) (HCC): ICD-10-CM

## 2021-03-01 DIAGNOSIS — J45.901 ASTHMA EXACERBATION WITH COPD (CHRONIC OBSTRUCTIVE PULMONARY DISEASE) (HCC): ICD-10-CM

## 2021-03-01 DIAGNOSIS — J45.909 ASTHMA, INTRINSIC: Primary | ICD-10-CM

## 2021-03-01 PROCEDURE — 96372 THER/PROPH/DIAG INJ SC/IM: CPT

## 2021-03-01 PROCEDURE — 6370000000 HC RX 637 (ALT 250 FOR IP): Performed by: INTERNAL MEDICINE

## 2021-03-01 PROCEDURE — 6360000002 HC RX W HCPCS: Performed by: INTERNAL MEDICINE

## 2021-03-01 RX ORDER — DIPHENHYDRAMINE HCL 25 MG
25 TABLET ORAL ONCE
Status: COMPLETED | OUTPATIENT
Start: 2021-03-01 | End: 2021-03-01

## 2021-03-01 RX ORDER — SODIUM CHLORIDE 9 MG/ML
INJECTION, SOLUTION INTRAVENOUS CONTINUOUS
Status: CANCELLED | OUTPATIENT
Start: 2021-03-15

## 2021-03-01 RX ORDER — SODIUM CHLORIDE 0.9 % (FLUSH) 0.9 %
5 SYRINGE (ML) INJECTION PRN
Status: CANCELLED | OUTPATIENT
Start: 2021-03-15

## 2021-03-01 RX ORDER — HEPARIN SODIUM (PORCINE) LOCK FLUSH IV SOLN 100 UNIT/ML 100 UNIT/ML
500 SOLUTION INTRAVENOUS PRN
Status: CANCELLED | OUTPATIENT
Start: 2021-03-15

## 2021-03-01 RX ORDER — DIPHENHYDRAMINE HYDROCHLORIDE 50 MG/ML
50 INJECTION INTRAMUSCULAR; INTRAVENOUS ONCE
Status: CANCELLED | OUTPATIENT
Start: 2021-03-15 | End: 2021-03-15

## 2021-03-01 RX ORDER — SODIUM CHLORIDE 0.9 % (FLUSH) 0.9 %
10 SYRINGE (ML) INJECTION PRN
Status: CANCELLED | OUTPATIENT
Start: 2021-03-15

## 2021-03-01 RX ORDER — METHYLPREDNISOLONE SODIUM SUCCINATE 125 MG/2ML
125 INJECTION, POWDER, LYOPHILIZED, FOR SOLUTION INTRAMUSCULAR; INTRAVENOUS ONCE
Status: CANCELLED | OUTPATIENT
Start: 2021-03-15 | End: 2021-03-15

## 2021-03-01 RX ORDER — METHYLPREDNISOLONE SODIUM SUCCINATE 125 MG/2ML
125 INJECTION, POWDER, LYOPHILIZED, FOR SOLUTION INTRAMUSCULAR; INTRAVENOUS ONCE
Status: CANCELLED
Start: 2021-03-15 | End: 2021-03-15

## 2021-03-01 RX ORDER — METHYLPREDNISOLONE SODIUM SUCCINATE 125 MG/2ML
125 INJECTION, POWDER, LYOPHILIZED, FOR SOLUTION INTRAMUSCULAR; INTRAVENOUS ONCE
Status: COMPLETED | OUTPATIENT
Start: 2021-03-01 | End: 2021-03-01

## 2021-03-01 RX ORDER — EPINEPHRINE 1 MG/ML
0.3 INJECTION, SOLUTION, CONCENTRATE INTRAVENOUS PRN
Status: CANCELLED | OUTPATIENT
Start: 2021-03-15

## 2021-03-01 RX ORDER — DIPHENHYDRAMINE HCL 25 MG
25 TABLET ORAL ONCE
Status: CANCELLED
Start: 2021-03-15 | End: 2021-03-15

## 2021-03-01 RX ADMIN — DIPHENHYDRAMINE HYDROCHLORIDE 25 MG: 25 TABLET ORAL at 12:23

## 2021-03-01 RX ADMIN — METHYLPREDNISOLONE SODIUM SUCCINATE 125 MG: 125 INJECTION, POWDER, FOR SOLUTION INTRAMUSCULAR; INTRAVENOUS at 12:23

## 2021-03-01 RX ADMIN — OMALIZUMAB 375 MG: 150 INJECTION, SOLUTION SUBCUTANEOUS at 12:23

## 2021-03-10 ENCOUNTER — TELEPHONE (OUTPATIENT)
Dept: PULMONOLOGY | Age: 61
End: 2021-03-10

## 2021-03-10 NOTE — TELEPHONE ENCOUNTER
Mailed a letter to patient informing him that his CT Chest is scheduled for 4-16-21 at 11:30 am at [address] [facility],[address].  He must arrive by 11:00 am. There is no prep for this test

## 2021-03-15 ENCOUNTER — HOSPITAL ENCOUNTER (OUTPATIENT)
Dept: INFUSION THERAPY | Age: 61
Setting detail: INFUSION SERIES
Discharge: HOME | End: 2021-03-15
Payer: MEDICAID

## 2021-03-15 VITALS
HEART RATE: 97 BPM | SYSTOLIC BLOOD PRESSURE: 152 MMHG | TEMPERATURE: 97.2 F | OXYGEN SATURATION: 92 % | DIASTOLIC BLOOD PRESSURE: 78 MMHG | RESPIRATION RATE: 20 BRPM

## 2021-03-15 DIAGNOSIS — J45.909 ASTHMA, INTRINSIC: Primary | ICD-10-CM

## 2021-03-15 DIAGNOSIS — J44.1 ASTHMA EXACERBATION WITH COPD (CHRONIC OBSTRUCTIVE PULMONARY DISEASE) (HCC): ICD-10-CM

## 2021-03-15 DIAGNOSIS — J45.901 ASTHMA EXACERBATION WITH COPD (CHRONIC OBSTRUCTIVE PULMONARY DISEASE) (HCC): ICD-10-CM

## 2021-03-15 PROCEDURE — 6360000002 HC RX W HCPCS: Performed by: INTERNAL MEDICINE

## 2021-03-15 PROCEDURE — 96372 THER/PROPH/DIAG INJ SC/IM: CPT

## 2021-03-15 PROCEDURE — 6370000000 HC RX 637 (ALT 250 FOR IP): Performed by: INTERNAL MEDICINE

## 2021-03-15 RX ORDER — EPINEPHRINE 1 MG/ML
0.3 INJECTION, SOLUTION, CONCENTRATE INTRAVENOUS PRN
Status: CANCELLED | OUTPATIENT
Start: 2021-03-29

## 2021-03-15 RX ORDER — SODIUM CHLORIDE 9 MG/ML
INJECTION, SOLUTION INTRAVENOUS CONTINUOUS
Status: CANCELLED | OUTPATIENT
Start: 2021-03-29

## 2021-03-15 RX ORDER — SODIUM CHLORIDE 0.9 % (FLUSH) 0.9 %
5 SYRINGE (ML) INJECTION PRN
Status: CANCELLED | OUTPATIENT
Start: 2021-03-29

## 2021-03-15 RX ORDER — DIPHENHYDRAMINE HCL 25 MG
25 TABLET ORAL ONCE
Status: COMPLETED | OUTPATIENT
Start: 2021-03-15 | End: 2021-03-15

## 2021-03-15 RX ORDER — METHYLPREDNISOLONE SODIUM SUCCINATE 125 MG/2ML
125 INJECTION, POWDER, LYOPHILIZED, FOR SOLUTION INTRAMUSCULAR; INTRAVENOUS ONCE
Status: CANCELLED
Start: 2021-03-29 | End: 2021-03-29

## 2021-03-15 RX ORDER — DIPHENHYDRAMINE HYDROCHLORIDE 50 MG/ML
50 INJECTION INTRAMUSCULAR; INTRAVENOUS ONCE
Status: CANCELLED | OUTPATIENT
Start: 2021-03-29 | End: 2021-03-29

## 2021-03-15 RX ORDER — METHYLPREDNISOLONE SODIUM SUCCINATE 125 MG/2ML
125 INJECTION, POWDER, LYOPHILIZED, FOR SOLUTION INTRAMUSCULAR; INTRAVENOUS ONCE
Status: COMPLETED | OUTPATIENT
Start: 2021-03-15 | End: 2021-03-15

## 2021-03-15 RX ORDER — SODIUM CHLORIDE 0.9 % (FLUSH) 0.9 %
10 SYRINGE (ML) INJECTION PRN
Status: CANCELLED | OUTPATIENT
Start: 2021-03-29

## 2021-03-15 RX ORDER — METHYLPREDNISOLONE SODIUM SUCCINATE 125 MG/2ML
125 INJECTION, POWDER, LYOPHILIZED, FOR SOLUTION INTRAMUSCULAR; INTRAVENOUS ONCE
Status: CANCELLED | OUTPATIENT
Start: 2021-03-29 | End: 2021-03-29

## 2021-03-15 RX ORDER — HEPARIN SODIUM (PORCINE) LOCK FLUSH IV SOLN 100 UNIT/ML 100 UNIT/ML
500 SOLUTION INTRAVENOUS PRN
Status: CANCELLED | OUTPATIENT
Start: 2021-03-29

## 2021-03-15 RX ORDER — DIPHENHYDRAMINE HCL 25 MG
25 TABLET ORAL ONCE
Status: CANCELLED
Start: 2021-03-29 | End: 2021-03-29

## 2021-03-15 RX ADMIN — DIPHENHYDRAMINE HYDROCHLORIDE 25 MG: 25 TABLET ORAL at 13:22

## 2021-03-15 RX ADMIN — METHYLPREDNISOLONE SODIUM SUCCINATE 125 MG: 125 INJECTION, POWDER, FOR SOLUTION INTRAMUSCULAR; INTRAVENOUS at 13:23

## 2021-03-15 RX ADMIN — OMALIZUMAB 375 MG: 150 INJECTION, SOLUTION SUBCUTANEOUS at 13:27

## 2021-04-22 ENCOUNTER — VIRTUAL VISIT (OUTPATIENT)
Dept: PULMONOLOGY | Age: 61
End: 2021-04-22
Payer: MEDICAID

## 2021-04-22 DIAGNOSIS — J44.9 STAGE 4 VERY SEVERE COPD BY GOLD CLASSIFICATION (HCC): Primary | ICD-10-CM

## 2021-04-22 PROCEDURE — 99442 PR PHYS/QHP TELEPHONE EVALUATION 11-20 MIN: CPT | Performed by: INTERNAL MEDICINE

## 2021-04-22 NOTE — PROGRESS NOTES
Lizz Sanchez is a 56 y.o. male evaluated via telephone on 4/22/2021. Consent:  He and/or health care decision maker is aware that that he may receive a bill for this telephone service, depending on his insurance coverage, and has provided verbal consent to proceed: Yes      Documentation:  I communicated with the patient and/or health care decision maker about COPD/Sleep Apnea . Details of this discussion including any medical advice provided: 6 mos follow up phone per pt request today. Pt reports he is doing ok with meds and denies needing Sleep Study; pt states \"I won't wear mask\". Discussed referral to Pulmonary Rehab and pt agreeable to try it. Xolair injections continue since Dec tx began. Pt request office staff contact his aid Scotty Miller. RN will call with update from today's virtual visit and will review referral to pulmonary rehab and plan for follow up in office. Appt card to be mailed. .       I affirm this is a Patient Initiated Episode with a Patient who has not had a related appointment within my department in the past 7 days or scheduled within the next 24 hours. Patient identification was verified at the start of the visit: Yes    Total Time: minutes: 11-20 minutes    The visit was conducted pursuant to the emergency declaration under the [address] [place] [address], [address] [place] [address] authority and the [employer] Act. Patient identification was verified, and a caregiver was present when appropriate. The patient was located in a state where the provider was credentialed to provide care.     Note: not billable if this call serves to triage the patient into an appointment for the relevant concern      Stephanie Frederick

## 2021-04-22 NOTE — PROGRESS NOTES
Department of Internal Medicine  Division of Pulmonary, Critical Care & Sleep Medicine  Pulmonary [address] [facility]                                             Pulmonary Clinic Consult     I had the pleasure of seeing  Sirisha Frances in the [address] regarding their Hypoxia ,COPD      Chief Complaint   Patient presents with    Follow-up     6 mos    Asthma    Pulmonary Nodule       HISTORY OF PRESENT ILLNESS:    Sirisha Frances is a 56y.o. year old  Who start smoking at age 13   And increase gradually 2 pack and then quit 8 years,2013 .  and then comes back smoking about 1 year and now quit 1 year ago       The Patient comes in with SOB that has been going on 2 years Associated with cough and he bring clear sputum ,He states that it get worse with exercise or walking long distance and he can walk less than 1/2 block And go 1- flight of stairs before get short winded    He has cough ,productive for  Clear  Sputum and it is more in the     Has history of lung disease include Asthma     Sleep history :  Snoring yes   Feel tired during the day yes   Wake up fresh no   excessive daytime sleepiness    He denies any hemoptysis and he has been gaining weight    he has CHF and has been losing some weight and he is on Lasix 80 bnid     He use ventolin and Breo and incruse and duoneb for neb   Today Visit:  He states that his SOB fluctuate   No chest pain   He has some wheezing  He use albuterol as needed ,4 times a week  No fever or chills and no chest pain  No hemoptysis  He use his albuterol 4-5 times       ALLERGIES:    No Known Allergies    PAST MEDICAL HISTORY:       Diagnosis Date    Asthma     since childhood    CHF (congestive heart failure) ([address]     COPD (chronic obstructive pulmonary disease) ([address]     Depression     Diabetes mellitus ([address]     Emphysema of lung ([address]     H/O cardiovascular stress test 05/27/2020    Lexiscan    History of echocardiogram 04/19/2018    EF 62%    HIV ([facility]) [facility]     Hypertension 2008    Seizure [facility]     [facility]     Sleep apnea     no cpap       MEDICATIONS:   Current Outpatient Medications   Medication Sig Dispense Refill    budesonide-formoterol (SYMBICORT) 160-4.5 MCG/ACT AERO Inhale 2 puffs into the lungs 2 times daily 1 Inhaler 3    mometasone-formoterol (DULERA) 100-5 MCG/ACT inhaler Inhale 2 puffs into the lungs 2 times daily 1 Inhaler 3    fluticasone (FLONASE) 50 MCG/ACT nasal spray INHALE 1 SPRAY BY INTRANASAL ROUTE EVERY DAY IN EACH NOSTRIL  3    ipratropium-albuterol (DUONEB) 0.5-2.5 (3) MG/3ML SOLN nebulizer solution Inhale 3 mLs into the lungs every 6 hours as needed for Shortness of Breath 360 mL 0    albuterol sulfate  (90 BASE) MCG/ACT inhaler Inhale 2 puffs into the lungs every 6 hours as needed for Wheezing      predniSONE (DELTASONE) 10 MG tablet Take 40mg x 3 days, 30mg x 3 days, 20mg x 3 days, 10mg x 3 days then stop. 30 tablet 0    EPINEPHrine (EPIPEN) 0.3 MG/0.3ML SOAJ injection USE AS DIRECTED FOR ALLERGIC REACTION TO XOLAIR 1 each 1    levETIRAcetam (KEPPRA) 1000 MG tablet Take 1 tablet by mouth 2 times daily 60 tablet 0    metFORMIN (GLUCOPHAGE) 500 MG tablet Take 1 tablet by mouth daily (with breakfast) 60 tablet 0    lisinopril (PRINIVIL;ZESTRIL) 10 MG tablet Take 1 tablet by mouth daily 30 tablet 0    amLODIPine (NORVASC) 10 MG tablet Take 1 tablet by mouth daily 30 tablet 0    carvedilol (COREG) 6.25 MG tablet Take 1 tablet by mouth 2 times daily (with meals) 60 tablet 0    magnesium oxide (MAG-OX) 400 (240 Mg) MG tablet Take 1 tablet by mouth 2 times daily 30 tablet 0    hydroCHLOROthiazide (HYDRODIURIL) 25 MG tablet Take 1 tablet by mouth daily New BP pill replaces Lasix. 30 tablet 0    atorvastatin (LIPITOR) 40 MG tablet Take 1 tablet by mouth nightly New cholesterol pill.  30 tablet 0    aspirin EC 81 MG EC tablet Take 1 tablet by mouth daily 30 tablet 0    escitalopram (LEXAPRO) 20 MG tablet Take 1 tablet by mouth daily 30 tablet 0    bictegravir-emtricitab-tenofovir alafenamide (BIKTARVY) -25 MG TABS per tablet Take 1 tablet by mouth daily      tiotropium (SPIRIVA HANDIHALER) 18 MCG inhalation capsule Inhale 1 capsule into the lungs daily (Patient taking differently: Inhale 800 mcg into the lungs daily ) 30 capsule 3    cloNIDine (CATAPRES) 0.2 MG tablet TAKE 1 TABLET BY ORAL ROUTE 2 TIMES EVERY DAY  3    OXYGEN Inhale 2 L into the lungs      omalizumab (XOLAIR) 150 MG injection Inject 375 mg into the skin every 14 days 1 vial 3    tamsulosin (FLOMAX) 0.4 MG capsule Take 0.4 mg by mouth daily        No current facility-administered medications for this visit. SOCIAL AND OCCUPATIONAL HEALTH:  Social History     Tobacco Use   Smoking Status Current Some Day Smoker    Packs/day: 0.50    Years: 35.00    Pack years: 17.50    Types: Cigarettes    Last attempt to quit: 1/1/2018    Years since quitting: 3.3   Smokeless Tobacco Never Used     TB :  Pets   Industrial exposure:  Birds :    SURGICAL HISTORY:   Past Surgical History:   Procedure Laterality Date    OTHER SURGICAL HISTORY  05/09/2017    exc abd neoplasm    ROTATOR CUFF REPAIR  2011    left shoulder in [address] [place] [address] Ave:   Lung cancer:  DVT or PE     REVIEW OF SYSTEMS:  Constitutional: General health is good . There has been no weight changes. No fevers, fatigue or weakness. Head: Patient denies any history of trauma, convulsive disorder or syncope. Skin:  Patient denies history of changes in pigmentation, eruptions or pruritus. No easy bruising or bleeding. EENT: no nasal congestion   Cardiovascular ,No chest pain ,No edema ,  Respiration:SOB:  ,MCGARRY :  Gastrointestinal:No GI bleed ,no melena  ,no hematemesis    Musculoskeletal: no joint pain ,no swelling  Neurological:no , syncope.   Denies twitching, convulsions, loss of

## 2021-04-27 ENCOUNTER — HOSPITAL ENCOUNTER (OUTPATIENT)
Dept: INFUSION THERAPY | Age: 61
Setting detail: INFUSION SERIES
Discharge: HOME | End: 2021-04-27
Payer: MEDICAID

## 2021-04-27 VITALS
SYSTOLIC BLOOD PRESSURE: 158 MMHG | DIASTOLIC BLOOD PRESSURE: 83 MMHG | OXYGEN SATURATION: 95 % | TEMPERATURE: 98.2 F | RESPIRATION RATE: 20 BRPM | HEART RATE: 80 BPM

## 2021-04-27 DIAGNOSIS — J44.1 ASTHMA EXACERBATION WITH COPD (CHRONIC OBSTRUCTIVE PULMONARY DISEASE) (HCC): ICD-10-CM

## 2021-04-27 DIAGNOSIS — J45.909 ASTHMA, INTRINSIC: Primary | ICD-10-CM

## 2021-04-27 DIAGNOSIS — J45.901 ASTHMA EXACERBATION WITH COPD (CHRONIC OBSTRUCTIVE PULMONARY DISEASE) (HCC): ICD-10-CM

## 2021-04-27 PROCEDURE — 96372 THER/PROPH/DIAG INJ SC/IM: CPT

## 2021-04-27 PROCEDURE — 6370000000 HC RX 637 (ALT 250 FOR IP): Performed by: INTERNAL MEDICINE

## 2021-04-27 PROCEDURE — 6360000002 HC RX W HCPCS: Performed by: INTERNAL MEDICINE

## 2021-04-27 RX ORDER — EPINEPHRINE 1 MG/ML
0.3 INJECTION, SOLUTION, CONCENTRATE INTRAVENOUS PRN
Status: CANCELLED | OUTPATIENT
Start: 2021-05-11

## 2021-04-27 RX ORDER — DIPHENHYDRAMINE HCL 25 MG
25 TABLET ORAL ONCE
Status: CANCELLED
Start: 2021-05-11 | End: 2021-05-11

## 2021-04-27 RX ORDER — METHYLPREDNISOLONE SODIUM SUCCINATE 125 MG/2ML
125 INJECTION, POWDER, LYOPHILIZED, FOR SOLUTION INTRAMUSCULAR; INTRAVENOUS ONCE
Status: CANCELLED
Start: 2021-05-11 | End: 2021-05-11

## 2021-04-27 RX ORDER — HEPARIN SODIUM (PORCINE) LOCK FLUSH IV SOLN 100 UNIT/ML 100 UNIT/ML
500 SOLUTION INTRAVENOUS PRN
Status: CANCELLED | OUTPATIENT
Start: 2021-05-11

## 2021-04-27 RX ORDER — DIPHENHYDRAMINE HCL 25 MG
25 TABLET ORAL ONCE
Status: COMPLETED | OUTPATIENT
Start: 2021-04-27 | End: 2021-04-27

## 2021-04-27 RX ORDER — METHYLPREDNISOLONE SODIUM SUCCINATE 125 MG/2ML
125 INJECTION, POWDER, LYOPHILIZED, FOR SOLUTION INTRAMUSCULAR; INTRAVENOUS ONCE
Status: CANCELLED | OUTPATIENT
Start: 2021-05-11 | End: 2021-05-11

## 2021-04-27 RX ORDER — DIPHENHYDRAMINE HYDROCHLORIDE 50 MG/ML
50 INJECTION INTRAMUSCULAR; INTRAVENOUS ONCE
Status: CANCELLED | OUTPATIENT
Start: 2021-05-11 | End: 2021-05-11

## 2021-04-27 RX ORDER — SODIUM CHLORIDE 0.9 % (FLUSH) 0.9 %
5 SYRINGE (ML) INJECTION PRN
Status: CANCELLED | OUTPATIENT
Start: 2021-05-11

## 2021-04-27 RX ORDER — METHYLPREDNISOLONE SODIUM SUCCINATE 125 MG/2ML
125 INJECTION, POWDER, LYOPHILIZED, FOR SOLUTION INTRAMUSCULAR; INTRAVENOUS ONCE
Status: COMPLETED | OUTPATIENT
Start: 2021-04-27 | End: 2021-04-27

## 2021-04-27 RX ORDER — SODIUM CHLORIDE 0.9 % (FLUSH) 0.9 %
10 SYRINGE (ML) INJECTION PRN
Status: CANCELLED | OUTPATIENT
Start: 2021-05-11

## 2021-04-27 RX ORDER — SODIUM CHLORIDE 9 MG/ML
INJECTION, SOLUTION INTRAVENOUS CONTINUOUS
Status: CANCELLED | OUTPATIENT
Start: 2021-05-11

## 2021-04-27 RX ADMIN — DIPHENHYDRAMINE HYDROCHLORIDE 25 MG: 25 TABLET ORAL at 13:39

## 2021-04-27 RX ADMIN — OMALIZUMAB 375 MG: 150 INJECTION, SOLUTION SUBCUTANEOUS at 13:41

## 2021-04-27 RX ADMIN — METHYLPREDNISOLONE SODIUM SUCCINATE 125 MG: 125 INJECTION, POWDER, FOR SOLUTION INTRAMUSCULAR; INTRAVENOUS at 13:40

## 2021-05-11 ENCOUNTER — HOSPITAL ENCOUNTER (OUTPATIENT)
Dept: INFUSION THERAPY | Age: 61
Setting detail: INFUSION SERIES
Discharge: HOME | End: 2021-05-11
Payer: MEDICAID

## 2021-05-11 VITALS
SYSTOLIC BLOOD PRESSURE: 181 MMHG | HEART RATE: 78 BPM | TEMPERATURE: 98.2 F | RESPIRATION RATE: 20 BRPM | OXYGEN SATURATION: 95 % | DIASTOLIC BLOOD PRESSURE: 93 MMHG

## 2021-05-11 DIAGNOSIS — J45.909 ASTHMA, INTRINSIC: Primary | ICD-10-CM

## 2021-05-11 DIAGNOSIS — J44.1 ASTHMA EXACERBATION WITH COPD (CHRONIC OBSTRUCTIVE PULMONARY DISEASE) (HCC): ICD-10-CM

## 2021-05-11 DIAGNOSIS — J45.901 ASTHMA EXACERBATION WITH COPD (CHRONIC OBSTRUCTIVE PULMONARY DISEASE) (HCC): ICD-10-CM

## 2021-05-11 PROCEDURE — 6360000002 HC RX W HCPCS: Performed by: INTERNAL MEDICINE

## 2021-05-11 PROCEDURE — 6370000000 HC RX 637 (ALT 250 FOR IP): Performed by: INTERNAL MEDICINE

## 2021-05-11 PROCEDURE — 96372 THER/PROPH/DIAG INJ SC/IM: CPT

## 2021-05-11 RX ORDER — METHYLPREDNISOLONE SODIUM SUCCINATE 125 MG/2ML
125 INJECTION, POWDER, LYOPHILIZED, FOR SOLUTION INTRAMUSCULAR; INTRAVENOUS ONCE
Status: COMPLETED | OUTPATIENT
Start: 2021-05-11 | End: 2021-05-11

## 2021-05-11 RX ORDER — SODIUM CHLORIDE 0.9 % (FLUSH) 0.9 %
5 SYRINGE (ML) INJECTION PRN
Status: CANCELLED | OUTPATIENT
Start: 2021-05-25

## 2021-05-11 RX ORDER — HEPARIN SODIUM (PORCINE) LOCK FLUSH IV SOLN 100 UNIT/ML 100 UNIT/ML
500 SOLUTION INTRAVENOUS PRN
Status: CANCELLED | OUTPATIENT
Start: 2021-05-25

## 2021-05-11 RX ORDER — SODIUM CHLORIDE 9 MG/ML
INJECTION, SOLUTION INTRAVENOUS CONTINUOUS
Status: CANCELLED | OUTPATIENT
Start: 2021-05-25

## 2021-05-11 RX ORDER — DIPHENHYDRAMINE HCL 25 MG
25 TABLET ORAL ONCE
Status: CANCELLED
Start: 2021-05-25 | End: 2021-05-25

## 2021-05-11 RX ORDER — EPINEPHRINE 1 MG/ML
0.3 INJECTION, SOLUTION, CONCENTRATE INTRAVENOUS PRN
Status: CANCELLED | OUTPATIENT
Start: 2021-05-25

## 2021-05-11 RX ORDER — DIPHENHYDRAMINE HCL 25 MG
25 TABLET ORAL ONCE
Status: COMPLETED | OUTPATIENT
Start: 2021-05-11 | End: 2021-05-11

## 2021-05-11 RX ORDER — SODIUM CHLORIDE 0.9 % (FLUSH) 0.9 %
10 SYRINGE (ML) INJECTION PRN
Status: CANCELLED | OUTPATIENT
Start: 2021-05-25

## 2021-05-11 RX ORDER — METHYLPREDNISOLONE SODIUM SUCCINATE 125 MG/2ML
125 INJECTION, POWDER, LYOPHILIZED, FOR SOLUTION INTRAMUSCULAR; INTRAVENOUS ONCE
Status: CANCELLED
Start: 2021-05-25 | End: 2021-05-25

## 2021-05-11 RX ORDER — DIPHENHYDRAMINE HYDROCHLORIDE 50 MG/ML
50 INJECTION INTRAMUSCULAR; INTRAVENOUS ONCE
Status: CANCELLED | OUTPATIENT
Start: 2021-05-25 | End: 2021-05-25

## 2021-05-11 RX ORDER — METHYLPREDNISOLONE SODIUM SUCCINATE 125 MG/2ML
125 INJECTION, POWDER, LYOPHILIZED, FOR SOLUTION INTRAMUSCULAR; INTRAVENOUS ONCE
Status: CANCELLED | OUTPATIENT
Start: 2021-05-25 | End: 2021-05-25

## 2021-05-11 RX ADMIN — DIPHENHYDRAMINE HYDROCHLORIDE 25 MG: 25 TABLET ORAL at 12:54

## 2021-05-11 RX ADMIN — METHYLPREDNISOLONE SODIUM SUCCINATE 125 MG: 125 INJECTION, POWDER, FOR SOLUTION INTRAMUSCULAR; INTRAVENOUS at 12:54

## 2021-05-11 RX ADMIN — OMALIZUMAB 375 MG: 150 INJECTION, SOLUTION SUBCUTANEOUS at 12:56

## 2021-05-22 DIAGNOSIS — J45.901 ASTHMA EXACERBATION WITH COPD (CHRONIC OBSTRUCTIVE PULMONARY DISEASE) (HCC): ICD-10-CM

## 2021-05-22 DIAGNOSIS — J44.9 CHRONIC OBSTRUCTIVE PULMONARY DISEASE, UNSPECIFIED COPD TYPE (HCC): Chronic | ICD-10-CM

## 2021-05-22 DIAGNOSIS — J44.1 ASTHMA EXACERBATION WITH COPD (CHRONIC OBSTRUCTIVE PULMONARY DISEASE) (HCC): ICD-10-CM

## 2021-05-24 RX ORDER — BUDESONIDE AND FORMOTEROL FUMARATE DIHYDRATE 160; 4.5 UG/1; UG/1
AEROSOL RESPIRATORY (INHALATION)
Qty: 1 INHALER | Refills: 6 | Status: SHIPPED
Start: 2021-05-24 | End: 2021-08-19 | Stop reason: CLARIF

## 2021-05-26 ENCOUNTER — HOSPITAL ENCOUNTER (OUTPATIENT)
Dept: INFUSION THERAPY | Age: 61
Setting detail: INFUSION SERIES
Discharge: HOME | End: 2021-05-26
Payer: MEDICAID

## 2021-05-26 DIAGNOSIS — J45.909 ASTHMA, INTRINSIC: Primary | ICD-10-CM

## 2021-05-26 DIAGNOSIS — J45.901 ASTHMA EXACERBATION WITH COPD (CHRONIC OBSTRUCTIVE PULMONARY DISEASE) (HCC): ICD-10-CM

## 2021-05-26 DIAGNOSIS — J44.1 ASTHMA EXACERBATION WITH COPD (CHRONIC OBSTRUCTIVE PULMONARY DISEASE) (HCC): ICD-10-CM

## 2021-05-26 PROCEDURE — 6360000002 HC RX W HCPCS: Performed by: INTERNAL MEDICINE

## 2021-05-26 PROCEDURE — 96372 THER/PROPH/DIAG INJ SC/IM: CPT

## 2021-05-26 PROCEDURE — 6370000000 HC RX 637 (ALT 250 FOR IP): Performed by: INTERNAL MEDICINE

## 2021-05-26 RX ORDER — SODIUM CHLORIDE 0.9 % (FLUSH) 0.9 %
5 SYRINGE (ML) INJECTION PRN
Status: CANCELLED | OUTPATIENT
Start: 2021-06-08

## 2021-05-26 RX ORDER — DIPHENHYDRAMINE HCL 25 MG
25 TABLET ORAL ONCE
Status: COMPLETED | OUTPATIENT
Start: 2021-05-26 | End: 2021-05-26

## 2021-05-26 RX ORDER — DIPHENHYDRAMINE HCL 25 MG
25 TABLET ORAL ONCE
Status: CANCELLED
Start: 2021-06-08 | End: 2021-06-08

## 2021-05-26 RX ORDER — DIPHENHYDRAMINE HYDROCHLORIDE 50 MG/ML
50 INJECTION INTRAMUSCULAR; INTRAVENOUS ONCE
Status: CANCELLED | OUTPATIENT
Start: 2021-06-08 | End: 2021-06-08

## 2021-05-26 RX ORDER — EPINEPHRINE 1 MG/ML
0.3 INJECTION, SOLUTION, CONCENTRATE INTRAVENOUS PRN
Status: CANCELLED | OUTPATIENT
Start: 2021-06-08

## 2021-05-26 RX ORDER — HEPARIN SODIUM (PORCINE) LOCK FLUSH IV SOLN 100 UNIT/ML 100 UNIT/ML
500 SOLUTION INTRAVENOUS PRN
Status: CANCELLED | OUTPATIENT
Start: 2021-06-08

## 2021-05-26 RX ORDER — SODIUM CHLORIDE 0.9 % (FLUSH) 0.9 %
10 SYRINGE (ML) INJECTION PRN
Status: CANCELLED | OUTPATIENT
Start: 2021-06-08

## 2021-05-26 RX ORDER — METHYLPREDNISOLONE SODIUM SUCCINATE 125 MG/2ML
125 INJECTION, POWDER, LYOPHILIZED, FOR SOLUTION INTRAMUSCULAR; INTRAVENOUS ONCE
Status: CANCELLED
Start: 2021-06-08 | End: 2021-06-08

## 2021-05-26 RX ORDER — METHYLPREDNISOLONE SODIUM SUCCINATE 125 MG/2ML
125 INJECTION, POWDER, LYOPHILIZED, FOR SOLUTION INTRAMUSCULAR; INTRAVENOUS ONCE
Status: COMPLETED | OUTPATIENT
Start: 2021-05-26 | End: 2021-05-26

## 2021-05-26 RX ORDER — SODIUM CHLORIDE 9 MG/ML
INJECTION, SOLUTION INTRAVENOUS CONTINUOUS
Status: CANCELLED | OUTPATIENT
Start: 2021-06-08

## 2021-05-26 RX ORDER — METHYLPREDNISOLONE SODIUM SUCCINATE 125 MG/2ML
125 INJECTION, POWDER, LYOPHILIZED, FOR SOLUTION INTRAMUSCULAR; INTRAVENOUS ONCE
Status: CANCELLED | OUTPATIENT
Start: 2021-06-08 | End: 2021-06-08

## 2021-05-26 RX ADMIN — METHYLPREDNISOLONE SODIUM SUCCINATE 125 MG: 125 INJECTION, POWDER, FOR SOLUTION INTRAMUSCULAR; INTRAVENOUS at 14:59

## 2021-05-26 RX ADMIN — DIPHENHYDRAMINE HYDROCHLORIDE 25 MG: 25 TABLET ORAL at 14:58

## 2021-05-26 RX ADMIN — OMALIZUMAB 375 MG: 150 INJECTION, SOLUTION SUBCUTANEOUS at 15:03

## 2021-06-09 ENCOUNTER — HOSPITAL ENCOUNTER (OUTPATIENT)
Dept: INFUSION THERAPY | Age: 61
Setting detail: INFUSION SERIES
Discharge: HOME | End: 2021-06-09
Payer: MEDICAID

## 2021-06-09 VITALS
TEMPERATURE: 98.1 F | DIASTOLIC BLOOD PRESSURE: 77 MMHG | RESPIRATION RATE: 18 BRPM | HEART RATE: 74 BPM | SYSTOLIC BLOOD PRESSURE: 148 MMHG | OXYGEN SATURATION: 96 %

## 2021-06-09 DIAGNOSIS — J44.1 ASTHMA EXACERBATION WITH COPD (CHRONIC OBSTRUCTIVE PULMONARY DISEASE) (HCC): ICD-10-CM

## 2021-06-09 DIAGNOSIS — J45.901 ASTHMA EXACERBATION WITH COPD (CHRONIC OBSTRUCTIVE PULMONARY DISEASE) (HCC): ICD-10-CM

## 2021-06-09 DIAGNOSIS — J45.909 ASTHMA, INTRINSIC: Primary | ICD-10-CM

## 2021-06-09 PROCEDURE — 96372 THER/PROPH/DIAG INJ SC/IM: CPT

## 2021-06-09 PROCEDURE — 6370000000 HC RX 637 (ALT 250 FOR IP): Performed by: INTERNAL MEDICINE

## 2021-06-09 PROCEDURE — 6360000002 HC RX W HCPCS: Performed by: INTERNAL MEDICINE

## 2021-06-09 RX ORDER — SODIUM CHLORIDE 9 MG/ML
INJECTION, SOLUTION INTRAVENOUS CONTINUOUS
Status: CANCELLED | OUTPATIENT
Start: 2021-06-23

## 2021-06-09 RX ORDER — DIPHENHYDRAMINE HCL 25 MG
25 TABLET ORAL ONCE
Status: COMPLETED | OUTPATIENT
Start: 2021-06-09 | End: 2021-06-09

## 2021-06-09 RX ORDER — DIPHENHYDRAMINE HYDROCHLORIDE 50 MG/ML
50 INJECTION INTRAMUSCULAR; INTRAVENOUS ONCE
Status: CANCELLED | OUTPATIENT
Start: 2021-06-23 | End: 2021-06-23

## 2021-06-09 RX ORDER — HEPARIN SODIUM (PORCINE) LOCK FLUSH IV SOLN 100 UNIT/ML 100 UNIT/ML
500 SOLUTION INTRAVENOUS PRN
Status: CANCELLED | OUTPATIENT
Start: 2021-06-23

## 2021-06-09 RX ORDER — DIPHENHYDRAMINE HCL 25 MG
25 TABLET ORAL ONCE
Status: CANCELLED
Start: 2021-06-23 | End: 2021-06-23

## 2021-06-09 RX ORDER — METHYLPREDNISOLONE SODIUM SUCCINATE 125 MG/2ML
125 INJECTION, POWDER, LYOPHILIZED, FOR SOLUTION INTRAMUSCULAR; INTRAVENOUS ONCE
Status: CANCELLED | OUTPATIENT
Start: 2021-06-23 | End: 2021-06-23

## 2021-06-09 RX ORDER — METHYLPREDNISOLONE SODIUM SUCCINATE 125 MG/2ML
125 INJECTION, POWDER, LYOPHILIZED, FOR SOLUTION INTRAMUSCULAR; INTRAVENOUS ONCE
Status: COMPLETED | OUTPATIENT
Start: 2021-06-09 | End: 2021-06-09

## 2021-06-09 RX ORDER — EPINEPHRINE 1 MG/ML
0.3 INJECTION, SOLUTION, CONCENTRATE INTRAVENOUS PRN
Status: CANCELLED | OUTPATIENT
Start: 2021-06-23

## 2021-06-09 RX ORDER — SODIUM CHLORIDE 0.9 % (FLUSH) 0.9 %
5 SYRINGE (ML) INJECTION PRN
Status: CANCELLED | OUTPATIENT
Start: 2021-06-23

## 2021-06-09 RX ORDER — SODIUM CHLORIDE 0.9 % (FLUSH) 0.9 %
10 SYRINGE (ML) INJECTION PRN
Status: CANCELLED | OUTPATIENT
Start: 2021-06-23

## 2021-06-09 RX ORDER — METHYLPREDNISOLONE SODIUM SUCCINATE 125 MG/2ML
125 INJECTION, POWDER, LYOPHILIZED, FOR SOLUTION INTRAMUSCULAR; INTRAVENOUS ONCE
Status: CANCELLED
Start: 2021-06-23 | End: 2021-06-23

## 2021-06-09 RX ADMIN — OMALIZUMAB 375 MG: 150 INJECTION, SOLUTION SUBCUTANEOUS at 15:12

## 2021-06-09 RX ADMIN — DIPHENHYDRAMINE HYDROCHLORIDE 25 MG: 25 TABLET ORAL at 15:10

## 2021-06-09 RX ADMIN — METHYLPREDNISOLONE SODIUM SUCCINATE 125 MG: 125 INJECTION, POWDER, FOR SOLUTION INTRAMUSCULAR; INTRAVENOUS at 15:11

## 2021-06-11 ENCOUNTER — TELEPHONE (OUTPATIENT)
Dept: CARDIAC REHAB | Age: 61
End: 2021-06-11

## 2021-06-11 NOTE — TELEPHONE ENCOUNTER
Spoke with patient regarding Pulmonary Rehab evaluation. Stated he is not interested at this time. Let him know we would be happy to accommodate him should he change his mind.  to be notified.

## 2021-06-21 ENCOUNTER — HOSPITAL ENCOUNTER (EMERGENCY)
Age: 61
Discharge: HOME | End: 2021-06-22
Attending: EMERGENCY MEDICINE
Payer: MEDICAID

## 2021-06-21 DIAGNOSIS — J44.1 COPD EXACERBATION (HCC): Primary | ICD-10-CM

## 2021-06-21 PROCEDURE — 87635 SARS-COV-2 COVID-19 AMP PRB: CPT

## 2021-06-21 PROCEDURE — 96374 THER/PROPH/DIAG INJ IV PUSH: CPT

## 2021-06-21 PROCEDURE — 93005 ELECTROCARDIOGRAM TRACING: CPT | Performed by: EMERGENCY MEDICINE

## 2021-06-21 PROCEDURE — 6370000000 HC RX 637 (ALT 250 FOR IP): Performed by: STUDENT IN AN ORGANIZED HEALTH CARE EDUCATION/TRAINING PROGRAM

## 2021-06-21 PROCEDURE — 99284 EMERGENCY DEPT VISIT MOD MDM: CPT

## 2021-06-21 PROCEDURE — 94640 AIRWAY INHALATION TREATMENT: CPT

## 2021-06-21 RX ORDER — METHYLPREDNISOLONE SODIUM SUCCINATE 125 MG/2ML
125 INJECTION, POWDER, LYOPHILIZED, FOR SOLUTION INTRAMUSCULAR; INTRAVENOUS ONCE
Status: COMPLETED | OUTPATIENT
Start: 2021-06-21 | End: 2021-06-22

## 2021-06-21 RX ORDER — IPRATROPIUM BROMIDE AND ALBUTEROL SULFATE 2.5; .5 MG/3ML; MG/3ML
3 SOLUTION RESPIRATORY (INHALATION) ONCE
Status: COMPLETED | OUTPATIENT
Start: 2021-06-21 | End: 2021-06-21

## 2021-06-21 RX ADMIN — IPRATROPIUM BROMIDE AND ALBUTEROL SULFATE 3 AMPULE: .5; 3 SOLUTION RESPIRATORY (INHALATION) at 23:38

## 2021-06-22 ENCOUNTER — APPOINTMENT (OUTPATIENT)
Dept: GENERAL RADIOLOGY | Age: 61
End: 2021-06-22
Payer: MEDICAID

## 2021-06-22 VITALS
BODY MASS INDEX: 36.34 KG/M2 | TEMPERATURE: 98.2 F | HEART RATE: 81 BPM | DIASTOLIC BLOOD PRESSURE: 89 MMHG | HEIGHT: 68 IN | SYSTOLIC BLOOD PRESSURE: 144 MMHG | RESPIRATION RATE: 20 BRPM | OXYGEN SATURATION: 95 %

## 2021-06-22 LAB
ALBUMIN SERPL-MCNC: 4.1 G/DL (ref 3.5–5.2)
ALP BLD-CCNC: 103 U/L (ref 40–129)
ALT SERPL-CCNC: 17 U/L (ref 0–40)
ANION GAP SERPL CALCULATED.3IONS-SCNC: 8 MMOL/L (ref 7–16)
AST SERPL-CCNC: 12 U/L (ref 0–39)
BASOPHILS ABSOLUTE: 0.03 E9/L (ref 0–0.2)
BASOPHILS RELATIVE PERCENT: 0.2 % (ref 0–2)
BILIRUB SERPL-MCNC: 0.3 MG/DL (ref 0–1.2)
BUN BLDV-MCNC: 11 MG/DL (ref 6–23)
CALCIUM SERPL-MCNC: 9.5 MG/DL (ref 8.6–10.2)
CHLORIDE BLD-SCNC: 97 MMOL/L (ref 98–107)
CO2: 35 MMOL/L (ref 22–29)
CREAT SERPL-MCNC: 0.9 MG/DL (ref 0.7–1.2)
EKG ATRIAL RATE: 82 BPM
EKG P AXIS: 74 DEGREES
EKG P-R INTERVAL: 166 MS
EKG Q-T INTERVAL: 384 MS
EKG QRS DURATION: 96 MS
EKG QTC CALCULATION (BAZETT): 448 MS
EKG R AXIS: 48 DEGREES
EKG T AXIS: 57 DEGREES
EKG VENTRICULAR RATE: 82 BPM
EOSINOPHILS ABSOLUTE: 0.08 E9/L (ref 0.05–0.5)
EOSINOPHILS RELATIVE PERCENT: 0.5 % (ref 0–6)
GFR AFRICAN AMERICAN: >60
GFR NON-AFRICAN AMERICAN: >60 ML/MIN/1.73
GLUCOSE BLD-MCNC: 99 MG/DL (ref 74–99)
HCT VFR BLD CALC: 42 % (ref 37–54)
HEMOGLOBIN: 13.3 G/DL (ref 12.5–16.5)
IMMATURE GRANULOCYTES #: 0.09 E9/L
IMMATURE GRANULOCYTES %: 0.6 % (ref 0–5)
LYMPHOCYTES ABSOLUTE: 3.19 E9/L (ref 1.5–4)
LYMPHOCYTES RELATIVE PERCENT: 20.3 % (ref 20–42)
MCH RBC QN AUTO: 28.7 PG (ref 26–35)
MCHC RBC AUTO-ENTMCNC: 31.7 % (ref 32–34.5)
MCV RBC AUTO: 90.7 FL (ref 80–99.9)
MONOCYTES ABSOLUTE: 1.17 E9/L (ref 0.1–0.95)
MONOCYTES RELATIVE PERCENT: 7.5 % (ref 2–12)
NEUTROPHILS ABSOLUTE: 11.13 E9/L (ref 1.8–7.3)
NEUTROPHILS RELATIVE PERCENT: 70.9 % (ref 43–80)
PDW BLD-RTO: 13.2 FL (ref 11.5–15)
PLATELET # BLD: 178 E9/L (ref 130–450)
PMV BLD AUTO: 11.8 FL (ref 7–12)
POTASSIUM REFLEX MAGNESIUM: 3.7 MMOL/L (ref 3.5–5)
PRO-BNP: 23 PG/ML (ref 0–125)
RBC # BLD: 4.63 E12/L (ref 3.8–5.8)
SARS-COV-2, NAAT: NOT DETECTED
SODIUM BLD-SCNC: 140 MMOL/L (ref 132–146)
TOTAL PROTEIN: 6.7 G/DL (ref 6.4–8.3)
TROPONIN, HIGH SENSITIVITY: 7 NG/L (ref 0–11)
TROPONIN, HIGH SENSITIVITY: 8 NG/L (ref 0–11)
WBC # BLD: 15.7 E9/L (ref 4.5–11.5)

## 2021-06-22 PROCEDURE — 85025 COMPLETE CBC W/AUTO DIFF WBC: CPT

## 2021-06-22 PROCEDURE — 71045 X-RAY EXAM CHEST 1 VIEW: CPT

## 2021-06-22 PROCEDURE — 83880 ASSAY OF NATRIURETIC PEPTIDE: CPT

## 2021-06-22 PROCEDURE — 84484 ASSAY OF TROPONIN QUANT: CPT

## 2021-06-22 PROCEDURE — 6360000002 HC RX W HCPCS: Performed by: STUDENT IN AN ORGANIZED HEALTH CARE EDUCATION/TRAINING PROGRAM

## 2021-06-22 PROCEDURE — 80053 COMPREHEN METABOLIC PANEL: CPT

## 2021-06-22 PROCEDURE — 93010 ELECTROCARDIOGRAM REPORT: CPT | Performed by: INTERNAL MEDICINE

## 2021-06-22 RX ORDER — PREDNISONE 20 MG/1
40 TABLET ORAL DAILY
Qty: 10 TABLET | Refills: 0 | Status: SHIPPED | OUTPATIENT
Start: 2021-06-22 | End: 2021-06-27

## 2021-06-22 RX ADMIN — METHYLPREDNISOLONE SODIUM SUCCINATE 125 MG: 125 INJECTION, POWDER, FOR SOLUTION INTRAMUSCULAR; INTRAVENOUS at 00:48

## 2021-06-22 ASSESSMENT — ENCOUNTER SYMPTOMS
SHORTNESS OF BREATH: 1
COUGH: 1
VOMITING: 0
ABDOMINAL PAIN: 0
WHEEZING: 1

## 2021-06-22 NOTE — ED PROVIDER NOTES
HPI   15-year-old male patient presents to emergency department chief complaint of shortness of breath. Patient has history of COPD and CHF. Symptoms of shortness of breath have been going on for the last week and worsening. Says he has been using his inhalers as prescribed and has been using them more frequently. Denies chest pain, nausea, vomiting, diarrhea. He does have an associated cough with associated clear phlegm production. Denies any fevers or chills. Patient has received both Covid vaccinations. Review of Systems   Constitutional: Negative for chills and fever. HENT: Negative for congestion. Respiratory: Positive for cough, shortness of breath and wheezing. Cardiovascular: Negative for chest pain and leg swelling. Gastrointestinal: Negative for abdominal pain and vomiting. Skin: Negative for wound. All other systems reviewed and are negative. Physical Exam  Vitals and nursing note reviewed. Constitutional:       Appearance: He is well-developed. HENT:      Head: Normocephalic and atraumatic. Eyes:      Conjunctiva/sclera: Conjunctivae normal.   Cardiovascular:      Rate and Rhythm: Normal rate and regular rhythm. Heart sounds: Normal heart sounds. No murmur heard. Pulmonary:      Effort: Pulmonary effort is normal. Tachypnea present. No respiratory distress. Breath sounds: Wheezing (Throughout) present. No rales. Abdominal:      General: Bowel sounds are normal.      Palpations: Abdomen is soft. Tenderness: There is no abdominal tenderness. There is no guarding or rebound. Musculoskeletal:      Cervical back: Normal range of motion and neck supple. Right lower leg: No edema. Left lower leg: No edema. Skin:     General: Skin is warm and dry. Neurological:      Mental Status: He is alert and oriented to person, place, and time. Cranial Nerves: No cranial nerve deficit.       Coordination: Coordination normal.          Procedures MDM   78-year-old male here for shortness of breath. Wheezes throughout symptoms consistent with COPD exacerbation. Lab work showing no acute abnormalities. Initial troponin was 8 and the repeat was 7. Covid negative no acute abnormalities on chest x-ray. Discussed with patient. He feels safe to go home prednisone given for 5 days. Follow-up with primary. Vitals stable at discharge. EKG: This EKG is signed and interpreted by me. Rate: 82  Rhythm: Sinus  Interpretation: no acute changes  Comparison: stable as compared to patient's most recent EKG    ED Course as of Jun 26 0602 Tue Jun 22, 2021   0051 Patient states he is feeling better however on physical examination he does still have wheezes throughout.    [FG]   0256 Patient states he is feeling better at this time, still awaiting repeat troponin at this time, did update him on his Covid testing remaining blood work. Does feel like he would be safe to be discharged home when the troponin comes back. He states he would like a steroid to go home on.    [CB]      ED Course User Index  [CB] Emily Romero MD  [FG] Annamaria Aaron DO         ED Course as of Jun 26 0605 Tue Jun 22, 2021   0051 Patient states he is feeling better however on physical examination he does still have wheezes throughout.    [FG]   4771 Patient states he is feeling better at this time, still awaiting repeat troponin at this time, did update him on his Covid testing remaining blood work. Does feel like he would be safe to be discharged home when the troponin comes back.   He states he would like a steroid to go home on.    [CB]      ED Course User Index  [CB] Emily Romero MD  [FG] Annamaria Aaron, DO       --------------------------------------------- PAST HISTORY ---------------------------------------------  Past Medical History:  has a past medical history of Asthma, CHF (congestive heart failure) ([facility] [address] COPD (chronic obstructive pulmonary disease) ([facility] Depression, Reflex to MG   Result Value Ref Range    Sodium 140 132 - 146 mmol/L    Potassium reflex Magnesium 3.7 3.5 - 5.0 mmol/L    Chloride 97 (L) 98 - 107 mmol/L    CO2 35 (H) 22 - 29 mmol/L    Anion Gap 8 7 - 16 mmol/L    Glucose 99 74 - 99 mg/dL    BUN 11 6 - 23 mg/dL    CREATININE 0.9 0.7 - 1.2 mg/dL    GFR Non-African American >60 >=60 mL/min/1.73    GFR African American >60     Calcium 9.5 8.6 - 10.2 mg/dL    Total Protein 6.7 6.4 - 8.3 g/dL    Albumin 4.1 3.5 - 5.2 g/dL    Total Bilirubin 0.3 0.0 - 1.2 mg/dL    Alkaline Phosphatase 103 40 - 129 U/L    ALT 17 0 - 40 U/L    AST 12 0 - 39 U/L   Troponin   Result Value Ref Range    Troponin, High Sensitivity 8 0 - 11 ng/L   Brain Natriuretic Peptide   Result Value Ref Range    Pro-BNP 23 0 - 125 pg/mL   Troponin   Result Value Ref Range    Troponin, High Sensitivity 7 0 - 11 ng/L   EKG 12 Lead   Result Value Ref Range    Ventricular Rate 82 BPM    Atrial Rate 82 BPM    P-R Interval 166 ms    QRS Duration 96 ms    Q-T Interval 384 ms    QTc Calculation (Bazett) 448 ms    P Axis 74 degrees    R Axis 48 degrees    T Axis 57 degrees       Radiology:  XR CHEST PORTABLE   Final Result   No acute abnormality identified.             ------------------------- NURSING NOTES AND VITALS REVIEWED ---------------------------  Date / Time Roomed:  6/21/2021 11:18 PM  ED Bed Assignment:  16/16    The nursing notes within the ED encounter and vital signs as below have been reviewed. BP (!) 144/89   Pulse 81   Temp 98.2 °F (36.8 °C) (Oral)   Resp 20   Ht 5' 8\" (1.727 m)   SpO2 95%   BMI 36.34 kg/m²   Oxygen Saturation Interpretation: Normal      ------------------------------------------ PROGRESS NOTES ------------------------------------------  6:05 AM EDT  I have spoken with the patient and discussed todays results, in addition to providing specific details for the plan of care and counseling regarding the diagnosis and prognosis.   Their questions are answered at this time and

## 2021-06-24 ENCOUNTER — HOSPITAL ENCOUNTER (OUTPATIENT)
Dept: INFUSION THERAPY | Age: 61
Setting detail: INFUSION SERIES
Discharge: HOME | End: 2021-06-24
Payer: MEDICAID

## 2021-06-24 DIAGNOSIS — J44.1 ASTHMA EXACERBATION WITH COPD (CHRONIC OBSTRUCTIVE PULMONARY DISEASE) (HCC): ICD-10-CM

## 2021-06-24 DIAGNOSIS — J45.901 ASTHMA EXACERBATION WITH COPD (CHRONIC OBSTRUCTIVE PULMONARY DISEASE) (HCC): ICD-10-CM

## 2021-06-24 DIAGNOSIS — J45.909 ASTHMA, INTRINSIC: Primary | ICD-10-CM

## 2021-06-24 PROCEDURE — 6370000000 HC RX 637 (ALT 250 FOR IP): Performed by: INTERNAL MEDICINE

## 2021-06-24 PROCEDURE — 6360000002 HC RX W HCPCS: Performed by: INTERNAL MEDICINE

## 2021-06-24 PROCEDURE — 96372 THER/PROPH/DIAG INJ SC/IM: CPT

## 2021-06-24 RX ORDER — SODIUM CHLORIDE 0.9 % (FLUSH) 0.9 %
5 SYRINGE (ML) INJECTION PRN
Status: CANCELLED | OUTPATIENT
Start: 2021-07-07

## 2021-06-24 RX ORDER — DIPHENHYDRAMINE HYDROCHLORIDE 50 MG/ML
50 INJECTION INTRAMUSCULAR; INTRAVENOUS ONCE
Status: CANCELLED | OUTPATIENT
Start: 2021-07-07 | End: 2021-07-07

## 2021-06-24 RX ORDER — SODIUM CHLORIDE 0.9 % (FLUSH) 0.9 %
10 SYRINGE (ML) INJECTION PRN
Status: CANCELLED | OUTPATIENT
Start: 2021-07-07

## 2021-06-24 RX ORDER — HEPARIN SODIUM (PORCINE) LOCK FLUSH IV SOLN 100 UNIT/ML 100 UNIT/ML
500 SOLUTION INTRAVENOUS PRN
Status: CANCELLED | OUTPATIENT
Start: 2021-07-07

## 2021-06-24 RX ORDER — METHYLPREDNISOLONE SODIUM SUCCINATE 125 MG/2ML
125 INJECTION, POWDER, LYOPHILIZED, FOR SOLUTION INTRAMUSCULAR; INTRAVENOUS ONCE
Status: CANCELLED | OUTPATIENT
Start: 2021-07-07 | End: 2021-07-07

## 2021-06-24 RX ORDER — METHYLPREDNISOLONE SODIUM SUCCINATE 125 MG/2ML
125 INJECTION, POWDER, LYOPHILIZED, FOR SOLUTION INTRAMUSCULAR; INTRAVENOUS ONCE
Status: COMPLETED | OUTPATIENT
Start: 2021-06-24 | End: 2021-06-24

## 2021-06-24 RX ORDER — DIPHENHYDRAMINE HCL 25 MG
25 TABLET ORAL ONCE
Status: COMPLETED | OUTPATIENT
Start: 2021-06-24 | End: 2021-06-24

## 2021-06-24 RX ORDER — METHYLPREDNISOLONE SODIUM SUCCINATE 125 MG/2ML
125 INJECTION, POWDER, LYOPHILIZED, FOR SOLUTION INTRAMUSCULAR; INTRAVENOUS ONCE
Status: CANCELLED
Start: 2021-07-07 | End: 2021-07-07

## 2021-06-24 RX ORDER — SODIUM CHLORIDE 9 MG/ML
INJECTION, SOLUTION INTRAVENOUS CONTINUOUS
Status: CANCELLED | OUTPATIENT
Start: 2021-07-07

## 2021-06-24 RX ORDER — EPINEPHRINE 1 MG/ML
0.3 INJECTION, SOLUTION, CONCENTRATE INTRAVENOUS PRN
Status: CANCELLED | OUTPATIENT
Start: 2021-07-07

## 2021-06-24 RX ORDER — DIPHENHYDRAMINE HCL 25 MG
25 TABLET ORAL ONCE
Status: CANCELLED
Start: 2021-07-07 | End: 2021-07-07

## 2021-06-24 RX ADMIN — METHYLPREDNISOLONE SODIUM SUCCINATE 125 MG: 125 INJECTION, POWDER, FOR SOLUTION INTRAMUSCULAR; INTRAVENOUS at 14:13

## 2021-06-24 RX ADMIN — OMALIZUMAB 375 MG: 150 INJECTION, SOLUTION SUBCUTANEOUS at 14:15

## 2021-06-24 RX ADMIN — DIPHENHYDRAMINE HYDROCHLORIDE 25 MG: 25 TABLET ORAL at 14:12

## 2021-07-08 ENCOUNTER — HOSPITAL ENCOUNTER (OUTPATIENT)
Dept: INFUSION THERAPY | Age: 61
Setting detail: INFUSION SERIES
Discharge: HOME | End: 2021-07-08
Payer: MEDICAID

## 2021-07-08 VITALS — OXYGEN SATURATION: 94 % | RESPIRATION RATE: 22 BRPM | TEMPERATURE: 98.1 F | HEART RATE: 78 BPM

## 2021-07-08 DIAGNOSIS — J45.909 ASTHMA, INTRINSIC: Primary | ICD-10-CM

## 2021-07-08 DIAGNOSIS — J44.1 ASTHMA EXACERBATION WITH COPD (CHRONIC OBSTRUCTIVE PULMONARY DISEASE) (HCC): ICD-10-CM

## 2021-07-08 DIAGNOSIS — J45.901 ASTHMA EXACERBATION WITH COPD (CHRONIC OBSTRUCTIVE PULMONARY DISEASE) (HCC): ICD-10-CM

## 2021-07-08 PROCEDURE — 6370000000 HC RX 637 (ALT 250 FOR IP): Performed by: INTERNAL MEDICINE

## 2021-07-08 PROCEDURE — 6360000002 HC RX W HCPCS: Performed by: INTERNAL MEDICINE

## 2021-07-08 PROCEDURE — 96372 THER/PROPH/DIAG INJ SC/IM: CPT

## 2021-07-08 RX ORDER — SODIUM CHLORIDE 0.9 % (FLUSH) 0.9 %
10 SYRINGE (ML) INJECTION PRN
Status: CANCELLED | OUTPATIENT
Start: 2021-07-22

## 2021-07-08 RX ORDER — DIPHENHYDRAMINE HCL 25 MG
25 TABLET ORAL ONCE
Status: COMPLETED | OUTPATIENT
Start: 2021-07-08 | End: 2021-07-08

## 2021-07-08 RX ORDER — HEPARIN SODIUM (PORCINE) LOCK FLUSH IV SOLN 100 UNIT/ML 100 UNIT/ML
500 SOLUTION INTRAVENOUS PRN
Status: CANCELLED | OUTPATIENT
Start: 2021-07-22

## 2021-07-08 RX ORDER — METHYLPREDNISOLONE SODIUM SUCCINATE 125 MG/2ML
125 INJECTION, POWDER, LYOPHILIZED, FOR SOLUTION INTRAMUSCULAR; INTRAVENOUS ONCE
Status: CANCELLED
Start: 2021-07-22 | End: 2021-07-22

## 2021-07-08 RX ORDER — METHYLPREDNISOLONE SODIUM SUCCINATE 125 MG/2ML
125 INJECTION, POWDER, LYOPHILIZED, FOR SOLUTION INTRAMUSCULAR; INTRAVENOUS ONCE
Status: CANCELLED | OUTPATIENT
Start: 2021-07-22 | End: 2021-07-22

## 2021-07-08 RX ORDER — SODIUM CHLORIDE 0.9 % (FLUSH) 0.9 %
5 SYRINGE (ML) INJECTION PRN
Status: CANCELLED | OUTPATIENT
Start: 2021-07-22

## 2021-07-08 RX ORDER — DIPHENHYDRAMINE HCL 25 MG
25 TABLET ORAL ONCE
Status: CANCELLED
Start: 2021-07-22 | End: 2021-07-22

## 2021-07-08 RX ORDER — SODIUM CHLORIDE 9 MG/ML
INJECTION, SOLUTION INTRAVENOUS CONTINUOUS
Status: CANCELLED | OUTPATIENT
Start: 2021-07-22

## 2021-07-08 RX ORDER — METHYLPREDNISOLONE SODIUM SUCCINATE 125 MG/2ML
125 INJECTION, POWDER, LYOPHILIZED, FOR SOLUTION INTRAMUSCULAR; INTRAVENOUS ONCE
Status: COMPLETED | OUTPATIENT
Start: 2021-07-08 | End: 2021-07-08

## 2021-07-08 RX ORDER — EPINEPHRINE 1 MG/ML
0.3 INJECTION, SOLUTION, CONCENTRATE INTRAVENOUS PRN
Status: CANCELLED | OUTPATIENT
Start: 2021-07-22

## 2021-07-08 RX ORDER — DIPHENHYDRAMINE HYDROCHLORIDE 50 MG/ML
50 INJECTION INTRAMUSCULAR; INTRAVENOUS ONCE
Status: CANCELLED | OUTPATIENT
Start: 2021-07-22 | End: 2021-07-22

## 2021-07-08 RX ADMIN — METHYLPREDNISOLONE SODIUM SUCCINATE 125 MG: 125 INJECTION, POWDER, FOR SOLUTION INTRAMUSCULAR; INTRAVENOUS at 14:47

## 2021-07-08 RX ADMIN — DIPHENHYDRAMINE HYDROCHLORIDE 25 MG: 25 TABLET ORAL at 14:47

## 2021-07-08 RX ADMIN — OMALIZUMAB 375 MG: 150 INJECTION, SOLUTION SUBCUTANEOUS at 14:49

## 2021-07-14 ENCOUNTER — HOSPITAL ENCOUNTER (EMERGENCY)
Age: 61
Discharge: HOME | End: 2021-07-14
Attending: EMERGENCY MEDICINE
Payer: MEDICAID

## 2021-07-14 ENCOUNTER — APPOINTMENT (OUTPATIENT)
Dept: GENERAL RADIOLOGY | Age: 61
End: 2021-07-14
Payer: MEDICAID

## 2021-07-14 VITALS
HEART RATE: 79 BPM | OXYGEN SATURATION: 93 % | DIASTOLIC BLOOD PRESSURE: 84 MMHG | SYSTOLIC BLOOD PRESSURE: 137 MMHG | RESPIRATION RATE: 16 BRPM

## 2021-07-14 DIAGNOSIS — J44.1 COPD EXACERBATION (HCC): Primary | ICD-10-CM

## 2021-07-14 LAB
ANION GAP SERPL CALCULATED.3IONS-SCNC: 11 MMOL/L (ref 7–16)
B.E.: 8 MMOL/L (ref -3–3)
BUN BLDV-MCNC: 11 MG/DL (ref 6–23)
CALCIUM SERPL-MCNC: 9.4 MG/DL (ref 8.6–10.2)
CHLORIDE BLD-SCNC: 99 MMOL/L (ref 98–107)
CO2: 31 MMOL/L (ref 22–29)
COHB: 1 % (ref 0–1.5)
CREAT SERPL-MCNC: 0.7 MG/DL (ref 0.7–1.2)
CRITICAL: ABNORMAL
DATE ANALYZED: ABNORMAL
DATE OF COLLECTION: ABNORMAL
GFR AFRICAN AMERICAN: >60
GFR NON-AFRICAN AMERICAN: >60 ML/MIN/1.73
GLUCOSE BLD-MCNC: 149 MG/DL (ref 74–99)
HCO3: 35.6 MMOL/L (ref 22–26)
HCT VFR BLD CALC: 40.2 % (ref 37–54)
HEMOGLOBIN: 12.7 G/DL (ref 12.5–16.5)
HHB: 3.2 % (ref 0–5)
LAB: ABNORMAL
LACTIC ACID: 1.8 MMOL/L (ref 0.5–2.2)
Lab: ABNORMAL
MCH RBC QN AUTO: 29 PG (ref 26–35)
MCHC RBC AUTO-ENTMCNC: 31.6 % (ref 32–34.5)
MCV RBC AUTO: 91.8 FL (ref 80–99.9)
METHB: 0.3 % (ref 0–1.5)
MODE: ABNORMAL
O2 CONTENT: 18.2 ML/DL
O2 SATURATION: 96.8 % (ref 92–98.5)
O2HB: 95.5 % (ref 94–97)
OPERATOR ID: ABNORMAL
PATIENT TEMP: 37 C
PCO2: 63.9 MMHG (ref 35–45)
PDW BLD-RTO: 14.1 FL (ref 11.5–15)
PH BLOOD GAS: 7.36 (ref 7.35–7.45)
PLATELET # BLD: 188 E9/L (ref 130–450)
PMV BLD AUTO: 11.1 FL (ref 7–12)
PO2: 88.6 MMHG (ref 75–100)
POTASSIUM SERPL-SCNC: 4.3 MMOL/L (ref 3.5–5)
PRO-BNP: 22 PG/ML (ref 0–125)
RBC # BLD: 4.38 E12/L (ref 3.8–5.8)
SODIUM BLD-SCNC: 141 MMOL/L (ref 132–146)
SOURCE, BLOOD GAS: ABNORMAL
THB: 13.5 G/DL (ref 11.5–16.5)
TIME ANALYZED: 1407
TROPONIN, HIGH SENSITIVITY: <6 NG/L (ref 0–11)
WBC # BLD: 13.9 E9/L (ref 4.5–11.5)

## 2021-07-14 PROCEDURE — 84484 ASSAY OF TROPONIN QUANT: CPT

## 2021-07-14 PROCEDURE — 99285 EMERGENCY DEPT VISIT HI MDM: CPT

## 2021-07-14 PROCEDURE — 80048 BASIC METABOLIC PNL TOTAL CA: CPT

## 2021-07-14 PROCEDURE — 82805 BLOOD GASES W/O2 SATURATION: CPT

## 2021-07-14 PROCEDURE — 87040 BLOOD CULTURE FOR BACTERIA: CPT

## 2021-07-14 PROCEDURE — 94664 DEMO&/EVAL PT USE INHALER: CPT

## 2021-07-14 PROCEDURE — 6370000000 HC RX 637 (ALT 250 FOR IP): Performed by: EMERGENCY MEDICINE

## 2021-07-14 PROCEDURE — 83605 ASSAY OF LACTIC ACID: CPT

## 2021-07-14 PROCEDURE — 83880 ASSAY OF NATRIURETIC PEPTIDE: CPT

## 2021-07-14 PROCEDURE — 85027 COMPLETE CBC AUTOMATED: CPT

## 2021-07-14 PROCEDURE — 96374 THER/PROPH/DIAG INJ IV PUSH: CPT

## 2021-07-14 PROCEDURE — 71045 X-RAY EXAM CHEST 1 VIEW: CPT

## 2021-07-14 PROCEDURE — 94640 AIRWAY INHALATION TREATMENT: CPT

## 2021-07-14 PROCEDURE — 6360000002 HC RX W HCPCS: Performed by: EMERGENCY MEDICINE

## 2021-07-14 RX ORDER — METHYLPREDNISOLONE SODIUM SUCCINATE 125 MG/2ML
125 INJECTION, POWDER, LYOPHILIZED, FOR SOLUTION INTRAMUSCULAR; INTRAVENOUS ONCE
Status: COMPLETED | OUTPATIENT
Start: 2021-07-14 | End: 2021-07-14

## 2021-07-14 RX ORDER — IPRATROPIUM BROMIDE AND ALBUTEROL SULFATE 2.5; .5 MG/3ML; MG/3ML
1 SOLUTION RESPIRATORY (INHALATION) CONTINUOUS
Status: DISCONTINUED | OUTPATIENT
Start: 2021-07-14 | End: 2021-07-14 | Stop reason: HOSPADM

## 2021-07-14 RX ORDER — PREDNISONE 20 MG/1
TABLET ORAL
Qty: 10 TABLET | Refills: 0 | Status: SHIPPED | OUTPATIENT
Start: 2021-07-14 | End: 2021-07-24

## 2021-07-14 RX ADMIN — IPRATROPIUM BROMIDE AND ALBUTEROL SULFATE 1 AMPULE: .5; 3 SOLUTION RESPIRATORY (INHALATION) at 12:54

## 2021-07-14 RX ADMIN — METHYLPREDNISOLONE SODIUM SUCCINATE 125 MG: 125 INJECTION, POWDER, FOR SOLUTION INTRAMUSCULAR; INTRAVENOUS at 13:13

## 2021-07-14 ASSESSMENT — ENCOUNTER SYMPTOMS
NAUSEA: 0
SORE THROAT: 0
HEMOPTYSIS: 0
DIARRHEA: 0
COUGH: 1
VOMITING: 0
ABDOMINAL PAIN: 0
WHEEZING: 1
SPUTUM PRODUCTION: 0
SHORTNESS OF BREATH: 1
CHEST TIGHTNESS: 1

## 2021-07-14 NOTE — ED PROVIDER NOTES
The history is provided by the patient. Shortness of Breath  Severity:  Moderate  Onset quality:  Gradual  Duration:  2 days  Timing:  Constant  Progression:  Unchanged  Chronicity:  Recurrent  Context: activity and URI    Relieved by:  None tried  Worsened by: Activity and coughing  Ineffective treatments:  None tried (ran out of inhaler)  Associated symptoms: cough and wheezing    Associated symptoms: no abdominal pain, no chest pain, no diaphoresis, no ear pain, no fever, no headaches, no hemoptysis, no sore throat, no sputum production and no vomiting    Risk factors: no hx of cancer, no hx of PE/DVT, no obesity and no recent surgery        Review of Systems   Constitutional: Positive for activity change and fatigue. Negative for appetite change, diaphoresis and fever. HENT: Negative for congestion, ear pain and sore throat. Respiratory: Positive for cough, chest tightness, shortness of breath and wheezing. Negative for hemoptysis and sputum production. Cardiovascular: Negative for chest pain, palpitations and leg swelling. Gastrointestinal: Negative for abdominal pain, diarrhea, nausea and vomiting. Genitourinary: Negative for decreased urine volume. Musculoskeletal: Negative. Skin: Negative. Neurological: Negative for light-headedness and headaches. Physical Exam  Vitals and nursing note reviewed. Constitutional:       Appearance: He is well-developed and normal weight. HENT:      Head: Normocephalic and atraumatic. Mouth/Throat:      Mouth: Mucous membranes are moist.   Eyes:      Pupils: Pupils are equal, round, and reactive to light. Cardiovascular:      Rate and Rhythm: Normal rate and regular rhythm. Pulses: Normal pulses. Heart sounds: Normal heart sounds. No murmur heard. Pulmonary:      Effort: Pulmonary effort is normal. No respiratory distress. Breath sounds: Examination of the right-upper field reveals wheezing.  Examination of the alcohol use. He reports that he does not use drugs. Family History: family history includes Alcohol Abuse in his father and mother. The patients home medications have been reviewed. Allergies: Patient has no known allergies.     -------------------------------------------------- RESULTS -------------------------------------------------  Labs:  Results for orders placed or performed during the hospital encounter of 06/39/81   Basic metabolic panel   Result Value Ref Range    Sodium 141 132 - 146 mmol/L    Potassium 4.3 3.5 - 5.0 mmol/L    Chloride 99 98 - 107 mmol/L    CO2 31 (H) 22 - 29 mmol/L    Anion Gap 11 7 - 16 mmol/L    Glucose 149 (H) 74 - 99 mg/dL    BUN 11 6 - 23 mg/dL    CREATININE 0.7 0.7 - 1.2 mg/dL    GFR Non-African American >60 >=60 mL/min/1.73    GFR African American >60     Calcium 9.4 8.6 - 10.2 mg/dL   CBC   Result Value Ref Range    WBC 13.9 (H) 4.5 - 11.5 E9/L    RBC 4.38 3.80 - 5.80 E12/L    Hemoglobin 12.7 12.5 - 16.5 g/dL    Hematocrit 40.2 37.0 - 54.0 %    MCV 91.8 80.0 - 99.9 fL    MCH 29.0 26.0 - 35.0 pg    MCHC 31.6 (L) 32.0 - 34.5 %    RDW 14.1 11.5 - 15.0 fL    Platelets 328 701 - 646 E9/L    MPV 11.1 7.0 - 12.0 fL   Troponin   Result Value Ref Range    Troponin, High Sensitivity <6 0 - 11 ng/L   Brain Natriuretic Peptide   Result Value Ref Range    Pro-BNP 22 0 - 125 pg/mL   Lactic Acid, Plasma   Result Value Ref Range    Lactic Acid 1.8 0.5 - 2.2 mmol/L   Blood Gas, Arterial   Result Value Ref Range    Date Analyzed [id number]     Time Analyzed 1407     Source: Blood Arterial     pH, Blood Gas 7.364 7.350 - 7.450    PCO2 63.9 (H) 35.0 - 45.0 mmHg    PO2 88.6 75.0 - 100.0 mmHg    HCO3 35.6 (H) 22.0 - 26.0 mmol/L    B.E. 8.0 (H) -3.0 - 3.0 mmol/L    O2 Sat 96.8 92.0 - 98.5 %    O2Hb 95.5 94.0 - 97.0 %    COHb 1.0 0.0 - 1.5 %    MetHb 0.3 0.0 - 1.5 %    O2 Content 18.2 mL/dL    HHb 3.2 0.0 - 5.0 %    tHb (est) 13.5 11.5 - 16.5 g/dL    Mode NC-4  L/M     Date Of Collection Time Collected      Pt Temp 37.0 C     ID [id number]     Lab [id number]     Critical(s) Notified . No Critical Values        Radiology:  XR CHEST PORTABLE   Final Result   No pneumonia or pleural effusion.             ------------------------- NURSING NOTES AND VITALS REVIEWED ---------------------------  Date / Time Roomed:  7/14/2021 12:31 PM  ED Bed Assignment:  08/08    The nursing notes within the ED encounter and vital signs as below have been reviewed. /75   Pulse 86   Resp 16   SpO2 100%   Oxygen Saturation Interpretation: Normal      ------------------------------------------ PROGRESS NOTES ------------------------------------------  I have spoken with the patient and discussed todays results, in addition to providing specific details for the plan of care and counseling regarding the diagnosis and prognosis. Their questions are answered at this time and they are agreeable with the plan. I discussed at length with them reasons for immediate return here for re evaluation. They will followup with primary care by calling their office tomorrow. 15:00  Resolution of symptoms following breathing treatments and steroids. He states he feels well enough to go home. He understands reasons to return to the ED. He is on 4L of oxygen via nasal cannula which is at his baseline. He's oxygen saturation is 100%.  --------------------------------- ADDITIONAL PROVIDER NOTES ---------------------------------  At this time the patient is without objective evidence of an acute process requiring hospitalization or inpatient management. They have remained hemodynamically stable throughout their entire ED visit and are stable for discharge with outpatient follow-up. The plan has been discussed in detail and they are aware of the specific conditions for emergent return, as well as the importance of follow-up.       New Prescriptions    PREDNISONE (DELTASONE) 20 MG TABLET    Two tablets daily for 5 days Diagnosis:  1. COPD exacerbation ([facility] [address]        Disposition:  Patient's disposition: Discharge to home  Patient's condition is stable.              [address], DO  07/14/21 9084

## 2021-07-19 LAB — BLOOD CULTURE, ROUTINE: NORMAL

## 2021-07-20 ENCOUNTER — HOSPITAL ENCOUNTER (OUTPATIENT)
Dept: INFUSION THERAPY | Age: 61
Setting detail: INFUSION SERIES
Discharge: HOME | End: 2021-07-20
Payer: MEDICAID

## 2021-07-20 VITALS
SYSTOLIC BLOOD PRESSURE: 148 MMHG | RESPIRATION RATE: 24 BRPM | HEART RATE: 90 BPM | TEMPERATURE: 98 F | DIASTOLIC BLOOD PRESSURE: 70 MMHG | OXYGEN SATURATION: 92 %

## 2021-07-20 DIAGNOSIS — J45.909 ASTHMA, INTRINSIC: Primary | ICD-10-CM

## 2021-07-20 DIAGNOSIS — J45.901 ASTHMA EXACERBATION WITH COPD (CHRONIC OBSTRUCTIVE PULMONARY DISEASE) (HCC): ICD-10-CM

## 2021-07-20 DIAGNOSIS — J44.1 ASTHMA EXACERBATION WITH COPD (CHRONIC OBSTRUCTIVE PULMONARY DISEASE) (HCC): ICD-10-CM

## 2021-07-20 PROCEDURE — 6360000002 HC RX W HCPCS: Performed by: INTERNAL MEDICINE

## 2021-07-20 PROCEDURE — 6370000000 HC RX 637 (ALT 250 FOR IP): Performed by: INTERNAL MEDICINE

## 2021-07-20 PROCEDURE — 96372 THER/PROPH/DIAG INJ SC/IM: CPT

## 2021-07-20 RX ORDER — SODIUM CHLORIDE 0.9 % (FLUSH) 0.9 %
10 SYRINGE (ML) INJECTION PRN
Status: CANCELLED | OUTPATIENT
Start: 2021-07-22

## 2021-07-20 RX ORDER — METHYLPREDNISOLONE SODIUM SUCCINATE 125 MG/2ML
125 INJECTION, POWDER, LYOPHILIZED, FOR SOLUTION INTRAMUSCULAR; INTRAVENOUS ONCE
Status: COMPLETED | OUTPATIENT
Start: 2021-07-20 | End: 2021-07-20

## 2021-07-20 RX ORDER — SODIUM CHLORIDE 0.9 % (FLUSH) 0.9 %
5 SYRINGE (ML) INJECTION PRN
Status: CANCELLED | OUTPATIENT
Start: 2021-07-22

## 2021-07-20 RX ORDER — METHYLPREDNISOLONE SODIUM SUCCINATE 125 MG/2ML
125 INJECTION, POWDER, LYOPHILIZED, FOR SOLUTION INTRAMUSCULAR; INTRAVENOUS ONCE
Status: CANCELLED | OUTPATIENT
Start: 2021-07-22 | End: 2021-07-22

## 2021-07-20 RX ORDER — DIPHENHYDRAMINE HCL 25 MG
25 TABLET ORAL ONCE
Status: COMPLETED | OUTPATIENT
Start: 2021-07-20 | End: 2021-07-20

## 2021-07-20 RX ORDER — EPINEPHRINE 1 MG/ML
0.3 INJECTION, SOLUTION, CONCENTRATE INTRAVENOUS PRN
Status: CANCELLED | OUTPATIENT
Start: 2021-07-22

## 2021-07-20 RX ORDER — SODIUM CHLORIDE 9 MG/ML
INJECTION, SOLUTION INTRAVENOUS CONTINUOUS
Status: CANCELLED | OUTPATIENT
Start: 2021-07-22

## 2021-07-20 RX ORDER — HEPARIN SODIUM (PORCINE) LOCK FLUSH IV SOLN 100 UNIT/ML 100 UNIT/ML
500 SOLUTION INTRAVENOUS PRN
Status: CANCELLED | OUTPATIENT
Start: 2021-07-22

## 2021-07-20 RX ORDER — DIPHENHYDRAMINE HYDROCHLORIDE 50 MG/ML
50 INJECTION INTRAMUSCULAR; INTRAVENOUS ONCE
Status: CANCELLED | OUTPATIENT
Start: 2021-07-22 | End: 2021-07-22

## 2021-07-20 RX ORDER — DIPHENHYDRAMINE HCL 25 MG
25 TABLET ORAL ONCE
Status: CANCELLED
Start: 2021-07-22 | End: 2021-07-22

## 2021-07-20 RX ORDER — METHYLPREDNISOLONE SODIUM SUCCINATE 125 MG/2ML
125 INJECTION, POWDER, LYOPHILIZED, FOR SOLUTION INTRAMUSCULAR; INTRAVENOUS ONCE
Status: CANCELLED
Start: 2021-07-22 | End: 2021-07-22

## 2021-07-20 RX ADMIN — DIPHENHYDRAMINE HYDROCHLORIDE 25 MG: 25 TABLET ORAL at 13:07

## 2021-07-20 RX ADMIN — OMALIZUMAB 375 MG: 150 INJECTION, SOLUTION SUBCUTANEOUS at 13:08

## 2021-07-20 RX ADMIN — METHYLPREDNISOLONE SODIUM SUCCINATE 125 MG: 125 INJECTION, POWDER, FOR SOLUTION INTRAMUSCULAR; INTRAVENOUS at 13:07

## 2021-08-03 ENCOUNTER — HOSPITAL ENCOUNTER (OUTPATIENT)
Dept: INFUSION THERAPY | Age: 61
Setting detail: INFUSION SERIES
Discharge: HOME | End: 2021-08-03
Payer: MEDICAID

## 2021-08-03 VITALS
HEART RATE: 80 BPM | RESPIRATION RATE: 22 BRPM | TEMPERATURE: 98.2 F | SYSTOLIC BLOOD PRESSURE: 141 MMHG | OXYGEN SATURATION: 93 % | DIASTOLIC BLOOD PRESSURE: 68 MMHG

## 2021-08-03 DIAGNOSIS — J45.901 ASTHMA EXACERBATION WITH COPD (CHRONIC OBSTRUCTIVE PULMONARY DISEASE) (HCC): ICD-10-CM

## 2021-08-03 DIAGNOSIS — J45.909 ASTHMA, INTRINSIC: Primary | ICD-10-CM

## 2021-08-03 DIAGNOSIS — J44.1 ASTHMA EXACERBATION WITH COPD (CHRONIC OBSTRUCTIVE PULMONARY DISEASE) (HCC): ICD-10-CM

## 2021-08-03 PROCEDURE — 6360000002 HC RX W HCPCS: Performed by: INTERNAL MEDICINE

## 2021-08-03 PROCEDURE — 6370000000 HC RX 637 (ALT 250 FOR IP): Performed by: INTERNAL MEDICINE

## 2021-08-03 PROCEDURE — 96372 THER/PROPH/DIAG INJ SC/IM: CPT

## 2021-08-03 RX ORDER — SODIUM CHLORIDE 0.9 % (FLUSH) 0.9 %
10 SYRINGE (ML) INJECTION PRN
Status: CANCELLED | OUTPATIENT
Start: 2021-08-17

## 2021-08-03 RX ORDER — SODIUM CHLORIDE 9 MG/ML
INJECTION, SOLUTION INTRAVENOUS CONTINUOUS
Status: CANCELLED | OUTPATIENT
Start: 2021-08-17

## 2021-08-03 RX ORDER — METHYLPREDNISOLONE SODIUM SUCCINATE 125 MG/2ML
125 INJECTION, POWDER, LYOPHILIZED, FOR SOLUTION INTRAMUSCULAR; INTRAVENOUS ONCE
Status: CANCELLED | OUTPATIENT
Start: 2021-08-17 | End: 2021-08-17

## 2021-08-03 RX ORDER — METHYLPREDNISOLONE SODIUM SUCCINATE 125 MG/2ML
125 INJECTION, POWDER, LYOPHILIZED, FOR SOLUTION INTRAMUSCULAR; INTRAVENOUS ONCE
Status: COMPLETED | OUTPATIENT
Start: 2021-08-03 | End: 2021-08-03

## 2021-08-03 RX ORDER — DIPHENHYDRAMINE HCL 25 MG
25 TABLET ORAL ONCE
Status: COMPLETED | OUTPATIENT
Start: 2021-08-03 | End: 2021-08-03

## 2021-08-03 RX ORDER — HEPARIN SODIUM (PORCINE) LOCK FLUSH IV SOLN 100 UNIT/ML 100 UNIT/ML
500 SOLUTION INTRAVENOUS PRN
Status: CANCELLED | OUTPATIENT
Start: 2021-08-17

## 2021-08-03 RX ORDER — DIPHENHYDRAMINE HCL 25 MG
25 TABLET ORAL ONCE
Status: CANCELLED
Start: 2021-08-17 | End: 2021-08-17

## 2021-08-03 RX ORDER — EPINEPHRINE 1 MG/ML
0.3 INJECTION, SOLUTION, CONCENTRATE INTRAVENOUS PRN
Status: CANCELLED | OUTPATIENT
Start: 2021-08-17

## 2021-08-03 RX ORDER — SODIUM CHLORIDE 0.9 % (FLUSH) 0.9 %
5 SYRINGE (ML) INJECTION PRN
Status: CANCELLED | OUTPATIENT
Start: 2021-08-17

## 2021-08-03 RX ORDER — METHYLPREDNISOLONE SODIUM SUCCINATE 125 MG/2ML
125 INJECTION, POWDER, LYOPHILIZED, FOR SOLUTION INTRAMUSCULAR; INTRAVENOUS ONCE
Status: CANCELLED
Start: 2021-08-17 | End: 2021-08-17

## 2021-08-03 RX ORDER — DIPHENHYDRAMINE HYDROCHLORIDE 50 MG/ML
50 INJECTION INTRAMUSCULAR; INTRAVENOUS ONCE
Status: CANCELLED | OUTPATIENT
Start: 2021-08-17 | End: 2021-08-17

## 2021-08-03 RX ADMIN — OMALIZUMAB 375 MG: 150 INJECTION, SOLUTION SUBCUTANEOUS at 13:07

## 2021-08-03 RX ADMIN — DIPHENHYDRAMINE HYDROCHLORIDE 25 MG: 25 TABLET ORAL at 13:03

## 2021-08-03 RX ADMIN — METHYLPREDNISOLONE SODIUM SUCCINATE 125 MG: 125 INJECTION, POWDER, FOR SOLUTION INTRAMUSCULAR; INTRAVENOUS at 13:03

## 2021-08-17 ENCOUNTER — HOSPITAL ENCOUNTER (OUTPATIENT)
Dept: INFUSION THERAPY | Age: 61
Setting detail: INFUSION SERIES
Discharge: HOME | End: 2021-08-17
Payer: MEDICAID

## 2021-08-17 VITALS
DIASTOLIC BLOOD PRESSURE: 66 MMHG | HEART RATE: 70 BPM | OXYGEN SATURATION: 96 % | RESPIRATION RATE: 24 BRPM | SYSTOLIC BLOOD PRESSURE: 120 MMHG | TEMPERATURE: 98 F

## 2021-08-17 DIAGNOSIS — J45.909 ASTHMA, INTRINSIC: Primary | ICD-10-CM

## 2021-08-17 DIAGNOSIS — J44.1 ASTHMA EXACERBATION WITH COPD (CHRONIC OBSTRUCTIVE PULMONARY DISEASE) (HCC): ICD-10-CM

## 2021-08-17 DIAGNOSIS — J45.901 ASTHMA EXACERBATION WITH COPD (CHRONIC OBSTRUCTIVE PULMONARY DISEASE) (HCC): ICD-10-CM

## 2021-08-17 PROCEDURE — 6370000000 HC RX 637 (ALT 250 FOR IP): Performed by: INTERNAL MEDICINE

## 2021-08-17 PROCEDURE — 96372 THER/PROPH/DIAG INJ SC/IM: CPT

## 2021-08-17 PROCEDURE — 6360000002 HC RX W HCPCS: Performed by: INTERNAL MEDICINE

## 2021-08-17 RX ORDER — SODIUM CHLORIDE 0.9 % (FLUSH) 0.9 %
5 SYRINGE (ML) INJECTION PRN
Status: CANCELLED | OUTPATIENT
Start: 2021-08-31

## 2021-08-17 RX ORDER — DIPHENHYDRAMINE HCL 25 MG
25 TABLET ORAL ONCE
Status: CANCELLED
Start: 2021-08-31 | End: 2021-08-31

## 2021-08-17 RX ORDER — DIPHENHYDRAMINE HYDROCHLORIDE 50 MG/ML
50 INJECTION INTRAMUSCULAR; INTRAVENOUS ONCE
Status: CANCELLED | OUTPATIENT
Start: 2021-08-31 | End: 2021-08-31

## 2021-08-17 RX ORDER — METHYLPREDNISOLONE SODIUM SUCCINATE 125 MG/2ML
125 INJECTION, POWDER, LYOPHILIZED, FOR SOLUTION INTRAMUSCULAR; INTRAVENOUS ONCE
Status: COMPLETED | OUTPATIENT
Start: 2021-08-17 | End: 2021-08-17

## 2021-08-17 RX ORDER — EPINEPHRINE 1 MG/ML
0.3 INJECTION, SOLUTION, CONCENTRATE INTRAVENOUS PRN
Status: CANCELLED | OUTPATIENT
Start: 2021-08-31

## 2021-08-17 RX ORDER — METHYLPREDNISOLONE SODIUM SUCCINATE 125 MG/2ML
125 INJECTION, POWDER, LYOPHILIZED, FOR SOLUTION INTRAMUSCULAR; INTRAVENOUS ONCE
Status: CANCELLED
Start: 2021-08-31 | End: 2021-08-31

## 2021-08-17 RX ORDER — SODIUM CHLORIDE 9 MG/ML
INJECTION, SOLUTION INTRAVENOUS CONTINUOUS
Status: CANCELLED | OUTPATIENT
Start: 2021-08-31

## 2021-08-17 RX ORDER — METHYLPREDNISOLONE SODIUM SUCCINATE 125 MG/2ML
125 INJECTION, POWDER, LYOPHILIZED, FOR SOLUTION INTRAMUSCULAR; INTRAVENOUS ONCE
Status: CANCELLED | OUTPATIENT
Start: 2021-08-31 | End: 2021-08-31

## 2021-08-17 RX ORDER — SODIUM CHLORIDE 0.9 % (FLUSH) 0.9 %
10 SYRINGE (ML) INJECTION PRN
Status: CANCELLED | OUTPATIENT
Start: 2021-08-31

## 2021-08-17 RX ORDER — DIPHENHYDRAMINE HCL 25 MG
25 TABLET ORAL ONCE
Status: COMPLETED | OUTPATIENT
Start: 2021-08-17 | End: 2021-08-17

## 2021-08-17 RX ORDER — HEPARIN SODIUM (PORCINE) LOCK FLUSH IV SOLN 100 UNIT/ML 100 UNIT/ML
500 SOLUTION INTRAVENOUS PRN
Status: CANCELLED | OUTPATIENT
Start: 2021-08-31

## 2021-08-17 RX ADMIN — DIPHENHYDRAMINE HYDROCHLORIDE 25 MG: 25 TABLET ORAL at 12:18

## 2021-08-17 RX ADMIN — OMALIZUMAB 375 MG: 150 INJECTION, SOLUTION SUBCUTANEOUS at 12:21

## 2021-08-17 RX ADMIN — METHYLPREDNISOLONE SODIUM SUCCINATE 125 MG: 125 INJECTION, POWDER, FOR SOLUTION INTRAMUSCULAR; INTRAVENOUS at 12:18

## 2021-08-19 ENCOUNTER — TELEPHONE (OUTPATIENT)
Dept: PULMONOLOGY | Age: 61
End: 2021-08-19

## 2021-08-19 DIAGNOSIS — J44.9 STAGE 4 VERY SEVERE COPD BY GOLD CLASSIFICATION (HCC): Primary | ICD-10-CM

## 2021-08-19 DIAGNOSIS — J44.9 CHRONIC OBSTRUCTIVE PULMONARY DISEASE, UNSPECIFIED COPD TYPE (HCC): ICD-10-CM

## 2021-08-19 RX ORDER — FLUTICASONE FUROATE, UMECLIDINIUM BROMIDE AND VILANTEROL TRIFENATATE 100; 62.5; 25 UG/1; UG/1; UG/1
1 POWDER RESPIRATORY (INHALATION) DAILY
Qty: 1 EACH | Refills: 12 | Status: ON HOLD
Start: 2021-08-19 | End: 2021-11-09 | Stop reason: SDUPTHER

## 2021-08-19 RX ORDER — DULOXETIN HYDROCHLORIDE 30 MG/1
30 CAPSULE, DELAYED RELEASE ORAL NIGHTLY
COMMUNITY
Start: 2021-07-16

## 2021-08-19 RX ORDER — ALBUTEROL SULFATE 90 UG/1
2 AEROSOL, METERED RESPIRATORY (INHALATION) EVERY 4 HOURS PRN
Qty: 2 INHALER | Refills: 6 | Status: ON HOLD
Start: 2021-08-19 | End: 2021-11-09 | Stop reason: SDUPTHER

## 2021-08-19 NOTE — TELEPHONE ENCOUNTER
Spoke with pt's caregiver [facility] and discussed medications. Reviewed inhalers with Dr. Byrne and decided to try pt with Trelegy inhaler to simplify his daily routine and allow pt to use Trelegy inhaler daily with Albuterol or Nebulizer treatments prn needs. Will continue Xolair injections and pt's caregiver to notify office if any issues with breathing or problems with script for Trelegy to replace Dulera and Spiriva. Reviewed with Cynthia S S[facility] Staff current script on file for inhalers. Advised that [facility] and Spiriva are being discontinued and new inhaler Trelegy to be trailed. [facility] to contact PCP for script for [facility] as this is not a medication managed by our office.

## 2021-08-31 ENCOUNTER — HOSPITAL ENCOUNTER (OUTPATIENT)
Dept: INFUSION THERAPY | Age: 61
Setting detail: INFUSION SERIES
Discharge: HOME | End: 2021-08-31
Payer: MEDICAID

## 2021-08-31 VITALS
OXYGEN SATURATION: 94 % | HEART RATE: 90 BPM | TEMPERATURE: 98.1 F | RESPIRATION RATE: 24 BRPM | DIASTOLIC BLOOD PRESSURE: 68 MMHG | SYSTOLIC BLOOD PRESSURE: 147 MMHG

## 2021-08-31 DIAGNOSIS — J45.909 ASTHMA, INTRINSIC: Primary | ICD-10-CM

## 2021-08-31 DIAGNOSIS — J45.901 ASTHMA EXACERBATION WITH COPD (CHRONIC OBSTRUCTIVE PULMONARY DISEASE) (HCC): ICD-10-CM

## 2021-08-31 DIAGNOSIS — J44.1 ASTHMA EXACERBATION WITH COPD (CHRONIC OBSTRUCTIVE PULMONARY DISEASE) (HCC): ICD-10-CM

## 2021-08-31 PROCEDURE — 6360000002 HC RX W HCPCS: Performed by: INTERNAL MEDICINE

## 2021-08-31 PROCEDURE — 6370000000 HC RX 637 (ALT 250 FOR IP): Performed by: INTERNAL MEDICINE

## 2021-08-31 PROCEDURE — 96372 THER/PROPH/DIAG INJ SC/IM: CPT

## 2021-08-31 RX ORDER — METHYLPREDNISOLONE SODIUM SUCCINATE 125 MG/2ML
125 INJECTION, POWDER, LYOPHILIZED, FOR SOLUTION INTRAMUSCULAR; INTRAVENOUS ONCE
Status: COMPLETED | OUTPATIENT
Start: 2021-08-31 | End: 2021-08-31

## 2021-08-31 RX ORDER — HEPARIN SODIUM (PORCINE) LOCK FLUSH IV SOLN 100 UNIT/ML 100 UNIT/ML
500 SOLUTION INTRAVENOUS PRN
Status: CANCELLED | OUTPATIENT
Start: 2021-09-14

## 2021-08-31 RX ORDER — SODIUM CHLORIDE 0.9 % (FLUSH) 0.9 %
10 SYRINGE (ML) INJECTION PRN
Status: CANCELLED | OUTPATIENT
Start: 2021-09-14

## 2021-08-31 RX ORDER — DIPHENHYDRAMINE HCL 25 MG
25 TABLET ORAL ONCE
Status: COMPLETED | OUTPATIENT
Start: 2021-08-31 | End: 2021-08-31

## 2021-08-31 RX ORDER — DIPHENHYDRAMINE HYDROCHLORIDE 50 MG/ML
50 INJECTION INTRAMUSCULAR; INTRAVENOUS ONCE
Status: CANCELLED | OUTPATIENT
Start: 2021-09-14 | End: 2021-09-14

## 2021-08-31 RX ORDER — SODIUM CHLORIDE 9 MG/ML
INJECTION, SOLUTION INTRAVENOUS CONTINUOUS
Status: CANCELLED | OUTPATIENT
Start: 2021-09-14

## 2021-08-31 RX ORDER — EPINEPHRINE 1 MG/ML
0.3 INJECTION, SOLUTION, CONCENTRATE INTRAVENOUS PRN
Status: CANCELLED | OUTPATIENT
Start: 2021-09-14

## 2021-08-31 RX ORDER — SODIUM CHLORIDE 0.9 % (FLUSH) 0.9 %
5 SYRINGE (ML) INJECTION PRN
Status: CANCELLED | OUTPATIENT
Start: 2021-09-14

## 2021-08-31 RX ORDER — METHYLPREDNISOLONE SODIUM SUCCINATE 125 MG/2ML
125 INJECTION, POWDER, LYOPHILIZED, FOR SOLUTION INTRAMUSCULAR; INTRAVENOUS ONCE
Status: CANCELLED
Start: 2021-09-14 | End: 2021-09-14

## 2021-08-31 RX ORDER — DIPHENHYDRAMINE HCL 25 MG
25 TABLET ORAL ONCE
Status: CANCELLED
Start: 2021-09-14 | End: 2021-09-14

## 2021-08-31 RX ORDER — METHYLPREDNISOLONE SODIUM SUCCINATE 125 MG/2ML
125 INJECTION, POWDER, LYOPHILIZED, FOR SOLUTION INTRAMUSCULAR; INTRAVENOUS ONCE
Status: CANCELLED | OUTPATIENT
Start: 2021-09-14 | End: 2021-09-14

## 2021-08-31 RX ADMIN — METHYLPREDNISOLONE SODIUM SUCCINATE 125 MG: 125 INJECTION, POWDER, FOR SOLUTION INTRAMUSCULAR; INTRAVENOUS at 12:48

## 2021-08-31 RX ADMIN — OMALIZUMAB 375 MG: 150 INJECTION, SOLUTION SUBCUTANEOUS at 12:49

## 2021-08-31 RX ADMIN — DIPHENHYDRAMINE HYDROCHLORIDE 25 MG: 25 TABLET ORAL at 12:49

## 2021-09-14 ENCOUNTER — HOSPITAL ENCOUNTER (OUTPATIENT)
Dept: INFUSION THERAPY | Age: 61
Setting detail: INFUSION SERIES
Discharge: HOME | End: 2021-09-14
Payer: MEDICAID

## 2021-09-14 VITALS
DIASTOLIC BLOOD PRESSURE: 82 MMHG | RESPIRATION RATE: 24 BRPM | HEART RATE: 79 BPM | TEMPERATURE: 97.9 F | OXYGEN SATURATION: 95 % | SYSTOLIC BLOOD PRESSURE: 141 MMHG

## 2021-09-14 DIAGNOSIS — J45.909 ASTHMA, INTRINSIC: Primary | ICD-10-CM

## 2021-09-14 DIAGNOSIS — J45.901 ASTHMA EXACERBATION WITH COPD (CHRONIC OBSTRUCTIVE PULMONARY DISEASE) (HCC): ICD-10-CM

## 2021-09-14 DIAGNOSIS — J44.1 ASTHMA EXACERBATION WITH COPD (CHRONIC OBSTRUCTIVE PULMONARY DISEASE) (HCC): ICD-10-CM

## 2021-09-14 PROCEDURE — 6370000000 HC RX 637 (ALT 250 FOR IP): Performed by: INTERNAL MEDICINE

## 2021-09-14 PROCEDURE — 96372 THER/PROPH/DIAG INJ SC/IM: CPT

## 2021-09-14 PROCEDURE — 6360000002 HC RX W HCPCS: Performed by: INTERNAL MEDICINE

## 2021-09-14 RX ORDER — HEPARIN SODIUM (PORCINE) LOCK FLUSH IV SOLN 100 UNIT/ML 100 UNIT/ML
500 SOLUTION INTRAVENOUS PRN
Status: CANCELLED | OUTPATIENT
Start: 2021-09-28

## 2021-09-14 RX ORDER — SODIUM CHLORIDE 9 MG/ML
INJECTION, SOLUTION INTRAVENOUS CONTINUOUS
Status: CANCELLED | OUTPATIENT
Start: 2021-09-28

## 2021-09-14 RX ORDER — METHYLPREDNISOLONE SODIUM SUCCINATE 125 MG/2ML
125 INJECTION, POWDER, LYOPHILIZED, FOR SOLUTION INTRAMUSCULAR; INTRAVENOUS ONCE
Status: CANCELLED | OUTPATIENT
Start: 2021-09-28 | End: 2021-09-28

## 2021-09-14 RX ORDER — DIPHENHYDRAMINE HCL 25 MG
25 TABLET ORAL ONCE
Status: COMPLETED | OUTPATIENT
Start: 2021-09-14 | End: 2021-09-14

## 2021-09-14 RX ORDER — DIPHENHYDRAMINE HYDROCHLORIDE 50 MG/ML
50 INJECTION INTRAMUSCULAR; INTRAVENOUS ONCE
Status: CANCELLED | OUTPATIENT
Start: 2021-09-28 | End: 2021-09-28

## 2021-09-14 RX ORDER — SODIUM CHLORIDE 0.9 % (FLUSH) 0.9 %
10 SYRINGE (ML) INJECTION PRN
Status: CANCELLED | OUTPATIENT
Start: 2021-09-28

## 2021-09-14 RX ORDER — SODIUM CHLORIDE 0.9 % (FLUSH) 0.9 %
5 SYRINGE (ML) INJECTION PRN
Status: CANCELLED | OUTPATIENT
Start: 2021-09-28

## 2021-09-14 RX ORDER — METHYLPREDNISOLONE SODIUM SUCCINATE 125 MG/2ML
125 INJECTION, POWDER, LYOPHILIZED, FOR SOLUTION INTRAMUSCULAR; INTRAVENOUS ONCE
Status: CANCELLED
Start: 2021-09-28 | End: 2021-09-28

## 2021-09-14 RX ORDER — EPINEPHRINE 1 MG/ML
0.3 INJECTION, SOLUTION, CONCENTRATE INTRAVENOUS PRN
Status: CANCELLED | OUTPATIENT
Start: 2021-09-28

## 2021-09-14 RX ORDER — METHYLPREDNISOLONE SODIUM SUCCINATE 125 MG/2ML
125 INJECTION, POWDER, LYOPHILIZED, FOR SOLUTION INTRAMUSCULAR; INTRAVENOUS ONCE
Status: COMPLETED | OUTPATIENT
Start: 2021-09-14 | End: 2021-09-14

## 2021-09-14 RX ORDER — DIPHENHYDRAMINE HCL 25 MG
25 TABLET ORAL ONCE
Status: CANCELLED
Start: 2021-09-28 | End: 2021-09-28

## 2021-09-14 RX ADMIN — DIPHENHYDRAMINE HYDROCHLORIDE 25 MG: 25 TABLET ORAL at 12:00

## 2021-09-14 RX ADMIN — OMALIZUMAB 375 MG: 150 INJECTION, SOLUTION SUBCUTANEOUS at 12:03

## 2021-09-14 RX ADMIN — METHYLPREDNISOLONE SODIUM SUCCINATE 125 MG: 125 INJECTION, POWDER, FOR SOLUTION INTRAMUSCULAR; INTRAVENOUS at 12:00

## 2021-09-28 ENCOUNTER — HOSPITAL ENCOUNTER (OUTPATIENT)
Dept: INFUSION THERAPY | Age: 61
Setting detail: INFUSION SERIES
Discharge: HOME | End: 2021-09-28
Payer: MEDICAID

## 2021-09-28 VITALS
SYSTOLIC BLOOD PRESSURE: 153 MMHG | RESPIRATION RATE: 22 BRPM | OXYGEN SATURATION: 95 % | HEART RATE: 80 BPM | TEMPERATURE: 97.9 F | DIASTOLIC BLOOD PRESSURE: 77 MMHG

## 2021-09-28 DIAGNOSIS — J45.909 ASTHMA, INTRINSIC: Primary | ICD-10-CM

## 2021-09-28 DIAGNOSIS — J44.1 ASTHMA EXACERBATION WITH COPD (CHRONIC OBSTRUCTIVE PULMONARY DISEASE) (HCC): ICD-10-CM

## 2021-09-28 DIAGNOSIS — J45.901 ASTHMA EXACERBATION WITH COPD (CHRONIC OBSTRUCTIVE PULMONARY DISEASE) (HCC): ICD-10-CM

## 2021-09-28 PROCEDURE — 6360000002 HC RX W HCPCS: Performed by: INTERNAL MEDICINE

## 2021-09-28 PROCEDURE — 6370000000 HC RX 637 (ALT 250 FOR IP): Performed by: INTERNAL MEDICINE

## 2021-09-28 PROCEDURE — 96372 THER/PROPH/DIAG INJ SC/IM: CPT

## 2021-09-28 RX ORDER — SODIUM CHLORIDE 9 MG/ML
INJECTION, SOLUTION INTRAVENOUS CONTINUOUS
Status: CANCELLED | OUTPATIENT
Start: 2021-10-12

## 2021-09-28 RX ORDER — DIPHENHYDRAMINE HCL 25 MG
25 TABLET ORAL ONCE
Status: COMPLETED | OUTPATIENT
Start: 2021-09-28 | End: 2021-09-28

## 2021-09-28 RX ORDER — METHYLPREDNISOLONE SODIUM SUCCINATE 125 MG/2ML
125 INJECTION, POWDER, LYOPHILIZED, FOR SOLUTION INTRAMUSCULAR; INTRAVENOUS ONCE
Status: CANCELLED | OUTPATIENT
Start: 2021-10-12 | End: 2021-10-12

## 2021-09-28 RX ORDER — SODIUM CHLORIDE 0.9 % (FLUSH) 0.9 %
10 SYRINGE (ML) INJECTION PRN
Status: CANCELLED | OUTPATIENT
Start: 2021-10-12

## 2021-09-28 RX ORDER — METHYLPREDNISOLONE SODIUM SUCCINATE 125 MG/2ML
125 INJECTION, POWDER, LYOPHILIZED, FOR SOLUTION INTRAMUSCULAR; INTRAVENOUS ONCE
Status: CANCELLED
Start: 2021-10-12 | End: 2021-10-12

## 2021-09-28 RX ORDER — METHYLPREDNISOLONE SODIUM SUCCINATE 125 MG/2ML
125 INJECTION, POWDER, LYOPHILIZED, FOR SOLUTION INTRAMUSCULAR; INTRAVENOUS ONCE
Status: COMPLETED | OUTPATIENT
Start: 2021-09-28 | End: 2021-09-28

## 2021-09-28 RX ORDER — EPINEPHRINE 1 MG/ML
0.3 INJECTION, SOLUTION, CONCENTRATE INTRAVENOUS PRN
Status: CANCELLED | OUTPATIENT
Start: 2021-10-12

## 2021-09-28 RX ORDER — HEPARIN SODIUM (PORCINE) LOCK FLUSH IV SOLN 100 UNIT/ML 100 UNIT/ML
500 SOLUTION INTRAVENOUS PRN
Status: CANCELLED | OUTPATIENT
Start: 2021-10-12

## 2021-09-28 RX ORDER — DIPHENHYDRAMINE HCL 25 MG
25 TABLET ORAL ONCE
Status: CANCELLED
Start: 2021-10-12 | End: 2021-10-12

## 2021-09-28 RX ORDER — DIPHENHYDRAMINE HYDROCHLORIDE 50 MG/ML
50 INJECTION INTRAMUSCULAR; INTRAVENOUS ONCE
Status: CANCELLED | OUTPATIENT
Start: 2021-10-12 | End: 2021-10-12

## 2021-09-28 RX ORDER — SODIUM CHLORIDE 0.9 % (FLUSH) 0.9 %
5 SYRINGE (ML) INJECTION PRN
Status: CANCELLED | OUTPATIENT
Start: 2021-10-12

## 2021-09-28 RX ADMIN — DIPHENHYDRAMINE HYDROCHLORIDE 25 MG: 25 TABLET ORAL at 11:44

## 2021-09-28 RX ADMIN — OMALIZUMAB 375 MG: 150 INJECTION, SOLUTION SUBCUTANEOUS at 11:45

## 2021-09-28 RX ADMIN — METHYLPREDNISOLONE SODIUM SUCCINATE 125 MG: 125 INJECTION, POWDER, FOR SOLUTION INTRAMUSCULAR; INTRAVENOUS at 11:44

## 2021-10-13 ENCOUNTER — HOSPITAL ENCOUNTER (OUTPATIENT)
Dept: INFUSION THERAPY | Age: 61
Setting detail: INFUSION SERIES
Discharge: HOME | End: 2021-10-13
Payer: MEDICAID

## 2021-10-13 VITALS
HEART RATE: 65 BPM | DIASTOLIC BLOOD PRESSURE: 75 MMHG | RESPIRATION RATE: 24 BRPM | SYSTOLIC BLOOD PRESSURE: 154 MMHG | OXYGEN SATURATION: 96 % | TEMPERATURE: 97.7 F

## 2021-10-13 DIAGNOSIS — J45.901 ASTHMA EXACERBATION WITH COPD (CHRONIC OBSTRUCTIVE PULMONARY DISEASE) (HCC): ICD-10-CM

## 2021-10-13 DIAGNOSIS — J45.909 ASTHMA, INTRINSIC: Primary | ICD-10-CM

## 2021-10-13 DIAGNOSIS — J45.909 ASTHMA WITHOUT STATUS ASTHMATICUS WITHOUT COMPLICATION, UNSPECIFIED ASTHMA SEVERITY, UNSPECIFIED WHETHER PERSISTENT: ICD-10-CM

## 2021-10-13 DIAGNOSIS — J44.1 ASTHMA EXACERBATION WITH COPD (CHRONIC OBSTRUCTIVE PULMONARY DISEASE) (HCC): ICD-10-CM

## 2021-10-13 PROCEDURE — 96372 THER/PROPH/DIAG INJ SC/IM: CPT

## 2021-10-13 PROCEDURE — 6360000002 HC RX W HCPCS: Performed by: INTERNAL MEDICINE

## 2021-10-13 PROCEDURE — 6370000000 HC RX 637 (ALT 250 FOR IP): Performed by: INTERNAL MEDICINE

## 2021-10-13 RX ORDER — SODIUM CHLORIDE 9 MG/ML
INJECTION, SOLUTION INTRAVENOUS CONTINUOUS
Status: CANCELLED | OUTPATIENT
Start: 2021-10-13

## 2021-10-13 RX ORDER — DIPHENHYDRAMINE HYDROCHLORIDE 50 MG/ML
50 INJECTION INTRAMUSCULAR; INTRAVENOUS ONCE
Status: CANCELLED | OUTPATIENT
Start: 2021-10-26 | End: 2021-10-26

## 2021-10-13 RX ORDER — DIPHENHYDRAMINE HYDROCHLORIDE 50 MG/ML
50 INJECTION INTRAMUSCULAR; INTRAVENOUS ONCE
Status: CANCELLED | OUTPATIENT
Start: 2021-10-13 | End: 2021-10-13

## 2021-10-13 RX ORDER — EPINEPHRINE 1 MG/ML
0.3 INJECTION, SOLUTION, CONCENTRATE INTRAVENOUS PRN
Status: CANCELLED | OUTPATIENT
Start: 2021-10-13

## 2021-10-13 RX ORDER — DIPHENHYDRAMINE HCL 25 MG
25 TABLET ORAL ONCE
Status: COMPLETED | OUTPATIENT
Start: 2021-10-13 | End: 2021-10-13

## 2021-10-13 RX ORDER — SODIUM CHLORIDE 0.9 % (FLUSH) 0.9 %
5 SYRINGE (ML) INJECTION PRN
Status: CANCELLED | OUTPATIENT
Start: 2021-10-26

## 2021-10-13 RX ORDER — METHYLPREDNISOLONE SODIUM SUCCINATE 125 MG/2ML
125 INJECTION, POWDER, LYOPHILIZED, FOR SOLUTION INTRAMUSCULAR; INTRAVENOUS ONCE
Status: CANCELLED | OUTPATIENT
Start: 2021-10-26 | End: 2021-10-26

## 2021-10-13 RX ORDER — DIPHENHYDRAMINE HCL 25 MG
25 TABLET ORAL ONCE
Status: CANCELLED
Start: 2021-10-13 | End: 2021-10-13

## 2021-10-13 RX ORDER — SODIUM CHLORIDE 0.9 % (FLUSH) 0.9 %
10 SYRINGE (ML) INJECTION PRN
Status: CANCELLED | OUTPATIENT
Start: 2021-10-26

## 2021-10-13 RX ORDER — HEPARIN SODIUM (PORCINE) LOCK FLUSH IV SOLN 100 UNIT/ML 100 UNIT/ML
500 SOLUTION INTRAVENOUS PRN
Status: CANCELLED | OUTPATIENT
Start: 2021-10-26

## 2021-10-13 RX ORDER — METHYLPREDNISOLONE SODIUM SUCCINATE 125 MG/2ML
125 INJECTION, POWDER, LYOPHILIZED, FOR SOLUTION INTRAMUSCULAR; INTRAVENOUS ONCE
Status: CANCELLED
Start: 2021-10-26 | End: 2021-10-26

## 2021-10-13 RX ORDER — METHYLPREDNISOLONE SODIUM SUCCINATE 125 MG/2ML
125 INJECTION, POWDER, LYOPHILIZED, FOR SOLUTION INTRAMUSCULAR; INTRAVENOUS ONCE
Status: CANCELLED
Start: 2021-10-13 | End: 2021-10-13

## 2021-10-13 RX ORDER — METHYLPREDNISOLONE SODIUM SUCCINATE 125 MG/2ML
125 INJECTION, POWDER, LYOPHILIZED, FOR SOLUTION INTRAMUSCULAR; INTRAVENOUS ONCE
Status: COMPLETED | OUTPATIENT
Start: 2021-10-13 | End: 2021-10-13

## 2021-10-13 RX ORDER — DIPHENHYDRAMINE HCL 25 MG
25 TABLET ORAL ONCE
Status: CANCELLED
Start: 2021-10-26 | End: 2021-10-26

## 2021-10-13 RX ORDER — SODIUM CHLORIDE 9 MG/ML
INJECTION, SOLUTION INTRAVENOUS CONTINUOUS
Status: CANCELLED | OUTPATIENT
Start: 2021-10-26

## 2021-10-13 RX ORDER — EPINEPHRINE 1 MG/ML
0.3 INJECTION, SOLUTION, CONCENTRATE INTRAVENOUS PRN
Status: CANCELLED | OUTPATIENT
Start: 2021-10-26

## 2021-10-13 RX ORDER — METHYLPREDNISOLONE SODIUM SUCCINATE 125 MG/2ML
125 INJECTION, POWDER, LYOPHILIZED, FOR SOLUTION INTRAMUSCULAR; INTRAVENOUS ONCE
Status: CANCELLED | OUTPATIENT
Start: 2021-10-13 | End: 2021-10-13

## 2021-10-13 RX ADMIN — METHYLPREDNISOLONE SODIUM SUCCINATE 125 MG: 125 INJECTION, POWDER, FOR SOLUTION INTRAMUSCULAR; INTRAVENOUS at 12:11

## 2021-10-13 RX ADMIN — DIPHENHYDRAMINE HYDROCHLORIDE 25 MG: 25 TABLET ORAL at 12:11

## 2021-10-13 RX ADMIN — OMALIZUMAB 375 MG: 150 INJECTION, SOLUTION SUBCUTANEOUS at 12:12

## 2021-10-27 ENCOUNTER — HOSPITAL ENCOUNTER (OUTPATIENT)
Dept: INFUSION THERAPY | Age: 61
Setting detail: INFUSION SERIES
Discharge: HOME | End: 2021-10-27
Payer: MEDICAID

## 2021-10-27 VITALS
TEMPERATURE: 98 F | SYSTOLIC BLOOD PRESSURE: 140 MMHG | DIASTOLIC BLOOD PRESSURE: 80 MMHG | OXYGEN SATURATION: 93 % | HEART RATE: 90 BPM | RESPIRATION RATE: 22 BRPM

## 2021-10-27 DIAGNOSIS — J45.909 ASTHMA, INTRINSIC: Primary | ICD-10-CM

## 2021-10-27 PROCEDURE — 6360000002 HC RX W HCPCS: Performed by: INTERNAL MEDICINE

## 2021-10-27 PROCEDURE — 6370000000 HC RX 637 (ALT 250 FOR IP): Performed by: INTERNAL MEDICINE

## 2021-10-27 PROCEDURE — 96372 THER/PROPH/DIAG INJ SC/IM: CPT

## 2021-10-27 RX ORDER — DIPHENHYDRAMINE HYDROCHLORIDE 50 MG/ML
50 INJECTION INTRAMUSCULAR; INTRAVENOUS ONCE
Status: CANCELLED | OUTPATIENT
Start: 2021-11-10 | End: 2021-11-10

## 2021-10-27 RX ORDER — DIPHENHYDRAMINE HCL 25 MG
25 TABLET ORAL ONCE
Status: COMPLETED | OUTPATIENT
Start: 2021-10-27 | End: 2021-10-27

## 2021-10-27 RX ORDER — SODIUM CHLORIDE 9 MG/ML
INJECTION, SOLUTION INTRAVENOUS CONTINUOUS
Status: CANCELLED | OUTPATIENT
Start: 2021-11-10

## 2021-10-27 RX ORDER — EPINEPHRINE 1 MG/ML
0.3 INJECTION, SOLUTION, CONCENTRATE INTRAVENOUS PRN
Status: CANCELLED | OUTPATIENT
Start: 2021-11-10

## 2021-10-27 RX ORDER — METHYLPREDNISOLONE SODIUM SUCCINATE 125 MG/2ML
125 INJECTION, POWDER, LYOPHILIZED, FOR SOLUTION INTRAMUSCULAR; INTRAVENOUS ONCE
Status: COMPLETED | OUTPATIENT
Start: 2021-10-27 | End: 2021-10-27

## 2021-10-27 RX ORDER — METHYLPREDNISOLONE SODIUM SUCCINATE 125 MG/2ML
125 INJECTION, POWDER, LYOPHILIZED, FOR SOLUTION INTRAMUSCULAR; INTRAVENOUS ONCE
Status: CANCELLED | OUTPATIENT
Start: 2021-11-10 | End: 2021-11-10

## 2021-10-27 RX ORDER — METHYLPREDNISOLONE SODIUM SUCCINATE 125 MG/2ML
125 INJECTION, POWDER, LYOPHILIZED, FOR SOLUTION INTRAMUSCULAR; INTRAVENOUS ONCE
Status: CANCELLED
Start: 2021-11-10 | End: 2021-11-10

## 2021-10-27 RX ORDER — DIPHENHYDRAMINE HCL 25 MG
25 TABLET ORAL ONCE
Status: CANCELLED
Start: 2021-11-10 | End: 2021-11-10

## 2021-10-27 RX ADMIN — DIPHENHYDRAMINE HYDROCHLORIDE 25 MG: 25 TABLET ORAL at 10:59

## 2021-10-27 RX ADMIN — OMALIZUMAB 375 MG: 150 INJECTION, SOLUTION SUBCUTANEOUS at 11:00

## 2021-10-27 RX ADMIN — METHYLPREDNISOLONE SODIUM SUCCINATE 125 MG: 125 INJECTION, POWDER, FOR SOLUTION INTRAMUSCULAR; INTRAVENOUS at 10:59

## 2021-11-07 ENCOUNTER — HOSPITAL ENCOUNTER (INPATIENT)
Age: 61
LOS: 2 days | Discharge: HOME | DRG: 140 | End: 2021-11-09
Attending: STUDENT IN AN ORGANIZED HEALTH CARE EDUCATION/TRAINING PROGRAM | Admitting: INTERNAL MEDICINE
Payer: MEDICAID

## 2021-11-07 ENCOUNTER — APPOINTMENT (OUTPATIENT)
Dept: CT IMAGING | Age: 61
DRG: 140 | End: 2021-11-07
Payer: MEDICAID

## 2021-11-07 ENCOUNTER — APPOINTMENT (OUTPATIENT)
Dept: GENERAL RADIOLOGY | Age: 61
DRG: 140 | End: 2021-11-07
Payer: MEDICAID

## 2021-11-07 DIAGNOSIS — J44.9 STAGE 4 VERY SEVERE COPD BY GOLD CLASSIFICATION (HCC): ICD-10-CM

## 2021-11-07 DIAGNOSIS — J44.1 COPD WITH ACUTE EXACERBATION (HCC): ICD-10-CM

## 2021-11-07 DIAGNOSIS — J96.02 ACUTE RESPIRATORY FAILURE WITH HYPOXIA AND HYPERCAPNIA (HCC): Primary | ICD-10-CM

## 2021-11-07 DIAGNOSIS — J96.01 ACUTE RESPIRATORY FAILURE WITH HYPOXIA AND HYPERCAPNIA (HCC): Primary | ICD-10-CM

## 2021-11-07 PROBLEM — J96.21 ACUTE ON CHRONIC RESPIRATORY FAILURE WITH HYPOXIA AND HYPERCAPNIA (HCC): Status: ACTIVE | Noted: 2021-11-07

## 2021-11-07 PROBLEM — J96.22 ACUTE ON CHRONIC RESPIRATORY FAILURE WITH HYPOXIA AND HYPERCAPNIA (HCC): Status: ACTIVE | Noted: 2021-11-07

## 2021-11-07 LAB
ALBUMIN SERPL-MCNC: 4.8 G/DL (ref 3.5–5.2)
ALP BLD-CCNC: 90 U/L (ref 40–129)
ALT SERPL-CCNC: 11 U/L (ref 0–40)
ANION GAP SERPL CALCULATED.3IONS-SCNC: 11 MMOL/L (ref 7–16)
ANISOCYTOSIS: ABNORMAL
AST SERPL-CCNC: 14 U/L (ref 0–39)
B.E.: 0.7 MMOL/L (ref -3–3)
B.E.: 5.1 MMOL/L (ref -3–3)
BASOPHILS ABSOLUTE: 0 E9/L (ref 0–0.2)
BASOPHILS RELATIVE PERCENT: 0.4 % (ref 0–2)
BILIRUB SERPL-MCNC: 0.6 MG/DL (ref 0–1.2)
BUN BLDV-MCNC: 11 MG/DL (ref 6–23)
CALCIUM SERPL-MCNC: 9 MG/DL (ref 8.6–10.2)
CHLORIDE BLD-SCNC: 97 MMOL/L (ref 98–107)
CO2: 32 MMOL/L (ref 22–29)
COHB: 0.8 % (ref 0–1.5)
CREAT SERPL-MCNC: 0.8 MG/DL (ref 0.7–1.2)
CRITICAL NOTIFICATION: YES
CRITICAL: ABNORMAL
DATE ANALYZED: ABNORMAL
DATE OF COLLECTION: ABNORMAL
DELIVERY SYSTEMS: ABNORMAL
DEVICE: ABNORMAL
EKG ATRIAL RATE: 123 BPM
EKG P AXIS: 79 DEGREES
EKG P-R INTERVAL: 106 MS
EKG Q-T INTERVAL: 328 MS
EKG QRS DURATION: 100 MS
EKG QTC CALCULATION (BAZETT): 469 MS
EKG R AXIS: 87 DEGREES
EKG T AXIS: 56 DEGREES
EKG VENTRICULAR RATE: 123 BPM
EOSINOPHILS ABSOLUTE: 0.27 E9/L (ref 0.05–0.5)
EOSINOPHILS RELATIVE PERCENT: 1.7 % (ref 0–6)
FIO2 ARTERIAL: 35
GFR AFRICAN AMERICAN: >60
GFR NON-AFRICAN AMERICAN: >60 ML/MIN/1.73
GLUCOSE BLD-MCNC: 153 MG/DL (ref 74–99)
HCO3 ARTERIAL: 35.1 MMOL/L (ref 22–26)
HCO3: 29 MMOL/L (ref 22–26)
HCT VFR BLD CALC: 47.8 % (ref 37–54)
HEMOGLOBIN: 15.3 G/DL (ref 12.5–16.5)
HHB: 10.4 % (ref 0–5)
LAB: ABNORMAL
LACTIC ACID, SEPSIS: 1 MMOL/L (ref 0.5–1.9)
LYMPHOCYTES ABSOLUTE: 4.74 E9/L (ref 1.5–4)
LYMPHOCYTES RELATIVE PERCENT: 30.4 % (ref 20–42)
Lab: ABNORMAL
MCH RBC QN AUTO: 28.8 PG (ref 26–35)
MCHC RBC AUTO-ENTMCNC: 32 % (ref 32–34.5)
MCV RBC AUTO: 89.8 FL (ref 80–99.9)
METHB: 0.4 % (ref 0–1.5)
MODE: ABNORMAL
MONOCYTES ABSOLUTE: 1.58 E9/L (ref 0.1–0.95)
MONOCYTES RELATIVE PERCENT: 10.4 % (ref 2–12)
NEUTROPHILS ABSOLUTE: 9.01 E9/L (ref 1.8–7.3)
NEUTROPHILS RELATIVE PERCENT: 57.4 % (ref 43–80)
O2 CONTENT: 19.4 ML/DL
O2 SATURATION: 89.5 % (ref 92–98.5)
O2 SATURATION: 99.8 % (ref 92–98.5)
O2HB: 88.4 % (ref 94–97)
OPERATOR ID: 797
OPERATOR ID: 797
OVALOCYTES: ABNORMAL
PATIENT TEMP: 37 C
PCO2 ARTERIAL: 71.7 MMHG (ref 35–45)
PCO2: 61.8 MMHG (ref 35–45)
PDW BLD-RTO: 13.2 FL (ref 11.5–15)
PH BLOOD GAS: 7.29 (ref 7.35–7.45)
PH BLOOD GAS: 7.3 (ref 7.35–7.45)
PLATELET # BLD: 225 E9/L (ref 130–450)
PMV BLD AUTO: 11.7 FL (ref 7–12)
PO2 ARTERIAL: 247 MMHG (ref 60–80)
PO2: 58.2 MMHG (ref 75–100)
POIKILOCYTES: ABNORMAL
POLYCHROMASIA: ABNORMAL
POSITIVE END EXP PRESS: 6 CMH2O
POTASSIUM REFLEX MAGNESIUM: 3.9 MMOL/L (ref 3.5–5)
PRO-BNP: 31 PG/ML (ref 0–125)
RBC # BLD: 5.32 E12/L (ref 3.8–5.8)
SARS-COV-2, NAAT: NOT DETECTED
SODIUM BLD-SCNC: 140 MMOL/L (ref 132–146)
SOURCE, BLOOD GAS: ABNORMAL
SOURCE, BLOOD GAS: ABNORMAL
THB: 15.6 G/DL (ref 11.5–16.5)
TIME ANALYZED: 1608
TOTAL PROTEIN: 7.9 G/DL (ref 6.4–8.3)
TROPONIN, HIGH SENSITIVITY: 6 NG/L (ref 0–11)
WBC # BLD: 15.8 E9/L (ref 4.5–11.5)

## 2021-11-07 PROCEDURE — 94660 CPAP INITIATION&MGMT: CPT

## 2021-11-07 PROCEDURE — 94644 CONT INHLJ TX 1ST HOUR: CPT

## 2021-11-07 PROCEDURE — 96366 THER/PROPH/DIAG IV INF ADDON: CPT

## 2021-11-07 PROCEDURE — 96361 HYDRATE IV INFUSION ADD-ON: CPT

## 2021-11-07 PROCEDURE — 6360000002 HC RX W HCPCS: Performed by: INTERNAL MEDICINE

## 2021-11-07 PROCEDURE — 82805 BLOOD GASES W/O2 SATURATION: CPT

## 2021-11-07 PROCEDURE — 96365 THER/PROPH/DIAG IV INF INIT: CPT

## 2021-11-07 PROCEDURE — 96375 TX/PRO/DX INJ NEW DRUG ADDON: CPT

## 2021-11-07 PROCEDURE — 71045 X-RAY EXAM CHEST 1 VIEW: CPT

## 2021-11-07 PROCEDURE — 6360000004 HC RX CONTRAST MEDICATION: Performed by: RADIOLOGY

## 2021-11-07 PROCEDURE — 85025 COMPLETE CBC W/AUTO DIFF WBC: CPT

## 2021-11-07 PROCEDURE — 80053 COMPREHEN METABOLIC PANEL: CPT

## 2021-11-07 PROCEDURE — 84484 ASSAY OF TROPONIN QUANT: CPT

## 2021-11-07 PROCEDURE — 0202U NFCT DS 22 TRGT SARS-COV-2: CPT

## 2021-11-07 PROCEDURE — 2580000003 HC RX 258: Performed by: INTERNAL MEDICINE

## 2021-11-07 PROCEDURE — 99223 1ST HOSP IP/OBS HIGH 75: CPT | Performed by: INTERNAL MEDICINE

## 2021-11-07 PROCEDURE — 93010 ELECTROCARDIOGRAM REPORT: CPT | Performed by: INTERNAL MEDICINE

## 2021-11-07 PROCEDURE — 71275 CT ANGIOGRAPHY CHEST: CPT

## 2021-11-07 PROCEDURE — 83605 ASSAY OF LACTIC ACID: CPT

## 2021-11-07 PROCEDURE — 87635 SARS-COV-2 COVID-19 AMP PRB: CPT

## 2021-11-07 PROCEDURE — 82803 BLOOD GASES ANY COMBINATION: CPT

## 2021-11-07 PROCEDURE — 87040 BLOOD CULTURE FOR BACTERIA: CPT

## 2021-11-07 PROCEDURE — 96372 THER/PROPH/DIAG INJ SC/IM: CPT

## 2021-11-07 PROCEDURE — 2580000003 HC RX 258

## 2021-11-07 PROCEDURE — 93005 ELECTROCARDIOGRAM TRACING: CPT | Performed by: STUDENT IN AN ORGANIZED HEALTH CARE EDUCATION/TRAINING PROGRAM

## 2021-11-07 PROCEDURE — 6370000000 HC RX 637 (ALT 250 FOR IP): Performed by: INTERNAL MEDICINE

## 2021-11-07 PROCEDURE — 83880 ASSAY OF NATRIURETIC PEPTIDE: CPT

## 2021-11-07 PROCEDURE — 99285 EMERGENCY DEPT VISIT HI MDM: CPT

## 2021-11-07 PROCEDURE — 6370000000 HC RX 637 (ALT 250 FOR IP): Performed by: STUDENT IN AN ORGANIZED HEALTH CARE EDUCATION/TRAINING PROGRAM

## 2021-11-07 PROCEDURE — 6360000002 HC RX W HCPCS: Performed by: STUDENT IN AN ORGANIZED HEALTH CARE EDUCATION/TRAINING PROGRAM

## 2021-11-07 PROCEDURE — 1200000000 HC SEMI PRIVATE

## 2021-11-07 RX ORDER — ACETAMINOPHEN 650 MG/1
650 SUPPOSITORY RECTAL EVERY 6 HOURS PRN
Status: DISCONTINUED | OUTPATIENT
Start: 2021-11-07 | End: 2021-11-09 | Stop reason: HOSPADM

## 2021-11-07 RX ORDER — ATORVASTATIN CALCIUM 40 MG/1
40 TABLET, FILM COATED ORAL NIGHTLY
Status: DISCONTINUED | OUTPATIENT
Start: 2021-11-07 | End: 2021-11-09 | Stop reason: HOSPADM

## 2021-11-07 RX ORDER — CLONIDINE HYDROCHLORIDE 0.2 MG/1
0.2 TABLET ORAL 2 TIMES DAILY
Status: DISCONTINUED | OUTPATIENT
Start: 2021-11-07 | End: 2021-11-09 | Stop reason: HOSPADM

## 2021-11-07 RX ORDER — SODIUM CHLORIDE 0.9 % (FLUSH) 0.9 %
5-40 SYRINGE (ML) INJECTION EVERY 12 HOURS SCHEDULED
Status: DISCONTINUED | OUTPATIENT
Start: 2021-11-07 | End: 2021-11-09 | Stop reason: HOSPADM

## 2021-11-07 RX ORDER — BUDESONIDE 0.5 MG/2ML
500 INHALANT ORAL 2 TIMES DAILY
Status: DISCONTINUED | OUTPATIENT
Start: 2021-11-07 | End: 2021-11-09 | Stop reason: HOSPADM

## 2021-11-07 RX ORDER — CARVEDILOL 3.12 MG/1
6.25 TABLET ORAL 2 TIMES DAILY WITH MEALS
Status: DISCONTINUED | OUTPATIENT
Start: 2021-11-07 | End: 2021-11-09 | Stop reason: HOSPADM

## 2021-11-07 RX ORDER — DULOXETIN HYDROCHLORIDE 30 MG/1
30 CAPSULE, DELAYED RELEASE ORAL NIGHTLY
Status: DISCONTINUED | OUTPATIENT
Start: 2021-11-07 | End: 2021-11-09 | Stop reason: HOSPADM

## 2021-11-07 RX ORDER — SODIUM CHLORIDE 0.9 % (FLUSH) 0.9 %
5-40 SYRINGE (ML) INJECTION PRN
Status: DISCONTINUED | OUTPATIENT
Start: 2021-11-07 | End: 2021-11-09 | Stop reason: HOSPADM

## 2021-11-07 RX ORDER — LISINOPRIL 10 MG/1
10 TABLET ORAL DAILY
Status: DISCONTINUED | OUTPATIENT
Start: 2021-11-07 | End: 2021-11-09 | Stop reason: HOSPADM

## 2021-11-07 RX ORDER — ONDANSETRON 2 MG/ML
4 INJECTION INTRAMUSCULAR; INTRAVENOUS EVERY 6 HOURS PRN
Status: DISCONTINUED | OUTPATIENT
Start: 2021-11-07 | End: 2021-11-09 | Stop reason: HOSPADM

## 2021-11-07 RX ORDER — METHYLPREDNISOLONE SODIUM SUCCINATE 125 MG/2ML
125 INJECTION, POWDER, LYOPHILIZED, FOR SOLUTION INTRAMUSCULAR; INTRAVENOUS ONCE
Status: DISCONTINUED | OUTPATIENT
Start: 2021-11-07 | End: 2021-11-07

## 2021-11-07 RX ORDER — POLYETHYLENE GLYCOL 3350 17 G/17G
17 POWDER, FOR SOLUTION ORAL DAILY PRN
Status: DISCONTINUED | OUTPATIENT
Start: 2021-11-07 | End: 2021-11-09 | Stop reason: HOSPADM

## 2021-11-07 RX ORDER — PREDNISONE 20 MG/1
40 TABLET ORAL DAILY
Status: DISCONTINUED | OUTPATIENT
Start: 2021-11-10 | End: 2021-11-09 | Stop reason: HOSPADM

## 2021-11-07 RX ORDER — HYDROXYZINE PAMOATE 25 MG/1
25 CAPSULE ORAL 4 TIMES DAILY PRN
COMMUNITY

## 2021-11-07 RX ORDER — MAGNESIUM SULFATE IN WATER 40 MG/ML
2000 INJECTION, SOLUTION INTRAVENOUS ONCE
Status: COMPLETED | OUTPATIENT
Start: 2021-11-07 | End: 2021-11-07

## 2021-11-07 RX ORDER — ONDANSETRON 4 MG/1
4 TABLET, ORALLY DISINTEGRATING ORAL EVERY 8 HOURS PRN
Status: DISCONTINUED | OUTPATIENT
Start: 2021-11-07 | End: 2021-11-09 | Stop reason: HOSPADM

## 2021-11-07 RX ORDER — IPRATROPIUM BROMIDE AND ALBUTEROL SULFATE 2.5; .5 MG/3ML; MG/3ML
3 SOLUTION RESPIRATORY (INHALATION) ONCE
Status: COMPLETED | OUTPATIENT
Start: 2021-11-07 | End: 2021-11-07

## 2021-11-07 RX ORDER — SODIUM CHLORIDE 0.9 % (FLUSH) 0.9 %
SYRINGE (ML) INJECTION
Status: DISPENSED
Start: 2021-11-07 | End: 2021-11-08

## 2021-11-07 RX ORDER — HYDROXYZINE PAMOATE 25 MG/1
25 CAPSULE ORAL 4 TIMES DAILY PRN
Status: DISCONTINUED | OUTPATIENT
Start: 2021-11-07 | End: 2021-11-09 | Stop reason: HOSPADM

## 2021-11-07 RX ORDER — SODIUM CHLORIDE 9 MG/ML
INJECTION, SOLUTION INTRAVENOUS
Status: COMPLETED
Start: 2021-11-07 | End: 2021-11-07

## 2021-11-07 RX ORDER — 0.9 % SODIUM CHLORIDE 0.9 %
500 INTRAVENOUS SOLUTION INTRAVENOUS ONCE
Status: COMPLETED | OUTPATIENT
Start: 2021-11-07 | End: 2021-11-07

## 2021-11-07 RX ORDER — SODIUM CHLORIDE 9 MG/ML
25 INJECTION, SOLUTION INTRAVENOUS PRN
Status: DISCONTINUED | OUTPATIENT
Start: 2021-11-07 | End: 2021-11-09 | Stop reason: HOSPADM

## 2021-11-07 RX ORDER — TAMSULOSIN HYDROCHLORIDE 0.4 MG/1
0.4 CAPSULE ORAL DAILY
Status: DISCONTINUED | OUTPATIENT
Start: 2021-11-07 | End: 2021-11-09 | Stop reason: HOSPADM

## 2021-11-07 RX ORDER — ACETAMINOPHEN 325 MG/1
650 TABLET ORAL EVERY 6 HOURS PRN
Status: DISCONTINUED | OUTPATIENT
Start: 2021-11-07 | End: 2021-11-09 | Stop reason: HOSPADM

## 2021-11-07 RX ORDER — ASPIRIN 81 MG/1
81 TABLET ORAL DAILY
Status: DISCONTINUED | OUTPATIENT
Start: 2021-11-08 | End: 2021-11-09 | Stop reason: HOSPADM

## 2021-11-07 RX ORDER — LEVETIRACETAM 500 MG/1
1000 TABLET ORAL 2 TIMES DAILY
Status: DISCONTINUED | OUTPATIENT
Start: 2021-11-07 | End: 2021-11-09 | Stop reason: HOSPADM

## 2021-11-07 RX ORDER — ALBUTEROL SULFATE 2.5 MG/3ML
2.5 SOLUTION RESPIRATORY (INHALATION)
Status: DISCONTINUED | OUTPATIENT
Start: 2021-11-08 | End: 2021-11-09 | Stop reason: HOSPADM

## 2021-11-07 RX ORDER — AMLODIPINE BESYLATE 5 MG/1
10 TABLET ORAL DAILY
Status: DISCONTINUED | OUTPATIENT
Start: 2021-11-08 | End: 2021-11-09 | Stop reason: HOSPADM

## 2021-11-07 RX ORDER — METHYLPREDNISOLONE SODIUM SUCCINATE 40 MG/ML
40 INJECTION, POWDER, LYOPHILIZED, FOR SOLUTION INTRAMUSCULAR; INTRAVENOUS EVERY 6 HOURS
Status: DISCONTINUED | OUTPATIENT
Start: 2021-11-07 | End: 2021-11-09 | Stop reason: HOSPADM

## 2021-11-07 RX ORDER — MAGNESIUM OXIDE 400 MG/1
400 TABLET ORAL 2 TIMES DAILY
Status: DISCONTINUED | OUTPATIENT
Start: 2021-11-07 | End: 2021-11-09 | Stop reason: HOSPADM

## 2021-11-07 RX ADMIN — CARVEDILOL 6.25 MG: 3.12 TABLET, FILM COATED ORAL at 23:42

## 2021-11-07 RX ADMIN — CLONIDINE HYDROCHLORIDE 0.2 MG: 0.2 TABLET ORAL at 23:44

## 2021-11-07 RX ADMIN — METHYLPREDNISOLONE SODIUM SUCCINATE 40 MG: 40 INJECTION, POWDER, FOR SOLUTION INTRAMUSCULAR; INTRAVENOUS at 23:43

## 2021-11-07 RX ADMIN — BICTEGRAVIR SODIUM, EMTRICITABINE, AND TENOFOVIR ALAFENAMIDE FUMARATE 1 TABLET: 50; 200; 25 TABLET ORAL at 23:42

## 2021-11-07 RX ADMIN — IOPAMIDOL 75 ML: 755 INJECTION, SOLUTION INTRAVENOUS at 14:46

## 2021-11-07 RX ADMIN — DULOXETINE HYDROCHLORIDE 30 MG: 30 CAPSULE, DELAYED RELEASE ORAL at 23:42

## 2021-11-07 RX ADMIN — TAMSULOSIN HYDROCHLORIDE 0.4 MG: 0.4 CAPSULE ORAL at 23:44

## 2021-11-07 RX ADMIN — AZITHROMYCIN DIHYDRATE 500 MG: 500 INJECTION, POWDER, LYOPHILIZED, FOR SOLUTION INTRAVENOUS at 23:44

## 2021-11-07 RX ADMIN — MAGNESIUM OXIDE 400 MG: 400 TABLET ORAL at 23:44

## 2021-11-07 RX ADMIN — Medication 500 ML: at 13:58

## 2021-11-07 RX ADMIN — LISINOPRIL 10 MG: 10 TABLET ORAL at 23:43

## 2021-11-07 RX ADMIN — HYDROXYZINE PAMOATE 25 MG: 25 CAPSULE ORAL at 23:43

## 2021-11-07 RX ADMIN — ENOXAPARIN SODIUM 40 MG: 100 INJECTION SUBCUTANEOUS at 23:42

## 2021-11-07 RX ADMIN — LEVETIRACETAM 1000 MG: 500 TABLET, FILM COATED ORAL at 23:43

## 2021-11-07 RX ADMIN — MAGNESIUM SULFATE HEPTAHYDRATE 2000 MG: 40 INJECTION, SOLUTION INTRAVENOUS at 12:00

## 2021-11-07 RX ADMIN — IPRATROPIUM BROMIDE AND ALBUTEROL SULFATE 3 AMPULE: .5; 3 SOLUTION RESPIRATORY (INHALATION) at 11:35

## 2021-11-07 RX ADMIN — SODIUM CHLORIDE 500 ML: 9 INJECTION, SOLUTION INTRAVENOUS at 13:58

## 2021-11-07 RX ADMIN — ATORVASTATIN CALCIUM 40 MG: 40 TABLET, FILM COATED ORAL at 23:43

## 2021-11-07 ASSESSMENT — ENCOUNTER SYMPTOMS: TACHYPNEA: 1

## 2021-11-07 NOTE — ED NOTES
Bed: 06  Expected date:   Expected time:   Means of arrival:   Comments:  [address], RN  11/07/21 0589

## 2021-11-07 NOTE — Clinical Note
Patient Class: Inpatient [101]   REQUIRED: Diagnosis: Acute on chronic respiratory failure with hypoxia and hypercapnia ([facility] [[id number]]   Estimated Length of Stay: Estimated stay of more than 2 midnights

## 2021-11-07 NOTE — ED NOTES
ekg was completed at 1141, lots of artifact d/t patient in distress upon arrival. to redo when patient calm.  Dr Hansen Para aware     Maria R Sharpe, ALANIS  11/07/21 8593

## 2021-11-07 NOTE — ED PROVIDER NOTES
pulses. Pulmonary:      Effort: Tachypnea, accessory muscle usage and respiratory distress present. Breath sounds: Decreased air movement present. Examination of the left-lower field reveals decreased breath sounds. Decreased breath sounds and wheezing present. No rhonchi. Chest:      Chest wall: No tenderness. Abdominal:      General: Abdomen is flat. Bowel sounds are normal. There is no distension. Palpations: Abdomen is soft. Tenderness: There is no abdominal tenderness. There is no right CVA tenderness, left CVA tenderness or guarding. Hernia: No hernia is present. Musculoskeletal:      Cervical back: Normal range of motion. Right lower leg: No edema. Left lower leg: No edema. Skin:     General: Skin is warm. Capillary Refill: Capillary refill takes less than 2 seconds. Neurological:      General: No focal deficit present. Mental Status: He is alert and oriented to person, place, and time. Mental status is at baseline. Psychiatric:         Mood and Affect: Mood normal.         Behavior: Behavior normal.         Thought Content: Thought content normal.         Judgment: Judgment normal.          Procedures     MDM   59 y.o. male with pmhx of DM2, COPD(4L oxygen at home), HIV, autism presents in respiratory distress. Patient by EMS who stated that he was receiving DuoNeb treatments all night with minimal improvement of his symptoms. While in the ED patient was tachycardic, tachypnic ill appearing in respiratory distress. He was on Cpap from EMS and was transferred to [facility] while in the ED. Patient had decreased movement especially on the right lungs, wheezing throughout all lung fields and accessory muscle use. Labs remarkable for leukocytosis, respiratory acidosis. COVID negative. Negative trop and BNP. CXR remarkable for vague opacity within the left suprahilar region favored to represent superimposed vascular structures. CTA pulm was negative.  Patient received 3 duoneb treatments, mag and 1L fluids while in the ED. Patient was eventually weaned off Bipap and placed on 6L nasal canula by respiratory therapist. Patient admitted to Medicine. He is in agreement with plan of admission. .       ED Course as of 11/07/21 1522   Cheryl Fuller Nov 07, 2021   1517 Patient follows with Dr. Judy Stewart for pulmonology  []      ED Course User Index  [SS] Natanael Goodson MD       --------------------------------------------- PAST HISTORY ---------------------------------------------  Past Medical History:  has a past medical history of Asthma, CHF (congestive heart failure) ([facility] [address] COPD (chronic obstructive pulmonary disease) ([facility] [address] Depression, Diabetes mellitus ([facility] [address] Emphysema of lung ([facility] [address] H/O cardiovascular stress test, History of echocardiogram, HIV (human immunodeficiency virus infection) ([facility] [address] Hypertension, Seizure ([facility] [address] Seizure ([facility] [address] and Sleep apnea. Past Surgical History:  has a past surgical history that includes Rotator cuff repair (2011) and other surgical history (05/09/2017). Social History:  reports that he has been smoking cigarettes. He has a 17.50 pack-year smoking history. He has never used smokeless tobacco. He reports current alcohol use. He reports that he does not use drugs. Family History: family history includes Alcohol Abuse in his father and mother. The patients home medications have been reviewed. Allergies: Patient has no known allergies.     -------------------------------------------------- RESULTS -------------------------------------------------    LABS:  Results for orders placed or performed during the hospital encounter of 11/07/21   COVID-19, Rapid    Specimen: Nasopharyngeal Swab   Result Value Ref Range    SARS-CoV-2, NAAT Not Detected Not Detected   CBC auto differential   Result Value Ref Range    WBC 15.8 (H) 4.5 - 11.5 E9/L    RBC 5.32 3.80 - 5.80 E12/L    Hemoglobin 15.3 12.5 - 16.5 g/dL    Hematocrit 47.8 37.0 - 54.0 % MCV 89.8 80.0 - 99.9 fL    MCH 28.8 26.0 - 35.0 pg    MCHC 32.0 32.0 - 34.5 %    RDW 13.2 11.5 - 15.0 fL    Platelets 944 432 - 130 E9/L    MPV 11.7 7.0 - 12.0 fL    Neutrophils % 57.4 43.0 - 80.0 %    Lymphocytes % 30.4 20.0 - 42.0 %    Monocytes % 10.4 2.0 - 12.0 %    Eosinophils % 1.7 0.0 - 6.0 %    Basophils % 0.4 0.0 - 2.0 %    Neutrophils Absolute 9.01 (H) 1.80 - 7.30 E9/L    Lymphocytes Absolute 4.74 (H) 1.50 - 4.00 E9/L    Monocytes Absolute 1.58 (H) 0.10 - 0.95 E9/L    Eosinophils Absolute 0.27 0.05 - 0.50 E9/L    Basophils Absolute 0.00 0.00 - 0.20 E9/L    Anisocytosis 1+     Polychromasia 1+     Poikilocytes 1+     Ovalocytes 1+    Comprehensive Metabolic Panel w/ Reflex to MG   Result Value Ref Range    Sodium 140 132 - 146 mmol/L    Potassium reflex Magnesium 3.9 3.5 - 5.0 mmol/L    Chloride 97 (L) 98 - 107 mmol/L    CO2 32 (H) 22 - 29 mmol/L    Anion Gap 11 7 - 16 mmol/L    Glucose 153 (H) 74 - 99 mg/dL    BUN 11 6 - 23 mg/dL    CREATININE 0.8 0.7 - 1.2 mg/dL    GFR Non-African American >60 >=60 mL/min/1.73    GFR African American >60     Calcium 9.0 8.6 - 10.2 mg/dL    Total Protein 7.9 6.4 - 8.3 g/dL    Albumin 4.8 3.5 - 5.2 g/dL    Total Bilirubin 0.6 0.0 - 1.2 mg/dL    Alkaline Phosphatase 90 40 - 129 U/L    ALT 11 0 - 40 U/L    AST 14 0 - 39 U/L   Lactate, Sepsis   Result Value Ref Range    Lactic Acid, Sepsis 1.0 0.5 - 1.9 mmol/L   Troponin   Result Value Ref Range    Troponin, High Sensitivity 6 0 - 11 ng/L   Brain Natriuretic Peptide   Result Value Ref Range    Pro-BNP 31 0 - 125 pg/mL   Arterial Blood Gas, Respiratory Only   Result Value Ref Range    Source: Arterial     FIO2 Arterial 35.0     pH, Blood Gas 7.298 (L) 7.350 - 7.450    pCO2, Arterial 71.7 (HH) 35.0 - 45.0 mmHg    pO2, Arterial 247.0 (H) 60.0 - 80.0 mmHg    HCO3, Arterial 35.1 (H) 22.0 - 26.0 mmol/L    B.E. 5.1 (H) -3.0 - 3.0 mmol/L    O2 Sat 99.8 (H) 92.0 - 98.5 %          DEVICE 17,310,521,400,678     Critical Notification Yes     Positive End EXP Press 6 cmH2O    Delivery Systems BiPAP    EKG 12 Lead   Result Value Ref Range    Ventricular Rate 123 BPM    Atrial Rate 123 BPM    P-R Interval 106 ms    QRS Duration 100 ms    Q-T Interval 328 ms    QTc Calculation (Bazett) 469 ms    P Axis 79 degrees    R Axis 87 degrees    T Axis 56 degrees       RADIOLOGY:  CTA CHEST W CONTRAST   Final Result   No evidence of pulmonary embolism or acute pulmonary abnormality. Emphysema, upper lobes. Bibasilar subsegmental atelectasis and chronic parenchymal changes. XR CHEST PORTABLE   Final Result   Vague opacity within the left suprahilar region favored to represent   superimposed vascular structures. Given the respiratory distress and if   clinically warranted CT of the chest can be obtained for further evaluation. EKG:  This EKG is signed and interpreted by me. Rate: 123  Rhythm: Sinus tachycardia  Interpretation: non-specific EKG  Comparison: stable as compared to patient's most recent EKG      ------------------------- NURSING NOTES AND VITALS REVIEWED ---------------------------  Date / Time Roomed:  11/7/2021 11:33 AM  ED Bed Assignment:  06/06    The nursing notes within the ED encounter and vital signs as below have been reviewed.      Patient Vitals for the past 24 hrs:   BP Temp Pulse Resp SpO2 Weight   11/07/21 1502 139/87  94 22 94 %    11/07/21 1501   96 16 96 %    11/07/21 1424     97 %    11/07/21 1401 138/76  85 15 100 %    11/07/21 1313 138/76  86 16 100 %    11/07/21 1302 120/69  89 15     11/07/21 1249   94 16 100 %    11/07/21 1201 117/88   21     11/07/21 1159   105 27 98 % 188 lb (85.3 kg)   11/07/21 1134 (!) 202/135 97.3 °F (36.3 °C)       11/07/21 1133   133 (!) 35 99 %        Oxygen Saturation Interpretation: Improved after treatment    ------------------------------------------ PROGRESS NOTES ------------------------------------------  Re-evaluation(s):  Time: 9923  Patients symptoms show no change  Repeat physical examination is not changed    Counseling:  I have spoken with the patient and discussed todays results, in addition to providing specific details for the plan of care and counseling regarding the diagnosis and prognosis. Their questions are answered at this time and they are agreeable with the plan of admission.    --------------------------------- ADDITIONAL PROVIDER NOTES ---------------------------------  Consultations:  Diana Garcia. Dr. Laraine Barthel spoke with Dr. Sofi Don. Discussed case. They will admit the patient. This patient's ED course included: a personal history and physicial examination, re-evaluation prior to disposition, multiple bedside re-evaluations, IV medications, cardiac monitoring and continuous pulse oximetry    This patient has remained hemodynamically stable during their ED course. Diagnosis:  1. Acute respiratory failure with hypoxia and hypercapnia (HCC)    2. COPD with acute exacerbation ([facility] [address]        Disposition:  Patient's disposition: Admit to med/surg floor  Patient's condition is stable.          David Hester MD  Resident  11/07/21 2172

## 2021-11-08 LAB
ADENOVIRUS BY PCR: NOT DETECTED
B.E.: 3.1 MMOL/L (ref -3–3)
BASOPHILS ABSOLUTE: 0 E9/L (ref 0–0.2)
BASOPHILS RELATIVE PERCENT: 0 % (ref 0–2)
BORDETELLA PARAPERTUSSIS BY PCR: NOT DETECTED
BORDETELLA PERTUSSIS BY PCR: NOT DETECTED
CHLAMYDOPHILIA PNEUMONIAE BY PCR: NOT DETECTED
CORONAVIRUS 229E BY PCR: NOT DETECTED
CORONAVIRUS HKU1 BY PCR: NOT DETECTED
CORONAVIRUS NL63 BY PCR: NOT DETECTED
CORONAVIRUS OC43 BY PCR: NOT DETECTED
DEVICE: ABNORMAL
EOSINOPHILS ABSOLUTE: 0 E9/L (ref 0.05–0.5)
EOSINOPHILS RELATIVE PERCENT: 0 % (ref 0–6)
FIO2 ARTERIAL: 6
HCO3 ARTERIAL: 30.6 MMOL/L (ref 22–26)
HCT VFR BLD CALC: 43.3 % (ref 37–54)
HEMOGLOBIN: 13.6 G/DL (ref 12.5–16.5)
HUMAN METAPNEUMOVIRUS BY PCR: NOT DETECTED
HUMAN RHINOVIRUS/ENTEROVIRUS BY PCR: NOT DETECTED
IMMATURE GRANULOCYTES #: 0.04 E9/L
IMMATURE GRANULOCYTES %: 0.4 % (ref 0–5)
INFLUENZA A BY PCR: NOT DETECTED
INFLUENZA B BY PCR: NOT DETECTED
LYMPHOCYTES ABSOLUTE: 1.48 E9/L (ref 1.5–4)
LYMPHOCYTES RELATIVE PERCENT: 13.4 % (ref 20–42)
MCH RBC QN AUTO: 29.1 PG (ref 26–35)
MCHC RBC AUTO-ENTMCNC: 31.4 % (ref 32–34.5)
MCV RBC AUTO: 92.7 FL (ref 80–99.9)
MONOCYTES ABSOLUTE: 0.3 E9/L (ref 0.1–0.95)
MONOCYTES RELATIVE PERCENT: 2.7 % (ref 2–12)
MYCOPLASMA PNEUMONIAE BY PCR: NOT DETECTED
NEUTROPHILS ABSOLUTE: 9.24 E9/L (ref 1.8–7.3)
NEUTROPHILS RELATIVE PERCENT: 83.5 % (ref 43–80)
O2 SATURATION: 92.3 % (ref 92–98.5)
OPERATOR ID: 2124
PARAINFLUENZA VIRUS 1 BY PCR: NOT DETECTED
PARAINFLUENZA VIRUS 2 BY PCR: NOT DETECTED
PARAINFLUENZA VIRUS 3 BY PCR: NOT DETECTED
PARAINFLUENZA VIRUS 4 BY PCR: NOT DETECTED
PCO2 ARTERIAL: 56.5 MMHG (ref 35–45)
PDW BLD-RTO: 13.3 FL (ref 11.5–15)
PH BLOOD GAS: 7.34 (ref 7.35–7.45)
PLATELET # BLD: 184 E9/L (ref 130–450)
PMV BLD AUTO: 11.4 FL (ref 7–12)
PO2 ARTERIAL: 70.2 MMHG (ref 60–80)
RBC # BLD: 4.67 E12/L (ref 3.8–5.8)
RESPIRATORY SYNCYTIAL VIRUS BY PCR: NOT DETECTED
SARS-COV-2, PCR: NOT DETECTED
SOURCE, BLOOD GAS: ABNORMAL
WBC # BLD: 11.1 E9/L (ref 4.5–11.5)

## 2021-11-08 PROCEDURE — 2580000003 HC RX 258: Performed by: INTERNAL MEDICINE

## 2021-11-08 PROCEDURE — 6370000000 HC RX 637 (ALT 250 FOR IP): Performed by: INTERNAL MEDICINE

## 2021-11-08 PROCEDURE — 96372 THER/PROPH/DIAG INJ SC/IM: CPT

## 2021-11-08 PROCEDURE — 99233 SBSQ HOSP IP/OBS HIGH 50: CPT | Performed by: INTERNAL MEDICINE

## 2021-11-08 PROCEDURE — 96376 TX/PRO/DX INJ SAME DRUG ADON: CPT

## 2021-11-08 PROCEDURE — 1200000000 HC SEMI PRIVATE

## 2021-11-08 PROCEDURE — 6360000002 HC RX W HCPCS: Performed by: INTERNAL MEDICINE

## 2021-11-08 PROCEDURE — 94660 CPAP INITIATION&MGMT: CPT

## 2021-11-08 PROCEDURE — 82803 BLOOD GASES ANY COMBINATION: CPT

## 2021-11-08 PROCEDURE — 2700000000 HC OXYGEN THERAPY PER DAY

## 2021-11-08 PROCEDURE — 85025 COMPLETE CBC W/AUTO DIFF WBC: CPT

## 2021-11-08 PROCEDURE — 94640 AIRWAY INHALATION TREATMENT: CPT

## 2021-11-08 RX ADMIN — LEVETIRACETAM 1000 MG: 500 TABLET, FILM COATED ORAL at 08:41

## 2021-11-08 RX ADMIN — AZITHROMYCIN DIHYDRATE 500 MG: 500 INJECTION, POWDER, LYOPHILIZED, FOR SOLUTION INTRAVENOUS at 20:13

## 2021-11-08 RX ADMIN — BUDESONIDE 500 MCG: 0.5 INHALANT RESPIRATORY (INHALATION) at 17:15

## 2021-11-08 RX ADMIN — BUDESONIDE 500 MCG: 0.5 INHALANT RESPIRATORY (INHALATION) at 03:47

## 2021-11-08 RX ADMIN — SODIUM CHLORIDE, PRESERVATIVE FREE 10 ML: 5 INJECTION INTRAVENOUS at 20:13

## 2021-11-08 RX ADMIN — METHYLPREDNISOLONE SODIUM SUCCINATE 40 MG: 40 INJECTION, POWDER, FOR SOLUTION INTRAMUSCULAR; INTRAVENOUS at 12:59

## 2021-11-08 RX ADMIN — ATORVASTATIN CALCIUM 40 MG: 40 TABLET, FILM COATED ORAL at 20:13

## 2021-11-08 RX ADMIN — TAMSULOSIN HYDROCHLORIDE 0.4 MG: 0.4 CAPSULE ORAL at 08:42

## 2021-11-08 RX ADMIN — BICTEGRAVIR SODIUM, EMTRICITABINE, AND TENOFOVIR ALAFENAMIDE FUMARATE 1 TABLET: 50; 200; 25 TABLET ORAL at 08:40

## 2021-11-08 RX ADMIN — SODIUM CHLORIDE, PRESERVATIVE FREE 10 ML: 5 INJECTION INTRAVENOUS at 09:05

## 2021-11-08 RX ADMIN — AMLODIPINE BESYLATE 10 MG: 5 TABLET ORAL at 08:40

## 2021-11-08 RX ADMIN — ALBUTEROL SULFATE 2.5 MG: 2.5 SOLUTION RESPIRATORY (INHALATION) at 13:12

## 2021-11-08 RX ADMIN — CARVEDILOL 6.25 MG: 3.12 TABLET, FILM COATED ORAL at 08:39

## 2021-11-08 RX ADMIN — ASPIRIN 81 MG: 81 TABLET, COATED ORAL at 08:40

## 2021-11-08 RX ADMIN — ALBUTEROL SULFATE 2.5 MG: 2.5 SOLUTION RESPIRATORY (INHALATION) at 09:17

## 2021-11-08 RX ADMIN — DULOXETINE HYDROCHLORIDE 30 MG: 30 CAPSULE, DELAYED RELEASE ORAL at 20:13

## 2021-11-08 RX ADMIN — METHYLPREDNISOLONE SODIUM SUCCINATE 40 MG: 40 INJECTION, POWDER, FOR SOLUTION INTRAMUSCULAR; INTRAVENOUS at 16:49

## 2021-11-08 RX ADMIN — MAGNESIUM OXIDE 400 MG: 400 TABLET ORAL at 20:13

## 2021-11-08 RX ADMIN — MAGNESIUM OXIDE 400 MG: 400 TABLET ORAL at 08:41

## 2021-11-08 RX ADMIN — CARVEDILOL 6.25 MG: 3.12 TABLET, FILM COATED ORAL at 16:49

## 2021-11-08 RX ADMIN — CLONIDINE HYDROCHLORIDE 0.2 MG: 0.2 TABLET ORAL at 20:13

## 2021-11-08 RX ADMIN — LEVETIRACETAM 1000 MG: 500 TABLET, FILM COATED ORAL at 20:13

## 2021-11-08 RX ADMIN — CLONIDINE HYDROCHLORIDE 0.2 MG: 0.2 TABLET ORAL at 08:41

## 2021-11-08 RX ADMIN — ALBUTEROL SULFATE 2.5 MG: 2.5 SOLUTION RESPIRATORY (INHALATION) at 03:48

## 2021-11-08 RX ADMIN — LISINOPRIL 10 MG: 10 TABLET ORAL at 08:41

## 2021-11-08 RX ADMIN — ALBUTEROL SULFATE 2.5 MG: 2.5 SOLUTION RESPIRATORY (INHALATION) at 17:15

## 2021-11-08 RX ADMIN — METHYLPREDNISOLONE SODIUM SUCCINATE 40 MG: 40 INJECTION, POWDER, FOR SOLUTION INTRAMUSCULAR; INTRAVENOUS at 06:39

## 2021-11-08 RX ADMIN — ENOXAPARIN SODIUM 40 MG: 100 INJECTION SUBCUTANEOUS at 08:39

## 2021-11-08 ASSESSMENT — PAIN SCALES - GENERAL
PAINLEVEL_OUTOF10: 0

## 2021-11-08 NOTE — PLAN OF CARE
Problem: Falls - Risk of:  Goal: Will remain free from falls  Description: Will remain free from falls  11/8/2021 1626 by Jaydon Galeano RN  Outcome: Ongoing  11/8/2021 1615 by Jaydon Galeano RN  Outcome: Ongoing  Goal: Absence of physical injury  Description: Absence of physical injury  11/8/2021 1626 by Jaydon Galeano RN  Outcome: Ongoing  11/8/2021 1615 by Jaydon Galeano RN  Outcome: Ongoing

## 2021-11-08 NOTE — PROGRESS NOTES
[address] [place], [place] [address]ist   Progress Note    Admitting Date and Time: 11/7/2021 11:33 AM  Admit Dx: Acute on chronic respiratory failure with hypoxia and hypercapnia (HCC) [J96.21, J96.22]    Subjective/interval history:    Pt admitted yesterday evening with acute on chronic hypoxic respiratory failure and COPD exacerbation. At this time he states he is feeling much better, breathing comfortably. Patient was requiring BiPAP, but has been weaned off. Per RN: Patient was refusing BiPAP and it was ordered 4 hours on/2 hours off. Patient is upset that he is n.p.o.    ROS: denies fever, chills, cp, sob, n/v, HA unless stated above.      sodium chloride flush  5-40 mL IntraVENous 2 times per day    enoxaparin  40 mg SubCUTAneous Daily    albuterol  2.5 mg Nebulization Q4H WA    methylPREDNISolone  40 mg IntraVENous Q6H    Followed by   Lito Fernandes ON 11/10/2021] predniSONE  40 mg Oral Daily    azithromycin  500 mg IntraVENous Q24H    amLODIPine  10 mg Oral Daily    aspirin EC  81 mg Oral Daily    atorvastatin  40 mg Oral Nightly    bictegravir-emtricitab-tenofovir alafenamide  1 tablet Oral Daily    carvedilol  6.25 mg Oral BID WC    cloNIDine  0.2 mg Oral BID    DULoxetine  30 mg Oral Nightly    levETIRAcetam  1,000 mg Oral BID    lisinopril  10 mg Oral Daily    magnesium oxide  400 mg Oral BID    tamsulosin  0.4 mg Oral Daily    budesonide  500 mcg Nebulization BID     sodium chloride flush, 5-40 mL, PRN  sodium chloride, 25 mL, PRN  ondansetron, 4 mg, Q8H PRN   Or  ondansetron, 4 mg, Q6H PRN  polyethylene glycol, 17 g, Daily PRN  acetaminophen, 650 mg, Q6H PRN   Or  acetaminophen, 650 mg, Q6H PRN  hydrOXYzine, 25 mg, 4x Daily PRN         Objective:    /80   Pulse 77   Temp 98 °F (36.7 °C) (Oral)   Resp 16   Wt 188 lb (85.3 kg)   SpO2 98%   BMI 28.59 kg/m²   General Appearance: alert and oriented to person, place and time and in no acute distress  Skin: warm and dry  Head: normocephalic and atraumatic  Eyes: pupils equal, round, and reactive to light, extraocular eye movements intact, conjunctivae normal  Neck: neck supple and non tender without mass   Pulmonary/Chest: Nonlabored on 4.5 L high flow nasal cannula. Diminished bilaterally  Cardiovascular: Distant, normal rate, normal S1 and S2 and no carotid bruits  Abdomen: soft, non-tender, non-distended, normal bowel sounds, no masses or organomegaly  Extremities: no cyanosis, no clubbing and no edema  Neurologic: no cranial nerve deficit and speech normal      Recent Labs     11/07/21  1157      K 3.9   CL 97*   CO2 32*   BUN 11   CREATININE 0.8   GLUCOSE 153*   CALCIUM 9.0       Recent Labs     11/07/21  1157   ALKPHOS 90   PROT 7.9   LABALBU 4.8   BILITOT 0.6   AST 14   ALT 11       Recent Labs     11/07/21  1157 11/08/21  0646   WBC 15.8* 11.1   RBC 5.32 4.67   HGB 15.3 13.6   HCT 47.8 43.3   MCV 89.8 92.7   MCH 28.8 29.1   MCHC 32.0 31.4*   RDW 13.2 13.3    184   MPV 11.7 11.4       Radiology:   CTA CHEST W CONTRAST   Final Result   No evidence of pulmonary embolism or acute pulmonary abnormality. Emphysema, upper lobes. Bibasilar subsegmental atelectasis and chronic parenchymal changes. XR CHEST PORTABLE   Final Result   Vague opacity within the left suprahilar region favored to represent   superimposed vascular structures. Given the respiratory distress and if   clinically warranted CT of the chest can be obtained for further evaluation.              Assessment/Plan:  Principal Problem:    Acute on chronic respiratory failure with hypoxia and hypercapnia (HCC)  Active Problems:    HIV (human immunodeficiency virus infection) (HCC)    Acute exacerbation of chronic obstructive pulmonary disease (COPD) (HCC)    Sleep apnea    Chronic diastolic heart failure (HCC)    Seizure (HCC)    Benign prostatic hyperplasia without lower urinary tract symptoms  Resolved Problems:    * No resolved hospital problems. *      1. Acute on chronic respiratory failure with hypoxia  -rapid COVID and PCR panels negative  -Was on intermittent BiPAP, now on nasal cannula 4.5 L  -Continue to wean to baseline 4 L while treating as noted below    2. COPD with acute exacerbation   -May have been triggered by patient running out of home inhalers  -Continue IV methylprednisolone with plan to transition to oral prednisone  -Scheduled albuterol nebulizer treatments  -IV azithromycin  -Resume inhaled corticosteroid    3. Chronic diastolic heart failure  -No signs of significant volume overload at this time  -Continue home ACE inhibitor and beta-blocker    4. HIV  -Continue home [address]    5. Essential HTN  -continue home amlodipine, clonidine, carvedilol, and lisinopril     6. BPH  -Continue home Flomax    7. Seizure disorder   -continue home Keppra    8. CHEN  -continue BiPAP qhs    DVT prophylaxis: Subcutaneous enoxaparin  CODE STATUS: Full code    Disposition: Okay to downgrade to telemetry at this time. Plan for discharge home; likely 1-2 more days    Discussed with RN in the ED       NOTE: This report was transcribed using voice recognition software. Every effort was made to ensure accuracy; however, inadvertent computerized transcription errors may be present.      Electronically signed by Fritz Alba DO on 11/8/2021 at 11:54 AM

## 2021-11-08 NOTE — H&P
[address] [place] [address] Hospitalist Group   History and Physical      CHIEF COMPLAINT:  Respiratory distress and hypoxia    History of Present Illness:  59 y.o. male with a history of COPD on chronic home O2, HIV, seizure disorder, hypertension, chronic diastolic heart failure, CHEN who  presents with above complaints. Patient has progressive short of breath over the last 5 days or so. EMS was called to the house to find him to be hypoxic at 70% despite his 4 L of chronic O2. En route he was given 125 mg Solu-Medrol as well as placed on CPAP. Initial ABG showed pH 7.298 PCO2 71.7 PCO2 was 247 this is on BiPAP. Rapid Covid test came back negative. At this point, I was contacted for admission but requested repeat ABG to make sure he was improving. He had worn the BiPAP for about 2 hours then he was converted to 6 L nasal cannula. ABG was done on the 6 L and it showed pH 7.289 PCO2 61.8 PO2 58.2. He was placed back on BiPAP by the ER staff. Given his hypoxia, he was sent for CTPA and this showed no evidence acute pulmonary embolism.  It did show bilateral upper lobe emphysema and bibasilar subsegmental atelectasis     Informant(s) for H&P:patient and EMR    REVIEW OF SYSTEMS:  As per HPI, otherwise negative    PMH:  Past Medical History:   Diagnosis Date    Asthma     since childhood    CHF (congestive heart failure) (HCC)     [address] (chronic obstructive pulmonary disease) ([address]     Depression     Diabetes mellitus ([address]     Emphysema of lung ([address]     H/O cardiovascular stress test 05/27/2020    Lexiscan    History of echocardiogram 04/19/2018    EF 62%    HIV (human immunodeficiency virus infection) ([address]     Hypertension 2008    Seizure ([address]     Seizure ([address]     Sleep apnea     no cpap       Surgical History:  Past Surgical History:   Procedure Laterality Date    OTHER SURGICAL HISTORY  05/09/2017    exc abd neoplasm    ROTATOR CUFF REPAIR  2011    left shoulder in [facility]. Medications Prior to Admission:    Prior to Admission medications    Medication Sig Start Date End Date Taking? Authorizing Provider   hydrOXYzine (VISTARIL) 25 MG capsule Take 25 mg by mouth 4 times daily as needed for Anxiety   Yes Historical Provider, MD   DULoxetine (CYMBALTA) 30 MG extended release capsule Take 30 mg by mouth nightly  7/16/21  Yes Historical Provider, MD   albuterol sulfate  (90 Base) MCG/ACT inhaler Inhale 2 puffs into the lungs every 4 hours as needed for Wheezing or Shortness of Breath 8/19/21  Yes Jaylyn Short MD   EPINEPHrine (EPIPEN) 0.3 MG/0.3ML SOAJ injection USE AS DIRECTED FOR ALLERGIC REACTION TO Jesica Garcíaelsen 11/12/20  Yes Jaylyn Short MD   levETIRAcetam (KEPPRA) 1000 MG tablet Take 1 tablet by mouth 2 times daily 8/10/20  Yes LAVON Damon CNP   metFORMIN (GLUCOPHAGE) 500 MG tablet Take 1 tablet by mouth daily (with breakfast) 8/10/20  Yes LAVON Damon CNP   lisinopril (PRINIVIL;ZESTRIL) 10 MG tablet Take 1 tablet by mouth daily 8/11/20  Yes LAVON Damon CNP   amLODIPine (NORVASC) 10 MG tablet Take 1 tablet by mouth daily 8/11/20  Yes LAVON Damon CNP   carvedilol (COREG) 6.25 MG tablet Take 1 tablet by mouth 2 times daily (with meals) 8/10/20  Yes LAVON Damon CNP   magnesium oxide (MAG-OX) 400 (240 Mg) MG tablet Take 1 tablet by mouth 2 times daily 8/10/20  Yes LAVON Damon CNP   hydroCHLOROthiazide (HYDRODIURIL) 25 MG tablet Take 1 tablet by mouth daily New BP pill replaces Lasix. 5/30/20  Yes Aurelio Barger MD   atorvastatin (LIPITOR) 40 MG tablet Take 1 tablet by mouth nightly New cholesterol pill.  5/29/20  Yes Aurelio Barger MD   aspirin EC 81 MG EC tablet Take 1 tablet by mouth daily 5/29/20  Yes Aurelio Barger MD   escitalopram (LEXAPRO) 20 MG tablet Take 1 tablet by mouth daily 2/11/20  Yes LAVON Damon CNP   bictegravir-emtricitab-tenofovir alafenamide (GENA [address]) -36 MG TABS per tablet Take 1 tablet by mouth daily   Yes Historical Provider, MD   cloNIDine (CATAPRES) 0.2 MG tablet Take 0.2 mg by mouth 2 times daily  1/21/19  Yes Historical Provider, MD   fluticasone (FLONASE) 50 MCG/ACT nasal spray 1 spray by Nasal route daily  1/21/19  Yes Historical Provider, MD   OXYGEN Inhale 2 L into the lungs continuous    Yes Historical Provider, MD   omalizumab Harley Avtar) 150 MG injection Inject 375 mg into the skin every 14 days 8/2/18  Yes Britta Pagan MD   tamsulosin (FLOMAX) 0.4 MG capsule Take 0.4 mg by mouth daily  5/9/18  Yes Historical Provider, MD   ipratropium-albuterol (DUONEB) 0.5-2.5 (3) MG/3ML SOLN nebulizer solution Inhale 3 mLs into the lungs every 6 hours as needed for Shortness of Breath 12/18/17  Yes Reinaldo Tabor,    fluticasone-umeclidin-vilant (TRELEGY ELLIPTA) 100-62.5-25 MCG/INH AEPB Inhale 1 puff into the lungs daily 8/19/21   Britta Pagan MD       Allergies:    Patient has no known allergies. Social History:    reports that he has been smoking cigarettes. He has a 17.50 pack-year smoking history. He has never used smokeless tobacco. He reports current alcohol use. He reports that he does not use drugs. Family History:   family history includes Alcohol Abuse in his father and mother. PHYSICAL EXAM:  Vitals:  /73   Pulse 87   Temp 97.3 °F (36.3 °C)   Resp 13   Wt 188 lb (85.3 kg)   SpO2 97%   BMI 28.59 kg/m²     General Appearance: alert and oriented to person, place and time and in mild respiratory distress but able to answer question while wearing Bipap mask.   Skin: warm and dry  Head: normocephalic and atraumatic  Eyes: pupils equal, round, and reactive to light, extraocular eye movements intact, conjunctivae normal  Neck: neck supple and non tender without mass   Pulmonary/Chest: prominent bilateral expiratory wheezes in all lung fields  Cardiovascular: mild tachycardia, normal S1 and S2 and no carotid bruits  Abdomen: soft, non-tender, non-distended, normal bowel sounds, no masses or organomegaly  Extremities: no cyanosis, no clubbing and no edema  Neurologic: no cranial nerve deficit and speech normal    LABS:  Recent Labs     11/07/21  1157      K 3.9   CL 97*   CO2 32*   BUN 11   CREATININE 0.8   GLUCOSE 153*   CALCIUM 9.0       Recent Labs     11/07/21 1157   WBC 15.8*   RBC 5.32   HGB 15.3   HCT 47.8   MCV 89.8   MCH 28.8   MCHC 32.0   RDW 13.2      MPV 11.7       Arterial Blood Gas, Respiratory Only [[id number]] (Abnormal) Collected: 11/07/21 1152      Updated: 11/07/21 1156      Source: Arterial      FIO2 Arterial 35.0      pH, Blood Gas 7. 298      pCO2, Arterial 71.7 mmHg       pO2, Arterial 247.0 mmHg       HCO3, Arterial 35.1 mmol/L       B.E. 5.1 mmol/L       O2 Sat 99.8 %              DEVICE [id number]      Critical Notification Yes      Positive End EXP Press 6 cmH2O       Delivery Systems BiPAP           Troponin [[id number]] Collected: 11/07/21 1157     Specimen: Blood Updated: 11/07/21 1231      Troponin, High Sensitivity 6 ng/L      Brain Natriuretic Peptide [[id number]] Collected: 11/07/21 1157     Specimen: Blood Updated: 11/07/21 1231      Pro-BNP 31 pg/mL        COVID-19, Rapid [[id number]] Collected: 11/07/21 1157     Specimen: Nasopharyngeal Swab Updated: 11/07/21 1234      SARS-CoV-2, NAAT Not Detected         Radiology:      XR CHEST PORTABLE    Result Date: 11/7/2021  EXAMINATION: ONE XRAY VIEW OF THE CHEST 11/7/2021 11:59 am COMPARISON: None. HISTORY: ORDERING SYSTEM PROVIDED HISTORY: respiratory distress TECHNOLOGIST PROVIDED HISTORY: Reason for exam:->respiratory distress FINDINGS: The cardiac silhouette is within normals. The right lung is clear There is a patchy opacity seen within the left suprahilar region which could represent superimposed vascular structures. There is no right or left pleural effusion.      Vague opacity within the left suprahilar region favored to represent superimposed vascular structures. Given the respiratory distress and if clinically warranted CT of the chest can be obtained for further evaluation. CTA CHEST W CONTRAST    Result Date: 11/7/2021  EXAMINATION: CTA OF THE CHEST 11/7/2021 2:46 pm TECHNIQUE: CTA of the chest was performed after the administration of intravenous contrast.  Multiplanar reformatted images are provided for review. MIP images are provided for review. Dose modulation, iterative reconstruction, and/or weight based adjustment of the mA/kV was utilized to reduce the radiation dose to as low as reasonably achievable. COMPARISON: None. HISTORY: ORDERING SYSTEM PROVIDED HISTORY: evaluate for PE possible pna? TECHNOLOGIST PROVIDED HISTORY: Reason for exam:->evaluate for PE possible pna? Decision Support Exception - unselect if not a suspected or confirmed emergency medical condition->Emergency Medical Condition (MA) FINDINGS: Pulmonary Arteries: Pulmonary arteries are adequately opacified for evaluation. No evidence of intraluminal filling defect to suggest pulmonary embolism. Main pulmonary artery is normal in caliber. Mediastinum: No evidence of mediastinal lymphadenopathy. The heart and pericardium demonstrate no acute abnormality. There is no acute abnormality of the thoracic aorta. Lungs/pleura: The lungs demonstrate chronic parenchymal changes and atelectasis at the lung bases. There are emphysematous changes in the upper lobes. No evidence of pleural effusion or pneumothorax. There is mucus deposit in the right and left main bronchi. Upper Abdomen: Limited images of the upper abdomen are unremarkable. Soft Tissues/Bones: No acute bone or soft tissue abnormality. No evidence of pulmonary embolism or acute pulmonary abnormality. Emphysema, upper lobes. Bibasilar subsegmental atelectasis and chronic parenchymal changes. EKG: Sinus tachy at 123 with nonspecific ST abnormality.       ASSESSMENT:      Principal Problem:    Acute on chronic respiratory failure with hypoxia and hypercapnia (HCC)  Active Problems:    Acute exacerbation of chronic obstructive pulmonary disease (COPD) (HCC)    HIV (human immunodeficiency virus infection) (HCC)    Sleep apnea    Chronic diastolic heart failure (HCC)    Seizure (HCC)    Benign prostatic hyperplasia without lower urinary tract symptoms  Resolved Problems:    * No resolved hospital problems. *      PLAN:    1. Acute on chronic respiratory failure with hypoxia and hypercapnia in the setting of AECOPD  -Continue BiPAP support through tonight. Repeat ABG in the a.m. to determine if he can decrease to 4 hours onto our off regimen.  -Rapid Covid is negative but will send respiratory panel to make sure there is no other viral etiology responsible for his symptoms.   -Of note patient vaccinated with COVID. He received Moderna vaccination March 17 and April 14 2021  -Given degree of wheezing, continue Solu-Medrol 40 mg IV every 6 hours. We will also add budesonide aerosols to his DuoNeb regimen  -CTPA negative for PE  -placed on azithromycin 500 mg IV daily. 2.         Chronic diastolic heart failure  -continue home antihypertensive regimen    3. HIV  -reordered Biktarvy.   -low threshold to consult ID.    4.  Seizure disorder  -continue Keppra    5. BPH  -continue Flomax. Code Status: FULL  DVT prophylaxis: Lovenox    NOTE: This report was transcribed using voice recognition software.  Every effort was made to ensure accuracy; however, inadvertent computerized transcription errors may be present.     Electronically signed by Ronda Judge MD on 11/7/2021 at 8:21 PM

## 2021-11-08 NOTE — ED NOTES
Patient argumentative with this nurse at this time. Mad because he is still NPO except sips of water with meds. Stating that he is hungry and wants to eat. He is tired of being starved. If he can't eat then he is going to leave. This nurse explained to the patient that he is unable to eat because he needs to wear the bipap. This nurse also explained to the patient that his CO2 level was still abnormal this morning and he needs to go back on his bipap. Patient refuses to wear his bipap at this time. Would like to speak with his doctor. Dr. Samm Raymond called and message left.      Duarte Arroyo, RN  11/08/21 [address] [facility], RN  11/08/21 [id number]

## 2021-11-09 VITALS
DIASTOLIC BLOOD PRESSURE: 85 MMHG | OXYGEN SATURATION: 98 % | HEIGHT: 68 IN | RESPIRATION RATE: 18 BRPM | HEART RATE: 71 BPM | TEMPERATURE: 97.9 F | WEIGHT: 188.93 LBS | SYSTOLIC BLOOD PRESSURE: 120 MMHG | BODY MASS INDEX: 28.63 KG/M2

## 2021-11-09 LAB
ANION GAP SERPL CALCULATED.3IONS-SCNC: 9 MMOL/L (ref 7–16)
BUN BLDV-MCNC: 16 MG/DL (ref 6–23)
CALCIUM SERPL-MCNC: 8.8 MG/DL (ref 8.6–10.2)
CHLORIDE BLD-SCNC: 95 MMOL/L (ref 98–107)
CO2: 32 MMOL/L (ref 22–29)
CREAT SERPL-MCNC: 0.7 MG/DL (ref 0.7–1.2)
GFR AFRICAN AMERICAN: >60
GFR NON-AFRICAN AMERICAN: >60 ML/MIN/1.73
GLUCOSE BLD-MCNC: 149 MG/DL (ref 74–99)
HCT VFR BLD CALC: 43.7 % (ref 37–54)
HEMOGLOBIN: 14.1 G/DL (ref 12.5–16.5)
MCH RBC QN AUTO: 28.9 PG (ref 26–35)
MCHC RBC AUTO-ENTMCNC: 32.3 % (ref 32–34.5)
MCV RBC AUTO: 89.5 FL (ref 80–99.9)
PDW BLD-RTO: 13.1 FL (ref 11.5–15)
PLATELET # BLD: 209 E9/L (ref 130–450)
PMV BLD AUTO: 11.8 FL (ref 7–12)
POTASSIUM SERPL-SCNC: 4.2 MMOL/L (ref 3.5–5)
RBC # BLD: 4.88 E12/L (ref 3.8–5.8)
SODIUM BLD-SCNC: 136 MMOL/L (ref 132–146)
WBC # BLD: 23 E9/L (ref 4.5–11.5)

## 2021-11-09 PROCEDURE — 80048 BASIC METABOLIC PNL TOTAL CA: CPT

## 2021-11-09 PROCEDURE — 2580000003 HC RX 258: Performed by: INTERNAL MEDICINE

## 2021-11-09 PROCEDURE — 6370000000 HC RX 637 (ALT 250 FOR IP): Performed by: INTERNAL MEDICINE

## 2021-11-09 PROCEDURE — 6360000002 HC RX W HCPCS: Performed by: INTERNAL MEDICINE

## 2021-11-09 PROCEDURE — 2700000000 HC OXYGEN THERAPY PER DAY

## 2021-11-09 PROCEDURE — 85027 COMPLETE CBC AUTOMATED: CPT

## 2021-11-09 PROCEDURE — 99239 HOSP IP/OBS DSCHRG MGMT >30: CPT | Performed by: INTERNAL MEDICINE

## 2021-11-09 PROCEDURE — 94640 AIRWAY INHALATION TREATMENT: CPT

## 2021-11-09 PROCEDURE — 94660 CPAP INITIATION&MGMT: CPT

## 2021-11-09 PROCEDURE — 36415 COLL VENOUS BLD VENIPUNCTURE: CPT

## 2021-11-09 RX ORDER — ALBUTEROL SULFATE 90 UG/1
2 AEROSOL, METERED RESPIRATORY (INHALATION) EVERY 4 HOURS PRN
Qty: 1 EACH | Refills: 2 | Status: SHIPPED | OUTPATIENT
Start: 2021-11-09

## 2021-11-09 RX ORDER — FLUTICASONE FUROATE, UMECLIDINIUM BROMIDE AND VILANTEROL TRIFENATATE 100; 62.5; 25 UG/1; UG/1; UG/1
1 POWDER RESPIRATORY (INHALATION) DAILY
Qty: 1 EACH | Refills: 2 | Status: SHIPPED | OUTPATIENT
Start: 2021-11-09 | End: 2021-12-03 | Stop reason: SDUPTHER

## 2021-11-09 RX ORDER — PREDNISONE 10 MG/1
TABLET ORAL
Qty: 30 TABLET | Refills: 0 | Status: SHIPPED | OUTPATIENT
Start: 2021-11-09 | End: 2022-03-21

## 2021-11-09 RX ORDER — IPRATROPIUM BROMIDE AND ALBUTEROL SULFATE 2.5; .5 MG/3ML; MG/3ML
1 SOLUTION RESPIRATORY (INHALATION) EVERY 6 HOURS PRN
Qty: 360 ML | Refills: 1 | Status: SHIPPED | OUTPATIENT
Start: 2021-11-09

## 2021-11-09 RX ADMIN — ALBUTEROL SULFATE 2.5 MG: 2.5 SOLUTION RESPIRATORY (INHALATION) at 13:06

## 2021-11-09 RX ADMIN — ENOXAPARIN SODIUM 40 MG: 100 INJECTION SUBCUTANEOUS at 07:57

## 2021-11-09 RX ADMIN — ALBUTEROL SULFATE 2.5 MG: 2.5 SOLUTION RESPIRATORY (INHALATION) at 10:02

## 2021-11-09 RX ADMIN — ALBUTEROL SULFATE 2.5 MG: 2.5 SOLUTION RESPIRATORY (INHALATION) at 07:06

## 2021-11-09 RX ADMIN — CARVEDILOL 6.25 MG: 3.12 TABLET, FILM COATED ORAL at 07:58

## 2021-11-09 RX ADMIN — BUDESONIDE 500 MCG: 0.5 INHALANT RESPIRATORY (INHALATION) at 07:05

## 2021-11-09 RX ADMIN — AMLODIPINE BESYLATE 10 MG: 5 TABLET ORAL at 07:58

## 2021-11-09 RX ADMIN — LISINOPRIL 10 MG: 10 TABLET ORAL at 07:58

## 2021-11-09 RX ADMIN — METHYLPREDNISOLONE SODIUM SUCCINATE 40 MG: 40 INJECTION, POWDER, FOR SOLUTION INTRAMUSCULAR; INTRAVENOUS at 12:08

## 2021-11-09 RX ADMIN — ASPIRIN 81 MG: 81 TABLET, COATED ORAL at 07:58

## 2021-11-09 RX ADMIN — METHYLPREDNISOLONE SODIUM SUCCINATE 40 MG: 40 INJECTION, POWDER, FOR SOLUTION INTRAMUSCULAR; INTRAVENOUS at 05:37

## 2021-11-09 RX ADMIN — BICTEGRAVIR SODIUM, EMTRICITABINE, AND TENOFOVIR ALAFENAMIDE FUMARATE 1 TABLET: 50; 200; 25 TABLET ORAL at 08:49

## 2021-11-09 RX ADMIN — TAMSULOSIN HYDROCHLORIDE 0.4 MG: 0.4 CAPSULE ORAL at 07:58

## 2021-11-09 RX ADMIN — METHYLPREDNISOLONE SODIUM SUCCINATE 40 MG: 40 INJECTION, POWDER, FOR SOLUTION INTRAMUSCULAR; INTRAVENOUS at 00:13

## 2021-11-09 RX ADMIN — MAGNESIUM OXIDE 400 MG: 400 TABLET ORAL at 07:57

## 2021-11-09 RX ADMIN — LEVETIRACETAM 1000 MG: 500 TABLET, FILM COATED ORAL at 07:57

## 2021-11-09 RX ADMIN — CLONIDINE HYDROCHLORIDE 0.2 MG: 0.2 TABLET ORAL at 07:58

## 2021-11-09 RX ADMIN — SODIUM CHLORIDE, PRESERVATIVE FREE 10 ML: 5 INJECTION INTRAVENOUS at 07:57

## 2021-11-09 ASSESSMENT — PAIN SCALES - GENERAL
PAINLEVEL_OUTOF10: 0
PAINLEVEL_OUTOF10: 0

## 2021-11-09 NOTE — DISCHARGE SUMMARY
[facility] Physician Discharge Summary       Fabiano Sy, [address] [facility] [address]  [phone]    Call today        Activity level: Normal activity    Diet: ADULT DIET; Regular    Labs: Routine labs per primary care physician    Condition at discharge: Stable    Dispo: Home    Continue supplemental oxygen via nasal canula @ 2 LPM round-the-clock. Continue CPAP / BiPAP during sleep as prior to admission. Patient ID:  Zane Amaral  [id number]  28 y.o.  1960    Admit date: 11/7/2021    Discharge date and time:  11/9/2021  12:08 PM    Admission Diagnoses: Principal Problem:    Acute on chronic respiratory failure with hypoxia and hypercapnia (HCC)  Active Problems:    HIV (human immunodeficiency virus infection) ([facility] [address]    Acute exacerbation of chronic obstructive pulmonary disease (COPD) (HCC)    Sleep apnea    Chronic diastolic heart failure (HCC)    Seizure (HCC)    Benign prostatic hyperplasia without lower urinary tract symptoms  Resolved Problems:    * No resolved hospital problems. *      Discharge Diagnoses: Principal Problem:    Acute on chronic respiratory failure with hypoxia and hypercapnia (HCC)  Active Problems:    HIV (human immunodeficiency virus infection) (HCC)    Acute exacerbation of chronic obstructive pulmonary disease (COPD) (HCC)    Sleep apnea    Chronic diastolic heart failure (HCC)    Seizure (HCC)    Benign prostatic hyperplasia without lower urinary tract symptoms  Resolved Problems:    * No resolved hospital problems. *      Consults:  None    Procedures: None    Hospital Course: This is a 78-year-old male with a history significant for COPD, chronic diastolic heart failure, seizure disorder, and HIV that was admitted to the hospital with acute exacerbation of COPD. Recently ran out of home inhalers. Rapid Covid test as well as respiratory PCR panel were negative.   He was treated with BiPAP initially in the ED due to significant hypercapnia and respiratory distress. Given IV Solu-Medrol for 2 days with significant improvement, and was able to come off of BiPAP the morning after admission. Also given IV azithromycin for exacerbation of COPD. Stable for discharge home on oral prednisone taper. He was also provided prescriptions for his home inhalers and nebulizer solutions; these were sent to the in-house outpatient pharmacy. Discussed returning to the emergency department immediately or seeking immediate medical attention for any recurrence or worsening symptoms. He states understanding. Discharge Exam:  Vitals:    11/08/21 1718 11/08/21 2000 11/09/21 0715 11/09/21 0800   BP:  127/75  120/85   Pulse:  78  71   Resp:  18  18   Temp:  97.5 °F (36.4 °C)  97.9 °F (36.6 °C)   TempSrc:  Oral  Oral   SpO2: 97%   98%   Weight:   188 lb 15 oz (85.7 kg)    Height:           General Appearance: alert and oriented to person, place and time and in no acute distress  Skin: warm and dry  Head: normocephalic and atraumatic  Eyes: pupils equal, round, and reactive to light, extraocular eye movements intact, conjunctivae normal  Neck: neck supple and non tender without mass   Pulmonary/Chest:  Nonlabored on room air. Diminished but otherwise clear to auscultation bilaterally. Cardiovascular: Distant, normal rate, normal S1 and S2 and no carotid bruits  Abdomen: soft, non-tender, non-distended, normal bowel sounds, no masses or organomegaly  Extremities: no cyanosis, no clubbing and no edema  Neurologic: no cranial nerve deficit and speech normal  I/O last 3 completed shifts: In: 10 [I.V.:10]  Out: -   No intake/output data recorded.       LABS:  Recent Labs     11/07/21 1157 11/09/21  0600    136   K 3.9 4.2   CL 97* 95*   CO2 32* 32*   BUN 11 16   CREATININE 0.8 0.7   GLUCOSE 153* 149*   CALCIUM 9.0 8.8       Recent Labs     11/07/21  1157 11/08/21  0646 11/09/21  0600   WBC 15.8* 11.1 23.0*   RBC 5.32 4.67 4.88   HGB 15.3 13.6 14.1   HCT 47.8 43.3 43.7   MCV 89.8 92.7 89.5   MCH 28.8 29.1 28.9   MCHC 32.0 31.4* 32.3   RDW 13.2 13.3 13.1    184 209   MPV 11.7 11.4 11.8       No results for input(s): POCGLU in the last 72 hours. Imaging:  XR CHEST PORTABLE    Result Date: 11/7/2021  EXAMINATION: ONE XRAY VIEW OF THE CHEST 11/7/2021 11:59 am COMPARISON: None. HISTORY: ORDERING SYSTEM PROVIDED HISTORY: respiratory distress TECHNOLOGIST PROVIDED HISTORY: Reason for exam:->respiratory distress FINDINGS: The cardiac silhouette is within normals. The right lung is clear There is a patchy opacity seen within the left suprahilar region which could represent superimposed vascular structures. There is no right or left pleural effusion. Vague opacity within the left suprahilar region favored to represent superimposed vascular structures. Given the respiratory distress and if clinically warranted CT of the chest can be obtained for further evaluation. CTA CHEST W CONTRAST    Result Date: 11/7/2021  EXAMINATION: CTA OF THE CHEST 11/7/2021 2:46 pm TECHNIQUE: CTA of the chest was performed after the administration of intravenous contrast.  Multiplanar reformatted images are provided for review. MIP images are provided for review. Dose modulation, iterative reconstruction, and/or weight based adjustment of the mA/kV was utilized to reduce the radiation dose to as low as reasonably achievable. COMPARISON: None. HISTORY: ORDERING SYSTEM PROVIDED HISTORY: evaluate for PE possible pna? TECHNOLOGIST PROVIDED HISTORY: Reason for exam:->evaluate for PE possible pna? Decision Support Exception - unselect if not a suspected or confirmed emergency medical condition->Emergency Medical Condition (MA) FINDINGS: Pulmonary Arteries: Pulmonary arteries are adequately opacified for evaluation. No evidence of intraluminal filling defect to suggest pulmonary embolism. Main pulmonary artery is normal in caliber.  Mediastinum: No evidence of mediastinal lymphadenopathy. The heart and pericardium demonstrate no acute abnormality. There is no acute abnormality of the thoracic aorta. Lungs/pleura: The lungs demonstrate chronic parenchymal changes and atelectasis at the lung bases. There are emphysematous changes in the upper lobes. No evidence of pleural effusion or pneumothorax. There is mucus deposit in the right and left main bronchi. Upper Abdomen: Limited images of the upper abdomen are unremarkable. Soft Tissues/Bones: No acute bone or soft tissue abnormality. No evidence of pulmonary embolism or acute pulmonary abnormality. Emphysema, upper lobes. Bibasilar subsegmental atelectasis and chronic parenchymal changes.          Patient Instructions:   Current Discharge Medication List      START taking these medications    Details   predniSONE (DELTASONE) 10 MG tablet Take 4 tabs for 3 days, then 3 tabs for 3 days, then 2 tabs for 3 days, then 1 tab for 3 days, then stop  Qty: 30 tablet, Refills: 0         CONTINUE these medications which have CHANGED    Details   albuterol sulfate  (90 Base) MCG/ACT inhaler Inhale 2 puffs into the lungs every 4 hours as needed for Wheezing or Shortness of Breath  Qty: 1 each, Refills: 2    Associated Diagnoses: Stage 4 very severe COPD by GOLD classification ([address]      fluticasone-umeclidin-vilant (TRELEGY ELLIPTA) 100-62.5-25 MCG/INH AEPB Inhale 1 puff into the lungs daily  Qty: 1 each, Refills: 2    Associated Diagnoses: Stage 4 very severe COPD by GOLD classification ([address]      ipratropium-albuterol (DUONEB) 0.5-2.5 (3) MG/3ML SOLN nebulizer solution Inhale 3 mLs into the lungs every 6 hours as needed for Shortness of Breath  Qty: 360 mL, Refills: 1         CONTINUE these medications which have NOT CHANGED    Details   hydrOXYzine (VISTARIL) 25 MG capsule Take 25 mg by mouth 4 times daily as needed for Anxiety      DULoxetine (CYMBALTA) 30 MG extended release capsule Take 30 mg by mouth nightly EPINEPHrine (EPIPEN) 0.3 MG/0.3ML SOAJ injection USE AS DIRECTED FOR ALLERGIC REACTION TO Birdia Borer  Qty: 1 each, Refills: 1    Associated Diagnoses: Severe persistent extrinsic asthma with acute exacerbation      levETIRAcetam (KEPPRA) 1000 MG tablet Take 1 tablet by mouth 2 times daily  Qty: 60 tablet, Refills: 0      metFORMIN (GLUCOPHAGE) 500 MG tablet Take 1 tablet by mouth daily (with breakfast)  Qty: 60 tablet, Refills: 0      lisinopril (PRINIVIL;ZESTRIL) 10 MG tablet Take 1 tablet by mouth daily  Qty: 30 tablet, Refills: 0      amLODIPine (NORVASC) 10 MG tablet Take 1 tablet by mouth daily  Qty: 30 tablet, Refills: 0      carvedilol (COREG) 6.25 MG tablet Take 1 tablet by mouth 2 times daily (with meals)  Qty: 60 tablet, Refills: 0      magnesium oxide (MAG-OX) 400 (240 Mg) MG tablet Take 1 tablet by mouth 2 times daily  Qty: 30 tablet, Refills: 0      hydroCHLOROthiazide (HYDRODIURIL) 25 MG tablet Take 1 tablet by mouth daily New BP pill replaces Lasix. Qty: 30 tablet, Refills: 0      atorvastatin (LIPITOR) 40 MG tablet Take 1 tablet by mouth nightly New cholesterol pill.   Qty: 30 tablet, Refills: 0      aspirin EC 81 MG EC tablet Take 1 tablet by mouth daily  Qty: 30 tablet, Refills: 0      bictegravir-emtricitab-tenofovir alafenamide (BIKTARVY) -25 MG TABS per tablet Take 1 tablet by mouth daily      cloNIDine (CATAPRES) 0.2 MG tablet Take 0.2 mg by mouth 2 times daily   Refills: 3      fluticasone (FLONASE) 50 MCG/ACT nasal spray 1 spray by Nasal route daily   Refills: 3      OXYGEN Inhale 2 L into the lungs continuous       omalizumab (XOLAIR) 150 MG injection Inject 375 mg into the skin every 14 days  Qty: 1 vial, Refills: 3      tamsulosin (FLOMAX) 0.4 MG capsule Take 0.4 mg by mouth daily          STOP taking these medications       escitalopram (LEXAPRO) 20 MG tablet Comments:   Reason for Stopping:                 Note that greater than 30 minutes was spent in preparing discharge papers, discussing discharge with patient, medication review, etc.    NOTE: This report was transcribed using voice recognition software. Every effort was made to ensure accuracy; however, inadvertent computerized transcription errors may be present.      Signed:  Electronically signed by Susan Baron DO on 11/9/2021 at 12:08 PM

## 2021-11-09 NOTE — PROGRESS NOTES
CLINICAL PHARMACY NOTE: MEDS TO BEDS    Total # of Prescriptions Filled: 2   The following medications were delivered to the patient:  · Prednisone 10mg  · Albuterol Sulf HFA [address]aers                 Additional Documentation:    Dr. Adithya Estrada also sent down scripts for Trelegy and Duoneb. Patient says he has a lot of Duoneb at home and does not need anymore. Trelegy was transferred to [facility] on [address]. We did not have it in stock and patient says that he will have his aid or his sister pick that up.

## 2021-11-09 NOTE — CARE COORDINATION
SS Note: Covid negative 11/7/21. Pt is discharged. ALIDA met with pt for transition of care. Pt stated he resides alone, first floor apartment, no steps to enter home. Stated through [address], [address] has a [address] for five days per week to assist with cooking, cleaning, personal care, and shopping. Stated his PCP is Eleanor Campo at [facility]. Patient has prescription coverage, uses [employer] program, if he requires a new medication immediately he uses [employer]. [address] Pt plans to return home upon discharge, denies any needs including [facility]. Stated has Home O2 from [employer] however hoses need replaced soon. ALIDA spoke with Lizbeth Reid at [facility] and she will arrange for new O2 hoses to be delivered to pt.   Electronically signed by MONIQUE Gant on 11/9/2021 at 3:19 PM

## 2021-11-10 ENCOUNTER — HOSPITAL ENCOUNTER (OUTPATIENT)
Dept: INFUSION THERAPY | Age: 61
Setting detail: INFUSION SERIES
End: 2021-11-10
Payer: MEDICAID

## 2021-11-11 ENCOUNTER — HOSPITAL ENCOUNTER (OUTPATIENT)
Dept: INFUSION THERAPY | Age: 61
Setting detail: INFUSION SERIES
Discharge: HOME | End: 2021-11-11
Payer: MEDICAID

## 2021-11-11 VITALS
DIASTOLIC BLOOD PRESSURE: 80 MMHG | SYSTOLIC BLOOD PRESSURE: 140 MMHG | HEART RATE: 74 BPM | RESPIRATION RATE: 24 BRPM | TEMPERATURE: 98 F | OXYGEN SATURATION: 94 %

## 2021-11-11 DIAGNOSIS — J45.909 ASTHMA, INTRINSIC: Primary | ICD-10-CM

## 2021-11-11 PROCEDURE — 96372 THER/PROPH/DIAG INJ SC/IM: CPT

## 2021-11-11 PROCEDURE — 6370000000 HC RX 637 (ALT 250 FOR IP): Performed by: INTERNAL MEDICINE

## 2021-11-11 PROCEDURE — 6360000002 HC RX W HCPCS: Performed by: INTERNAL MEDICINE

## 2021-11-11 RX ORDER — SODIUM CHLORIDE 9 MG/ML
INJECTION, SOLUTION INTRAVENOUS CONTINUOUS
Status: CANCELLED | OUTPATIENT
Start: 2021-11-24

## 2021-11-11 RX ORDER — DIPHENHYDRAMINE HCL 25 MG
25 TABLET ORAL ONCE
Status: CANCELLED
Start: 2021-11-24 | End: 2021-11-24

## 2021-11-11 RX ORDER — METHYLPREDNISOLONE SODIUM SUCCINATE 125 MG/2ML
125 INJECTION, POWDER, LYOPHILIZED, FOR SOLUTION INTRAMUSCULAR; INTRAVENOUS ONCE
Status: CANCELLED
Start: 2021-11-24 | End: 2021-11-24

## 2021-11-11 RX ORDER — METHYLPREDNISOLONE SODIUM SUCCINATE 125 MG/2ML
125 INJECTION, POWDER, LYOPHILIZED, FOR SOLUTION INTRAMUSCULAR; INTRAVENOUS ONCE
Status: CANCELLED | OUTPATIENT
Start: 2021-11-24 | End: 2021-11-24

## 2021-11-11 RX ORDER — DIPHENHYDRAMINE HCL 25 MG
25 TABLET ORAL ONCE
Status: COMPLETED | OUTPATIENT
Start: 2021-11-11 | End: 2021-11-11

## 2021-11-11 RX ORDER — EPINEPHRINE 1 MG/ML
0.3 INJECTION, SOLUTION, CONCENTRATE INTRAVENOUS PRN
Status: CANCELLED | OUTPATIENT
Start: 2021-11-24

## 2021-11-11 RX ORDER — METHYLPREDNISOLONE SODIUM SUCCINATE 125 MG/2ML
125 INJECTION, POWDER, LYOPHILIZED, FOR SOLUTION INTRAMUSCULAR; INTRAVENOUS ONCE
Status: COMPLETED | OUTPATIENT
Start: 2021-11-11 | End: 2021-11-11

## 2021-11-11 RX ORDER — DIPHENHYDRAMINE HYDROCHLORIDE 50 MG/ML
50 INJECTION INTRAMUSCULAR; INTRAVENOUS ONCE
Status: CANCELLED | OUTPATIENT
Start: 2021-11-24 | End: 2021-11-24

## 2021-11-11 RX ADMIN — DIPHENHYDRAMINE HCL 25 MG: 25 TABLET ORAL at 11:14

## 2021-11-11 RX ADMIN — OMALIZUMAB 375 MG: 150 INJECTION, SOLUTION SUBCUTANEOUS at 11:14

## 2021-11-11 RX ADMIN — METHYLPREDNISOLONE SODIUM SUCCINATE 125 MG: 125 INJECTION, POWDER, FOR SOLUTION INTRAMUSCULAR; INTRAVENOUS at 11:14

## 2021-11-12 LAB
BLOOD CULTURE, ROUTINE: NORMAL
CULTURE, BLOOD 2: NORMAL

## 2021-11-24 ENCOUNTER — HOSPITAL ENCOUNTER (OUTPATIENT)
Dept: INFUSION THERAPY | Age: 61
Setting detail: INFUSION SERIES
End: 2021-11-24
Payer: MEDICAID

## 2021-11-30 ENCOUNTER — HOSPITAL ENCOUNTER (OUTPATIENT)
Dept: INFUSION THERAPY | Age: 61
Setting detail: INFUSION SERIES
Discharge: HOME | End: 2021-11-30
Payer: MEDICAID

## 2021-11-30 VITALS
RESPIRATION RATE: 24 BRPM | HEART RATE: 80 BPM | OXYGEN SATURATION: 95 % | DIASTOLIC BLOOD PRESSURE: 67 MMHG | SYSTOLIC BLOOD PRESSURE: 128 MMHG | TEMPERATURE: 97.8 F

## 2021-11-30 DIAGNOSIS — J45.909 ASTHMA, INTRINSIC: Primary | ICD-10-CM

## 2021-11-30 PROCEDURE — 6360000002 HC RX W HCPCS: Performed by: INTERNAL MEDICINE

## 2021-11-30 PROCEDURE — 6370000000 HC RX 637 (ALT 250 FOR IP): Performed by: INTERNAL MEDICINE

## 2021-11-30 PROCEDURE — 96372 THER/PROPH/DIAG INJ SC/IM: CPT

## 2021-11-30 RX ORDER — DIPHENHYDRAMINE HCL 25 MG
25 TABLET ORAL ONCE
Status: CANCELLED
Start: 2021-12-07 | End: 2021-12-07

## 2021-11-30 RX ORDER — SODIUM CHLORIDE 9 MG/ML
INJECTION, SOLUTION INTRAVENOUS CONTINUOUS
Status: CANCELLED | OUTPATIENT
Start: 2021-12-07

## 2021-11-30 RX ORDER — DIPHENHYDRAMINE HCL 25 MG
25 TABLET ORAL ONCE
Status: COMPLETED | OUTPATIENT
Start: 2021-11-30 | End: 2021-11-30

## 2021-11-30 RX ORDER — METHYLPREDNISOLONE SODIUM SUCCINATE 125 MG/2ML
125 INJECTION, POWDER, LYOPHILIZED, FOR SOLUTION INTRAMUSCULAR; INTRAVENOUS ONCE
Status: CANCELLED
Start: 2021-12-07 | End: 2021-12-07

## 2021-11-30 RX ORDER — METHYLPREDNISOLONE SODIUM SUCCINATE 125 MG/2ML
125 INJECTION, POWDER, LYOPHILIZED, FOR SOLUTION INTRAMUSCULAR; INTRAVENOUS ONCE
Status: CANCELLED | OUTPATIENT
Start: 2021-12-07 | End: 2021-12-07

## 2021-11-30 RX ORDER — DIPHENHYDRAMINE HYDROCHLORIDE 50 MG/ML
50 INJECTION INTRAMUSCULAR; INTRAVENOUS ONCE
Status: CANCELLED | OUTPATIENT
Start: 2021-12-07 | End: 2021-12-07

## 2021-11-30 RX ORDER — EPINEPHRINE 1 MG/ML
0.3 INJECTION, SOLUTION, CONCENTRATE INTRAVENOUS PRN
Status: CANCELLED | OUTPATIENT
Start: 2021-12-07

## 2021-11-30 RX ORDER — METHYLPREDNISOLONE SODIUM SUCCINATE 125 MG/2ML
125 INJECTION, POWDER, LYOPHILIZED, FOR SOLUTION INTRAMUSCULAR; INTRAVENOUS ONCE
Status: COMPLETED | OUTPATIENT
Start: 2021-11-30 | End: 2021-11-30

## 2021-11-30 RX ADMIN — DIPHENHYDRAMINE HCL 25 MG: 25 TABLET ORAL at 13:30

## 2021-11-30 RX ADMIN — METHYLPREDNISOLONE SODIUM SUCCINATE 125 MG: 125 INJECTION, POWDER, FOR SOLUTION INTRAMUSCULAR; INTRAVENOUS at 13:32

## 2021-11-30 RX ADMIN — OMALIZUMAB 375 MG: 150 INJECTION, SOLUTION SUBCUTANEOUS at 13:33

## 2021-12-03 DIAGNOSIS — J44.9 STAGE 4 VERY SEVERE COPD BY GOLD CLASSIFICATION (HCC): ICD-10-CM

## 2021-12-03 RX ORDER — FLUTICASONE FUROATE, UMECLIDINIUM BROMIDE AND VILANTEROL TRIFENATATE 100; 62.5; 25 UG/1; UG/1; UG/1
1 POWDER RESPIRATORY (INHALATION) DAILY
Qty: 1 EACH | Refills: 6 | Status: SHIPPED
Start: 2021-12-03 | End: 2021-12-17

## 2021-12-03 NOTE — PROGRESS NOTES
refill requested from [facility] for pt. Appt not until later next year wants ton make sure pt does not run out of med refills. Script sent to [facility] as ordered.

## 2021-12-10 ENCOUNTER — HOSPITAL ENCOUNTER (OUTPATIENT)
Dept: PSYCHIATRY | Age: 61
Setting detail: THERAPIES SERIES
Discharge: HOME | End: 2021-12-10
Payer: MEDICAID

## 2021-12-10 PROCEDURE — 90791 PSYCH DIAGNOSTIC EVALUATION: CPT

## 2021-12-10 PROCEDURE — 80305 DRUG TEST PRSMV DIR OPT OBS: CPT

## 2021-12-10 ASSESSMENT — ANXIETY QUESTIONNAIRES
2. NOT BEING ABLE TO STOP OR CONTROL WORRYING: 1
5. BEING SO RESTLESS THAT IT IS HARD TO SIT STILL: 0
6. BECOMING EASILY ANNOYED OR IRRITABLE: 0
7. FEELING AFRAID AS IF SOMETHING AWFUL MIGHT HAPPEN: 0
1. FEELING NERVOUS, ANXIOUS, OR ON EDGE: 1
3. WORRYING TOO MUCH ABOUT DIFFERENT THINGS: 3
IF YOU CHECKED OFF ANY PROBLEMS ON THIS QUESTIONNAIRE, HOW DIFFICULT HAVE THESE PROBLEMS MADE IT FOR YOU TO DO YOUR WORK, TAKE CARE OF THINGS AT HOME, OR GET ALONG WITH OTHER PEOPLE: SOMEWHAT DIFFICULT
4. TROUBLE RELAXING: 3
GAD7 TOTAL SCORE: 8

## 2021-12-10 ASSESSMENT — PATIENT HEALTH QUESTIONNAIRE - PHQ9: SUM OF ALL RESPONSES TO PHQ QUESTIONS 1-9: 11

## 2021-12-10 ASSESSMENT — LIFESTYLE VARIABLES: HISTORY_ALCOHOL_USE: YES

## 2021-12-10 NOTE — FLOWSHEET NOTE
SW placed a call to [facility]  through [address] [employer] in chart, not in left message.     Electronically signed by MITALI Roa, MONIQUE on 12/10/2021 at 11:20 AM

## 2021-12-10 NOTE — PLAN OF CARE
[facility]- Level of Care Placement      [x]Admissions  []Continued Stay []Discharge/Transfer / Complication in [address] [id number]    Client Sticker      Level of Care Level 1      Outpatient Services Level 2.1   Intensive Outpatient Services(IOP) Level 2.5   Partial Hospitalization Services Level 3.1 CLINICALLY Managed Low-Intensity Residential Services Level 3.3  CLINICALLY Managed Population- Specific High- Intensity Residential Services Level 3.5  CLINICALLY Managed High Intensive Residential Services Level 3.7  MEDICALLY Monitored Intensive Inpatient Services Level 4  MEDICALLY Managed Intensive Inpatient Services   Dimension 1  Acute Intoxication and/or Withdrawal Potential [x] Not experiencing significant withdrawal    [] Minimal  risk of severe  withdrawal [] Minimal  risk of severe  withdrawal    [] Manageable  at Level 2-WM [] Moderate  risk of severe withdrawal    [] Manageable at Level 2-WM [] No withdrawal risk or minimal or stable withdrawal     [] Concurrently receiving Level -WM or Level 2-WM services [] Minimal risk of severe withdrawal    [] If withdrawal is present, manageable at Level 3. 2-WM  [] Minimal risk of severe withdrawal    [] If withdrawal is present manageable at Level 3. 2-WM [] High risk of withdrawal, but manageable at Level  3.7-WM and does not require  full resources of a licensed hospital [] At high risk of withdrawal and requires Level 4-WM and full resources of licensed hospital    COMMENTS:           Dimension 2   Biomedical Conditions and Complications  (BMC/C) [] None or very stable    [x] Receiving concurrent medical monitoring  [] None or not a distraction from treatment    [] Problems are manageable at Level 2.1 [] None or not sufficient to distract from treatment    [] Problems are manageable at  Level 2.5 [] None or stable    [] Receiving concurrent medical monitoring  [] None or stable    [] Receiving concurrent medical monitoring  [] None or stable    [] Receiving concurrent medical monitoring [] Requires 24-hour medical monitoring  but not intensive treatment [] Requires 24-hour medical & nursing care and the full  resources of a licensed hospital   COMMENTS:           Dimension 3  Emotional,  Behavioral,  or Cognitive Conditions and Complications   (EBC/C) [] None or very stable    [x] Receiving concurrent mental health monitoring [] Mild severity  with potential to distract from recovery; needs monitoring [] Mild to moderate severity, with potential to distract from recovery, needs stabilization [] None or minimal; not distracting to recovery    [] If  stable, a    co-occurring capable program is appropriate    [] If not stable, a co-occurring enhanced program is required [] Mild to moderate severity; needs structure to focus on recovery. Treatment should be designed to address significant cognitive deficits     [] If  stable, a  co-occurring capable program is appropriate    [] If not stable, a co-occurring enhanced program is required [] Demonstrates repeated inability to control impulses or unstable & dangerous signs/ symptoms require stabilization. Other functional deficits require stabilization and 24-hr.  setting to prepare for community integration  & continuing care    [] Co-occurring enhanced setting is required for those with severe & chronic mental illness [] Moderate severity;  needs 24-hour   structured setting    [] If client has co-occurring mental disorder, requires concurrent mental health services in a medically monitored setting  [] Severe and unstable problems;  requires 24-hour psychiatric care  with concomitant addiction treatment   COMMENTS:             Electronically signed by MITALI Nails, MONIQUE on 12/10/2021 at 10:30 AM               [address] Placement      [x]Admissions  []Continued Stay []Discharge/Transfer / Complication in [place] Date:12/10/2021    Client Sticker      Level of Care Level 1  Outpatient   Services Level 2.1  Intensive  Outpatient  Services Level 2.5  Partial Hospitalization Services Level 3.1  CLINICALLY Managed Low-intensity Residential Services Level 3.3  CLINICALLY Managed Population- Specific High- Intensity Residential Services Level 3.5  CLINICALLY Managed High-Intensive Residential Services Level 3.7  MEDICALLY Monitored Intensive Inpatient Services Level 4  MEDICALLY Managed Intensive Inpatient Services   Dimension 4  Readiness  To  Change [] Ready for recovery but needs motivating and monitoring strategies to strengthen readiness    []Needs ongoing monitoring and disease management    [] High severity in this dimension but not in other dimensions. Needs Level 1 motivational enhancement strategies   [x] Has variable engagement in treatment, ambivalence, or lack of awareness of substance use or mental health problems and requires structured program several times/wk. to promote progress through stages of change [] Has poor engagement in treatment, significant ambivalence, or lack of awareness of substance use or mental health problems, requires near daily structured program or intensive engagement to promote progress through stages of change [] Open to recovery, but needs structured environment to maintain therapeutic gains [] Has little awareness & needs interventions at Level 3.3 to engage & stay in treatment.     [] If there is high severity in this dimension, but not in any other dimension, motivational enhancement strategies should be provided in Level 1 [] Has marked difficulty with, or opposition to treatment with dangerous consequences    [] If there is high severity in this dimension, but not in any other dimension, motivational enhancement strategies should be provided in Level 1 [] Low interest in treatment and impulse control is poor despite negative consequences;  needs motivating strategies only safely available in 24-hour structured setting    [] If there is high severity in this dimension, but not in any other dimension, motivational enhancement strategies should be provided in Level 1 [] Problems in this dimension do not qualify the client for Level 4 services    [] If the client's only severity is in Dimension 4,5, and/or 6 without high severity in Dimensions 1,2, and/or 3, then client is not qualified for Level 4   COMMENTS:           Dimension 5  Relapse, Continued Use or Continued Problem Potential  [] Able to maintain abstinence or control use and/or addictive behaviors  and pursue recovery or motivational goals with minimal support [x] Intensification of addiction or mental health symptoms indicate high likelihood of relapse risk or continued use or continued problems without  close monitoring and support several times/wk.  [] Intensification of addiction or mental health symptoms, despite active participation in a Level 1 or 2.1 program, indicates high likelihood of relapse or continued use or continued problems without near-daily monitoring [] Understands relapse but needs structure to maintain therapeutic gains  [] Has little awareness and needs interventions available only at Level 3.3 to prevent continued use, with imminent dangerous consequences, because of cognitive deficits or  comparable dysfunction  [] Has no recognition  of the skills needed to prevent continued use,  with imminently dangerous  consequences [] Unable to control use, with imminently dangerous consequences,  despite active participation at less intensive levels of care [] Problems in this dimension do not qualify the client for Level 4 services    [] If the client's only severity is in Dimension 4,5, and/or 6 without high severity in Dimensions 1,2, and/or 3, then client is not qualified for Level 4   COMMENTS:           Dimension 6  Recovery/Living Environment []  Recovery environment is supportive and/or the client has skills to cope [x] Recovery environment is not supportive  but with structure and support, the client can cope [] Recovery environment is not supportive, but with structure and support and relief from the home environment, client can cope [] Environment    is dangerous, but recovery is achievable if Level 3.1 24-hour structure is available  [] Environment is dangerous and  client needs 24-hour structure to learn to cope [] Environment is dangerous, and the client lacks skills to cope outside of a  highly structured 24-hour setting   [] Environment is dangerous, and the client lacks skills to cope outside of a  highly structured 24-hour setting [] Problems in this dimension do not qualify the client for Level 4 services    [] If the client's only severity is in Dimension 4,5, and/or 6 without high severity in Dimensions 1,2, and/or 3, then client is not qualified for Level 4   COMMENTS:               Electronically signed by MITALI Hollis, MONIQUE on 12/10/2021 at 10:30 AM
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
## 2021-12-10 NOTE — CARE COORDINATION
DIAGNOSTIC IMPRESSIONS: Substance-Use Disorder (SUB)    Scale: DSM-V Diagnosis Code   No diagnosis                    0-1    Mild GISSELLE                          2-3    Moderate GISSELLE                 4-5 F17.20   Severe GISSELLE                      6+ F10.20, F12.20 in partial remission, F14.20     Other factors: Patient noted he has been drinking, mostly beer, every other day from 6 beers to a case. He last used on . In the past, he preferred hard Liquor but transitioned to beer and sometimes wine. He reported no tremors, diaphoresis or hallucinations. He has a long history of alcohol use beginning at the age of 13. He had been to outpatient treatment about 20 years ago and reported at least 3 years sober. He last used cannabis approximately 6 months ago and cocaine, (Crack) several weeks ago. He noted his wife  a few months ago which has contributed to his depression. Patient is on at least 20 medical medications for HTN , seizure, asthma, COPD, urology and depression. Patient is willing to start IOP on 2021. Criteria symptoms   A problematic pattern of substance use leading to clinically significant impairment or distress, as manifested by at least two of the following, occurring within a 12-month period. [x] Taken in larger amounts or over longer time than intended. [x] Persistent desire or unsuccessful efforts to cut down/control use. [x] Great deal of time spent obtaining , using, and recovering from effects. [x] Craving or strong desire to use. [] Recurrent use resulting in failure to fulfill major role obligations at work; school, or home. [x] Continued use despite persistent or recurrent social/interpersonal problems caused or exacerbated by effects. [] Giving up important social, occupational or recreational activities because of use. [x] Recurrent use in situations in which it is physically hazardous.     [] Continued use despite knowledge of persistent or recurrent physical or psychological problem likely caused/exacerbated by use. [x] Tolerance as defined by either:  · Need for increased amounts to achieve intoxication or desired effects. · Diminished effect with continued use of the same amount.     [x] Withdrawal as manifested by either:  · The characteristic withdrawal symptoms  · Using to relieve or avoid withdrawal symptoms

## 2021-12-14 ENCOUNTER — APPOINTMENT (OUTPATIENT)
Dept: PSYCHIATRY | Age: 61
End: 2021-12-14
Payer: MEDICAID

## 2021-12-16 ENCOUNTER — HOSPITAL ENCOUNTER (OUTPATIENT)
Dept: PSYCHIATRY | Age: 61
Setting detail: THERAPIES SERIES
Discharge: HOME | End: 2021-12-16
Payer: MEDICAID

## 2021-12-16 ENCOUNTER — HOSPITAL ENCOUNTER (OUTPATIENT)
Dept: INFUSION THERAPY | Age: 61
Setting detail: INFUSION SERIES
Discharge: HOME | End: 2021-12-16
Payer: MEDICAID

## 2021-12-16 VITALS
OXYGEN SATURATION: 95 % | TEMPERATURE: 98 F | RESPIRATION RATE: 22 BRPM | HEART RATE: 75 BPM | SYSTOLIC BLOOD PRESSURE: 141 MMHG | DIASTOLIC BLOOD PRESSURE: 73 MMHG

## 2021-12-16 DIAGNOSIS — J45.909 ASTHMA, INTRINSIC: Primary | ICD-10-CM

## 2021-12-16 DIAGNOSIS — J44.9 STAGE 4 VERY SEVERE COPD BY GOLD CLASSIFICATION (HCC): ICD-10-CM

## 2021-12-16 PROCEDURE — 6360000002 HC RX W HCPCS: Performed by: INTERNAL MEDICINE

## 2021-12-16 PROCEDURE — 90853 GROUP PSYCHOTHERAPY: CPT

## 2021-12-16 PROCEDURE — 80305 DRUG TEST PRSMV DIR OPT OBS: CPT

## 2021-12-16 PROCEDURE — 6370000000 HC RX 637 (ALT 250 FOR IP): Performed by: INTERNAL MEDICINE

## 2021-12-16 PROCEDURE — 96372 THER/PROPH/DIAG INJ SC/IM: CPT

## 2021-12-16 RX ORDER — DIPHENHYDRAMINE HCL 25 MG
25 TABLET ORAL ONCE
Status: CANCELLED
Start: 2021-12-30 | End: 2021-12-30

## 2021-12-16 RX ORDER — DIPHENHYDRAMINE HYDROCHLORIDE 50 MG/ML
50 INJECTION INTRAMUSCULAR; INTRAVENOUS ONCE
Status: CANCELLED | OUTPATIENT
Start: 2021-12-30 | End: 2021-12-30

## 2021-12-16 RX ORDER — METHYLPREDNISOLONE SODIUM SUCCINATE 125 MG/2ML
125 INJECTION, POWDER, LYOPHILIZED, FOR SOLUTION INTRAMUSCULAR; INTRAVENOUS ONCE
Status: CANCELLED | OUTPATIENT
Start: 2021-12-30 | End: 2021-12-30

## 2021-12-16 RX ORDER — METHYLPREDNISOLONE SODIUM SUCCINATE 125 MG/2ML
125 INJECTION, POWDER, LYOPHILIZED, FOR SOLUTION INTRAMUSCULAR; INTRAVENOUS ONCE
Status: COMPLETED | OUTPATIENT
Start: 2021-12-16 | End: 2021-12-16

## 2021-12-16 RX ORDER — EPINEPHRINE 1 MG/ML
0.3 INJECTION, SOLUTION, CONCENTRATE INTRAVENOUS PRN
Status: CANCELLED | OUTPATIENT
Start: 2021-12-30

## 2021-12-16 RX ORDER — METHYLPREDNISOLONE SODIUM SUCCINATE 125 MG/2ML
125 INJECTION, POWDER, LYOPHILIZED, FOR SOLUTION INTRAMUSCULAR; INTRAVENOUS ONCE
Status: CANCELLED
Start: 2021-12-30 | End: 2021-12-30

## 2021-12-16 RX ORDER — SODIUM CHLORIDE 9 MG/ML
INJECTION, SOLUTION INTRAVENOUS CONTINUOUS
Status: CANCELLED | OUTPATIENT
Start: 2021-12-30

## 2021-12-16 RX ORDER — DIPHENHYDRAMINE HCL 25 MG
25 TABLET ORAL ONCE
Status: COMPLETED | OUTPATIENT
Start: 2021-12-16 | End: 2021-12-16

## 2021-12-16 RX ADMIN — METHYLPREDNISOLONE SODIUM SUCCINATE 125 MG: 125 INJECTION, POWDER, FOR SOLUTION INTRAMUSCULAR; INTRAVENOUS at 14:26

## 2021-12-16 RX ADMIN — OMALIZUMAB 375 MG: 150 INJECTION, SOLUTION SUBCUTANEOUS at 14:31

## 2021-12-16 RX ADMIN — DIPHENHYDRAMINE HCL 25 MG: 25 TABLET ORAL at 14:25

## 2021-12-16 NOTE — GROUP NOTE
Date: 21  Start time: 9:00  End time: 10:15  Type of group: psychotherapy  Number of participants: 5  Mode of intervention: instillation of hope, socialization, imparting information, existential factors, catharsis, and universality. Topic: resisting urges and managing stress  Objective: maintaining sobriety. Note: Today was Ambrosio's first group session. Hippa and group rules were reviewed. Lona Olivera noted since his significant other  5 months ago  he will be spending Nat alone. He has not used in one week and he doesn't want to go back into using. One of the group members welcomed him to the group and another plans to provide him with resources and meeting he can attend on  [place] and day. He felt at ease in the group with active participation. He was given information regarding local groups. His and the group's goal is to help Lona Olivera maintain sobriety during the holiday season. Status after intervention:Improved  Participation level:  Active Listener and Interactive  Participation Quality: Appropriate, Attentive, Sharing and Supportive  Speech: normal  Thought Process/Content:Logical  Mood/Affect: calm, congruent and spontaneous  Self report: None Reported  Response to learning: Able to verbalize current knowledge/experience, Able to verbalize/acknowledge new learning, Able to retain information, Capable of insight, Able to change behavior and Progressing to goal  Discipline Responsible: /Counselor     Electronically signed by MITALI Merchant, DONW on 2021 at 11:44 AM

## 2021-12-16 NOTE — GROUP NOTE
Start Time : 56 AM   End Time: 12:00 PM   Number of participants:5  Type of group: Recovery  Mode of intervention: Education, Support, Socialization, Exploration, Clarifying, Problem-solving, Confrontation, Limit-setting, and Reality-testing  Topic: Consequences of Addiction Behaviors  Objective:  Explore and develop a clear understanding of how substance use leads to negative consequences. Note: Client actively participated in the group session. Client was asked to share and identify 1-2 consequences that have happened as a result of substance use. Client described to the group feeling ashamed and embarrassed as a negative consequence of his substance use. Client was asked to identify and describe one strength he could use to eliminate negative consequences from substance use and he verbalized reaching out to others to avoid isolation as a skill he needs to practice. Status after intervention:Improved  Participation level:  Active Listener and Interactive  Participation Quality: Appropriate, Attentive, Sharing and Supportive  Speech: normal  Thought Process/Content:Logical  Mood/Affect: calm  Self report: None Reported  Response to learning: Able to verbalize current knowledge/experience, Able to verbalize/acknowledge new learning, Able to retain information and Capable of insight  Discipline Responsible: [place]  Electronically signed by Ben Carreon on 63/61/5143 at 2:08 PM

## 2021-12-17 RX ORDER — FLUTICASONE FUROATE, UMECLIDINIUM BROMIDE AND VILANTEROL TRIFENATATE 100; 62.5; 25 UG/1; UG/1; UG/1
POWDER RESPIRATORY (INHALATION)
Qty: 60 EACH | Refills: 0 | Status: SHIPPED
Start: 2021-12-17 | End: 2022-01-12

## 2021-12-21 ENCOUNTER — HOSPITAL ENCOUNTER (OUTPATIENT)
Dept: PSYCHIATRY | Age: 61
Setting detail: THERAPIES SERIES
Discharge: HOME | End: 2021-12-21
Payer: MEDICAID

## 2021-12-21 PROCEDURE — 80305 DRUG TEST PRSMV DIR OPT OBS: CPT

## 2021-12-21 PROCEDURE — H0015 ALCOHOL AND/OR DRUG SERVICES: HCPCS

## 2021-12-21 NOTE — GROUP NOTE
Date: 21  Start Time: 9:00  End Time: 10:15  Number of participants: 9  Type of group: psychotherapy  Mode of intervention: Existential factors, altruism, Instillation of hope, imparting information, correction of the primary family, and group cohesiveness. Topic: Holiday stress and family's  Objective: maintaining sobriety during the holidays      Note:  Jon Gentile was active in group, and the group enjoys his input. He noted after his significant other  a few months ago he would use cocaine to help him feel better, but it did not, it made him fell worse, He noted he relapsed on alcohol since his last group. He is still struggling with the death of his significant other. He most likely will be alone over [place], but we passed out handouts for groups and [place] meals which will be held on Nat day. He has limited transportation and he relies on public transportation to get to and from here. If he is able he will make a [place] meal and meeting. Status after intervention:Improved  Participation level:  Active Listener  Participation Quality: Appropriate, Attentive and Sharing  Speech: normal  Thought Process/Content:Logical  Mood/Affect: calm and congruent  Self report: None Reported  Response to learning: Able to verbalize current knowledge/experience, Able to verbalize/acknowledge new learning, Able to retain information, Capable of insight, Able to change behavior and Progressing to goal  Discipline Responsible: /Counselor      Electronically signed by MITALI Ulloa, MONIQUE on 2021 at 11:45 AM

## 2021-12-30 ENCOUNTER — HOSPITAL ENCOUNTER (OUTPATIENT)
Dept: PSYCHIATRY | Age: 61
Setting detail: THERAPIES SERIES
Discharge: HOME | End: 2021-12-30
Payer: MEDICAID

## 2021-12-30 PROCEDURE — 80305 DRUG TEST PRSMV DIR OPT OBS: CPT

## 2021-12-30 PROCEDURE — H0015 ALCOHOL AND/OR DRUG SERVICES: HCPCS

## 2021-12-30 NOTE — GROUP NOTE
Start Time: 56 AM  End Time: 12:00 PM  Number of participants:8  Type of group: Recovery  Mode of intervention: Education, Support, Socialization, Exploration, Clarifying, Problem-solving, Confrontation, Limit-setting, and Reality-testing  Topic: Spirituality   Objective: Explore and understand how having a spiritual connection aids sobriety efforts. Note: Client actively participated in the group session. Client's spiritually journey and feelings toward higher power were explored and client identified God as higher power and  verbalized 2-4 ways having  letitia in higher power can aid relapse prevention and help with cravings & urges. Status after intervention:Improved  Participation level:  Active Listener and Interactive  Participation Quality: Appropriate, Attentive, Sharing and Supportive  Speech: normal  Thought Process/Content:Logical  Mood/Affect: congruent  Self report: None Reported  Response to learning: Able to verbalize current knowledge/experience, Able to verbalize/acknowledge new learning, Able to retain information, Capable of insight and Able to change behavior  Discipline Responsible: T[address]  Electronically signed by Tania Weller [address] on 96/69/1330 at 12:02 PM

## 2021-12-30 NOTE — GROUP NOTE
Date: 12-30-21  Start Time: 9:00  End Time: 10:15  Type of group: Psychotherapy  Number of participants: 8  Mode of intervention: Existential factors, Instillation of hope, Imparting of information, Altruism, Group Cohesiveness, and Catharsis. Topic: Reviewing Nat holiday  Objective to learn from others experiences. Note: Wang noted there had been a problem with his transportation as to why he had not been here. Apparently, now the situation has been stabilized. Shahla Parker noted he spent [place] alone with his dog. He noted he relapsed on a glass of  alcohol. Because he lives alone and is isolated, group members offered to transport him to meetings and to help him set up zoom meetings on his phone. Shahla Parker noted the group is a support for him and he enjoys attending. He was screened after the group. Status after intervention:Improved  Participation level:  Active Listener and Interactive  Participation Quality: Appropriate, Attentive, Sharing and Supportive  Speech: normal  Thought Process/Content:Logical  Mood/Affect: calm and congruent  Self report: None Reported  Response to learning: Able to verbalize current knowledge/experience, Able to verbalize/acknowledge new learning, Able to retain information, Capable of insight, Able to change behavior and Progressing to goal  Discipline Responsible: /Counselor     Electronically signed by MITALI Tena LSW on 12/30/2021 at 1:34 PM

## 2022-01-03 ENCOUNTER — HOSPITAL ENCOUNTER (OUTPATIENT)
Dept: PSYCHIATRY | Age: 62
Setting detail: THERAPIES SERIES
Discharge: HOME | End: 2022-01-03
Payer: MEDICAID

## 2022-01-03 PROCEDURE — H0015 ALCOHOL AND/OR DRUG SERVICES: HCPCS

## 2022-01-03 PROCEDURE — 90791 PSYCH DIAGNOSTIC EVALUATION: CPT | Performed by: PSYCHIATRY & NEUROLOGY

## 2022-01-03 PROCEDURE — 80305 DRUG TEST PRSMV DIR OPT OBS: CPT

## 2022-01-03 NOTE — GROUP NOTE
Start Time: 56 AM  End Time: 12:00 PM Number of participants:3  Type of group: Recovery  Mode of intervention: Education, Support, Socialization, Exploration, Clarifying, Problem-solving, Media, Confrontation, Limit-setting, and Reality-testing  Topic: The Effects of Alcohol on the body  Objective: To increase clients awareness and dangers related to how usage and the impact on the body. Note: Client actively participated in the group session. He was assigned to view education \" Drugged on Alcohol \" by Stroz Friedberg , Documentary. Client related to the education and identified and verbalized he is placing his life in danger by drinking alcohol and taking prescribed medicine as insight gained from the education. Status after intervention:Improved  Participation level:  Active Listener and Interactive  Participation Quality: Appropriate, Attentive, Sharing and Supportive  Speech: normal  Thought Process/Content:Logical  Mood/Affect: congruent  Self report: None Reported  Response to learning: Able to verbalize current knowledge/experience, Able to verbalize/acknowledge new learning, Able to retain information, Capable of insight and Able to change behavior  Discipline Responsible: [place]  Electronically signed by Sylvain Lester on 0/8/5869 at 1:46 PM

## 2022-01-03 NOTE — BH NOTE
CHIEF COMPLAINT:     HISTORY OF PRESENT ILLNESS:  60 y/o AA M h/o EtOH abuse as documented below by MITALI,MONIQUE    \" Patient noted his case manger from [facility], [place], [phone] referred him for IOP treatment.     Patient Report and Notes: Patient noted he has been drinking, mostly beer, every other day from 6 beers to a case. He last used on . In the past he preferred hard Liquor but transitioned to beer. He reported no tremors, diaphoresis or hallucinations. He has a long history of alcohol use beginning at the age of 13. He had been to outpatient treatment about 20  Years ago and reported at least 3 years sober. He last used cannabis approximately 6 months ago and cocaine, (Crack) several weeks ago. He noted his wife  a few months ago which has contributed to his depression. \"       Reported O/P TX at [facility] stayed sober 7 year. He lost his his wife 5 months ago. Alcoholuse escalated after wife's death. He said he was trying to escape the pain. He says his nurses aid suggested he get [place] for his alcohol use. Current Alcohol Use Including Type/Amount of Ingestion/Frequency Current   He admits to sporadic alcohol use since mid Dec. Drank 2 beers on New Years. Opioids- no  BNZ/Sedatives- no  Cocaine- past daily use age early 17's until smoking and snorting. Amphetamines- no  THC-sporadic since teens none none lasts year  Hallucinogens-no      DX Criteria for Alcohol Use Disorder  **Give examples below  Consumed more alcohol or spent more time drinking than intended. Y    Want to limit or halt alcohol use, but havent succeeded. N    Spends an inordinate duration drinking, being ill and undergoing the aftereffects of alcohol use. N    Strong cravings for alcohol.  Y past    Consuming alcohol or becoming ill because of it has kept the person from properly attending to household duties and children, or resulted in difficulties performing on the job or at school. N    Continued drinking in spite of it causing problems with family and loved ones. N    Discontinue or are only sporadically involved with things that were once enjoyable or important to be able to drink. Y    Have repeatedly been in situations during the consumption of alcohol that have increased the chance of being injured (using machinery, driving). Y    Even though a person feels sad or distressed, or it affects an already existing health problem, the person continues to drink. Or, after episodes of forgetting or going blank about the events during drinking, the individual continues to use alcohol. ? Have to increase drinking to get the results he wants. (The usual amount of alcohol provides little results.) y/N    When alcohol wears off, causing symptoms like such as insomnia, difficulty staying asleep, aggravation, nervousness, sadness, stomach upset and nausea and/or perspiring. Or, the person felt items were there, but they are actually not. N    BlackoutsY  Withdrawal SZ N  AM Drinking due to tremors N     DSM-5 (305.00 F10.10) Mild AUD Diagnosis  The presence of two symptoms in the criteria signify mild AUD. (Mild AUD is sometimes referred to as problem drinking. ) --Mild to Mod    DSM-5 (303.90 F10.20) Moderate AUD Diagnosis  When an individual has four to five criteria, the AUD diagnosis is considered moderate. (Moderate AUD is sometimes referred to as problem drinking. )    DSM-5 (303.90 F10.20) Severe AUD Diagnosis  Individuals who have six or more of the criteria are diagnosed with severe AUD    CIWA-AR  NA    1) . Do you feel sick to your stomach and have you vomited?  0  No nausea and no vomiting    1  mild nausea with no vomiting    2,3,4  intermittent nausea with dry heaves    5,6,7  constant nausea, frequent dry heaves and vomiting    2) Do you have any itching, pins and needles sensations, any burning, any numbness, or do you feel bugs crawling on or under your skin?  0  No tactile disturbances    1  very mild itching, pins and needles, burning or numbness    2  mild itching, pins and needles, burning or numbness    3  moderate itching, pins and needles, burning or numbness    4  moderately severe hallucinations    5  severe hallucinations    6  extremely severe hallucinations    7  continual severe hallucinations    3) Observe tremors in the patient by having the patient extend their arms and spread their fingers. 0  no tremor    1  not visible, but can be felt fingertip to fingertip    2,3,4  moderate, with patients arms extended    5,6,7  severe, even with arms not extended    4) Are you hearing things you know are not there? (And other related questions)  0  not present    1  very mild harshness or ability to frighten 2 mild harshness or ability to frighten    3  moderate harshness or ability to frighten    4  moderately severe hallucinations    5  severe hallucinations    6  extremely severe hallucinations    7  continuous hallucinations    5) Paroxysmal Sweats  Observation. 0  no sweat visible    1  barely perceptible sweating, palms moist    2,3,4  beads of sweat obvious on forehead    5,6,7  drenching sweats    6) Are you seeing anything that is disturbing to you? Are you seeing things you know are not there?  0  not present    1  very mild sensitivity    2  mild sensitivity    3  moderate sensitivity    4  moderately severe hallucinations    5  severe hallucinations    6  extremely severe hallucinations    7  continuous hallucinations    7) Do you feel nervous?  0  no anxiety, at ease    1  mild anxious    2,3,4  moderately anxious, or guarded, so anxiety is inferred    5,6,7  equivalent to acute panic states as seen in severe delirium or acute schizophrenic reactions    8) Does your head hurt or feel different?  Does it feel like there is a band around your head?  0  no present    1  very mild    2  mild    3  moderate    4  moderately severe    5  severe    6  very severe    7  extremely severe    9) Agitation  Observation. 0  normal activity    1  somewhat more than normal activity    2,3,4  moderately fidgety and restless    5,6,7  paces back and forth during most of the interview, or constantly thrashes about    10) What day is this? Where are you? Who am I?  0  oriented and can do serial additions    1  cannot do serial additions or is uncertain about date    2  disoriented for date by no more than 2 calendar days    3  disoriented for date by more than 2 calendar days    4  disoriented for place/or person        Past GISSELLE [place]    In [address]  Counseling     Residential-No    PAST PSYCHIATRIC HISTORY:    Denies in In PT [place]  Denies [place] by Psychiatry.      TX for Depression by Primary Care     PAST MEDICAL HISTORY:  Depression    HIV (human immunodeficiency virus infection) ([address]    Tobacco abuse    Chronic obstructive pulmonary disease (HCC)    Seizure ([address]    Benign prostatic hyperplasia without lower urinary tract symptoms    Bronchospasm, acute    SIRS (systemic inflammatory response syndrome) (HCC)    Acute on chronic diastolic CHF (congestive heart failure) (HCC)    Lactic acidosis    Poorly-controlled hypertension    Alcohol abuse    Asthma exacerbation with COPD (chronic obstructive pulmonary disease) (HCC)    Asthma, intrinsic    Acute exacerbation of chronic obstructive pulmonary disease (COPD) ([address]    Sleep apnea    Acute on chronic respiratory failure with hypercapnia (HCC)    Chronic diastolic heart failure (HCC)    Chest pain on exertion    COPD exacerbation (HCC)    Acute on chronic respiratory failure with hypoxemia (HCC)    Type 2 diabetes mellitus, without long-term current use of insulin (HCC)    Asthma without status asthmaticus    Acute on chronic respiratory failure with hypoxia and hypercapnia ([address]        MEDICAL ROS:      PAST SURGICAL HISTORY:    -MEDICATIONS:    TRELEGY ELLIPTA 100-62.5-25 MCG/INH AEPB INHALE 1 PUFF INTO LUNGS DAILY    albuterol sulfate  (90 Base) MCG/ACT inhaler Inhale 2 puffs into the lungs every 4 hours as needed for Wheezing or Shortness of Breath    ipratropium-albuterol (DUONEB) 0.5-2.5 (3) MG/3ML SOLN nebulizer solution Inhale 3 mLs into the lungs every 6 hours as needed for Shortness of Breath    predniSONE (DELTASONE) 10 MG tablet Take 4 tabs for 3 days, then 3 tabs for 3 days, then 2 tabs for 3 days, then 1 tab for 3 days, then stop    hydrOXYzine (VISTARIL) 25 MG capsule Take 25 mg by mouth 4 times daily as needed for Anxiety    DULoxetine (CYMBALTA) 30 MG extended release capsule Take 30 mg by mouth nightly     EPINEPHrine (EPIPEN) 0.3 MG/0.3ML SOAJ injection USE AS DIRECTED FOR ALLERGIC REACTION TO XOLAIR    levETIRAcetam (KEPPRA) 1000 MG tablet Take 1 tablet by mouth 2 times daily    metFORMIN (GLUCOPHAGE) 500 MG tablet Take 1 tablet by mouth daily (with breakfast)    lisinopril (PRINIVIL;ZESTRIL) 10 MG tablet Take 1 tablet by mouth daily    amLODIPine (NORVASC) 10 MG tablet Take 1 tablet by mouth daily    carvedilol (COREG) 6.25 MG tablet Take 1 tablet by mouth 2 times daily (with meals)    magnesium oxide (MAG-OX) 400 (240 Mg) MG tablet Take 1 tablet by mouth 2 times daily    hydroCHLOROthiazide (HYDRODIURIL) 25 MG tablet Take 1 tablet by mouth daily New BP pill replaces Lasix. atorvastatin (LIPITOR) 40 MG tablet Take 1 tablet by mouth nightly New cholesterol pill.     aspirin EC 81 MG EC tablet Take 1 tablet by mouth daily    bictegravir-emtricitab-tenofovir alafenamide (BIKTARVY) -25 MG TABS per tablet Take 1 tablet by mouth daily    cloNIDine (CATAPRES) 0.2 MG tablet Take 0.2 mg by mouth 2 times daily     fluticasone (FLONASE) 50 MCG/ACT nasal spray 1 spray by Nasal route daily     OXYGEN Inhale 2 L into the lungs continuous     omalizumab (XOLAIR) 150 MG injection Inject 375 mg into the skin every 14 days    tamsulosin (FLOMAX) 0.4 MG capsule Take 0.4 mg by mouth daily        ALLERGIES:  NKDA    FAMILY GISSELLE/ PSYCHIATRIC HISTORY:    Unsure     SOCIAL HISTORY:  Born in [place] 1 of 6   Developmental- Father- Unclear Mother- GISELA Says he stopped living with parents as adult Mother was murdered when he was 10 y/o by her   Education- HS grad  Marital- 1x age 1 years wife  COPD  Children-  Son age 15 Lives with Mother in [address] DUI  2-3 years halfway vague           LABS: reviewed and Neg    MENTAL STATUS EXAMINATION  Appearance: Casually dressed black male hat on has a mask. T shirt   Behavior: cooperative but evasive   Mood: I think its getting better\"     Affect: nl range  Speech: nl   Thought Process: organised but short blunted answer   Thought Content: help seeking but guarded    Perception: nl   Orientation: nl   Concentration: nl   Memory: grossly intact   Insight: fair       ASSESSMENT:   Alcohol Dep in remission   H/O Cocaine THC use  Depression    HIV (human immunodeficiency virus infection) ([facility])    Tobacco abuse    Chronic obstructive pulmonary disease ([facility])    Seizure ([facility])    Benign prostatic hyperplasia without lower urinary tract symptoms    Bronchospasm, acute    SIRS (systemic inflammatory response syndrome) ([facility])    Acute on chronic diastolic CHF (congestive heart failure) ([facility])    Lactic acidosis    Poorly-controlled hypertension    Alcohol abuse    Asthma exacerbation with COPD (chronic obstructive pulmonary disease) ([facility])    Asthma, intrinsic    Acute exacerbation of chronic obstructive pulmonary disease (COPD) ([facility])    Sleep apnea    Acute on chronic respiratory failure with hypercapnia ([facility])    Chronic diastolic heart failure ([facility])    Chest pain on exertion    COPD exacerbation ([facility])    PLAN & RECOMMENDATIONS:   Cont Individual and group therapy  Concealed pt on tobacco reduction.     1 hr spent in evaluation       OARRS reviewed and negative

## 2022-01-04 ENCOUNTER — HOSPITAL ENCOUNTER (OUTPATIENT)
Dept: PSYCHIATRY | Age: 62
Setting detail: THERAPIES SERIES
Discharge: HOME | End: 2022-01-04
Payer: MEDICAID

## 2022-01-04 PROCEDURE — H0015 ALCOHOL AND/OR DRUG SERVICES: HCPCS

## 2022-01-04 NOTE — GROUP NOTE
Date: 1-4-21  Start Time: 9:00  End Time: 10:15  Type of group: Psychotherapy  Number of participants: 5  Mode of intervention: existential factors, catharsis, group cohesiveness, altruism, interpersonal learning and universality. Topic: comparing sobriety to using  Objective: To recognize the differences living a sober life vs using. Note: Yamel Zamorano noted he had a recent relapse on alcohol this past weekend. He drank two bottles. He has no more in the house. He was confronted by at least two peers on his alcohol use because he minimized his use. Other group members have cross used with alcohol in the past and noted that it is as serious as street drugs, especially if you are being prescribed medications. We discussed the dangers of alcohol use and how it can lead to cognitive decline amoung other issues. Yamel Zamorano will also attend his local [employer] this weekend to get credit for a sober meeting because he has no transportation. He may also supplement his meetings through zoom. Status after intervention:Improved  Participation level:  Active Listener and Interactive  Participation Quality: Appropriate, Attentive, Sharing and Supportive  Speech: normal  Thought Process/Content:Logical  Mood/Affect: calm and congruent  Self report: None Reported  Response to learning: Able to verbalize current knowledge/experience, Able to verbalize/acknowledge new learning, Able to retain information, Capable of insight, Able to change behavior and Progressing to goal  Discipline Responsible: /Counselor     Electronically signed by MITALI Hood LSW on 1/4/2022 at 11:16 AM

## 2022-01-04 NOTE — PLAN OF CARE
[facility]- Level of Care Placement      []Admissions  []Continued Stay [x]Complication in [address] Relapse Alcohol Date:12/30/21          Level of Care Level 1      Outpatient Services Level 2.1   Intensive Outpatient Services(IOP) Level 2.5   Partial Hospitalization Services Level 3.1 CLINICALLY Managed Low-Intensity Residential Services Level 3.3  CLINICALLY Managed Population- Specific High- Intensity Residential Services Level 3.5  CLINICALLY Managed High Intensive Residential Services Level 3.7  MEDICALLY Monitored Intensive Inpatient Services Level 4  MEDICALLY Managed Intensive Inpatient Services   Dimension 1  Acute Intoxication and/or Withdrawal Potential [x] Not experiencing significant withdrawal    [] Minimal  risk of severe  withdrawal [] Minimal  risk of severe  withdrawal    [] Manageable  at Level 2-WM [] Moderate  risk of severe withdrawal    [] Manageable at Level 2-WM [] No withdrawal risk or minimal or stable withdrawal     [] Concurrently receiving Level -WM or Level 2-WM services [] Minimal risk of severe withdrawal    [] If withdrawal is present, manageable at Level 3. 2-WM  [] Minimal risk of severe withdrawal    [] If withdrawal is present manageable at Level 3. 2-WM [] High risk of withdrawal, but manageable at Level  3.7-WM and does not require  full resources of a licensed hospital [] At high risk of withdrawal and requires Level 4- and full resources of licensed hospital    COMMENTS:           Dimension 2   Biomedical Conditions and Complications  (BMC/C) [] None or very stable    [x] Receiving concurrent medical monitoring  [] None or not a distraction from treatment    [] Problems are manageable at Level 2.1 [] None or not sufficient to distract from treatment    [] Problems are manageable at  Level 2.5 [] None or stable    [] Receiving concurrent medical monitoring  [] None or stable    [] Receiving concurrent medical monitoring  [] None or stable    [] Receiving concurrent medical monitoring [] Requires 24-hour medical monitoring  but not intensive treatment [] Requires 24-hour medical & nursing care and the full  resources of a licensed hospital   COMMENTS:           Dimension 3  Emotional,  Behavioral,  or Cognitive Conditions and Complications   (EBC/C) [] None or very stable    [] Receiving concurrent mental health monitoring [x] Mild severity  with potential to distract from recovery; needs monitoring [] Mild to moderate severity, with potential to distract from recovery, needs stabilization [] None or minimal; not distracting to recovery    [] If  stable, a    co-occurring capable program is appropriate    [] If not stable, a co-occurring enhanced program is required [] Mild to moderate severity; needs structure to focus on recovery. Treatment should be designed to address significant cognitive deficits     [] If  stable, a  co-occurring capable program is appropriate    [] If not stable, a co-occurring enhanced program is required [] Demonstrates repeated inability to control impulses or unstable & dangerous signs/ symptoms require stabilization. Other functional deficits require stabilization and 24-hr.  setting to prepare for community integration  & continuing care    [] Co-occurring enhanced setting is required for those with severe & chronic mental illness [] Moderate severity;  needs 24-hour   structured setting    [] If client has co-occurring mental disorder, requires concurrent mental health services in a medically monitored setting  [] Severe and unstable problems;  requires 24-hour psychiatric care  with concomitant addiction treatment   COMMENTS:             Electronically signed by Tania Payne [place] 320 on 29/90/44 at 11:03 AM                   [address] Placement      []Admissions  []Continued Stay [x]Complication in [address] [place] [address] 153- Relapse to Alcohol  Date:12/30/21          Level of Care Level 1  Outpatient   Services Level 2.1  Intensive  Outpatient  Services Level 2.5  Partial Hospitalization Services Level 3.1  CLINICALLY Managed Low-intensity Residential Services Level 3.3  CLINICALLY Managed Population- Specific High- Intensity Residential Services Level 3.5  CLINICALLY Managed High-Intensive Residential Services Level 3.7  MEDICALLY Monitored Intensive Inpatient Services Level 4  MEDICALLY Managed Intensive Inpatient Services   Dimension 4  Readiness  To  Change [] Ready for recovery but needs motivating and monitoring strategies to strengthen readiness    []Needs ongoing monitoring and disease management    [] High severity in this dimension but not in other dimensions. Needs Level 1 motivational enhancement strategies   [x] Has variable engagement in treatment, ambivalence, or lack of awareness of substance use or mental health problems and requires structured program several times/wk. to promote progress through stages of change [] Has poor engagement in treatment, significant ambivalence, or lack of awareness of substance use or mental health problems, requires near daily structured program or intensive engagement to promote progress through stages of change [] Open to recovery, but needs structured environment to maintain therapeutic gains [] Has little awareness & needs interventions at Level 3.3 to engage & stay in treatment.     [] If there is high severity in this dimension, but not in any other dimension, motivational enhancement strategies should be provided in Level 1 [] Has marked difficulty with, or opposition to treatment with dangerous consequences    [] If there is high severity in this dimension, but not in any other dimension, motivational enhancement strategies should be provided in Level 1 [] Low interest in treatment and impulse control is poor despite negative consequences;  needs motivating strategies only safely available in 24-hour structured setting    [] If supportive  but with structure and support, the client can cope [] Recovery environment is not supportive, but with structure and support and relief from the home environment, client can cope [] Environment    is dangerous, but recovery is achievable if Level 3.1 24-hour structure is available  [] Environment is dangerous and  client needs 24-hour structure to learn to cope [] Environment is dangerous, and the client lacks skills to cope outside of a  highly structured 24-hour setting   [] Environment is dangerous, and the client lacks skills to cope outside of a  highly structured 24-hour setting [] Problems in this dimension do not qualify the client for Level 4 services    [] If the client's only severity is in Dimension 4,5, and/or 6 without high severity in Dimensions 1,2, and/or 3, then client is not qualified for Level 4   COMMENTS:               Electronically signed by Tania Alaniz [place] 320 on 89/95/66 at 11:04 AM

## 2022-01-04 NOTE — PLAN OF CARE
or stable    [] Receiving concurrent medical monitoring [] Requires 24-hour medical monitoring  but not intensive treatment [] Requires 24-hour medical & nursing care and the full  resources of a licensed hospital   COMMENTS:           Dimension 3  Emotional,  Behavioral,  or Cognitive Conditions and Complications   (EBC/C) [] None or very stable    [] Receiving concurrent mental health monitoring [x] Mild severity  with potential to distract from recovery; needs monitoring [] Mild to moderate severity, with potential to distract from recovery, needs stabilization [] None or minimal; not distracting to recovery    [] If  stable, a    co-occurring capable program is appropriate    [] If not stable, a co-occurring enhanced program is required [] Mild to moderate severity; needs structure to focus on recovery. Treatment should be designed to address significant cognitive deficits     [] If  stable, a  co-occurring capable program is appropriate    [] If not stable, a co-occurring enhanced program is required [] Demonstrates repeated inability to control impulses or unstable & dangerous signs/ symptoms require stabilization. Other functional deficits require stabilization and 24-hr.  setting to prepare for community integration  & continuing care    [] Co-occurring enhanced setting is required for those with severe & chronic mental illness [] Moderate severity;  needs 24-hour   structured setting    [] If client has co-occurring mental disorder, requires concurrent mental health services in a medically monitored setting  [] Severe and unstable problems;  requires 24-hour psychiatric care  with concomitant addiction treatment   COMMENTS:             Electronically signed by Laith Ham [place] on 2/3/05 at [address] [place] [address]      []Admissions  []Continued Stay [x]Complication in [address] [place] [address] 153- Relapse to Alcohol Date:1/3/22          Level of Care Level 1  Outpatient   Services Level 2.1  Intensive  Outpatient  Services Level 2.5  Partial Hospitalization Services Level 3.1  CLINICALLY Managed Low-intensity Residential Services Level 3.3  CLINICALLY Managed Population- Specific High- Intensity Residential Services Level 3.5  CLINICALLY Managed High-Intensive Residential Services Level 3.7  MEDICALLY Monitored Intensive Inpatient Services Level 4  MEDICALLY Managed Intensive Inpatient Services   Dimension 4  Readiness  To  Change [] Ready for recovery but needs motivating and monitoring strategies to strengthen readiness    []Needs ongoing monitoring and disease management    [] High severity in this dimension but not in other dimensions. Needs Level 1 motivational enhancement strategies   [x] Has variable engagement in treatment, ambivalence, or lack of awareness of substance use or mental health problems and requires structured program several times/wk. to promote progress through stages of change [] Has poor engagement in treatment, significant ambivalence, or lack of awareness of substance use or mental health problems, requires near daily structured program or intensive engagement to promote progress through stages of change [] Open to recovery, but needs structured environment to maintain therapeutic gains [] Has little awareness & needs interventions at Level 3.3 to engage & stay in treatment.     [] If there is high severity in this dimension, but not in any other dimension, motivational enhancement strategies should be provided in Level 1 [] Has marked difficulty with, or opposition to treatment with dangerous consequences    [] If there is high severity in this dimension, but not in any other dimension, motivational enhancement strategies should be provided in Level 1 [] Low interest in treatment and impulse control is poor despite negative consequences;  needs motivating strategies only safely available in 24-hour structured setting    [] If there is high severity in this dimension, but not in any other dimension, motivational enhancement strategies should be provided in Level 1 [] Problems in this dimension do not qualify the client for Level 4 services    [] If the client's only severity is in Dimension 4,5, and/or 6 without high severity in Dimensions 1,2, and/or 3, then client is not qualified for Level 4   COMMENTS:           Dimension 5  Relapse, Continued Use or Continued Problem Potential  [] Able to maintain abstinence or control use and/or addictive behaviors  and pursue recovery or motivational goals with minimal support [x] Intensification of addiction or mental health symptoms indicate high likelihood of relapse risk or continued use or continued problems without  close monitoring and support several times/wk.  [] Intensification of addiction or mental health symptoms, despite active participation in a Level 1 or 2.1 program, indicates high likelihood of relapse or continued use or continued problems without near-daily monitoring [] Understands relapse but needs structure to maintain therapeutic gains  [] Has little awareness and needs interventions available only at Level 3.3 to prevent continued use, with imminent dangerous consequences, because of cognitive deficits or  comparable dysfunction  [] Has no recognition  of the skills needed to prevent continued use,  with imminently dangerous  consequences [] Unable to control use, with imminently dangerous consequences,  despite active participation at less intensive levels of care [] Problems in this dimension do not qualify the client for Level 4 services    [] If the client's only severity is in Dimension 4,5, and/or 6 without high severity in Dimensions 1,2, and/or 3, then client is not qualified for Level 4   COMMENTS:           Dimension 6  Recovery/Living Environment []  Recovery environment is supportive and/or the client has skills to cope [x] Recovery environment is not supportive  but with structure and support, the client can cope [] Recovery environment is not supportive, but with structure and support and relief from the home environment, client can cope [] Environment    is dangerous, but recovery is achievable if Level 3.1 24-hour structure is available  [] Environment is dangerous and  client needs 24-hour structure to learn to cope [] Environment is dangerous, and the client lacks skills to cope outside of a  highly structured 24-hour setting   [] Environment is dangerous, and the client lacks skills to cope outside of a  highly structured 24-hour setting [] Problems in this dimension do not qualify the client for Level 4 services    [] If the client's only severity is in Dimension 4,5, and/or 6 without high severity in Dimensions 1,2, and/or 3, then client is not qualified for Level 4   COMMENTS:               Electronically signed by Tania Siddiqui [place] 320 on 1/8/59 at 63:35 AM

## 2022-01-04 NOTE — PLAN OF CARE
Client discussed in treatment team today. Present:    Ismael Cruz. Nguyen Rodriguez and Kelsea Vargas. Current Status: In IOP    Treatment goals:    [x] Relapse prevention  [x] Increase sober support  [x] Increase attendance in sober support groups  [x] [place] effectively with cravings and urges  [] Other:     Recommendation: Client verbalized he relapsed on alcohol the past weekend. He had trouble with transportation because the company providing transportation was delinquent providing the transportation. He suffers from social isolation and lacks transportation to meetings. He was encouraged to attend his local [employer] for support.     Electronically signed by MITALI Cedillo, MONIQUE on 1/4/2022 at 3:52 PM

## 2022-01-04 NOTE — GROUP NOTE
Start Time: 65 AM  End Time: 12:00 PM   Number of participants:5  Type of group: Relapse Prevention  Mode of intervention: Education, Support, Socialization, Exploration, Clarifying, Problem-solving, Confrontation, Limit-setting, and Reality-testing  Topic: Cross Addiction  Objective:  Explore and and increase awareness of how any substance use can cause a return to relapse behaviors. Note: Client actively participated in the group session. She was assigned to view education : Cross addition the Back Door to Relapse \" by [employer]. Client verbalized insight by verbalizing cross addiction is exchanging for substance for another, however when sharing about past (2) relapses client still minmizes his alcohol use stating \" it was just (2) beers\". Counselor processed with client that beer is alcohol and is still causing harm to his health and not allowing him to meet his sobriety goals. Status after intervention:Unchanged  Participation level:  Active Listener and Interactive  Participation Quality: Appropriate, Attentive, Sharing and Resistant  Speech: normal  Thought Process/Content:Logical  Mood/Affect: congruent  Self report: None Reported  Response to learning: Able to verbalize current knowledge/experience, Able to retain information, Capable of insight and Resistant  Discipline Responsible: [place]  Electronically signed by Francoise Casarez on 9/8/9070 at 1:48 PM

## 2022-01-07 ENCOUNTER — HOSPITAL ENCOUNTER (OUTPATIENT)
Dept: INFUSION THERAPY | Age: 62
Setting detail: INFUSION SERIES
Discharge: HOME | End: 2022-01-07
Payer: MEDICAID

## 2022-01-07 VITALS
TEMPERATURE: 98.2 F | SYSTOLIC BLOOD PRESSURE: 156 MMHG | HEART RATE: 91 BPM | RESPIRATION RATE: 22 BRPM | OXYGEN SATURATION: 94 % | DIASTOLIC BLOOD PRESSURE: 75 MMHG

## 2022-01-07 DIAGNOSIS — J45.909 ASTHMA, INTRINSIC: Primary | ICD-10-CM

## 2022-01-07 PROCEDURE — 6370000000 HC RX 637 (ALT 250 FOR IP): Performed by: INTERNAL MEDICINE

## 2022-01-07 PROCEDURE — 6360000002 HC RX W HCPCS: Performed by: INTERNAL MEDICINE

## 2022-01-07 PROCEDURE — 96372 THER/PROPH/DIAG INJ SC/IM: CPT

## 2022-01-07 RX ORDER — DIPHENHYDRAMINE HCL 25 MG
25 TABLET ORAL ONCE
Status: COMPLETED | OUTPATIENT
Start: 2022-01-07 | End: 2022-01-07

## 2022-01-07 RX ORDER — DIPHENHYDRAMINE HCL 25 MG
25 TABLET ORAL ONCE
Status: CANCELLED
Start: 2022-01-13 | End: 2022-01-13

## 2022-01-07 RX ORDER — EPINEPHRINE 1 MG/ML
0.3 INJECTION, SOLUTION, CONCENTRATE INTRAVENOUS PRN
Status: CANCELLED | OUTPATIENT
Start: 2022-01-13

## 2022-01-07 RX ORDER — METHYLPREDNISOLONE SODIUM SUCCINATE 125 MG/2ML
125 INJECTION, POWDER, LYOPHILIZED, FOR SOLUTION INTRAMUSCULAR; INTRAVENOUS ONCE
Status: CANCELLED | OUTPATIENT
Start: 2022-01-13 | End: 2022-01-13

## 2022-01-07 RX ORDER — METHYLPREDNISOLONE SODIUM SUCCINATE 125 MG/2ML
125 INJECTION, POWDER, LYOPHILIZED, FOR SOLUTION INTRAMUSCULAR; INTRAVENOUS ONCE
Status: COMPLETED | OUTPATIENT
Start: 2022-01-07 | End: 2022-01-07

## 2022-01-07 RX ORDER — DIPHENHYDRAMINE HYDROCHLORIDE 50 MG/ML
50 INJECTION INTRAMUSCULAR; INTRAVENOUS ONCE
Status: CANCELLED | OUTPATIENT
Start: 2022-01-13 | End: 2022-01-13

## 2022-01-07 RX ORDER — METHYLPREDNISOLONE SODIUM SUCCINATE 125 MG/2ML
125 INJECTION, POWDER, LYOPHILIZED, FOR SOLUTION INTRAMUSCULAR; INTRAVENOUS ONCE
Status: CANCELLED
Start: 2022-01-13 | End: 2022-01-13

## 2022-01-07 RX ORDER — SODIUM CHLORIDE 9 MG/ML
INJECTION, SOLUTION INTRAVENOUS CONTINUOUS
Status: CANCELLED | OUTPATIENT
Start: 2022-01-13

## 2022-01-07 RX ADMIN — METHYLPREDNISOLONE SODIUM SUCCINATE 125 MG: 125 INJECTION, POWDER, FOR SOLUTION INTRAMUSCULAR; INTRAVENOUS at 14:05

## 2022-01-07 RX ADMIN — DIPHENHYDRAMINE HCL 25 MG: 25 TABLET ORAL at 14:03

## 2022-01-07 RX ADMIN — OMALIZUMAB 375 MG: 150 INJECTION, SOLUTION SUBCUTANEOUS at 14:05

## 2022-01-10 ENCOUNTER — HOSPITAL ENCOUNTER (OUTPATIENT)
Dept: PSYCHIATRY | Age: 62
Setting detail: THERAPIES SERIES
Discharge: HOME | End: 2022-01-10
Payer: MEDICAID

## 2022-01-10 NOTE — PLAN OF CARE
Client reported that feels he is not ready to quit is alcohol use and self- discharged. Client was advised not to quit program at this time, however client was resistant to recommendations and he was made aware that if he decided to return he could call us back.   Electronically signed by Tania Pena [place] 320 on 2/97/9195 at 10:05 AM

## 2022-01-11 ENCOUNTER — HOSPITAL ENCOUNTER (OUTPATIENT)
Dept: PSYCHIATRY | Age: 62
Setting detail: THERAPIES SERIES
End: 2022-01-11
Payer: MEDICAID

## 2022-01-12 DIAGNOSIS — J44.9 STAGE 4 VERY SEVERE COPD BY GOLD CLASSIFICATION (HCC): ICD-10-CM

## 2022-01-12 RX ORDER — FLUTICASONE FUROATE, UMECLIDINIUM BROMIDE AND VILANTEROL TRIFENATATE 100; 62.5; 25 UG/1; UG/1; UG/1
POWDER RESPIRATORY (INHALATION)
Qty: 60 EACH | Refills: 0 | Status: SHIPPED
Start: 2022-01-12 | End: 2022-02-04

## 2022-01-13 ENCOUNTER — APPOINTMENT (OUTPATIENT)
Dept: PSYCHIATRY | Age: 62
End: 2022-01-13
Payer: MEDICAID

## 2022-01-17 ENCOUNTER — APPOINTMENT (OUTPATIENT)
Dept: PSYCHIATRY | Age: 62
End: 2022-01-17
Payer: MEDICAID

## 2022-01-18 ENCOUNTER — APPOINTMENT (OUTPATIENT)
Dept: PSYCHIATRY | Age: 62
End: 2022-01-18
Payer: MEDICAID

## 2022-01-20 ENCOUNTER — APPOINTMENT (OUTPATIENT)
Dept: PSYCHIATRY | Age: 62
End: 2022-01-20
Payer: MEDICAID

## 2022-01-24 ENCOUNTER — APPOINTMENT (OUTPATIENT)
Dept: PSYCHIATRY | Age: 62
End: 2022-01-24
Payer: MEDICAID

## 2022-01-25 ENCOUNTER — APPOINTMENT (OUTPATIENT)
Dept: PSYCHIATRY | Age: 62
End: 2022-01-25
Payer: MEDICAID

## 2022-01-27 ENCOUNTER — APPOINTMENT (OUTPATIENT)
Dept: PSYCHIATRY | Age: 62
End: 2022-01-27
Payer: MEDICAID

## 2022-01-31 ENCOUNTER — APPOINTMENT (OUTPATIENT)
Dept: PSYCHIATRY | Age: 62
End: 2022-01-31
Payer: MEDICAID

## 2022-02-04 DIAGNOSIS — J44.9 STAGE 4 VERY SEVERE COPD BY GOLD CLASSIFICATION (HCC): ICD-10-CM

## 2022-02-04 RX ORDER — FLUTICASONE FUROATE, UMECLIDINIUM BROMIDE AND VILANTEROL TRIFENATATE 100; 62.5; 25 UG/1; UG/1; UG/1
POWDER RESPIRATORY (INHALATION)
Qty: 60 EACH | Refills: 0 | Status: SHIPPED
Start: 2022-02-04 | End: 2022-03-03

## 2022-03-03 DIAGNOSIS — J44.9 STAGE 4 VERY SEVERE COPD BY GOLD CLASSIFICATION (HCC): ICD-10-CM

## 2022-03-03 RX ORDER — FLUTICASONE FUROATE, UMECLIDINIUM BROMIDE AND VILANTEROL TRIFENATATE 100; 62.5; 25 UG/1; UG/1; UG/1
POWDER RESPIRATORY (INHALATION)
Qty: 60 EACH | Refills: 0 | Status: SHIPPED
Start: 2022-03-03 | End: 2022-04-01

## 2022-03-20 ENCOUNTER — HOSPITAL ENCOUNTER (EMERGENCY)
Age: 62
Discharge: HOME | End: 2022-03-21
Attending: STUDENT IN AN ORGANIZED HEALTH CARE EDUCATION/TRAINING PROGRAM
Payer: MEDICAID

## 2022-03-20 ENCOUNTER — APPOINTMENT (OUTPATIENT)
Dept: GENERAL RADIOLOGY | Age: 62
End: 2022-03-20
Payer: MEDICAID

## 2022-03-20 DIAGNOSIS — J44.1 COPD EXACERBATION (HCC): Primary | ICD-10-CM

## 2022-03-20 LAB
BASOPHILS ABSOLUTE: 0.04 E9/L (ref 0–0.2)
BASOPHILS RELATIVE PERCENT: 0.3 % (ref 0–2)
EOSINOPHILS ABSOLUTE: 0.16 E9/L (ref 0.05–0.5)
EOSINOPHILS RELATIVE PERCENT: 1.4 % (ref 0–6)
HCT VFR BLD CALC: 53 % (ref 37–54)
HEMOGLOBIN: 16.7 G/DL (ref 12.5–16.5)
IMMATURE GRANULOCYTES #: 0.03 E9/L
IMMATURE GRANULOCYTES %: 0.3 % (ref 0–5)
LYMPHOCYTES ABSOLUTE: 3.13 E9/L (ref 1.5–4)
LYMPHOCYTES RELATIVE PERCENT: 26.6 % (ref 20–42)
MCH RBC QN AUTO: 29 PG (ref 26–35)
MCHC RBC AUTO-ENTMCNC: 31.5 % (ref 32–34.5)
MCV RBC AUTO: 92 FL (ref 80–99.9)
MONOCYTES ABSOLUTE: 1.09 E9/L (ref 0.1–0.95)
MONOCYTES RELATIVE PERCENT: 9.3 % (ref 2–12)
NEUTROPHILS ABSOLUTE: 7.33 E9/L (ref 1.8–7.3)
NEUTROPHILS RELATIVE PERCENT: 62.1 % (ref 43–80)
PDW BLD-RTO: 13.3 FL (ref 11.5–15)
PLATELET # BLD: 200 E9/L (ref 130–450)
PMV BLD AUTO: 12.3 FL (ref 7–12)
RBC # BLD: 5.76 E12/L (ref 3.8–5.8)
REASON FOR REJECTION: NORMAL
REJECTED TEST: NORMAL
WBC # BLD: 11.8 E9/L (ref 4.5–11.5)

## 2022-03-20 PROCEDURE — 85025 COMPLETE CBC W/AUTO DIFF WBC: CPT

## 2022-03-20 PROCEDURE — 36415 COLL VENOUS BLD VENIPUNCTURE: CPT

## 2022-03-20 PROCEDURE — 94640 AIRWAY INHALATION TREATMENT: CPT

## 2022-03-20 PROCEDURE — 6370000000 HC RX 637 (ALT 250 FOR IP): Performed by: STUDENT IN AN ORGANIZED HEALTH CARE EDUCATION/TRAINING PROGRAM

## 2022-03-20 PROCEDURE — 96374 THER/PROPH/DIAG INJ IV PUSH: CPT

## 2022-03-20 PROCEDURE — 93005 ELECTROCARDIOGRAM TRACING: CPT | Performed by: STUDENT IN AN ORGANIZED HEALTH CARE EDUCATION/TRAINING PROGRAM

## 2022-03-20 PROCEDURE — 99283 EMERGENCY DEPT VISIT LOW MDM: CPT

## 2022-03-20 PROCEDURE — 71045 X-RAY EXAM CHEST 1 VIEW: CPT

## 2022-03-20 PROCEDURE — 6360000002 HC RX W HCPCS: Performed by: STUDENT IN AN ORGANIZED HEALTH CARE EDUCATION/TRAINING PROGRAM

## 2022-03-20 RX ORDER — IPRATROPIUM BROMIDE AND ALBUTEROL SULFATE 2.5; .5 MG/3ML; MG/3ML
3 SOLUTION RESPIRATORY (INHALATION) ONCE
Status: COMPLETED | OUTPATIENT
Start: 2022-03-20 | End: 2022-03-20

## 2022-03-20 RX ORDER — METHYLPREDNISOLONE SODIUM SUCCINATE 125 MG/2ML
125 INJECTION, POWDER, LYOPHILIZED, FOR SOLUTION INTRAMUSCULAR; INTRAVENOUS ONCE
Status: COMPLETED | OUTPATIENT
Start: 2022-03-20 | End: 2022-03-20

## 2022-03-20 RX ADMIN — METHYLPREDNISOLONE SODIUM SUCCINATE 125 MG: 125 INJECTION, POWDER, FOR SOLUTION INTRAMUSCULAR; INTRAVENOUS at 22:21

## 2022-03-20 RX ADMIN — IPRATROPIUM BROMIDE AND ALBUTEROL SULFATE 3 AMPULE: .5; 3 SOLUTION RESPIRATORY (INHALATION) at 22:01

## 2022-03-20 ASSESSMENT — ENCOUNTER SYMPTOMS
WHEEZING: 0
EYE DISCHARGE: 0
VOMITING: 0
ABDOMINAL PAIN: 0
SHORTNESS OF BREATH: 1
SINUS PRESSURE: 0
DIARRHEA: 0
SORE THROAT: 0
EYE PAIN: 0
COUGH: 1
EYE REDNESS: 0
BACK PAIN: 0
NAUSEA: 0

## 2022-03-21 VITALS
BODY MASS INDEX: 28.89 KG/M2 | WEIGHT: 190 LBS | TEMPERATURE: 96.2 F | OXYGEN SATURATION: 94 % | SYSTOLIC BLOOD PRESSURE: 176 MMHG | RESPIRATION RATE: 17 BRPM | DIASTOLIC BLOOD PRESSURE: 98 MMHG | HEART RATE: 90 BPM

## 2022-03-21 LAB
ANION GAP SERPL CALCULATED.3IONS-SCNC: 11 MMOL/L (ref 7–16)
BUN BLDV-MCNC: 14 MG/DL (ref 6–23)
CALCIUM SERPL-MCNC: 9.3 MG/DL (ref 8.6–10.2)
CHLORIDE BLD-SCNC: 102 MMOL/L (ref 98–107)
CO2: 27 MMOL/L (ref 22–29)
CREAT SERPL-MCNC: 0.8 MG/DL (ref 0.7–1.2)
EKG ATRIAL RATE: 86 BPM
EKG P AXIS: 102 DEGREES
EKG P-R INTERVAL: 160 MS
EKG Q-T INTERVAL: 384 MS
EKG QRS DURATION: 90 MS
EKG QTC CALCULATION (BAZETT): 459 MS
EKG R AXIS: -29 DEGREES
EKG T AXIS: 60 DEGREES
EKG VENTRICULAR RATE: 86 BPM
GFR AFRICAN AMERICAN: >60
GFR NON-AFRICAN AMERICAN: >60 ML/MIN/1.73
GLUCOSE BLD-MCNC: 117 MG/DL (ref 74–99)
POTASSIUM REFLEX MAGNESIUM: 4.1 MMOL/L (ref 3.5–5)
PRO-BNP: 26 PG/ML (ref 0–125)
SODIUM BLD-SCNC: 140 MMOL/L (ref 132–146)
TROPONIN, HIGH SENSITIVITY: 7 NG/L (ref 0–11)

## 2022-03-21 PROCEDURE — 36415 COLL VENOUS BLD VENIPUNCTURE: CPT

## 2022-03-21 PROCEDURE — 84484 ASSAY OF TROPONIN QUANT: CPT

## 2022-03-21 PROCEDURE — 83880 ASSAY OF NATRIURETIC PEPTIDE: CPT

## 2022-03-21 PROCEDURE — 80048 BASIC METABOLIC PNL TOTAL CA: CPT

## 2022-03-21 RX ORDER — PREDNISONE 20 MG/1
40 TABLET ORAL DAILY
Qty: 10 TABLET | Refills: 0 | Status: SHIPPED | OUTPATIENT
Start: 2022-03-21 | End: 2022-03-26

## 2022-03-21 NOTE — ED NOTES
Patient refusing labs      Kirk Clinton [facility]  03/21/22 6216

## 2022-03-21 NOTE — ED PROVIDER NOTES
The history is provided by the patient and medical records. 57-year-old female presents emergency department complaint of shortness of breath. States he does have a history of COPD and asthma. Does smoke cigarettes however not been smoking for the past 2 weeks. States is been having this problem with shortness of breath approximately 3 to 4 weeks he thinks. States his hard time with it. In the past is cleared with the prednisone. He does not have any inhalers at home to use. Does not follow with a pulmonologist.  Nothing makes his symptoms better or worse. Denies any fevers. Does have a slight cough nonproductive. Denies abdominal pain change bowel habits fever chills. The patient presents with sob that has been going on for 2-4 weeks. These symptoms are moderate in severity. Symptoms are made better by nothig. Symptoms are made worse by nothing. Associated symptoms include cough. Review of Systems   Constitutional: Negative for chills and fever. HENT: Negative for ear pain, sinus pressure and sore throat. Eyes: Negative for pain, discharge and redness. Respiratory: Positive for cough and shortness of breath. Negative for wheezing. Cardiovascular: Negative for chest pain. Gastrointestinal: Negative for abdominal pain, diarrhea, nausea and vomiting. Genitourinary: Negative for dysuria and frequency. Musculoskeletal: Negative for arthralgias and back pain. Skin: Negative for rash and wound. Neurological: Negative for weakness and headaches. Hematological: Negative for adenopathy. All other systems reviewed and are negative. Physical Exam  Vitals and nursing note reviewed. Constitutional:       Appearance: He is well-developed. HENT:      Head: Normocephalic and atraumatic. Eyes:      Conjunctiva/sclera: Conjunctivae normal.   Cardiovascular:      Rate and Rhythm: Normal rate and regular rhythm. Heart sounds: Normal heart sounds.  No murmur heard.      Pulmonary:      Effort: Pulmonary effort is normal. No respiratory distress. Breath sounds: Examination of the right-upper field reveals wheezing. Examination of the left-upper field reveals wheezing. Examination of the right-middle field reveals wheezing. Examination of the left-middle field reveals wheezing. Examination of the right-lower field reveals wheezing. Examination of the left-lower field reveals wheezing. Wheezing present. No rales. Abdominal:      General: Bowel sounds are normal.      Palpations: Abdomen is soft. Tenderness: There is no abdominal tenderness. There is no guarding or rebound. Musculoskeletal:         General: No tenderness or deformity. Cervical back: Normal range of motion and neck supple. Skin:     General: Skin is warm and dry. Neurological:      Mental Status: He is alert and oriented to person, place, and time. Cranial Nerves: No cranial nerve deficit. Coordination: Coordination normal.          Procedures     MDM   64-year-old male present emergency department complaint shortness of breath. Does have a history of HIV and COPD. He states he is taking his antiviral medications and his levels have been undetectable for years. Patient was given DuoNeb treatments here in the emergency department with significant improvement in his breathing he was also given Solu-Medrol. EKG was stable no ST elevations or depressions. Troponin was 7 patient was stuck for an additional troponin. .  Remaining laboratory work-up was unremarkable. Patient safe to be discharged home on prednisone patient agreeable this plan. Advised on return precautions follow-up with his family doctor.       ED Course as of 03/21/22 0123   Horne Luis Miguel Mar 20, 2022   4648 ATTENDING PROVIDER ATTESTATION:     I have personally performed and/or participated in the history, exam, medical decision making, and procedures and agree with all pertinent clinical information unless otherwise noted.      I have also reviewed and agree with the past medical, family and social history unless otherwise noted. I have discussed this patient in detail with the resident, and provided the instruction and education regarding the patient. My findings/plan:   26-year-old male with history of HIV, asthma, tobacco abuse who presents for evaluation of shortness of breath. Patient notes he is nebulizer at home which does not seem helping his symptoms. Notes has been progressively worse for the last week or so. Denies any dizziness, lightness, chest pain, nausea, vomiting, diarrhea, urinary symptoms. States that he has been taking his antiviral medication and he is at undetectable levels for years now. Patient denies any fevers, chills, arthralgias, myalgias. On exam he is lying in bed no acute distressHeart regular rate and rhythmLungs with wheezing in all lung fieldsAbdomen soft, nondistended, nontender  Plan for labs, imaging, supportive care. [BB]   Mon Mar 21, 2022   7389 Patient feeling much better [CB]      ED Course User Index  [BB] Lucille Yan DO  [CB] Malaika Jj MD            EKG:  This EKG is signed by emergency department physician. Rate: 86  Rhythm: Sinus  Interpretation: No acute ST elevation depression sinus rhythm left axis  Comparison: stable as compared to patient's most recent EKG       ED Course as of 03/21/22 0123   Sun Mar 20, 2022   2207 ATTENDING PROVIDER ATTESTATION:     I have personally performed and/or participated in the history, exam, medical decision making, and procedures and agree with all pertinent clinical information unless otherwise noted. I have also reviewed and agree with the past medical, family and social history unless otherwise noted. I have discussed this patient in detail with the resident, and provided the instruction and education regarding the patient.   My findings/plan:   26-year-old male with history of HIV, asthma, tobacco abuse who presents for evaluation of shortness of breath. Patient notes he is nebulizer at home which does not seem helping his symptoms. Notes has been progressively worse for the last week or so. Denies any dizziness, lightness, chest pain, nausea, vomiting, diarrhea, urinary symptoms. States that he has been taking his antiviral medication and he is at undetectable levels for years now. Patient denies any fevers, chills, arthralgias, myalgias. On exam he is lying in bed no acute distressHeart regular rate and rhythmLungs with wheezing in all lung fieldsAbdomen soft, nondistended, nontender  Plan for labs, imaging, supportive care. [BB]   Mon Mar 21, 2022   0043 Patient feeling much better [CB]      ED Course User Index  [BB] Yoana Almonte DO  [CB] Eleuterio Hill MD       --------------------------------------------- PAST HISTORY ---------------------------------------------  Past Medical History:  has a past medical history of Asthma, [facility] COPD (chronic obstructive pulmonary disease) ([facility] Depression, Diabetes mellitus ([facility] Emphysema of lung ([facility] H/O cardiovascular stress test, History of echocardiogram, HIV (human immunodeficiency virus infection) ([facility] Hypertension, Seizure ([facility] Seizure ([facility] and Sleep apnea. Past Surgical History:  has a past surgical history that includes Rotator cuff repair (2011) and other surgical history (05/09/2017). Social History:  reports that he has been smoking cigarettes. He has a 17.50 pack-year smoking history. He has never used smokeless tobacco. He reports current alcohol use. He reports that he does not use drugs. Family History: family history includes Alcohol Abuse in his father and mother. The patients home medications have been reviewed. Allergies: Patient has no known allergies.     -------------------------------------------------- RESULTS -------------------------------------------------  Labs:  Results for orders placed or performed during the hospital encounter of 03/20/22   CBC with Auto Differential   Result Value Ref Range    WBC 11.8 (H) 4.5 - 11.5 E9/L    RBC 5.76 3.80 - 5.80 E12/L    Hemoglobin 16.7 (H) 12.5 - 16.5 g/dL    Hematocrit 53.0 37.0 - 54.0 %    MCV 92.0 80.0 - 99.9 fL    MCH 29.0 26.0 - 35.0 pg    MCHC 31.5 (L) 32.0 - 34.5 %    RDW 13.3 11.5 - 15.0 fL    Platelets 087 631 - 188 E9/L    MPV 12.3 (H) 7.0 - 12.0 fL    Neutrophils % 62.1 43.0 - 80.0 %    Immature Granulocytes % 0.3 0.0 - 5.0 %    Lymphocytes % 26.6 20.0 - 42.0 %    Monocytes % 9.3 2.0 - 12.0 %    Eosinophils % 1.4 0.0 - 6.0 %    Basophils % 0.3 0.0 - 2.0 %    Neutrophils Absolute 7.33 (H) 1.80 - 7.30 E9/L    Immature Granulocytes # 0.03 E9/L    Lymphocytes Absolute 3.13 1.50 - 4.00 E9/L    Monocytes Absolute 1.09 (H) 0.10 - 0.95 E9/L    Eosinophils Absolute 0.16 0.05 - 0.50 E9/L    Basophils Absolute 0.04 0.00 - 0.20 E9/L   SPECIMEN REJECTION   Result Value Ref Range    Rejected Test BMPX/TRP5/BNPEP     Reason for Rejection see below    Basic Metabolic Panel w/ Reflex to MG   Result Value Ref Range    Sodium 140 132 - 146 mmol/L    Potassium reflex Magnesium 4.1 3.5 - 5.0 mmol/L    Chloride 102 98 - 107 mmol/L    CO2 27 22 - 29 mmol/L    Anion Gap 11 7 - 16 mmol/L    Glucose 117 (H) 74 - 99 mg/dL    BUN 14 6 - 23 mg/dL    CREATININE 0.8 0.7 - 1.2 mg/dL    GFR Non-African American >60 >=60 mL/min/1.73    GFR African American >60     Calcium 9.3 8.6 - 10.2 mg/dL   Troponin   Result Value Ref Range    Troponin, High Sensitivity 7 0 - 11 ng/L   Brain Natriuretic Peptide   Result Value Ref Range    Pro-BNP 26 0 - 125 pg/mL       Radiology:  XR CHEST PORTABLE   Final Result   No acute findings. ------------------------- NURSING NOTES AND VITALS REVIEWED ---------------------------  Date / Time Roomed:  3/20/2022  9:21 PM  ED Bed Assignment:  02/02    The nursing notes within the ED encounter and vital signs as below have been reviewed.    BP (!) 202/108   Pulse 106 Temp 96.2 °F (35.7 °C) (Infrared)   Resp 18   Wt 190 lb (86.2 kg)   SpO2 92%   BMI 28.89 kg/m²   Oxygen Saturation Interpretation: Normal      ------------------------------------------ PROGRESS NOTES ------------------------------------------  12:48 AM EDT  I have spoken with the patient and discussed todays results, in addition to providing specific details for the plan of care and counseling regarding the diagnosis and prognosis. Their questions are answered at this time and they are agreeable with the plan. I discussed at length with them reasons for immediate return here for re evaluation. They will followup with their primary care physician by calling their office on Monday.      --------------------------------- ADDITIONAL PROVIDER NOTES ---------------------------------  At this time the patient is without objective evidence of an acute process requiring hospitalization or inpatient management. They have remained hemodynamically stable throughout their entire ED visit and are stable for discharge with outpatient follow-up. The plan has been discussed in detail and they are aware of the specific conditions for emergent return, as well as the importance of follow-up. New Prescriptions    No medications on file       Diagnosis:  1. COPD exacerbation ([facility]        Disposition:  Patient's disposition: Discharge to home  Patient's condition is stable.          Raiza Izquierdo MD  Resident  03/21/22 6820

## 2022-04-01 DIAGNOSIS — J44.9 STAGE 4 VERY SEVERE COPD BY GOLD CLASSIFICATION (HCC): ICD-10-CM

## 2022-04-01 RX ORDER — FLUTICASONE FUROATE, UMECLIDINIUM BROMIDE AND VILANTEROL TRIFENATATE 100; 62.5; 25 UG/1; UG/1; UG/1
POWDER RESPIRATORY (INHALATION)
Qty: 60 EACH | Refills: 0 | Status: SHIPPED | OUTPATIENT
Start: 2022-04-01

## 2022-04-14 ENCOUNTER — HOSPITAL ENCOUNTER (OUTPATIENT)
Dept: INFUSION THERAPY | Age: 62
Setting detail: INFUSION SERIES
Discharge: HOME | End: 2022-04-14
Payer: MEDICAID

## 2022-04-14 VITALS — TEMPERATURE: 97.5 F | HEART RATE: 72 BPM | RESPIRATION RATE: 18 BRPM | OXYGEN SATURATION: 96 %

## 2022-04-14 DIAGNOSIS — J45.909 ASTHMA, INTRINSIC: Primary | ICD-10-CM

## 2022-04-14 PROCEDURE — 6360000002 HC RX W HCPCS: Performed by: INTERNAL MEDICINE

## 2022-04-14 PROCEDURE — 6370000000 HC RX 637 (ALT 250 FOR IP): Performed by: INTERNAL MEDICINE

## 2022-04-14 PROCEDURE — 96372 THER/PROPH/DIAG INJ SC/IM: CPT

## 2022-04-14 RX ORDER — EPINEPHRINE 1 MG/ML
0.3 INJECTION, SOLUTION, CONCENTRATE INTRAVENOUS PRN
Status: CANCELLED | OUTPATIENT
Start: 2022-04-15

## 2022-04-14 RX ORDER — ACETAMINOPHEN 325 MG/1
650 TABLET ORAL ONCE
Status: CANCELLED
Start: 2022-04-28 | End: 2022-04-28

## 2022-04-14 RX ORDER — METHYLPREDNISOLONE SODIUM SUCCINATE 125 MG/2ML
125 INJECTION, POWDER, LYOPHILIZED, FOR SOLUTION INTRAMUSCULAR; INTRAVENOUS ONCE
Status: CANCELLED | OUTPATIENT
Start: 2022-04-28 | End: 2022-04-28

## 2022-04-14 RX ORDER — METHYLPREDNISOLONE SODIUM SUCCINATE 125 MG/2ML
125 INJECTION, POWDER, LYOPHILIZED, FOR SOLUTION INTRAMUSCULAR; INTRAVENOUS ONCE
Status: COMPLETED | OUTPATIENT
Start: 2022-04-14 | End: 2022-04-14

## 2022-04-14 RX ORDER — METHYLPREDNISOLONE SODIUM SUCCINATE 125 MG/2ML
125 INJECTION, POWDER, LYOPHILIZED, FOR SOLUTION INTRAMUSCULAR; INTRAVENOUS ONCE
Status: CANCELLED
Start: 2022-04-28 | End: 2022-04-28

## 2022-04-14 RX ORDER — ACETAMINOPHEN 325 MG/1
650 TABLET ORAL ONCE
Status: COMPLETED | OUTPATIENT
Start: 2022-04-14 | End: 2022-04-14

## 2022-04-14 RX ORDER — DIPHENHYDRAMINE HYDROCHLORIDE 50 MG/ML
50 INJECTION INTRAMUSCULAR; INTRAVENOUS ONCE
Status: CANCELLED | OUTPATIENT
Start: 2022-04-15 | End: 2022-04-15

## 2022-04-14 RX ORDER — METHYLPREDNISOLONE SODIUM SUCCINATE 125 MG/2ML
125 INJECTION, POWDER, LYOPHILIZED, FOR SOLUTION INTRAMUSCULAR; INTRAVENOUS ONCE
Status: CANCELLED
Start: 2022-04-15 | End: 2022-04-15

## 2022-04-14 RX ORDER — EPINEPHRINE 1 MG/ML
0.3 INJECTION, SOLUTION, CONCENTRATE INTRAVENOUS PRN
Status: CANCELLED | OUTPATIENT
Start: 2022-04-28

## 2022-04-14 RX ORDER — SODIUM CHLORIDE 9 MG/ML
INJECTION, SOLUTION INTRAVENOUS CONTINUOUS
Status: CANCELLED | OUTPATIENT
Start: 2022-04-15

## 2022-04-14 RX ORDER — DIPHENHYDRAMINE HYDROCHLORIDE 50 MG/ML
50 INJECTION INTRAMUSCULAR; INTRAVENOUS ONCE
Status: CANCELLED | OUTPATIENT
Start: 2022-04-28 | End: 2022-04-28

## 2022-04-14 RX ORDER — DIPHENHYDRAMINE HCL 25 MG
25 TABLET ORAL ONCE
Status: COMPLETED | OUTPATIENT
Start: 2022-04-14 | End: 2022-04-14

## 2022-04-14 RX ORDER — METHYLPREDNISOLONE SODIUM SUCCINATE 125 MG/2ML
125 INJECTION, POWDER, LYOPHILIZED, FOR SOLUTION INTRAMUSCULAR; INTRAVENOUS ONCE
Status: CANCELLED | OUTPATIENT
Start: 2022-04-15 | End: 2022-04-15

## 2022-04-14 RX ORDER — SODIUM CHLORIDE 9 MG/ML
INJECTION, SOLUTION INTRAVENOUS CONTINUOUS
Status: CANCELLED | OUTPATIENT
Start: 2022-04-28

## 2022-04-14 RX ORDER — DIPHENHYDRAMINE HCL 25 MG
25 TABLET ORAL ONCE
Status: CANCELLED
Start: 2022-04-28 | End: 2022-04-28

## 2022-04-14 RX ORDER — DIPHENHYDRAMINE HCL 25 MG
25 TABLET ORAL ONCE
Status: CANCELLED
Start: 2022-04-15 | End: 2022-04-15

## 2022-04-14 RX ADMIN — DIPHENHYDRAMINE HCL 25 MG: 25 TABLET ORAL at 12:12

## 2022-04-14 RX ADMIN — METHYLPREDNISOLONE SODIUM SUCCINATE 125 MG: 125 INJECTION, POWDER, FOR SOLUTION INTRAMUSCULAR; INTRAVENOUS at 12:14

## 2022-04-14 RX ADMIN — OMALIZUMAB 375 MG: 150 INJECTION, SOLUTION SUBCUTANEOUS at 12:23

## 2022-04-14 RX ADMIN — ACETAMINOPHEN 650 MG: 325 TABLET ORAL at 12:20

## 2022-04-14 ASSESSMENT — PAIN SCALES - GENERAL: PAINLEVEL_OUTOF10: 0

## 2022-04-28 ENCOUNTER — HOSPITAL ENCOUNTER (OUTPATIENT)
Dept: INFUSION THERAPY | Age: 62
Setting detail: INFUSION SERIES
Discharge: HOME | End: 2022-04-28
Payer: MEDICAID

## 2022-04-28 VITALS
TEMPERATURE: 97.7 F | DIASTOLIC BLOOD PRESSURE: 79 MMHG | SYSTOLIC BLOOD PRESSURE: 171 MMHG | OXYGEN SATURATION: 98 % | RESPIRATION RATE: 20 BRPM | HEART RATE: 58 BPM

## 2022-04-28 DIAGNOSIS — J45.909 ASTHMA, INTRINSIC: Primary | ICD-10-CM

## 2022-04-28 PROCEDURE — 6360000002 HC RX W HCPCS: Performed by: INTERNAL MEDICINE

## 2022-04-28 PROCEDURE — 6370000000 HC RX 637 (ALT 250 FOR IP): Performed by: INTERNAL MEDICINE

## 2022-04-28 PROCEDURE — 96372 THER/PROPH/DIAG INJ SC/IM: CPT

## 2022-04-28 RX ORDER — EPINEPHRINE 1 MG/ML
0.3 INJECTION, SOLUTION, CONCENTRATE INTRAVENOUS PRN
Status: CANCELLED | OUTPATIENT
Start: 2022-05-12

## 2022-04-28 RX ORDER — DIPHENHYDRAMINE HCL 25 MG
25 TABLET ORAL ONCE
Status: COMPLETED | OUTPATIENT
Start: 2022-04-28 | End: 2022-04-28

## 2022-04-28 RX ORDER — ACETAMINOPHEN 325 MG/1
650 TABLET ORAL ONCE
Status: CANCELLED
Start: 2022-05-12 | End: 2022-05-12

## 2022-04-28 RX ORDER — DIPHENHYDRAMINE HYDROCHLORIDE 50 MG/ML
50 INJECTION INTRAMUSCULAR; INTRAVENOUS ONCE
Status: CANCELLED | OUTPATIENT
Start: 2022-05-12 | End: 2022-05-12

## 2022-04-28 RX ORDER — METHYLPREDNISOLONE SODIUM SUCCINATE 125 MG/2ML
125 INJECTION, POWDER, LYOPHILIZED, FOR SOLUTION INTRAMUSCULAR; INTRAVENOUS ONCE
Status: CANCELLED | OUTPATIENT
Start: 2022-05-12 | End: 2022-05-12

## 2022-04-28 RX ORDER — DIPHENHYDRAMINE HCL 25 MG
25 TABLET ORAL ONCE
Status: CANCELLED
Start: 2022-05-12 | End: 2022-05-12

## 2022-04-28 RX ORDER — SODIUM CHLORIDE 9 MG/ML
INJECTION, SOLUTION INTRAVENOUS CONTINUOUS
Status: CANCELLED | OUTPATIENT
Start: 2022-05-12

## 2022-04-28 RX ORDER — METHYLPREDNISOLONE SODIUM SUCCINATE 125 MG/2ML
125 INJECTION, POWDER, LYOPHILIZED, FOR SOLUTION INTRAMUSCULAR; INTRAVENOUS ONCE
Status: COMPLETED | OUTPATIENT
Start: 2022-04-28 | End: 2022-04-28

## 2022-04-28 RX ORDER — METHYLPREDNISOLONE SODIUM SUCCINATE 125 MG/2ML
125 INJECTION, POWDER, LYOPHILIZED, FOR SOLUTION INTRAMUSCULAR; INTRAVENOUS ONCE
Status: CANCELLED
Start: 2022-05-12 | End: 2022-05-12

## 2022-04-28 RX ADMIN — OMALIZUMAB 375 MG: 75 INJECTION, SOLUTION SUBCUTANEOUS at 12:06

## 2022-04-28 RX ADMIN — DIPHENHYDRAMINE HYDROCHLORIDE 25 MG: 25 TABLET ORAL at 12:03

## 2022-04-28 RX ADMIN — METHYLPREDNISOLONE SODIUM SUCCINATE 125 MG: 125 INJECTION, POWDER, FOR SOLUTION INTRAMUSCULAR; INTRAVENOUS at 12:02

## 2022-05-12 ENCOUNTER — HOSPITAL ENCOUNTER (OUTPATIENT)
Dept: INFUSION THERAPY | Age: 62
Setting detail: INFUSION SERIES
Discharge: HOME | End: 2022-05-12
Payer: MEDICAID

## 2022-05-12 VITALS
HEART RATE: 68 BPM | SYSTOLIC BLOOD PRESSURE: 156 MMHG | DIASTOLIC BLOOD PRESSURE: 73 MMHG | RESPIRATION RATE: 20 BRPM | OXYGEN SATURATION: 97 % | TEMPERATURE: 97.6 F

## 2022-05-12 DIAGNOSIS — J45.909 ASTHMA, INTRINSIC: Primary | ICD-10-CM

## 2022-05-12 PROCEDURE — 96372 THER/PROPH/DIAG INJ SC/IM: CPT

## 2022-05-12 PROCEDURE — 6360000002 HC RX W HCPCS: Performed by: INTERNAL MEDICINE

## 2022-05-12 PROCEDURE — 6370000000 HC RX 637 (ALT 250 FOR IP): Performed by: INTERNAL MEDICINE

## 2022-05-12 RX ORDER — METHYLPREDNISOLONE SODIUM SUCCINATE 125 MG/2ML
125 INJECTION, POWDER, LYOPHILIZED, FOR SOLUTION INTRAMUSCULAR; INTRAVENOUS ONCE
Status: CANCELLED | OUTPATIENT
Start: 2022-05-26 | End: 2022-05-26

## 2022-05-12 RX ORDER — SODIUM CHLORIDE 9 MG/ML
INJECTION, SOLUTION INTRAVENOUS CONTINUOUS
Status: CANCELLED | OUTPATIENT
Start: 2022-05-26

## 2022-05-12 RX ORDER — DIPHENHYDRAMINE HCL 25 MG
25 TABLET ORAL ONCE
Status: CANCELLED
Start: 2022-05-26 | End: 2022-05-26

## 2022-05-12 RX ORDER — DIPHENHYDRAMINE HCL 25 MG
25 TABLET ORAL ONCE
Status: COMPLETED | OUTPATIENT
Start: 2022-05-12 | End: 2022-05-12

## 2022-05-12 RX ORDER — METHYLPREDNISOLONE SODIUM SUCCINATE 125 MG/2ML
125 INJECTION, POWDER, LYOPHILIZED, FOR SOLUTION INTRAMUSCULAR; INTRAVENOUS ONCE
Status: CANCELLED
Start: 2022-05-26 | End: 2022-05-26

## 2022-05-12 RX ORDER — ACETAMINOPHEN 325 MG/1
650 TABLET ORAL ONCE
Status: CANCELLED
Start: 2022-05-26 | End: 2022-05-26

## 2022-05-12 RX ORDER — METHYLPREDNISOLONE SODIUM SUCCINATE 125 MG/2ML
125 INJECTION, POWDER, LYOPHILIZED, FOR SOLUTION INTRAMUSCULAR; INTRAVENOUS ONCE
Status: COMPLETED | OUTPATIENT
Start: 2022-05-12 | End: 2022-05-12

## 2022-05-12 RX ORDER — ACETAMINOPHEN 325 MG/1
650 TABLET ORAL ONCE
Status: COMPLETED | OUTPATIENT
Start: 2022-05-12 | End: 2022-05-12

## 2022-05-12 RX ORDER — DIPHENHYDRAMINE HYDROCHLORIDE 50 MG/ML
50 INJECTION INTRAMUSCULAR; INTRAVENOUS ONCE
Status: CANCELLED | OUTPATIENT
Start: 2022-05-26 | End: 2022-05-26

## 2022-05-12 RX ORDER — EPINEPHRINE 1 MG/ML
0.3 INJECTION, SOLUTION, CONCENTRATE INTRAVENOUS PRN
Status: CANCELLED | OUTPATIENT
Start: 2022-05-26

## 2022-05-12 RX ADMIN — METHYLPREDNISOLONE SODIUM SUCCINATE 125 MG: 125 INJECTION, POWDER, FOR SOLUTION INTRAMUSCULAR; INTRAVENOUS at 12:10

## 2022-05-12 RX ADMIN — ACETAMINOPHEN 650 MG: 325 TABLET ORAL at 12:09

## 2022-05-12 RX ADMIN — OMALIZUMAB 375 MG: 150 INJECTION, SOLUTION SUBCUTANEOUS at 12:10

## 2022-05-12 RX ADMIN — DIPHENHYDRAMINE HYDROCHLORIDE 25 MG: 25 TABLET ORAL at 12:09

## 2022-05-26 ENCOUNTER — HOSPITAL ENCOUNTER (OUTPATIENT)
Dept: INFUSION THERAPY | Age: 62
Setting detail: INFUSION SERIES
Discharge: HOME | End: 2022-05-26

## 2022-05-26 DIAGNOSIS — J45.909 ASTHMA, INTRINSIC: Primary | ICD-10-CM

## 2022-05-26 RX ORDER — METHYLPREDNISOLONE SODIUM SUCCINATE 125 MG/2ML
125 INJECTION, POWDER, LYOPHILIZED, FOR SOLUTION INTRAMUSCULAR; INTRAVENOUS ONCE
Status: CANCELLED | OUTPATIENT
Start: 2022-06-09 | End: 2022-06-09

## 2022-05-26 RX ORDER — ACETAMINOPHEN 325 MG/1
650 TABLET ORAL ONCE
Status: CANCELLED
Start: 2022-06-09 | End: 2022-06-09

## 2022-05-26 RX ORDER — DIPHENHYDRAMINE HCL 25 MG
25 TABLET ORAL ONCE
Status: DISCONTINUED | OUTPATIENT
Start: 2022-05-26 | End: 2022-05-29 | Stop reason: HOSPADM

## 2022-05-26 RX ORDER — DIPHENHYDRAMINE HCL 25 MG
25 TABLET ORAL ONCE
Status: CANCELLED
Start: 2022-06-09 | End: 2022-06-09

## 2022-05-26 RX ORDER — METHYLPREDNISOLONE SODIUM SUCCINATE 125 MG/2ML
125 INJECTION, POWDER, LYOPHILIZED, FOR SOLUTION INTRAMUSCULAR; INTRAVENOUS ONCE
Status: CANCELLED
Start: 2022-06-09 | End: 2022-06-09

## 2022-05-26 RX ORDER — EPINEPHRINE 1 MG/ML
0.3 INJECTION, SOLUTION, CONCENTRATE INTRAVENOUS PRN
Status: CANCELLED | OUTPATIENT
Start: 2022-06-09

## 2022-05-26 RX ORDER — SODIUM CHLORIDE 9 MG/ML
INJECTION, SOLUTION INTRAVENOUS CONTINUOUS
Status: CANCELLED | OUTPATIENT
Start: 2022-06-09

## 2022-05-26 RX ORDER — METHYLPREDNISOLONE SODIUM SUCCINATE 125 MG/2ML
125 INJECTION, POWDER, LYOPHILIZED, FOR SOLUTION INTRAMUSCULAR; INTRAVENOUS ONCE
Status: DISCONTINUED | OUTPATIENT
Start: 2022-05-26 | End: 2022-05-29 | Stop reason: HOSPADM

## 2022-05-26 RX ORDER — ACETAMINOPHEN 325 MG/1
650 TABLET ORAL ONCE
Status: DISCONTINUED | OUTPATIENT
Start: 2022-05-26 | End: 2022-05-29 | Stop reason: HOSPADM

## 2022-05-26 RX ORDER — DIPHENHYDRAMINE HYDROCHLORIDE 50 MG/ML
50 INJECTION INTRAMUSCULAR; INTRAVENOUS ONCE
Status: CANCELLED | OUTPATIENT
Start: 2022-06-09 | End: 2022-06-09

## 2022-06-10 ENCOUNTER — HOSPITAL ENCOUNTER (OUTPATIENT)
Dept: INFUSION THERAPY | Age: 62
Setting detail: INFUSION SERIES
Discharge: HOME | End: 2022-06-10
Payer: MEDICAID

## 2022-06-10 VITALS
SYSTOLIC BLOOD PRESSURE: 129 MMHG | RESPIRATION RATE: 24 BRPM | HEART RATE: 75 BPM | OXYGEN SATURATION: 95 % | TEMPERATURE: 98.2 F | DIASTOLIC BLOOD PRESSURE: 62 MMHG

## 2022-06-10 DIAGNOSIS — J45.909 ASTHMA, INTRINSIC: Primary | ICD-10-CM

## 2022-06-10 PROCEDURE — 6360000002 HC RX W HCPCS: Performed by: INTERNAL MEDICINE

## 2022-06-10 PROCEDURE — 6370000000 HC RX 637 (ALT 250 FOR IP): Performed by: INTERNAL MEDICINE

## 2022-06-10 PROCEDURE — 96372 THER/PROPH/DIAG INJ SC/IM: CPT

## 2022-06-10 RX ORDER — METHYLPREDNISOLONE SODIUM SUCCINATE 125 MG/2ML
125 INJECTION, POWDER, LYOPHILIZED, FOR SOLUTION INTRAMUSCULAR; INTRAVENOUS ONCE
Status: CANCELLED | OUTPATIENT
Start: 2022-06-24 | End: 2022-06-24

## 2022-06-10 RX ORDER — METHYLPREDNISOLONE SODIUM SUCCINATE 125 MG/2ML
125 INJECTION, POWDER, LYOPHILIZED, FOR SOLUTION INTRAMUSCULAR; INTRAVENOUS ONCE
Status: COMPLETED | OUTPATIENT
Start: 2022-06-10 | End: 2022-06-10

## 2022-06-10 RX ORDER — SODIUM CHLORIDE 9 MG/ML
INJECTION, SOLUTION INTRAVENOUS CONTINUOUS
Status: CANCELLED | OUTPATIENT
Start: 2022-06-24

## 2022-06-10 RX ORDER — EPINEPHRINE 1 MG/ML
0.3 INJECTION, SOLUTION, CONCENTRATE INTRAVENOUS PRN
Status: CANCELLED | OUTPATIENT
Start: 2022-06-24

## 2022-06-10 RX ORDER — DIPHENHYDRAMINE HYDROCHLORIDE 50 MG/ML
50 INJECTION INTRAMUSCULAR; INTRAVENOUS ONCE
Status: CANCELLED | OUTPATIENT
Start: 2022-06-24 | End: 2022-06-24

## 2022-06-10 RX ORDER — DIPHENHYDRAMINE HCL 25 MG
25 TABLET ORAL ONCE
Status: COMPLETED | OUTPATIENT
Start: 2022-06-10 | End: 2022-06-10

## 2022-06-10 RX ORDER — ACETAMINOPHEN 325 MG/1
650 TABLET ORAL ONCE
Status: CANCELLED
Start: 2022-06-24 | End: 2022-06-24

## 2022-06-10 RX ORDER — METHYLPREDNISOLONE SODIUM SUCCINATE 125 MG/2ML
125 INJECTION, POWDER, LYOPHILIZED, FOR SOLUTION INTRAMUSCULAR; INTRAVENOUS ONCE
Status: CANCELLED
Start: 2022-06-24 | End: 2022-06-24

## 2022-06-10 RX ORDER — DIPHENHYDRAMINE HCL 25 MG
25 TABLET ORAL ONCE
Status: CANCELLED
Start: 2022-06-24 | End: 2022-06-24

## 2022-06-10 RX ORDER — ACETAMINOPHEN 325 MG/1
650 TABLET ORAL ONCE
Status: COMPLETED | OUTPATIENT
Start: 2022-06-10 | End: 2022-06-10

## 2022-06-10 RX ADMIN — DIPHENHYDRAMINE HCL 25 MG: 25 TABLET ORAL at 09:49

## 2022-06-10 RX ADMIN — ACETAMINOPHEN 650 MG: 325 TABLET ORAL at 09:49

## 2022-06-10 RX ADMIN — METHYLPREDNISOLONE SODIUM SUCCINATE 125 MG: 125 INJECTION, POWDER, FOR SOLUTION INTRAMUSCULAR; INTRAVENOUS at 09:49

## 2022-06-10 RX ADMIN — OMALIZUMAB 375 MG: 150 INJECTION, SOLUTION SUBCUTANEOUS at 09:53

## 2022-06-24 ENCOUNTER — HOSPITAL ENCOUNTER (OUTPATIENT)
Dept: INFUSION THERAPY | Age: 62
Setting detail: INFUSION SERIES
Discharge: HOME | End: 2022-06-24

## 2022-06-24 DIAGNOSIS — J45.909 ASTHMA, INTRINSIC: Primary | ICD-10-CM

## 2022-06-24 RX ORDER — ACETAMINOPHEN 325 MG/1
650 TABLET ORAL ONCE
Status: CANCELLED
Start: 2022-07-08 | End: 2022-07-08

## 2022-06-24 RX ORDER — DIPHENHYDRAMINE HCL 25 MG
25 TABLET ORAL ONCE
Status: CANCELLED
Start: 2022-07-08 | End: 2022-07-08

## 2022-06-24 RX ORDER — EPINEPHRINE 1 MG/ML
0.3 INJECTION, SOLUTION, CONCENTRATE INTRAVENOUS PRN
Status: CANCELLED | OUTPATIENT
Start: 2022-07-08

## 2022-06-24 RX ORDER — DIPHENHYDRAMINE HCL 25 MG
25 TABLET ORAL ONCE
Status: DISCONTINUED | OUTPATIENT
Start: 2022-06-24 | End: 2022-06-25 | Stop reason: HOSPADM

## 2022-06-24 RX ORDER — DIPHENHYDRAMINE HYDROCHLORIDE 50 MG/ML
50 INJECTION INTRAMUSCULAR; INTRAVENOUS ONCE
Status: CANCELLED | OUTPATIENT
Start: 2022-07-08 | End: 2022-07-08

## 2022-06-24 RX ORDER — ACETAMINOPHEN 325 MG/1
650 TABLET ORAL ONCE
Status: DISCONTINUED | OUTPATIENT
Start: 2022-06-24 | End: 2022-06-25 | Stop reason: HOSPADM

## 2022-06-24 RX ORDER — METHYLPREDNISOLONE SODIUM SUCCINATE 125 MG/2ML
125 INJECTION, POWDER, LYOPHILIZED, FOR SOLUTION INTRAMUSCULAR; INTRAVENOUS ONCE
Status: CANCELLED
Start: 2022-07-08 | End: 2022-07-08

## 2022-06-24 RX ORDER — SODIUM CHLORIDE 9 MG/ML
INJECTION, SOLUTION INTRAVENOUS CONTINUOUS
Status: CANCELLED | OUTPATIENT
Start: 2022-07-08

## 2022-06-24 RX ORDER — METHYLPREDNISOLONE SODIUM SUCCINATE 125 MG/2ML
125 INJECTION, POWDER, LYOPHILIZED, FOR SOLUTION INTRAMUSCULAR; INTRAVENOUS ONCE
Status: CANCELLED | OUTPATIENT
Start: 2022-07-08 | End: 2022-07-08

## 2022-06-24 RX ORDER — METHYLPREDNISOLONE SODIUM SUCCINATE 125 MG/2ML
125 INJECTION, POWDER, LYOPHILIZED, FOR SOLUTION INTRAMUSCULAR; INTRAVENOUS ONCE
Status: DISCONTINUED | OUTPATIENT
Start: 2022-06-24 | End: 2022-06-25 | Stop reason: HOSPADM

## 2022-06-27 ENCOUNTER — HOSPITAL ENCOUNTER (OUTPATIENT)
Dept: INFUSION THERAPY | Age: 62
Setting detail: INFUSION SERIES
Discharge: HOME | End: 2022-06-27
Payer: MEDICAID

## 2022-06-27 VITALS
SYSTOLIC BLOOD PRESSURE: 131 MMHG | DIASTOLIC BLOOD PRESSURE: 70 MMHG | TEMPERATURE: 98 F | HEART RATE: 65 BPM | OXYGEN SATURATION: 95 % | RESPIRATION RATE: 24 BRPM

## 2022-06-27 DIAGNOSIS — J45.909 ASTHMA, INTRINSIC: Primary | ICD-10-CM

## 2022-06-27 PROCEDURE — 6360000002 HC RX W HCPCS: Performed by: INTERNAL MEDICINE

## 2022-06-27 PROCEDURE — 6370000000 HC RX 637 (ALT 250 FOR IP): Performed by: INTERNAL MEDICINE

## 2022-06-27 PROCEDURE — 96372 THER/PROPH/DIAG INJ SC/IM: CPT

## 2022-06-27 RX ORDER — ACETAMINOPHEN 325 MG/1
650 TABLET ORAL ONCE
Status: COMPLETED | OUTPATIENT
Start: 2022-06-27 | End: 2022-06-27

## 2022-06-27 RX ORDER — DIPHENHYDRAMINE HYDROCHLORIDE 50 MG/ML
50 INJECTION INTRAMUSCULAR; INTRAVENOUS ONCE
Status: CANCELLED | OUTPATIENT
Start: 2022-07-08 | End: 2022-07-08

## 2022-06-27 RX ORDER — METHYLPREDNISOLONE SODIUM SUCCINATE 125 MG/2ML
125 INJECTION, POWDER, LYOPHILIZED, FOR SOLUTION INTRAMUSCULAR; INTRAVENOUS ONCE
Status: CANCELLED | OUTPATIENT
Start: 2022-07-08 | End: 2022-07-08

## 2022-06-27 RX ORDER — EPINEPHRINE 1 MG/ML
0.3 INJECTION, SOLUTION, CONCENTRATE INTRAVENOUS PRN
Status: CANCELLED | OUTPATIENT
Start: 2022-07-08

## 2022-06-27 RX ORDER — METHYLPREDNISOLONE SODIUM SUCCINATE 125 MG/2ML
125 INJECTION, POWDER, LYOPHILIZED, FOR SOLUTION INTRAMUSCULAR; INTRAVENOUS ONCE
Status: COMPLETED | OUTPATIENT
Start: 2022-06-27 | End: 2022-06-27

## 2022-06-27 RX ORDER — DIPHENHYDRAMINE HCL 25 MG
25 TABLET ORAL ONCE
Status: COMPLETED | OUTPATIENT
Start: 2022-06-27 | End: 2022-06-27

## 2022-06-27 RX ORDER — DIPHENHYDRAMINE HCL 25 MG
25 TABLET ORAL ONCE
Status: CANCELLED
Start: 2022-07-08 | End: 2022-07-08

## 2022-06-27 RX ORDER — METHYLPREDNISOLONE SODIUM SUCCINATE 125 MG/2ML
125 INJECTION, POWDER, LYOPHILIZED, FOR SOLUTION INTRAMUSCULAR; INTRAVENOUS ONCE
Status: CANCELLED
Start: 2022-07-08 | End: 2022-07-08

## 2022-06-27 RX ORDER — ACETAMINOPHEN 325 MG/1
650 TABLET ORAL ONCE
Status: CANCELLED
Start: 2022-07-08 | End: 2022-07-08

## 2022-06-27 RX ORDER — SODIUM CHLORIDE 9 MG/ML
INJECTION, SOLUTION INTRAVENOUS CONTINUOUS
Status: CANCELLED | OUTPATIENT
Start: 2022-07-08

## 2022-06-27 RX ADMIN — METHYLPREDNISOLONE SODIUM SUCCINATE 125 MG: 125 INJECTION, POWDER, FOR SOLUTION INTRAMUSCULAR; INTRAVENOUS at 10:25

## 2022-06-27 RX ADMIN — ACETAMINOPHEN 650 MG: 325 TABLET ORAL at 10:25

## 2022-06-27 RX ADMIN — OMALIZUMAB 375 MG: 150 INJECTION, SOLUTION SUBCUTANEOUS at 10:29

## 2022-06-27 RX ADMIN — DIPHENHYDRAMINE HCL 25 MG: 25 TABLET ORAL at 10:25

## 2022-06-27 ASSESSMENT — PAIN SCALES - GENERAL: PAINLEVEL_OUTOF10: 0

## 2022-07-04 ENCOUNTER — HOSPITAL ENCOUNTER (EMERGENCY)
Age: 62
Discharge: HOME | End: 2022-07-04
Attending: EMERGENCY MEDICINE
Payer: MEDICAID

## 2022-07-04 VITALS
SYSTOLIC BLOOD PRESSURE: 145 MMHG | HEART RATE: 88 BPM | OXYGEN SATURATION: 94 % | TEMPERATURE: 98.9 F | BODY MASS INDEX: 29.57 KG/M2 | DIASTOLIC BLOOD PRESSURE: 86 MMHG | WEIGHT: 184 LBS | RESPIRATION RATE: 16 BRPM | HEIGHT: 66 IN

## 2022-07-04 DIAGNOSIS — F39 MOOD DISORDER (HCC): Primary | ICD-10-CM

## 2022-07-04 PROCEDURE — 99283 EMERGENCY DEPT VISIT LOW MDM: CPT

## 2022-07-04 ASSESSMENT — PAIN - FUNCTIONAL ASSESSMENT
PAIN_FUNCTIONAL_ASSESSMENT: NONE - DENIES PAIN
PAIN_FUNCTIONAL_ASSESSMENT: NONE - DENIES PAIN

## 2022-07-04 NOTE — ED NOTES
Patient continues to deny SI/HI  No aggressive behavior noted. Discharge instructions reviewed with patient who denies any questions. Nurse to nurse report given to [facility].        Dagmar Love RN  07/04/22 3453

## 2022-07-04 NOTE — ED NOTES
[facility]phone [facility] to [facility]: pt assisted living facility. Lunch in dining room. Pt \"went crazy\" MF'ing, very aggravated, screaming, started charging at staff however did not act like he was going to hit anyone. Many elderly residents and they were terrified by this behavior. Pt has acted like this before and it happens \"out of nowhere. \"    Jocelynn Shin, relays she sent pt in for psych eval. Pt was screaming out of nowhere as they were finishing up lunch. Staff attempted to calm pt and send him to his room. Pt was in RN and aides faces, swearing and \"his eyes changed\" with fists made at his sides. RN contacted Director Of Nursing who advised to send pt in to ED. Pt sometimes goes with his sister and will drink with her. Current meds: Vistaril PRN, and Cymbalta prescribed by Dr. Ankit Abrams- resident Psych. Pt has been a resident at facility since end of March, RN believes he came from a group home setting. Does not feel they have full medical/psych history. ALIDA relayed that pt is presenting as calm in ED and was the same for EMS, RN replied \"of course he is. \" ALIDA explained that ALIDA, RN and ED Physician have spoken with pt and are wondering what exactly the facility is hoping for from this encounter. RN relayed she would call ALIDA back after contacting TOYA. ALIDA relayed that TOYA is able to contact ALIDA if she feels it would be best to directly share her expectations.

## 2022-07-04 NOTE — ED NOTES
Behavioral Health Crisis Assessment      Chief Complaint:    Pt presents to the ED from his assisted living facility due to reported agitation. Per EMS, pt was calm when they arrived and for the entire transport here. Mental Status Exam:    Pt presents as alert and oriented x4, calm and cooperative throughout assessment. Normal speech pattern, average eye contact. Mood is euthymic, affect is stable. Denies SI, HI and any symptoms of psychosis. Denies substance use or abuse. Legal Status  [x] Voluntary:  [] Involuntary, Issued by:    Gender  [x] Male [] Female [] Transgender  [] Other    Sexual Orientation    [x] Heterosexual [] Homosexual [] Bisexual [] Other    Brief Clinical Summary:    Pt reports he was trying to eat lunch when a peer in the facility sneezed on him and him plate of food. Pt states he got angry and started yelling. He explains he wanted to punch the peer, but did not. Pt admits that he gets angry more easily than he should, however the peer did not even apologize and did not try to cover his mouth to prevent the sneeze from covering pt and pt food. Pt reports he is diagnosed with depression and it has mostly been since his wife passed around two years ago. He reports he is unaware of what medications he is prescribed or taking as the facility administers them. Pt states he is compliant in taking all meds the facility prepares for him. Collateral Information:     SW contacted RN at [facility]. See additional note for details.     Risk Factors:  Mental Health Diagnosis- depression  Gender risk (male over 61)    Protective Factors:  Strong support of family and friends  Stable living arrangement  Medication compliant    Suicidal Ideations:   [] Reports:    [] Past [] Present   [x] Denies    Suicide Attempts:  [] Reports:   [x] Denies    C-SSRS Screening Completed by RN: Current Suicide Risk:  [x] No Risk [] Low [] Moderate [] High    Homicidal Ideations  [] Reports:   [] Past [] Present [x] Denies     Self Injurious/Self Mutilation Behaviors:   [] Reports:    [] Past [] Present   [x] Denies    Hallucinations/Delusions   [] Reports:   [x] Denies     Substance Use/Alcohol Use/Addiction:   [] Reports:   [x] Denies   [x] SBIRT Screen Complete. Current or Past Substance Abuse Treatment  [] Yes, When and Where:  [x] No    Current or Past Mental Health Treatment:  [x] Yes, When and Where: through facility  [] No    Legal Issues:  []  Yes (Specify)  [x]  No    Access to Weapons:  []  Yes (Specify)  [x]  No    Trauma History  [] Reports:  [x] Denies     Living Situation:  Assisted living facility    Employment:  Not employed    Education Level:    Violence Risk Screenin. Have you ever thought about hurting someone? [x]  No  []  Yes (Ask the questions listed below)   When?  Did you follow through with the thoughts? [] No     [] Yes- When and what happened? 2.  Have you ever threatened anyone? [x]  No  []  Yes (Ask the questions listed below)   When and what happened?  Have you ever threatened someone with a gun, knife or other weapon? []  No  []  Yes - When and what happened? 2. Have you ever had an order of protection taken out against you? []  Yes [x]  No  3. Have you ever been arrested due to violence? []  Yes [x]  No  4. Have you ever been cruel to animals?  []  Yes [x]  No    After consideration of C-SSRS screening results, C-SSRS assessments, and this professional's assessment the patient's overall suicide risk assessed to be:  [x] No Risk  [] Low   [] Moderate   [] High     [x] Discussed current suicide risk, protective and risk factors with RN and ED Physician     Disposition   [x] Home:   [] Outpatient Provider:   [] Crisis Unit:   [] Inpatient Psychiatric Unit:  [] Other:

## 2022-07-04 NOTE — ED NOTES
Provider noted to hold off on completing lab and EKG at this time.       Azra Muro RN  07/04/22 3179

## 2022-07-04 NOTE — ED NOTES
Telephone call to [facility]. Spoke with Vernon Hoffmann RN. She relays that DON wants pt meds adjusted. SW advised that ED Physician will not make med adjustments, pt will need to see his Psychiatrist for this. ALIDA advised that this facility will be sending pt back to [facility].

## 2022-07-04 NOTE — ED PROVIDER NOTES
HPI:  7/4/22, Time: 6:10 PM EDT         Dulce Rao is a 57 y.o. male presenting to the ED for gastric evaluation. Patient was sent in from the nursing home. Apparently got upset when a another resident at the nursing home sneezed into his food. He did not apologize. He began to yell. Did not strike the nurse or the other resident. He denies any suicidal or homicidal ideation. He was just upset at the time. He states he has calm down. Denies any other symptoms or complaints. Denies any fever, chills, cough, sputum, change in bowel or bladder, paresthesias, or any other symptoms or complaints. Review of Systems:   A complete review of systems was performed and pertinent positives and negatives are stated within HPI, all other systems reviewed and are negative.          --------------------------------------------- PAST HISTORY ---------------------------------------------  Past Medical History:  has a past medical history of Asthma, CHF (congestive heart failure) ([facility] COPD (chronic obstructive pulmonary disease) ([facility] Depression, Diabetes mellitus ([facility] Emphysema of lung ([facility] H/O cardiovascular stress test, History of echocardiogram, HIV (human immunodeficiency virus infection) ([facility] Hypertension[facility] Seizure ([facility] and Sleep apnea. Past Surgical History:  has a past surgical history that includes Rotator cuff repair (2011) and other surgical history (05/09/2017). Social History:  reports that he has been smoking cigarettes. He has a 17.50 pack-year smoking history. He has never used smokeless tobacco. He reports current alcohol use. He reports that he does not use drugs. Family History: family history includes Alcohol Abuse in his father and mother. The patients home medications have been reviewed. Allergies: Patient has no known allergies.     -------------------------------------------------- RESULTS -------------------------------------------------  All laboratory and radiology results have been personally reviewed by myself   LABS:  No results found for this visit on 07/04/22. RADIOLOGY:  Interpreted by Radiologist.  No orders to display       ------------------------- NURSING NOTES AND VITALS REVIEWED ---------------------------   The nursing notes within the ED encounter and vital signs as below have been reviewed. BP (!) 145/86   Pulse 88   Temp 98.9 °F (37.2 °C) (Oral)   Resp 16   Ht 5' 6\" (1.676 m)   Wt 184 lb (83.5 kg)   SpO2 94%   BMI 29.70 kg/m²   Oxygen Saturation Interpretation: Normal      ---------------------------------------------------PHYSICAL EXAM--------------------------------------      Constitutional/General: Alert and oriented x3, well appearing, non toxic in NAD  Head: Normocephalic and atraumatic  Eyes: PERRL, EOMI  Mouth: Oropharynx clear, handling secretions, no trismus  Neck: Supple, full ROM,   Pulmonary: Lungs clear to auscultation bilaterally, no wheezes, rales, or rhonchi. Not in respiratory distress  Cardiovascular:  Regular rate and rhythm, no murmurs, gallops, or rubs. 2+ distal pulses  Abdomen: Soft, non tender, non distended,   Extremities: Moves all extremities x 4. Warm and well perfused  Skin: warm and dry without rash  Neurologic: GCS 15,  Psych: Normal Affect      ------------------------------ ED COURSE/MEDICAL DECISION MAKING----------------------  Medications - No data to display      ED COURSE:       Medical Decision Making:    Patient cooperative on arrival.  No symptoms or complaints. States he did lose his temper, he does regret it, he is no longer upset. Once again, denies any suicidal or homicidal ideation. Discussed with the nursing staff at the facility, patient will be discharged back to the nursing home. Counseling:    The emergency provider has spoken with the patient and discussed todays results, in addition to providing specific details for the plan of care and counseling regarding the diagnosis and prognosis. Questions are answered at this time and they are agreeable with the plan.      --------------------------------- IMPRESSION AND DISPOSITION ---------------------------------    IMPRESSION  1. [facility]        DISPOSITION  Disposition: Discharge to nursing home  Patient condition is stable      NOTE: This report was transcribed using voice recognition software.  Every effort was made to ensure accuracy; however, inadvertent computerized transcription errors may be present        Marissa Maxwell MD  07/04/22 0283

## 2022-07-11 ENCOUNTER — HOSPITAL ENCOUNTER (OUTPATIENT)
Dept: INFUSION THERAPY | Age: 62
Setting detail: INFUSION SERIES
Discharge: HOME | End: 2022-07-11

## 2022-07-26 ENCOUNTER — HOSPITAL ENCOUNTER (OUTPATIENT)
Dept: INFUSION THERAPY | Age: 62
Setting detail: INFUSION SERIES
Discharge: HOME | End: 2022-07-26
Payer: MEDICAID

## 2022-07-26 VITALS
TEMPERATURE: 98 F | DIASTOLIC BLOOD PRESSURE: 73 MMHG | SYSTOLIC BLOOD PRESSURE: 142 MMHG | HEART RATE: 82 BPM | OXYGEN SATURATION: 90 % | RESPIRATION RATE: 24 BRPM

## 2022-07-26 DIAGNOSIS — J45.909 ASTHMA, INTRINSIC: Primary | ICD-10-CM

## 2022-07-26 PROCEDURE — 6360000002 HC RX W HCPCS: Performed by: INTERNAL MEDICINE

## 2022-07-26 PROCEDURE — 96372 THER/PROPH/DIAG INJ SC/IM: CPT

## 2022-07-26 PROCEDURE — 6370000000 HC RX 637 (ALT 250 FOR IP): Performed by: INTERNAL MEDICINE

## 2022-07-26 RX ORDER — ACETAMINOPHEN 325 MG/1
650 TABLET ORAL ONCE
Status: CANCELLED
Start: 2022-08-09 | End: 2022-08-09

## 2022-07-26 RX ORDER — DIPHENHYDRAMINE HCL 25 MG
25 TABLET ORAL ONCE
Status: CANCELLED
Start: 2022-08-09 | End: 2022-08-09

## 2022-07-26 RX ORDER — METHYLPREDNISOLONE SODIUM SUCCINATE 125 MG/2ML
125 INJECTION, POWDER, LYOPHILIZED, FOR SOLUTION INTRAMUSCULAR; INTRAVENOUS ONCE
Status: CANCELLED | OUTPATIENT
Start: 2022-08-09 | End: 2022-08-09

## 2022-07-26 RX ORDER — ACETAMINOPHEN 325 MG/1
650 TABLET ORAL ONCE
Status: COMPLETED | OUTPATIENT
Start: 2022-07-26 | End: 2022-07-26

## 2022-07-26 RX ORDER — METHYLPREDNISOLONE SODIUM SUCCINATE 125 MG/2ML
125 INJECTION, POWDER, LYOPHILIZED, FOR SOLUTION INTRAMUSCULAR; INTRAVENOUS ONCE
Status: COMPLETED | OUTPATIENT
Start: 2022-07-26 | End: 2022-07-26

## 2022-07-26 RX ORDER — DIPHENHYDRAMINE HYDROCHLORIDE 50 MG/ML
50 INJECTION INTRAMUSCULAR; INTRAVENOUS ONCE
Status: CANCELLED | OUTPATIENT
Start: 2022-08-09 | End: 2022-08-09

## 2022-07-26 RX ORDER — DIPHENHYDRAMINE HCL 25 MG
25 TABLET ORAL ONCE
Status: COMPLETED | OUTPATIENT
Start: 2022-07-26 | End: 2022-07-26

## 2022-07-26 RX ORDER — METHYLPREDNISOLONE SODIUM SUCCINATE 125 MG/2ML
125 INJECTION, POWDER, LYOPHILIZED, FOR SOLUTION INTRAMUSCULAR; INTRAVENOUS ONCE
Status: CANCELLED
Start: 2022-08-09 | End: 2022-08-09

## 2022-07-26 RX ORDER — SODIUM CHLORIDE 9 MG/ML
INJECTION, SOLUTION INTRAVENOUS CONTINUOUS
Status: CANCELLED | OUTPATIENT
Start: 2022-08-09

## 2022-07-26 RX ORDER — EPINEPHRINE 1 MG/ML
0.3 INJECTION, SOLUTION, CONCENTRATE INTRAVENOUS PRN
Status: CANCELLED | OUTPATIENT
Start: 2022-08-09

## 2022-07-26 RX ADMIN — METHYLPREDNISOLONE SODIUM SUCCINATE 125 MG: 125 INJECTION, POWDER, FOR SOLUTION INTRAMUSCULAR; INTRAVENOUS at 12:30

## 2022-07-26 RX ADMIN — OMALIZUMAB 375 MG: 150 INJECTION, SOLUTION SUBCUTANEOUS at 12:30

## 2022-07-26 RX ADMIN — ACETAMINOPHEN 325MG 650 MG: 325 TABLET ORAL at 12:30

## 2022-07-26 RX ADMIN — DIPHENHYDRAMINE HCL 25 MG: 25 TABLET ORAL at 12:30

## 2022-07-27 NOTE — CARE COORDINATION
Client was called due to not attending group session on this date. He stated he did not feel like coming out today. Counselor empathized with client , however client was told that he needed to come in and provide a urine screen for today, and he stated he was not coming out today and that he feels he is not ready to quit is alcohol use and self- discharged. Client was advised not to quit program at this time, however client was made aware that if he decided to return he could call us back.     Electronically signed by Tania Freitas [place] [address] on 5/04/8192 at 9:52 AM Topical Sulfur Applications Counseling: Topical Sulfur Counseling: Patient counseled that this medication may cause skin irritation or allergic reactions.  In the event of skin irritation, the patient was advised to reduce the amount of the drug applied or use it less frequently.   The patient verbalized understanding of the proper use and possible adverse effects of topical sulfur application.  All of the patient's questions and concerns were addressed.

## 2022-08-15 ENCOUNTER — HOSPITAL ENCOUNTER (OUTPATIENT)
Dept: INFUSION THERAPY | Age: 62
Setting detail: INFUSION SERIES
Discharge: HOME | End: 2022-08-15
Payer: MEDICAID

## 2022-08-15 VITALS
HEART RATE: 70 BPM | TEMPERATURE: 98 F | RESPIRATION RATE: 24 BRPM | OXYGEN SATURATION: 94 % | SYSTOLIC BLOOD PRESSURE: 151 MMHG | DIASTOLIC BLOOD PRESSURE: 72 MMHG

## 2022-08-15 DIAGNOSIS — J45.909 ASTHMA, INTRINSIC: Primary | ICD-10-CM

## 2022-08-15 PROCEDURE — 6360000002 HC RX W HCPCS: Performed by: INTERNAL MEDICINE

## 2022-08-15 PROCEDURE — 96372 THER/PROPH/DIAG INJ SC/IM: CPT

## 2022-08-15 PROCEDURE — 6370000000 HC RX 637 (ALT 250 FOR IP): Performed by: INTERNAL MEDICINE

## 2022-08-15 RX ORDER — SODIUM CHLORIDE 9 MG/ML
INJECTION, SOLUTION INTRAVENOUS CONTINUOUS
Status: CANCELLED | OUTPATIENT
Start: 2022-08-22

## 2022-08-15 RX ORDER — METHYLPREDNISOLONE SODIUM SUCCINATE 125 MG/2ML
125 INJECTION, POWDER, LYOPHILIZED, FOR SOLUTION INTRAMUSCULAR; INTRAVENOUS ONCE
Status: CANCELLED | OUTPATIENT
Start: 2022-08-22 | End: 2022-08-22

## 2022-08-15 RX ORDER — METHYLPREDNISOLONE SODIUM SUCCINATE 125 MG/2ML
125 INJECTION, POWDER, LYOPHILIZED, FOR SOLUTION INTRAMUSCULAR; INTRAVENOUS ONCE
Status: COMPLETED | OUTPATIENT
Start: 2022-08-15 | End: 2022-08-15

## 2022-08-15 RX ORDER — DIPHENHYDRAMINE HCL 25 MG
25 TABLET ORAL ONCE
Status: COMPLETED | OUTPATIENT
Start: 2022-08-15 | End: 2022-08-15

## 2022-08-15 RX ORDER — ACETAMINOPHEN 325 MG/1
650 TABLET ORAL ONCE
Status: CANCELLED
Start: 2022-08-22 | End: 2022-08-22

## 2022-08-15 RX ORDER — METHYLPREDNISOLONE SODIUM SUCCINATE 125 MG/2ML
125 INJECTION, POWDER, LYOPHILIZED, FOR SOLUTION INTRAMUSCULAR; INTRAVENOUS ONCE
Status: CANCELLED
Start: 2022-08-22 | End: 2022-08-22

## 2022-08-15 RX ORDER — EPINEPHRINE 1 MG/ML
0.3 INJECTION, SOLUTION, CONCENTRATE INTRAVENOUS PRN
Status: CANCELLED | OUTPATIENT
Start: 2022-08-22

## 2022-08-15 RX ORDER — DIPHENHYDRAMINE HCL 25 MG
25 TABLET ORAL ONCE
Status: CANCELLED
Start: 2022-08-22 | End: 2022-08-22

## 2022-08-15 RX ORDER — ACETAMINOPHEN 325 MG/1
650 TABLET ORAL ONCE
Status: COMPLETED | OUTPATIENT
Start: 2022-08-15 | End: 2022-08-15

## 2022-08-15 RX ORDER — DIPHENHYDRAMINE HYDROCHLORIDE 50 MG/ML
50 INJECTION INTRAMUSCULAR; INTRAVENOUS ONCE
Status: CANCELLED | OUTPATIENT
Start: 2022-08-22 | End: 2022-08-22

## 2022-08-15 RX ADMIN — DIPHENHYDRAMINE HCL 25 MG: 25 TABLET ORAL at 14:47

## 2022-08-15 RX ADMIN — METHYLPREDNISOLONE SODIUM SUCCINATE 125 MG: 125 INJECTION, POWDER, FOR SOLUTION INTRAMUSCULAR; INTRAVENOUS at 14:48

## 2022-08-15 RX ADMIN — OMALIZUMAB 375 MG: 150 INJECTION, SOLUTION SUBCUTANEOUS at 14:49

## 2022-08-15 RX ADMIN — ACETAMINOPHEN 650 MG: 325 TABLET ORAL at 14:47

## 2022-08-29 ENCOUNTER — HOSPITAL ENCOUNTER (OUTPATIENT)
Dept: INFUSION THERAPY | Age: 62
Setting detail: INFUSION SERIES
Discharge: HOME | End: 2022-08-29

## 2022-09-06 ENCOUNTER — APPOINTMENT (OUTPATIENT)
Dept: GENERAL RADIOLOGY | Age: 62
End: 2022-09-06
Payer: MEDICAID

## 2022-09-06 ENCOUNTER — HOSPITAL ENCOUNTER (EMERGENCY)
Age: 62
Discharge: HOME | End: 2022-09-06
Attending: EMERGENCY MEDICINE
Payer: MEDICAID

## 2022-09-06 VITALS
RESPIRATION RATE: 19 BRPM | DIASTOLIC BLOOD PRESSURE: 90 MMHG | HEART RATE: 75 BPM | TEMPERATURE: 97.7 F | OXYGEN SATURATION: 98 % | SYSTOLIC BLOOD PRESSURE: 128 MMHG | WEIGHT: 195 LBS | BODY MASS INDEX: 31.47 KG/M2

## 2022-09-06 DIAGNOSIS — J44.1 COPD EXACERBATION (HCC): Primary | ICD-10-CM

## 2022-09-06 LAB
ANION GAP SERPL CALCULATED.3IONS-SCNC: 8 MMOL/L (ref 7–16)
BASOPHILS ABSOLUTE: 0.05 E9/L (ref 0–0.2)
BASOPHILS RELATIVE PERCENT: 0.5 % (ref 0–2)
BUN BLDV-MCNC: 10 MG/DL (ref 6–23)
CALCIUM SERPL-MCNC: 9.3 MG/DL (ref 8.6–10.2)
CHLORIDE BLD-SCNC: 97 MMOL/L (ref 98–107)
CO2: 35 MMOL/L (ref 22–29)
CREAT SERPL-MCNC: 0.8 MG/DL (ref 0.7–1.2)
EOSINOPHILS ABSOLUTE: 0.2 E9/L (ref 0.05–0.5)
EOSINOPHILS RELATIVE PERCENT: 1.8 % (ref 0–6)
GFR AFRICAN AMERICAN: >60
GFR NON-AFRICAN AMERICAN: >60 ML/MIN/1.73
GLUCOSE BLD-MCNC: 98 MG/DL (ref 74–99)
HCT VFR BLD CALC: 40.7 % (ref 37–54)
HEMOGLOBIN: 12.9 G/DL (ref 12.5–16.5)
IMMATURE GRANULOCYTES #: 0.06 E9/L
IMMATURE GRANULOCYTES %: 0.5 % (ref 0–5)
LYMPHOCYTES ABSOLUTE: 2.36 E9/L (ref 1.5–4)
LYMPHOCYTES RELATIVE PERCENT: 21.5 % (ref 20–42)
MCH RBC QN AUTO: 29.8 PG (ref 26–35)
MCHC RBC AUTO-ENTMCNC: 31.7 % (ref 32–34.5)
MCV RBC AUTO: 94 FL (ref 80–99.9)
MONOCYTES ABSOLUTE: 1.03 E9/L (ref 0.1–0.95)
MONOCYTES RELATIVE PERCENT: 9.4 % (ref 2–12)
NEUTROPHILS ABSOLUTE: 7.27 E9/L (ref 1.8–7.3)
NEUTROPHILS RELATIVE PERCENT: 66.3 % (ref 43–80)
PDW BLD-RTO: 14 FL (ref 11.5–15)
PLATELET # BLD: 170 E9/L (ref 130–450)
PMV BLD AUTO: 11.4 FL (ref 7–12)
POTASSIUM REFLEX MAGNESIUM: 3.7 MMOL/L (ref 3.5–5)
PRO-BNP: 33 PG/ML (ref 0–125)
RBC # BLD: 4.33 E12/L (ref 3.8–5.8)
SODIUM BLD-SCNC: 140 MMOL/L (ref 132–146)
TROPONIN, HIGH SENSITIVITY: 8 NG/L (ref 0–11)
WBC # BLD: 11 E9/L (ref 4.5–11.5)

## 2022-09-06 PROCEDURE — 85025 COMPLETE CBC W/AUTO DIFF WBC: CPT

## 2022-09-06 PROCEDURE — 84484 ASSAY OF TROPONIN QUANT: CPT

## 2022-09-06 PROCEDURE — 80048 BASIC METABOLIC PNL TOTAL CA: CPT

## 2022-09-06 PROCEDURE — 93005 ELECTROCARDIOGRAM TRACING: CPT | Performed by: STUDENT IN AN ORGANIZED HEALTH CARE EDUCATION/TRAINING PROGRAM

## 2022-09-06 PROCEDURE — 96375 TX/PRO/DX INJ NEW DRUG ADDON: CPT

## 2022-09-06 PROCEDURE — 83880 ASSAY OF NATRIURETIC PEPTIDE: CPT

## 2022-09-06 PROCEDURE — 36415 COLL VENOUS BLD VENIPUNCTURE: CPT

## 2022-09-06 PROCEDURE — 96374 THER/PROPH/DIAG INJ IV PUSH: CPT

## 2022-09-06 PROCEDURE — 94644 CONT INHLJ TX 1ST HOUR: CPT

## 2022-09-06 PROCEDURE — 99285 EMERGENCY DEPT VISIT HI MDM: CPT

## 2022-09-06 PROCEDURE — 6360000002 HC RX W HCPCS: Performed by: STUDENT IN AN ORGANIZED HEALTH CARE EDUCATION/TRAINING PROGRAM

## 2022-09-06 PROCEDURE — 71045 X-RAY EXAM CHEST 1 VIEW: CPT

## 2022-09-06 PROCEDURE — 6370000000 HC RX 637 (ALT 250 FOR IP): Performed by: STUDENT IN AN ORGANIZED HEALTH CARE EDUCATION/TRAINING PROGRAM

## 2022-09-06 RX ORDER — IPRATROPIUM BROMIDE AND ALBUTEROL SULFATE 2.5; .5 MG/3ML; MG/3ML
3 SOLUTION RESPIRATORY (INHALATION) ONCE
Status: COMPLETED | OUTPATIENT
Start: 2022-09-06 | End: 2022-09-06

## 2022-09-06 RX ORDER — METHYLPREDNISOLONE SODIUM SUCCINATE 125 MG/2ML
125 INJECTION, POWDER, LYOPHILIZED, FOR SOLUTION INTRAMUSCULAR; INTRAVENOUS ONCE
Status: COMPLETED | OUTPATIENT
Start: 2022-09-06 | End: 2022-09-06

## 2022-09-06 RX ORDER — PREDNISONE 20 MG/1
40 TABLET ORAL DAILY
Qty: 10 TABLET | Refills: 0 | Status: SHIPPED | OUTPATIENT
Start: 2022-09-06 | End: 2022-09-11

## 2022-09-06 RX ADMIN — IPRATROPIUM BROMIDE AND ALBUTEROL SULFATE 3 ML: .5; 2.5 SOLUTION RESPIRATORY (INHALATION) at 11:51

## 2022-09-06 RX ADMIN — METHYLPREDNISOLONE SODIUM SUCCINATE 125 MG: 125 INJECTION, POWDER, FOR SOLUTION INTRAMUSCULAR; INTRAVENOUS at 12:03

## 2022-09-06 NOTE — ED NOTES
Ras Robledo from [facility] called at [phone] and updated on pt condition.      Stuart Beebe RN  09/06/22 4607

## 2022-09-06 NOTE — ED NOTES
Patient ambulated in room on room air with Dr. Glenis Crow at the bedside. O2 sat was 90% on room air and decreased down to 89% on room air while ambulating.      Ben Lam RN  09/06/22 1395

## 2022-09-06 NOTE — ED PROVIDER NOTES
HPI  57 y.o. male presenting for shortness of breath. Symptoms have been present for 2 days. They have been constant and moderate in severity. They are worsened by exertion and relieved by rest.  Patient complains that his asthma is acting up. He has been using inhalers at home which he states does help. He denies any fevers, chills, chest pain, nausea, emesis, cough, or congestion.      --------------------------------------------- PAST HISTORY ---------------------------------------------  Past Medical History:  has a past medical history of Asthma, CHF (congestive heart failure) ([facility] COPD (chronic obstructive pulmonary disease) ([facility] Depression, Diabetes mellitus ([facility] Emphysema of lung ([facility] H/O cardiovascular stress test, History of echocardiogram, HIV (human immunodeficiency virus infection) ([facility] Hypertension, Seizure ([facility] Seizure ([facility] and Sleep apnea. Past Surgical History:  has a past surgical history that includes Rotator cuff repair (2011) and other surgical history (05/09/2017). Social History:  reports that he has been smoking cigarettes. He has a 17.50 pack-year smoking history. He has never used smokeless tobacco. He reports current alcohol use. He reports that he does not use drugs. Family History: family history includes Alcohol Abuse in his father and mother. The patients home medications have been reviewed. Allergies: Patient has no known allergies. Review of Systems   Constitutional:  Negative for chills and fever. HENT:  Negative for congestion and sore throat. Eyes:  Negative for photophobia and visual disturbance. Respiratory:  Positive for shortness of breath. Negative for cough. Cardiovascular:  Negative for chest pain and leg swelling. Gastrointestinal:  Negative for abdominal pain, diarrhea and nausea. Genitourinary:  Negative for dysuria and flank pain. Musculoskeletal:  Negative for back pain and myalgias.    Skin:  Negative for rash and wound.   Neurological:  Negative for dizziness, light-headedness and headaches. All other systems reviewed and are negative. Physical Exam  Constitutional:       General: He is not in acute distress. Appearance: Normal appearance. He is not ill-appearing. HENT:      Head: Normocephalic and atraumatic. Right Ear: External ear normal.      Left Ear: External ear normal.      Nose: Nose normal.   Eyes:      Conjunctiva/sclera: Conjunctivae normal.   Cardiovascular:      Rate and Rhythm: Normal rate and regular rhythm. Pulmonary:      Effort: Pulmonary effort is normal. No respiratory distress. Breath sounds: No stridor. Decreased breath sounds present. No wheezing, rhonchi or rales. Comments: Breath sounds diminished diffusely  Abdominal:      General: There is no distension. Palpations: Abdomen is soft. Tenderness: There is no abdominal tenderness. Musculoskeletal:         General: No swelling or deformity. Right lower leg: No edema. Left lower leg: No edema. Skin:     General: Skin is warm and dry. Neurological:      General: No focal deficit present. Mental Status: He is alert.    Psychiatric:         Mood and Affect: Mood normal.        Procedures           -------------------------------------------------- RESULTS -------------------------------------------------  Labs:  Results for orders placed or performed during the hospital encounter of 09/06/22   CBC with Auto Differential   Result Value Ref Range    WBC 11.0 4.5 - 11.5 E9/L    RBC 4.33 3.80 - 5.80 E12/L    Hemoglobin 12.9 12.5 - 16.5 g/dL    Hematocrit 40.7 37.0 - 54.0 %    MCV 94.0 80.0 - 99.9 fL    MCH 29.8 26.0 - 35.0 pg    MCHC 31.7 (L) 32.0 - 34.5 %    RDW 14.0 11.5 - 15.0 fL    Platelets 299 810 - 794 E9/L    MPV 11.4 7.0 - 12.0 fL    Neutrophils % 66.3 43.0 - 80.0 %    Immature Granulocytes % 0.5 0.0 - 5.0 %    Lymphocytes % 21.5 20.0 - 42.0 %    Monocytes % 9.4 2.0 - 12.0 %    Eosinophils % 1.8 0.0 - 6.0 %    Basophils % 0.5 0.0 - 2.0 %    Neutrophils Absolute 7.27 1.80 - 7.30 E9/L    Immature Granulocytes # 0.06 E9/L    Lymphocytes Absolute 2.36 1.50 - 4.00 E9/L    Monocytes Absolute 1.03 (H) 0.10 - 0.95 E9/L    Eosinophils Absolute 0.20 0.05 - 0.50 E9/L    Basophils Absolute 0.05 0.00 - 0.20 Q0/E   Basic Metabolic Panel w/ Reflex to MG   Result Value Ref Range    Sodium 140 132 - 146 mmol/L    Potassium reflex Magnesium 3.7 3.5 - 5.0 mmol/L    Chloride 97 (L) 98 - 107 mmol/L    CO2 35 (H) 22 - 29 mmol/L    Anion Gap 8 7 - 16 mmol/L    Glucose 98 74 - 99 mg/dL    BUN 10 6 - 23 mg/dL    Creatinine 0.8 0.7 - 1.2 mg/dL    GFR Non-African American >60 >=60 mL/min/1.73    GFR African American >60     Calcium 9.3 8.6 - 10.2 mg/dL   Troponin   Result Value Ref Range    Troponin, High Sensitivity 8 0 - 11 ng/L   Brain Natriuretic Peptide   Result Value Ref Range    Pro-BNP 33 0 - 125 pg/mL   EKG 12 Lead   Result Value Ref Range    Ventricular Rate 73 BPM    Atrial Rate 73 BPM    P-R Interval 170 ms    QRS Duration 96 ms    Q-T Interval 392 ms    QTc Calculation (Bazett) 431 ms    P Axis 84 degrees    R Axis 22 degrees    T Axis 64 degrees       Radiology:  XR CHEST PORTABLE   Final Result   No acute consolidation or infiltrate. EKG @ 11;15:  This EKG is signed and interpreted by me  Rate: 73  Rhythm: Sinus  Interpretation: no acute ST elevations or depressions, no acute T wave changes, normal intervals  Comparison: stable as compared to patient's most recent EKG      ------------------------- NURSING NOTES AND VITALS REVIEWED ---------------------------  Date / Time Roomed:  9/6/2022 11:04 AM  ED Bed Assignment:  AKIL/AKIL    The nursing notes within the ED encounter and vital signs as below have been reviewed.    BP (!) 128/90   Pulse 75   Temp 97.7 °F (36.5 °C)   Resp 19   Wt 195 lb (88.5 kg)   SpO2 98%   BMI 31.47 kg/m²   Oxygen Saturation Interpretation: Normal    MDM  Number of Diagnoses or Management Options  COPD exacerbation ([facility]  Diagnosis management comments: 58 yo male presenting with COPD exacerbation. Workup unremarkable. Patient given duonebs and solumedrol with relief. He ambulated without hypoxia. He was discharged home in stable condition with prednisone.        ------------------------------------------ PROGRESS NOTES ------------------------------------------  I have spoken with the patient and discussed todays results, in addition to providing specific details for the plan of care and counseling regarding the diagnosis and prognosis. Their questions are answered at this time and they are agreeable with the plan. I discussed at length with them reasons for immediate return here for re evaluation. They will followup with PCP.      --------------------------------- ADDITIONAL PROVIDER NOTES ---------------------------------  At this time the patient is without objective evidence of an acute process requiring hospitalization or inpatient management. They have remained hemodynamically stable throughout their entire ED visit and are stable for discharge with outpatient follow-up. The plan has been discussed in detail and they are aware of the specific conditions for emergent return, as well as the importance of follow-up. Discharge Medication List as of 9/6/2022  3:02 PM        START taking these medications    Details   predniSONE (DELTASONE) 20 MG tablet Take 2 tablets by mouth daily for 5 days, Disp-10 tablet, R-0Print             Diagnosis:  1. COPD exacerbation ([facility]        Disposition:  Patient's disposition: Discharge to home  Patient's condition is stable.        Margret Seaman MD  Resident  09/08/22 0113

## 2022-09-07 LAB
EKG ATRIAL RATE: 73 BPM
EKG P AXIS: 84 DEGREES
EKG P-R INTERVAL: 170 MS
EKG Q-T INTERVAL: 392 MS
EKG QRS DURATION: 96 MS
EKG QTC CALCULATION (BAZETT): 431 MS
EKG R AXIS: 22 DEGREES
EKG T AXIS: 64 DEGREES
EKG VENTRICULAR RATE: 73 BPM

## 2022-09-08 ASSESSMENT — ENCOUNTER SYMPTOMS
COUGH: 0
SORE THROAT: 0
PHOTOPHOBIA: 0
BACK PAIN: 0
NAUSEA: 0
SHORTNESS OF BREATH: 1
ABDOMINAL PAIN: 0
DIARRHEA: 0

## 2022-10-07 ENCOUNTER — HOSPITAL ENCOUNTER (OUTPATIENT)
Dept: INFUSION THERAPY | Age: 62
Setting detail: INFUSION SERIES
Discharge: HOME | End: 2022-10-07

## 2022-10-07 DIAGNOSIS — J45.909 ASTHMA, INTRINSIC: Primary | ICD-10-CM

## 2022-10-07 RX ORDER — METHYLPREDNISOLONE SODIUM SUCCINATE 125 MG/2ML
125 INJECTION, POWDER, LYOPHILIZED, FOR SOLUTION INTRAMUSCULAR; INTRAVENOUS ONCE
OUTPATIENT
Start: 2022-10-14 | End: 2022-10-14

## 2022-10-07 RX ORDER — DIPHENHYDRAMINE HCL 25 MG
25 TABLET ORAL ONCE
Status: DISCONTINUED | OUTPATIENT
Start: 2022-10-07 | End: 2022-10-08 | Stop reason: HOSPADM

## 2022-10-07 RX ORDER — METHYLPREDNISOLONE SODIUM SUCCINATE 125 MG/2ML
125 INJECTION, POWDER, LYOPHILIZED, FOR SOLUTION INTRAMUSCULAR; INTRAVENOUS ONCE
Start: 2022-10-14 | End: 2022-10-14

## 2022-10-07 RX ORDER — EPINEPHRINE 1 MG/ML
0.3 INJECTION, SOLUTION, CONCENTRATE INTRAVENOUS PRN
OUTPATIENT
Start: 2022-10-14

## 2022-10-07 RX ORDER — DIPHENHYDRAMINE HYDROCHLORIDE 50 MG/ML
50 INJECTION INTRAMUSCULAR; INTRAVENOUS ONCE
OUTPATIENT
Start: 2022-10-14 | End: 2022-10-14

## 2022-10-07 RX ORDER — METHYLPREDNISOLONE SODIUM SUCCINATE 125 MG/2ML
125 INJECTION, POWDER, LYOPHILIZED, FOR SOLUTION INTRAMUSCULAR; INTRAVENOUS ONCE
Status: DISCONTINUED | OUTPATIENT
Start: 2022-10-07 | End: 2022-10-08 | Stop reason: HOSPADM

## 2022-10-07 RX ORDER — DIPHENHYDRAMINE HCL 25 MG
25 TABLET ORAL ONCE
Start: 2022-10-14 | End: 2022-10-14

## 2022-10-07 RX ORDER — ACETAMINOPHEN 325 MG/1
650 TABLET ORAL ONCE
Start: 2022-10-14 | End: 2022-10-14

## 2022-10-07 RX ORDER — SODIUM CHLORIDE 9 MG/ML
INJECTION, SOLUTION INTRAVENOUS CONTINUOUS
OUTPATIENT
Start: 2022-10-14

## 2022-10-07 RX ORDER — ACETAMINOPHEN 325 MG/1
650 TABLET ORAL ONCE
Status: DISCONTINUED | OUTPATIENT
Start: 2022-10-07 | End: 2022-10-08 | Stop reason: HOSPADM

## 2023-04-01 ENCOUNTER — HOSPITAL ENCOUNTER (EMERGENCY)
Age: 63
Discharge: HOME | End: 2023-04-01
Attending: EMERGENCY MEDICINE
Payer: MEDICAID

## 2023-04-01 VITALS
DIASTOLIC BLOOD PRESSURE: 100 MMHG | OXYGEN SATURATION: 93 % | RESPIRATION RATE: 20 BRPM | TEMPERATURE: 98.7 F | HEART RATE: 82 BPM | SYSTOLIC BLOOD PRESSURE: 172 MMHG

## 2023-04-01 DIAGNOSIS — K08.89 PAIN, DENTAL: Primary | ICD-10-CM

## 2023-04-01 PROCEDURE — 6370000000 HC RX 637 (ALT 250 FOR IP): Performed by: STUDENT IN AN ORGANIZED HEALTH CARE EDUCATION/TRAINING PROGRAM

## 2023-04-01 PROCEDURE — 99283 EMERGENCY DEPT VISIT LOW MDM: CPT

## 2023-04-01 RX ORDER — AMOXICILLIN AND CLAVULANATE POTASSIUM 875; 125 MG/1; MG/1
1 TABLET, FILM COATED ORAL ONCE
Status: COMPLETED | OUTPATIENT
Start: 2023-04-01 | End: 2023-04-01

## 2023-04-01 RX ORDER — ACETAMINOPHEN 500 MG
1000 TABLET ORAL ONCE
Status: COMPLETED | OUTPATIENT
Start: 2023-04-01 | End: 2023-04-01

## 2023-04-01 RX ORDER — AMOXICILLIN AND CLAVULANATE POTASSIUM 875; 125 MG/1; MG/1
1 TABLET, FILM COATED ORAL 2 TIMES DAILY
Qty: 20 TABLET | Refills: 0 | Status: SHIPPED | OUTPATIENT
Start: 2023-04-01 | End: 2023-04-11

## 2023-04-01 RX ADMIN — ACETAMINOPHEN 1000 MG: 500 TABLET, FILM COATED ORAL at 15:24

## 2023-04-01 RX ADMIN — AMOXICILLIN AND CLAVULANATE POTASSIUM 1 TABLET: 875; 125 TABLET ORAL at 15:24

## 2023-04-01 ASSESSMENT — PAIN SCALES - GENERAL
PAINLEVEL_OUTOF10: 7
PAINLEVEL_OUTOF10: 7

## 2023-04-01 ASSESSMENT — LIFESTYLE VARIABLES: HOW OFTEN DO YOU HAVE A DRINK CONTAINING ALCOHOL: NEVER

## 2023-04-01 NOTE — ED PROVIDER NOTES
Diagnosis Date    Asthma     since childhood    CHF (congestive heart failure) (HCC)     COPD (chronic obstructive pulmonary disease) ([facility]     Depression     Diabetes mellitus ([facility]     Emphysema of lung ([facility]     H/O cardiovascular stress test 05/27/2020    Lexiscan    History of echocardiogram 04/19/2018    EF 62%    HIV (human immunodeficiency virus infection) ([facility]     Hypertension 2008    Seizure ([facility]     Seizure ([facility]     Sleep apnea     no cpap       CONSULTS: (Who and What was discussed)  None    Discussion with Other Profesionals : None    Social Determinants : None    Records Reviewed : None    CC/HPI Summary, DDx, ED Course, and Reassessment:   Patient is a 39-year-old male past medical history of CHF, diabetes, COPD, hypertension as well as HIV. Patient presents with chief complaint of dental pain. Vital signs stable presentation. Physical exam heart regular rate and rhythm, lungs clear to auscultation bilaterally, abdomen soft nontender no rigidity rebound or guarding. On examination of the oropharynx there is multiple dental caries noted, mild gingival swelling noted on the right upper molar no significant abscess. Patient without trismus she is able to speak in full sentences he is tolerating oral secretions. Findings of dental pain likely secondary to dental caries. Patient medicated with 1 g of Tylenol for pain. Patient also started on Augmentin. Patient is overall well-appearing he is afebrile. Decision made to discharge patient. Patient given prescription for Augmentin and instructed to follow-up with primary care doctor as well as dentist.  In addition if patient notes any new worrisome symptoms he was instructed to return to the emergency department for further evaluation. Plan of care discussed with patient including discharge all questions were answered, patient was in agreement plan of care and discharged home in stable condition.     Disposition Considerations (Tests not ordered but considered, Shared Decision Making, Pt Expectation of Test or Tx.): Shared decision making discussed with patient. At this time patient is overall well-appearing did medicate for pain with Tylenol as well as given first dose of antibiotic. Patient will follow-up with dentist.    FINAL IMPRESSION      1.  Pain, dental          DISPOSITION/PLAN     DISPOSITION Decision To Discharge 04/01/2023 03:12:25 PM    PATIENT REFERRED TO:  Buzz Ramirez, APRN - NP  [address]  [facility] [address] [id number]    Schedule an appointment as soon as possible for a visit       [address] [facility] Emergency Department  [address] [facility] [address] [facility]  Go to   If symptoms worsen    [address] [id number] [phone]  Schedule an appointment as soon as possible for a visit       DISCHARGE MEDICATIONS:  New Prescriptions    AMOXICILLIN-CLAVULANATE (AUGMENTIN) 875-125 MG PER TABLET    Take 1 tablet by mouth 2 times daily for 10 days       DISCONTINUED MEDICATIONS:  Discontinued Medications    No medications on file            (Please note that portions of this note were completed with a voice recognition program.  Efforts were made to edit the dictations but occasionally words are mis-transcribed.)    Joe Rodgers DO (electronically signed)       Cordell Wright DO  Resident  04/01/23 [id number]

## 2023-04-01 NOTE — DISCHARGE INSTRUCTIONS
Take all antibiotics as prescribed  Use Tylenol or ibuprofen as needed for pain  Follow-up with dentist or dental clinic as provided in discharge instructions  If you notice any new or worrisome symptoms please return to emergency department for further evaluation

## 2024-06-04 ENCOUNTER — HOSPITAL ENCOUNTER (INPATIENT)
Age: 64
LOS: 5 days | Discharge: HOME | DRG: 871 | End: 2024-06-09
Attending: EMERGENCY MEDICINE | Admitting: INTERNAL MEDICINE
Payer: MEDICARE

## 2024-06-04 ENCOUNTER — APPOINTMENT (OUTPATIENT)
Dept: GENERAL RADIOLOGY | Age: 64
DRG: 871 | End: 2024-06-04
Payer: MEDICARE

## 2024-06-04 DIAGNOSIS — J18.9 PNEUMONIA OF BOTH LOWER LOBES DUE TO INFECTIOUS ORGANISM: Primary | ICD-10-CM

## 2024-06-04 DIAGNOSIS — J96.02 ACUTE RESPIRATORY FAILURE WITH HYPOXIA AND HYPERCAPNIA (HCC): ICD-10-CM

## 2024-06-04 DIAGNOSIS — J96.01 ACUTE RESPIRATORY FAILURE WITH HYPOXIA AND HYPERCAPNIA (HCC): ICD-10-CM

## 2024-06-04 LAB
AADO2: 121.3 MMHG
ALBUMIN SERPL-MCNC: 4.4 G/DL (ref 3.5–5.2)
ALP SERPL-CCNC: 95 U/L (ref 40–129)
ALT SERPL-CCNC: 28 U/L (ref 0–40)
ANION GAP SERPL CALCULATED.3IONS-SCNC: 14 MMOL/L (ref 7–16)
AST SERPL-CCNC: 42 U/L (ref 0–39)
B.E.: -1 MMOL/L (ref -3–3)
BASOPHILS # BLD: 0.03 K/UL (ref 0–0.2)
BASOPHILS NFR BLD: 0 % (ref 0–2)
BILIRUB SERPL-MCNC: 0.2 MG/DL (ref 0–1.2)
BNP SERPL-MCNC: <36 PG/ML (ref 0–450)
BUN SERPL-MCNC: 19 MG/DL (ref 6–23)
CALCIUM SERPL-MCNC: 8.4 MG/DL (ref 8.6–10.2)
CHLORIDE SERPL-SCNC: 100 MMOL/L (ref 98–107)
CO2 SERPL-SCNC: 25 MMOL/L (ref 22–29)
COHB: 0.7 % (ref 0–1.5)
COMMENT: ABNORMAL
CREAT SERPL-MCNC: 0.7 MG/DL (ref 0.7–1.2)
CRITICAL ACTION: NORMAL
CRITICAL NOTIFICATION DATE/TIME: NORMAL
CRITICAL NOTIFICATION: NORMAL
CRITICAL VALUE READ BACK: YES
CRITICAL: ABNORMAL
D-DIMER QUANTITATIVE: <200 NG/ML DDU (ref 0–230)
DATE ANALYZED: ABNORMAL
DATE OF COLLECTION: ABNORMAL
EKG ATRIAL RATE: 107 BPM
EKG P AXIS: 76 DEGREES
EKG P-R INTERVAL: 122 MS
EKG Q-T INTERVAL: 374 MS
EKG QRS DURATION: 86 MS
EKG QTC CALCULATION (BAZETT): 499 MS
EKG R AXIS: -120 DEGREES
EKG T AXIS: 73 DEGREES
EKG VENTRICULAR RATE: 107 BPM
EOSINOPHIL # BLD: 0.01 K/UL (ref 0.05–0.5)
EOSINOPHILS RELATIVE PERCENT: 0 % (ref 0–6)
ERYTHROCYTE [DISTWIDTH] IN BLOOD BY AUTOMATED COUNT: 15 % (ref 11.5–15)
FIO2: 100
FIO2: 60 %
GFR, ESTIMATED: >90 ML/MIN/1.73M2
GLUCOSE BLD-MCNC: 135 MG/DL (ref 74–99)
GLUCOSE BLD-MCNC: 161 MG/DL (ref 74–99)
GLUCOSE SERPL-MCNC: 172 MG/DL (ref 74–99)
HCO3: 25.8 MMOL/L (ref 22–26)
HCT VFR BLD AUTO: 43 % (ref 37–54)
HGB BLD-MCNC: 13.6 G/DL (ref 12.5–16.5)
HHB: 0.1 % (ref 0–5)
IMM GRANULOCYTES # BLD AUTO: 0.07 K/UL (ref 0–0.58)
IMM GRANULOCYTES NFR BLD: 0 % (ref 0–5)
LAB: ABNORMAL
LACTATE BLDV-SCNC: 2.2 MMOL/L (ref 0.5–2.2)
LYMPHOCYTES NFR BLD: 3.03 K/UL (ref 1.5–4)
LYMPHOCYTES RELATIVE PERCENT: 19 % (ref 20–42)
Lab: 320
MCH RBC QN AUTO: 29 PG (ref 26–35)
MCHC RBC AUTO-ENTMCNC: 31.6 G/DL (ref 32–34.5)
MCV RBC AUTO: 91.7 FL (ref 80–99.9)
METHB: 0.1 % (ref 0–1.5)
MODE: ABNORMAL
MONOCYTES NFR BLD: 1.27 K/UL (ref 0.1–0.95)
MONOCYTES NFR BLD: 8 % (ref 2–12)
NEUTROPHILS NFR BLD: 73 % (ref 43–80)
NEUTS SEG NFR BLD: 11.78 K/UL (ref 1.8–7.3)
O2 CONTENT: 19.5 ML/DL
O2 DELIVERY DEVICE: ABNORMAL
O2 SATURATION: 99.9 % (ref 92–98.5)
O2HB: 99.1 % (ref 94–97)
OPERATOR ID: 1821
PATIENT TEMP: 37 C
PCO2: 51.2 MMHG (ref 35–45)
PFO2: 3.92 MMHG/%
PH BLOOD GAS: 7.32 (ref 7.35–7.45)
PLATELET # BLD AUTO: 206 K/UL (ref 130–450)
PMV BLD AUTO: 11.7 FL (ref 7–12)
PO2: 235.2 MMHG (ref 75–100)
POC HCO3: 31 MMOL/L (ref 22–26)
POC O2 SATURATION: 100 % (ref 92–98.5)
POC PCO2: 70.8 MM HG (ref 35–45)
POC PH: 7.25 (ref 7.35–7.45)
POC PO2: 491.6 MM HG (ref 60–80)
POSITIVE BASE EXCESS, ART: 1 MMOL/L (ref 0–3)
POTASSIUM SERPL-SCNC: 4.8 MMOL/L (ref 3.5–5)
PROCALCITONIN SERPL-MCNC: 0.11 NG/ML (ref 0–0.08)
PROT SERPL-MCNC: 8.1 G/DL (ref 6.4–8.3)
RBC # BLD AUTO: 4.69 M/UL (ref 3.8–5.8)
RI(T): 0.52
RR MECHANICAL: 22 B/MIN
SODIUM SERPL-SCNC: 139 MMOL/L (ref 132–146)
SOURCE, BLOOD GAS: ABNORMAL
THB: 13.6 G/DL (ref 11.5–16.5)
TIME ANALYZED: 327
TROPONIN I SERPL HS-MCNC: 13 NG/L (ref 0–11)
TROPONIN I SERPL HS-MCNC: 7 NG/L (ref 0–11)
WBC OTHER # BLD: 16.2 K/UL (ref 4.5–11.5)

## 2024-06-04 PROCEDURE — 5A09457 ASSISTANCE WITH RESPIRATORY VENTILATION, 24-96 CONSECUTIVE HOURS, CONTINUOUS POSITIVE AIRWAY PRESSURE: ICD-10-PCS | Performed by: INTERNAL MEDICINE

## 2024-06-04 PROCEDURE — 2580000003 HC RX 258

## 2024-06-04 PROCEDURE — 93005 ELECTROCARDIOGRAM TRACING: CPT

## 2024-06-04 PROCEDURE — 80053 COMPREHEN METABOLIC PANEL: CPT

## 2024-06-04 PROCEDURE — 85379 FIBRIN DEGRADATION QUANT: CPT

## 2024-06-04 PROCEDURE — 71045 X-RAY EXAM CHEST 1 VIEW: CPT

## 2024-06-04 PROCEDURE — 96374 THER/PROPH/DIAG INJ IV PUSH: CPT

## 2024-06-04 PROCEDURE — 84484 ASSAY OF TROPONIN QUANT: CPT

## 2024-06-04 PROCEDURE — 87040 BLOOD CULTURE FOR BACTERIA: CPT

## 2024-06-04 PROCEDURE — 82805 BLOOD GASES W/O2 SATURATION: CPT

## 2024-06-04 PROCEDURE — 94660 CPAP INITIATION&MGMT: CPT

## 2024-06-04 PROCEDURE — NBSRV NON-BILLABLE SERVICE: Performed by: INTERNAL MEDICINE

## 2024-06-04 PROCEDURE — 6360000002 HC RX W HCPCS: Performed by: INTERNAL MEDICINE

## 2024-06-04 PROCEDURE — 93010 ELECTROCARDIOGRAM REPORT: CPT | Performed by: INTERNAL MEDICINE

## 2024-06-04 PROCEDURE — 6370000000 HC RX 637 (ALT 250 FOR IP): Performed by: INTERNAL MEDICINE

## 2024-06-04 PROCEDURE — 2500000003 HC RX 250 WO HCPCS: Performed by: INTERNAL MEDICINE

## 2024-06-04 PROCEDURE — 87154 CUL TYP ID BLD PTHGN 6+ TRGT: CPT

## 2024-06-04 PROCEDURE — 87385 HISTOPLASMA CAPSUL AG IA: CPT

## 2024-06-04 PROCEDURE — 84145 PROCALCITONIN (PCT): CPT

## 2024-06-04 PROCEDURE — 96365 THER/PROPH/DIAG IV INF INIT: CPT

## 2024-06-04 PROCEDURE — 6360000002 HC RX W HCPCS

## 2024-06-04 PROCEDURE — 96375 TX/PRO/DX INJ NEW DRUG ADDON: CPT

## 2024-06-04 PROCEDURE — 83605 ASSAY OF LACTIC ACID: CPT

## 2024-06-04 PROCEDURE — 82962 GLUCOSE BLOOD TEST: CPT

## 2024-06-04 PROCEDURE — 99285 EMERGENCY DEPT VISIT HI MDM: CPT

## 2024-06-04 PROCEDURE — 2500000003 HC RX 250 WO HCPCS

## 2024-06-04 PROCEDURE — 83880 ASSAY OF NATRIURETIC PEPTIDE: CPT

## 2024-06-04 PROCEDURE — 2060000000 HC ICU INTERMEDIATE R&B

## 2024-06-04 PROCEDURE — 94640 AIRWAY INHALATION TREATMENT: CPT

## 2024-06-04 PROCEDURE — 2580000003 HC RX 258: Performed by: INTERNAL MEDICINE

## 2024-06-04 PROCEDURE — 36415 COLL VENOUS BLD VENIPUNCTURE: CPT

## 2024-06-04 PROCEDURE — 87081 CULTURE SCREEN ONLY: CPT

## 2024-06-04 PROCEDURE — 85025 COMPLETE CBC W/AUTO DIFF WBC: CPT

## 2024-06-04 PROCEDURE — 6370000000 HC RX 637 (ALT 250 FOR IP)

## 2024-06-04 PROCEDURE — 87899 AGENT NOS ASSAY W/OPTIC: CPT

## 2024-06-04 PROCEDURE — 99223 1ST HOSP IP/OBS HIGH 75: CPT | Performed by: INTERNAL MEDICINE

## 2024-06-04 PROCEDURE — 82803 BLOOD GASES ANY COMBINATION: CPT

## 2024-06-04 PROCEDURE — 87449 NOS EACH ORGANISM AG IA: CPT

## 2024-06-04 RX ORDER — LANOLIN ALCOHOL/MO/W.PET/CERES
5 CREAM (GRAM) TOPICAL DAILY
COMMUNITY

## 2024-06-04 RX ORDER — ROSUVASTATIN CALCIUM 10 MG/1
10 TABLET, COATED ORAL
Status: DISCONTINUED | OUTPATIENT
Start: 2024-06-04 | End: 2024-06-09 | Stop reason: HOSPADM

## 2024-06-04 RX ORDER — HYDROCHLOROTHIAZIDE 25 MG/1
25 TABLET ORAL DAILY
Status: DISCONTINUED | OUTPATIENT
Start: 2024-06-04 | End: 2024-06-09 | Stop reason: HOSPADM

## 2024-06-04 RX ORDER — TAMSULOSIN HYDROCHLORIDE 0.4 MG/1
0.4 CAPSULE ORAL DAILY
Status: DISCONTINUED | OUTPATIENT
Start: 2024-06-04 | End: 2024-06-04 | Stop reason: SDUPTHER

## 2024-06-04 RX ORDER — LISINOPRIL 20 MG/1
40 TABLET ORAL DAILY
Status: DISCONTINUED | OUTPATIENT
Start: 2024-06-04 | End: 2024-06-09 | Stop reason: HOSPADM

## 2024-06-04 RX ORDER — CARVEDILOL 25 MG/1
25 TABLET ORAL 2 TIMES DAILY WITH MEALS
Status: DISCONTINUED | OUTPATIENT
Start: 2024-06-04 | End: 2024-06-09 | Stop reason: HOSPADM

## 2024-06-04 RX ORDER — DIVALPROEX SODIUM 125 MG/1
125 CAPSULE, COATED PELLETS ORAL 2 TIMES DAILY
COMMUNITY

## 2024-06-04 RX ORDER — ROSUVASTATIN CALCIUM 10 MG/1
10 TABLET, COATED ORAL
COMMUNITY

## 2024-06-04 RX ORDER — LANOLIN ALCOHOL/MO/W.PET/CERES
400 CREAM (GRAM) TOPICAL 2 TIMES DAILY
Status: DISCONTINUED | OUTPATIENT
Start: 2024-06-04 | End: 2024-06-04 | Stop reason: ALTCHOICE

## 2024-06-04 RX ORDER — ONDANSETRON 2 MG/ML
4 INJECTION INTRAMUSCULAR; INTRAVENOUS EVERY 6 HOURS PRN
Status: DISCONTINUED | OUTPATIENT
Start: 2024-06-04 | End: 2024-06-09 | Stop reason: HOSPADM

## 2024-06-04 RX ORDER — VITAMIN B COMPLEX
2000 TABLET ORAL EVERY MORNING
Status: DISCONTINUED | OUTPATIENT
Start: 2024-06-04 | End: 2024-06-09 | Stop reason: HOSPADM

## 2024-06-04 RX ORDER — CETIRIZINE HYDROCHLORIDE 10 MG/1
10 TABLET ORAL NIGHTLY
Status: DISCONTINUED | OUTPATIENT
Start: 2024-06-04 | End: 2024-06-09 | Stop reason: HOSPADM

## 2024-06-04 RX ORDER — MECOBALAMIN 5000 MCG
5 TABLET,DISINTEGRATING ORAL NIGHTLY
Status: DISCONTINUED | OUTPATIENT
Start: 2024-06-04 | End: 2024-06-09 | Stop reason: HOSPADM

## 2024-06-04 RX ORDER — DEXTROSE MONOHYDRATE 100 MG/ML
INJECTION, SOLUTION INTRAVENOUS CONTINUOUS PRN
Status: DISCONTINUED | OUTPATIENT
Start: 2024-06-04 | End: 2024-06-09 | Stop reason: HOSPADM

## 2024-06-04 RX ORDER — DIPHENHYDRAMINE HCL 25 MG
25 CAPSULE ORAL EVERY 6 HOURS PRN
COMMUNITY

## 2024-06-04 RX ORDER — PROMETHAZINE HYDROCHLORIDE 25 MG/1
12.5 TABLET ORAL EVERY 6 HOURS PRN
Status: DISCONTINUED | OUTPATIENT
Start: 2024-06-04 | End: 2024-06-09 | Stop reason: HOSPADM

## 2024-06-04 RX ORDER — IPRATROPIUM BROMIDE AND ALBUTEROL SULFATE 2.5; .5 MG/3ML; MG/3ML
3 SOLUTION RESPIRATORY (INHALATION) ONCE
Status: COMPLETED | OUTPATIENT
Start: 2024-06-04 | End: 2024-06-04

## 2024-06-04 RX ORDER — LANOLIN ALCOHOL/MO/W.PET/CERES
250 CREAM (GRAM) TOPICAL EVERY MORNING
Status: DISCONTINUED | OUTPATIENT
Start: 2024-06-04 | End: 2024-06-09 | Stop reason: HOSPADM

## 2024-06-04 RX ORDER — INSULIN LISPRO 100 [IU]/ML
0-4 INJECTION, SOLUTION INTRAVENOUS; SUBCUTANEOUS NIGHTLY
Status: DISCONTINUED | OUTPATIENT
Start: 2024-06-04 | End: 2024-06-09 | Stop reason: HOSPADM

## 2024-06-04 RX ORDER — DIVALPROEX SODIUM 125 MG/1
125 TABLET, DELAYED RELEASE ORAL 2 TIMES DAILY WITH MEALS
Status: DISCONTINUED | OUTPATIENT
Start: 2024-06-04 | End: 2024-06-09 | Stop reason: HOSPADM

## 2024-06-04 RX ORDER — CLONIDINE HYDROCHLORIDE 0.2 MG/1
0.2 TABLET ORAL 2 TIMES DAILY
Status: DISCONTINUED | OUTPATIENT
Start: 2024-06-04 | End: 2024-06-04

## 2024-06-04 RX ORDER — ACETAMINOPHEN 650 MG/1
650 SUPPOSITORY RECTAL EVERY 6 HOURS PRN
Status: DISCONTINUED | OUTPATIENT
Start: 2024-06-04 | End: 2024-06-09 | Stop reason: HOSPADM

## 2024-06-04 RX ORDER — SODIUM CHLORIDE 0.9 % (FLUSH) 0.9 %
10 SYRINGE (ML) INJECTION PRN
Status: DISCONTINUED | OUTPATIENT
Start: 2024-06-04 | End: 2024-06-09 | Stop reason: HOSPADM

## 2024-06-04 RX ORDER — CARVEDILOL 6.25 MG/1
6.25 TABLET ORAL 2 TIMES DAILY WITH MEALS
Status: DISCONTINUED | OUTPATIENT
Start: 2024-06-04 | End: 2024-06-04

## 2024-06-04 RX ORDER — HYDROXYZINE PAMOATE 25 MG/1
25 CAPSULE ORAL 4 TIMES DAILY PRN
Status: DISCONTINUED | OUTPATIENT
Start: 2024-06-04 | End: 2024-06-09 | Stop reason: HOSPADM

## 2024-06-04 RX ORDER — DIVALPROEX SODIUM 250 MG/1
250 TABLET, DELAYED RELEASE ORAL
COMMUNITY

## 2024-06-04 RX ORDER — RIVASTIGMINE TARTRATE 3 MG/1
3 CAPSULE ORAL 2 TIMES DAILY
COMMUNITY

## 2024-06-04 RX ORDER — ATORVASTATIN CALCIUM 40 MG/1
40 TABLET, FILM COATED ORAL NIGHTLY
Status: DISCONTINUED | OUTPATIENT
Start: 2024-06-04 | End: 2024-06-04

## 2024-06-04 RX ORDER — LEVETIRACETAM 500 MG/1
1000 TABLET ORAL 2 TIMES DAILY
Status: DISCONTINUED | OUTPATIENT
Start: 2024-06-04 | End: 2024-06-04

## 2024-06-04 RX ORDER — MELOXICAM 15 MG/1
15 TABLET ORAL DAILY
COMMUNITY

## 2024-06-04 RX ORDER — MAGNESIUM SULFATE IN WATER 40 MG/ML
2000 INJECTION, SOLUTION INTRAVENOUS ONCE
Status: COMPLETED | OUTPATIENT
Start: 2024-06-04 | End: 2024-06-04

## 2024-06-04 RX ORDER — LEVETIRACETAM 500 MG/1
500 TABLET ORAL 2 TIMES DAILY
Status: DISCONTINUED | OUTPATIENT
Start: 2024-06-04 | End: 2024-06-09 | Stop reason: HOSPADM

## 2024-06-04 RX ORDER — DULOXETIN HYDROCHLORIDE 60 MG/1
60 CAPSULE, DELAYED RELEASE ORAL NIGHTLY
Status: DISCONTINUED | OUTPATIENT
Start: 2024-06-04 | End: 2024-06-04

## 2024-06-04 RX ORDER — BUDESONIDE 0.5 MG/2ML
1 INHALANT ORAL
Status: DISCONTINUED | OUTPATIENT
Start: 2024-06-04 | End: 2024-06-09 | Stop reason: HOSPADM

## 2024-06-04 RX ORDER — AMLODIPINE BESYLATE 5 MG/1
10 TABLET ORAL DAILY
Status: DISCONTINUED | OUTPATIENT
Start: 2024-06-04 | End: 2024-06-09 | Stop reason: HOSPADM

## 2024-06-04 RX ORDER — ENOXAPARIN SODIUM 100 MG/ML
40 INJECTION SUBCUTANEOUS DAILY
Status: DISCONTINUED | OUTPATIENT
Start: 2024-06-04 | End: 2024-06-09 | Stop reason: HOSPADM

## 2024-06-04 RX ORDER — MELOXICAM 7.5 MG/1
15 TABLET ORAL EVERY MORNING
Status: DISCONTINUED | OUTPATIENT
Start: 2024-06-04 | End: 2024-06-09 | Stop reason: HOSPADM

## 2024-06-04 RX ORDER — IPRATROPIUM BROMIDE AND ALBUTEROL SULFATE 2.5; .5 MG/3ML; MG/3ML
1 SOLUTION RESPIRATORY (INHALATION) EVERY 6 HOURS PRN
Status: DISCONTINUED | OUTPATIENT
Start: 2024-06-04 | End: 2024-06-09 | Stop reason: HOSPADM

## 2024-06-04 RX ORDER — CLONIDINE HYDROCHLORIDE 0.2 MG/1
0.2 TABLET ORAL 2 TIMES DAILY
Status: DISCONTINUED | OUTPATIENT
Start: 2024-06-04 | End: 2024-06-09 | Stop reason: HOSPADM

## 2024-06-04 RX ORDER — AMMONIUM LACTATE 12 G/100G
CREAM TOPICAL EVERY 12 HOURS PRN
Status: DISCONTINUED | OUTPATIENT
Start: 2024-06-04 | End: 2024-06-09 | Stop reason: HOSPADM

## 2024-06-04 RX ORDER — DIVALPROEX SODIUM 250 MG/1
250 TABLET, DELAYED RELEASE ORAL
Status: DISCONTINUED | OUTPATIENT
Start: 2024-06-04 | End: 2024-06-09 | Stop reason: HOSPADM

## 2024-06-04 RX ORDER — ACETAMINOPHEN 325 MG/1
650 TABLET ORAL EVERY 6 HOURS PRN
Status: DISCONTINUED | OUTPATIENT
Start: 2024-06-04 | End: 2024-06-09 | Stop reason: HOSPADM

## 2024-06-04 RX ORDER — INSULIN LISPRO 100 [IU]/ML
0-4 INJECTION, SOLUTION INTRAVENOUS; SUBCUTANEOUS
Status: DISCONTINUED | OUTPATIENT
Start: 2024-06-04 | End: 2024-06-09 | Stop reason: HOSPADM

## 2024-06-04 RX ORDER — DOCUSATE SODIUM 100 MG/1
100 CAPSULE, LIQUID FILLED ORAL 2 TIMES DAILY
Status: DISCONTINUED | OUTPATIENT
Start: 2024-06-04 | End: 2024-06-09 | Stop reason: HOSPADM

## 2024-06-04 RX ORDER — LISINOPRIL 10 MG/1
10 TABLET ORAL DAILY
Status: DISCONTINUED | OUTPATIENT
Start: 2024-06-04 | End: 2024-06-04

## 2024-06-04 RX ORDER — UREA 10 %
250 LOTION (ML) TOPICAL DAILY
COMMUNITY

## 2024-06-04 RX ORDER — 0.9 % SODIUM CHLORIDE 0.9 %
1000 INTRAVENOUS SOLUTION INTRAVENOUS ONCE
Status: COMPLETED | OUTPATIENT
Start: 2024-06-04 | End: 2024-06-04

## 2024-06-04 RX ORDER — DOCUSATE SODIUM 100 MG/1
100 CAPSULE, LIQUID FILLED ORAL 2 TIMES DAILY
COMMUNITY

## 2024-06-04 RX ORDER — ASPIRIN 81 MG/1
81 TABLET ORAL DAILY
Status: DISCONTINUED | OUTPATIENT
Start: 2024-06-04 | End: 2024-06-09 | Stop reason: HOSPADM

## 2024-06-04 RX ORDER — TAMSULOSIN HYDROCHLORIDE 0.4 MG/1
0.4 CAPSULE ORAL
Status: DISCONTINUED | OUTPATIENT
Start: 2024-06-04 | End: 2024-06-09 | Stop reason: HOSPADM

## 2024-06-04 RX ORDER — SODIUM CHLORIDE 0.9 % (FLUSH) 0.9 %
10 SYRINGE (ML) INJECTION EVERY 12 HOURS SCHEDULED
Status: DISCONTINUED | OUTPATIENT
Start: 2024-06-04 | End: 2024-06-09 | Stop reason: HOSPADM

## 2024-06-04 RX ORDER — UREA 10 %
5 LOTION (ML) TOPICAL NIGHTLY
Status: DISCONTINUED | OUTPATIENT
Start: 2024-06-04 | End: 2024-06-04 | Stop reason: CLARIF

## 2024-06-04 RX ORDER — DULOXETIN HYDROCHLORIDE 30 MG/1
30 CAPSULE, DELAYED RELEASE ORAL NIGHTLY
Status: DISCONTINUED | OUTPATIENT
Start: 2024-06-04 | End: 2024-06-04

## 2024-06-04 RX ORDER — SODIUM CHLORIDE 9 MG/ML
INJECTION, SOLUTION INTRAVENOUS PRN
Status: DISCONTINUED | OUTPATIENT
Start: 2024-06-04 | End: 2024-06-09 | Stop reason: HOSPADM

## 2024-06-04 RX ORDER — RIVASTIGMINE TARTRATE 3 MG/1
3 CAPSULE ORAL 2 TIMES DAILY
Status: DISCONTINUED | OUTPATIENT
Start: 2024-06-04 | End: 2024-06-09 | Stop reason: HOSPADM

## 2024-06-04 RX ORDER — POLYETHYLENE GLYCOL 3350 17 G/17G
17 POWDER, FOR SOLUTION ORAL DAILY PRN
Status: DISCONTINUED | OUTPATIENT
Start: 2024-06-04 | End: 2024-06-09 | Stop reason: HOSPADM

## 2024-06-04 RX ORDER — FLUOXETINE HYDROCHLORIDE 20 MG/1
60 CAPSULE ORAL EVERY MORNING
Status: DISCONTINUED | OUTPATIENT
Start: 2024-06-04 | End: 2024-06-09 | Stop reason: HOSPADM

## 2024-06-04 RX ORDER — ALBUTEROL SULFATE 90 UG/1
2 AEROSOL, METERED RESPIRATORY (INHALATION) EVERY 4 HOURS PRN
Status: DISCONTINUED | OUTPATIENT
Start: 2024-06-04 | End: 2024-06-04 | Stop reason: SDUPTHER

## 2024-06-04 RX ORDER — GLUCAGON 1 MG/ML
1 KIT INJECTION PRN
Status: DISCONTINUED | OUTPATIENT
Start: 2024-06-04 | End: 2024-06-09 | Stop reason: HOSPADM

## 2024-06-04 RX ORDER — LANOLIN ALCOHOL/MO/W.PET/CERES
100 CREAM (GRAM) TOPICAL DAILY
Status: DISCONTINUED | OUTPATIENT
Start: 2024-06-04 | End: 2024-06-09 | Stop reason: HOSPADM

## 2024-06-04 RX ADMIN — CLONIDINE HYDROCHLORIDE 0.2 MG: 0.2 TABLET ORAL at 20:32

## 2024-06-04 RX ADMIN — DOCUSATE SODIUM 100 MG: 100 CAPSULE, LIQUID FILLED ORAL at 20:32

## 2024-06-04 RX ADMIN — LEVETIRACETAM 500 MG: 500 TABLET, FILM COATED ORAL at 11:59

## 2024-06-04 RX ADMIN — WATER 125 MG: 1 INJECTION INTRAMUSCULAR; INTRAVENOUS; SUBCUTANEOUS at 02:48

## 2024-06-04 RX ADMIN — AMLODIPINE BESYLATE 10 MG: 5 TABLET ORAL at 11:59

## 2024-06-04 RX ADMIN — DOXYCYCLINE 100 MG: 100 INJECTION, POWDER, LYOPHILIZED, FOR SOLUTION INTRAVENOUS at 19:03

## 2024-06-04 RX ADMIN — Medication 5 MG: at 20:32

## 2024-06-04 RX ADMIN — RIVASTIGMINE TARTRATE 3 MG: 3 CAPSULE ORAL at 16:20

## 2024-06-04 RX ADMIN — IPRATROPIUM BROMIDE AND ALBUTEROL SULFATE 1 DOSE: .5; 2.5 SOLUTION RESPIRATORY (INHALATION) at 18:05

## 2024-06-04 RX ADMIN — MAGNESIUM SULFATE HEPTAHYDRATE 2000 MG: 40 INJECTION, SOLUTION INTRAVENOUS at 02:53

## 2024-06-04 RX ADMIN — ASPIRIN 81 MG: 81 TABLET, COATED ORAL at 11:59

## 2024-06-04 RX ADMIN — HYDROCHLOROTHIAZIDE 25 MG: 25 TABLET ORAL at 11:59

## 2024-06-04 RX ADMIN — CYANOCOBALAMIN TAB 1000 MCG 250 MCG: 1000 TAB at 11:59

## 2024-06-04 RX ADMIN — TAMSULOSIN HYDROCHLORIDE 0.4 MG: 0.4 CAPSULE ORAL at 16:20

## 2024-06-04 RX ADMIN — Medication 2000 UNITS: at 11:59

## 2024-06-04 RX ADMIN — IPRATROPIUM BROMIDE AND ALBUTEROL SULFATE 3 DOSE: 2.5; .5 SOLUTION RESPIRATORY (INHALATION) at 04:12

## 2024-06-04 RX ADMIN — FLUOXETINE HYDROCHLORIDE 60 MG: 20 CAPSULE ORAL at 11:59

## 2024-06-04 RX ADMIN — MELOXICAM 15 MG: 7.5 TABLET ORAL at 16:20

## 2024-06-04 RX ADMIN — BUDESONIDE INHALATION 1000 MCG: 0.5 SUSPENSION RESPIRATORY (INHALATION) at 18:05

## 2024-06-04 RX ADMIN — RIVASTIGMINE TARTRATE 3 MG: 3 CAPSULE ORAL at 20:32

## 2024-06-04 RX ADMIN — WATER 2000 MG: 1 INJECTION INTRAMUSCULAR; INTRAVENOUS; SUBCUTANEOUS at 03:46

## 2024-06-04 RX ADMIN — DOXYCYCLINE 100 MG: 100 INJECTION, POWDER, LYOPHILIZED, FOR SOLUTION INTRAVENOUS at 03:55

## 2024-06-04 RX ADMIN — CARVEDILOL 25 MG: 25 TABLET, FILM COATED ORAL at 18:49

## 2024-06-04 RX ADMIN — CETIRIZINE HYDROCHLORIDE 10 MG: 10 TABLET, FILM COATED ORAL at 20:32

## 2024-06-04 RX ADMIN — WATER 60 MG: 1 INJECTION INTRAMUSCULAR; INTRAVENOUS; SUBCUTANEOUS at 18:49

## 2024-06-04 RX ADMIN — BUDESONIDE INHALATION 1000 MCG: 0.5 SUSPENSION RESPIRATORY (INHALATION) at 10:19

## 2024-06-04 RX ADMIN — LISINOPRIL 40 MG: 20 TABLET ORAL at 11:59

## 2024-06-04 RX ADMIN — SODIUM CHLORIDE 1000 ML: 9 INJECTION, SOLUTION INTRAVENOUS at 03:57

## 2024-06-04 RX ADMIN — PYRIDOXINE HCL TAB 50 MG 100 MG: 50 TAB at 11:59

## 2024-06-04 RX ADMIN — Medication 10 ML: at 16:22

## 2024-06-04 RX ADMIN — DIVALPROEX SODIUM 250 MG: 250 TABLET, DELAYED RELEASE ORAL at 20:32

## 2024-06-04 RX ADMIN — IPRATROPIUM BROMIDE AND ALBUTEROL SULFATE 3 DOSE: .5; 3 SOLUTION RESPIRATORY (INHALATION) at 01:42

## 2024-06-04 RX ADMIN — ENOXAPARIN SODIUM 40 MG: 100 INJECTION SUBCUTANEOUS at 11:59

## 2024-06-04 RX ADMIN — LEVETIRACETAM 500 MG: 500 TABLET, FILM COATED ORAL at 20:32

## 2024-06-04 RX ADMIN — ROSUVASTATIN CALCIUM 10 MG: 10 TABLET, FILM COATED ORAL at 20:32

## 2024-06-04 RX ADMIN — BICTEGRAVIR SODIUM, EMTRICITABINE, AND TENOFOVIR ALAFENAMIDE FUMARATE 1 TABLET: 50; 200; 25 TABLET ORAL at 11:59

## 2024-06-04 ASSESSMENT — LIFESTYLE VARIABLES
HOW MANY STANDARD DRINKS CONTAINING ALCOHOL DO YOU HAVE ON A TYPICAL DAY: 1 OR 2
HOW OFTEN DO YOU HAVE A DRINK CONTAINING ALCOHOL: 2-3 TIMES A WEEK

## 2024-06-04 NOTE — ED PROVIDER NOTES
[facility] EMERGENCY DEPARTMENT  EMERGENCY DEPARTMENT ENCOUNTER      Pt Name: Ambrosio Johnson  MRN: [id number]  Birthdate 1960  Date of evaluation: 6/4/2024  Provider: Leonel Dumont DO  PCP: Michelle Harper APRN - NP    HPI: 64-year-old male presenting to the emergency department complaints of dyspnea.  Patient reports has been feeling short of breath for about 3 days prior.  Reports he was recently taken off his nebulizers at facility.  Patient is a nursing facility.  Patient is some 90% on room air with progressive worsening per EMS with audible wheezing assessment.  Was found by peers at facility to be short of breath on their check prior to arrival.  Was placed on CPAP and is on CPAP on initial arrival.  Audible noted with patient's SpO2 dropping to less than 90% while transitioning to CPAP at hospital.  Patient with previous history of COPD,H IV.    Chief Complaint   Patient presents with    Shortness of Breath     Sob for 3 days; pt was 90 RA in ems; ems states pt became audibly wheezy and was placed on cpap. Denies other symptoms. Pt does not wear O2 at home.       Review of Systems   Unable to perform ROS: Severe respiratory distress   Respiratory:  Positive for shortness of breath.    Cardiovascular:  Negative for palpitations.        Physical Exam  Vitals reviewed.   Constitutional:       General: He is not in acute distress.     Appearance: Normal appearance. He is not diaphoretic.   HENT:      Head: Normocephalic.      Right Ear: External ear normal.      Left Ear: External ear normal.      Nose: Nose normal.      Mouth/Throat:      Pharynx: No oropharyngeal exudate.   Eyes:      General:         Right eye: No discharge.         Left eye: No discharge.      Conjunctiva/sclera: Conjunctivae normal.      Pupils: Pupils are equal, round, and reactive to light.   Cardiovascular:      Rate and Rhythm: Normal rate and regular rhythm.      Heart sounds:      No friction rub. No

## 2024-06-04 NOTE — H&P
[facility]   HISTORY AND PHYSICAL EXAM      AUTHOR: Alexsandra Hughes MD PATIENT NAME: Ambrosio Johnson   PCP: Michelle Harper APRN - NP  MRN: [id number], : 1960       CHIEF COMPLAINT / REASON FOR ADMISSION: SOB, cough  HPI:   This is a 64 y.o. male  has a past medical history of Asthma, CHF (congestive heart failure) (HCC), COPD (chronic obstructive pulmonary disease) (HCC), Depression, Diabetes mellitus (HCC), Emphysema of lung (HCC), H/O cardiovascular stress test, History of echocardiogram, HIV (human immunodeficiency virus infection) (HCC), Hypertension, Seizure (HCC), Seizure (HCC), and Sleep apnea. presented with SOB, cough  for last few days prior to arrival to the hospital. SOB is associated with some cough, nonproductive but no fever or chills reported. Initially SOB was mild, intermittent but gradually getting more persistent. Started gradually. Exacerbated by general activity or exertion. Relieved only partially by rest. Patient was found to be hypoxic in ED and was placed on BIPAP for respiratory failure.  The patient was seen and examined at bedside, appears alert and awake with no acute distress and is able to answer simple  questions. On direct questioning, patient denied any  resting ongoing chest pain, resting SOB, hemoptysis, productive cough, fever, ongoing palpitation, active abdominal pain, hematemesis, rectal bleeding, gianna, hematuria, any other  and GI complaints, and any new focal neuro deficits   .    ROS:  Pertinent positives and negatives are noted in the HPI, all other systems are reviewed and negative    PMH:  Past Medical History:   Diagnosis Date    Asthma     since childhood    CHF (congestive heart failure) (HCC)     COPD (chronic obstructive pulmonary disease) (HCC)     Depression     Diabetes mellitus (HCC)     Emphysema of lung (HCC)     H/O cardiovascular stress test 2020    Lexiscan    History of echocardiogram 2018    EF 62%

## 2024-06-04 NOTE — CONSULTS
Consult Note            Date:6/4/2024        Patient Name:Ambrosio Johnson     YOB: 1960     Age:64 y.o.    Inpatient consult to Infectious Diseases  Consult performed by: Sydnee Resendiz MD  Consult ordered by: Alexsandra Hughes MD          Chief Complaint     Chief Complaint   Patient presents with    Shortness of Breath     Sob for 3 days; pt was 90 RA in ems; ems states pt became audibly wheezy and was placed on cpap. Denies other symptoms. Pt does not wear O2 at home.          History Obtained From   patient, electronic medical record    History of Present Illness   Ambrosio Johnson is a 64 y.o. male who  has a past medical history of Asthma, CHF (congestive heart failure) ([facility]), COPD (chronic obstructive pulmonary disease) ([facility]), Depression, Diabetes mellitus ([facility]), H/O cardiovascular stress test, History of echocardiogram, HIV (human immunodeficiency virus infection) ([facility]), Hypertension, Seizure ([facility]), and Sleep apnea.   Pt is known to ID/[id number] from [facility] last seen 2022 with DR Branham was on biktarvy   9/2021 vl undetected  [id number]     Pt presents on 6/4/2024 with   Chief Complaint   Patient presents with    Shortness of Breath     Sob for 3 days; pt was 90 RA in ems; ems states pt became audibly wheezy and was placed on cpap. Denies other symptoms. Pt does not wear O2 at home.     Pt came in with sob cough no f/c/n/vd/chest pain no travels   Live sin assited living   Pt from assisted living   Has not seen ID for awhile  Came in with sob has h/o asthma   No recent travels no recent atxb   Afebrile  tachy bp stable  Wbc16.2 cr0.7  Blood cx neg     XR CHEST PORTABLE    Result Date: 6/4/2024  Hazy bibasilar opacities may be due to atelectasis or infiltrates. Probable trace right pleural effusion.       Past Medical History     Past Medical History:   Diagnosis Date    Asthma     since childhood    CHF (congestive heart failure) ([facility])     COPD (chronic obstructive pulmonary disease) ([facility])

## 2024-06-04 NOTE — ED NOTES
Attempted to call report a second time; nurse currently getting bedside shift report; will call back

## 2024-06-04 NOTE — ED NOTES
Attempted to call report; was told they did not know who was getting the patient; informed them just needed to give report and patient would be brought up after change of shift; 5th floor to call back when ready

## 2024-06-05 LAB
BASOPHILS # BLD: 0.02 K/UL (ref 0–0.2)
BASOPHILS NFR BLD: 0 % (ref 0–2)
EOSINOPHIL # BLD: 0 K/UL (ref 0.05–0.5)
EOSINOPHILS RELATIVE PERCENT: 0 % (ref 0–6)
ERYTHROCYTE [DISTWIDTH] IN BLOOD BY AUTOMATED COUNT: 14.5 % (ref 11.5–15)
GLUCOSE BLD-MCNC: 113 MG/DL (ref 74–99)
GLUCOSE BLD-MCNC: 154 MG/DL (ref 74–99)
GLUCOSE BLD-MCNC: 154 MG/DL (ref 74–99)
GLUCOSE BLD-MCNC: 168 MG/DL (ref 74–99)
HCT VFR BLD AUTO: 39.6 % (ref 37–54)
HGB BLD-MCNC: 12.2 G/DL (ref 12.5–16.5)
IMM GRANULOCYTES # BLD AUTO: 0.15 K/UL (ref 0–0.58)
IMM GRANULOCYTES NFR BLD: 1 % (ref 0–5)
L PNEUMO1 AG UR QL IA.RAPID: NEGATIVE
LDH SERPL-CCNC: 206 U/L (ref 135–225)
LYMPHOCYTES NFR BLD: 1.61 K/UL (ref 1.5–4)
LYMPHOCYTES RELATIVE PERCENT: 8 % (ref 20–42)
MCH RBC QN AUTO: 28 PG (ref 26–35)
MCHC RBC AUTO-ENTMCNC: 30.8 G/DL (ref 32–34.5)
MCV RBC AUTO: 91 FL (ref 80–99.9)
MONOCYTES NFR BLD: 1.28 K/UL (ref 0.1–0.95)
MONOCYTES NFR BLD: 6 % (ref 2–12)
NEUTROPHILS NFR BLD: 86 % (ref 43–80)
NEUTS SEG NFR BLD: 18.55 K/UL (ref 1.8–7.3)
PLATELET # BLD AUTO: 182 K/UL (ref 130–450)
PMV BLD AUTO: 11.8 FL (ref 7–12)
RBC # BLD AUTO: 4.35 M/UL (ref 3.8–5.8)
S PNEUM AG SPEC QL: POSITIVE
SPECIMEN SOURCE: ABNORMAL
WBC OTHER # BLD: 21.6 K/UL (ref 4.5–11.5)

## 2024-06-05 PROCEDURE — 86359 T CELLS TOTAL COUNT: CPT

## 2024-06-05 PROCEDURE — 6360000002 HC RX W HCPCS: Performed by: INTERNAL MEDICINE

## 2024-06-05 PROCEDURE — 86738 MYCOPLASMA ANTIBODY: CPT

## 2024-06-05 PROCEDURE — 82962 GLUCOSE BLOOD TEST: CPT

## 2024-06-05 PROCEDURE — 36415 COLL VENOUS BLD VENIPUNCTURE: CPT

## 2024-06-05 PROCEDURE — 86360 T CELL ABSOLUTE COUNT/RATIO: CPT

## 2024-06-05 PROCEDURE — 6370000000 HC RX 637 (ALT 250 FOR IP): Performed by: SPECIALIST

## 2024-06-05 PROCEDURE — 83615 LACTATE (LD) (LDH) ENZYME: CPT

## 2024-06-05 PROCEDURE — 85025 COMPLETE CBC W/AUTO DIFF WBC: CPT

## 2024-06-05 PROCEDURE — 86481 TB AG RESPONSE T-CELL SUSP: CPT

## 2024-06-05 PROCEDURE — 6370000000 HC RX 637 (ALT 250 FOR IP): Performed by: INTERNAL MEDICINE

## 2024-06-05 PROCEDURE — 2500000003 HC RX 250 WO HCPCS: Performed by: INTERNAL MEDICINE

## 2024-06-05 PROCEDURE — 87449 NOS EACH ORGANISM AG IA: CPT

## 2024-06-05 PROCEDURE — 99232 SBSQ HOSP IP/OBS MODERATE 35: CPT | Performed by: INTERNAL MEDICINE

## 2024-06-05 PROCEDURE — 2580000003 HC RX 258: Performed by: INTERNAL MEDICINE

## 2024-06-05 PROCEDURE — 2700000000 HC OXYGEN THERAPY PER DAY

## 2024-06-05 PROCEDURE — 76937 US GUIDE VASCULAR ACCESS: CPT

## 2024-06-05 PROCEDURE — 2060000000 HC ICU INTERMEDIATE R&B

## 2024-06-05 PROCEDURE — 94660 CPAP INITIATION&MGMT: CPT

## 2024-06-05 PROCEDURE — 94640 AIRWAY INHALATION TREATMENT: CPT

## 2024-06-05 RX ADMIN — DIVALPROEX SODIUM 125 MG: 125 TABLET, DELAYED RELEASE ORAL at 17:54

## 2024-06-05 RX ADMIN — PYRIDOXINE HCL TAB 50 MG 100 MG: 50 TAB at 10:42

## 2024-06-05 RX ADMIN — CARVEDILOL 25 MG: 25 TABLET, FILM COATED ORAL at 10:42

## 2024-06-05 RX ADMIN — LEVETIRACETAM 500 MG: 500 TABLET, FILM COATED ORAL at 10:43

## 2024-06-05 RX ADMIN — TAMSULOSIN HYDROCHLORIDE 0.4 MG: 0.4 CAPSULE ORAL at 10:42

## 2024-06-05 RX ADMIN — HYDROCHLOROTHIAZIDE 25 MG: 25 TABLET ORAL at 10:42

## 2024-06-05 RX ADMIN — CLONIDINE HYDROCHLORIDE 0.2 MG: 0.2 TABLET ORAL at 21:57

## 2024-06-05 RX ADMIN — ENOXAPARIN SODIUM 40 MG: 100 INJECTION SUBCUTANEOUS at 10:43

## 2024-06-05 RX ADMIN — CYANOCOBALAMIN TAB 1000 MCG 250 MCG: 1000 TAB at 10:42

## 2024-06-05 RX ADMIN — RIVASTIGMINE TARTRATE 3 MG: 3 CAPSULE ORAL at 21:56

## 2024-06-05 RX ADMIN — CLONIDINE HYDROCHLORIDE 0.2 MG: 0.2 TABLET ORAL at 10:42

## 2024-06-05 RX ADMIN — DOCUSATE SODIUM 100 MG: 100 CAPSULE, LIQUID FILLED ORAL at 21:57

## 2024-06-05 RX ADMIN — DIVALPROEX SODIUM 125 MG: 125 TABLET, DELAYED RELEASE ORAL at 10:49

## 2024-06-05 RX ADMIN — Medication 2000 UNITS: at 10:41

## 2024-06-05 RX ADMIN — AMLODIPINE BESYLATE 10 MG: 5 TABLET ORAL at 10:42

## 2024-06-05 RX ADMIN — MELOXICAM 15 MG: 7.5 TABLET ORAL at 10:50

## 2024-06-05 RX ADMIN — ASPIRIN 81 MG: 81 TABLET, COATED ORAL at 10:42

## 2024-06-05 RX ADMIN — Medication 10 ML: at 16:06

## 2024-06-05 RX ADMIN — LEVETIRACETAM 500 MG: 500 TABLET, FILM COATED ORAL at 21:57

## 2024-06-05 RX ADMIN — CETIRIZINE HYDROCHLORIDE 10 MG: 10 TABLET, FILM COATED ORAL at 21:57

## 2024-06-05 RX ADMIN — HYDROXYZINE PAMOATE 25 MG: 25 CAPSULE ORAL at 21:56

## 2024-06-05 RX ADMIN — BUDESONIDE INHALATION 1000 MCG: 0.5 SUSPENSION RESPIRATORY (INHALATION) at 05:19

## 2024-06-05 RX ADMIN — DOCUSATE SODIUM 100 MG: 100 CAPSULE, LIQUID FILLED ORAL at 10:42

## 2024-06-05 RX ADMIN — DIVALPROEX SODIUM 250 MG: 250 TABLET, DELAYED RELEASE ORAL at 21:56

## 2024-06-05 RX ADMIN — NYSTATIN 500000 UNITS: 100000 SUSPENSION ORAL at 17:57

## 2024-06-05 RX ADMIN — Medication 10 ML: at 21:57

## 2024-06-05 RX ADMIN — CARVEDILOL 25 MG: 25 TABLET, FILM COATED ORAL at 17:54

## 2024-06-05 RX ADMIN — FLUOXETINE HYDROCHLORIDE 60 MG: 20 CAPSULE ORAL at 10:42

## 2024-06-05 RX ADMIN — Medication 5 MG: at 21:57

## 2024-06-05 RX ADMIN — RIVASTIGMINE TARTRATE 3 MG: 3 CAPSULE ORAL at 10:49

## 2024-06-05 RX ADMIN — BUDESONIDE INHALATION 1000 MCG: 0.5 SUSPENSION RESPIRATORY (INHALATION) at 17:27

## 2024-06-05 RX ADMIN — NYSTATIN 500000 UNITS: 100000 SUSPENSION ORAL at 21:57

## 2024-06-05 RX ADMIN — DOXYCYCLINE 100 MG: 100 INJECTION, POWDER, LYOPHILIZED, FOR SOLUTION INTRAVENOUS at 16:05

## 2024-06-05 RX ADMIN — ROSUVASTATIN CALCIUM 10 MG: 10 TABLET, FILM COATED ORAL at 21:57

## 2024-06-05 RX ADMIN — LISINOPRIL 40 MG: 20 TABLET ORAL at 10:42

## 2024-06-05 RX ADMIN — POLYETHYLENE GLYCOL 3350 17 G: 17 POWDER, FOR SOLUTION ORAL at 17:54

## 2024-06-05 RX ADMIN — BICTEGRAVIR SODIUM, EMTRICITABINE, AND TENOFOVIR ALAFENAMIDE FUMARATE 1 TABLET: 50; 200; 25 TABLET ORAL at 10:49

## 2024-06-05 RX ADMIN — IPRATROPIUM BROMIDE AND ALBUTEROL SULFATE 1 DOSE: .5; 2.5 SOLUTION RESPIRATORY (INHALATION) at 05:19

## 2024-06-05 RX ADMIN — WATER 60 MG: 1 INJECTION INTRAMUSCULAR; INTRAVENOUS; SUBCUTANEOUS at 17:53

## 2024-06-05 NOTE — ACP (ADVANCE CARE PLANNING)
Advance Care Planning   Healthcare Decision Maker:    Primary Decision Maker: Lola Reyes - Brother/Sister - [phone]

## 2024-06-05 NOTE — CARE COORDINATION
Case Management Assessment  Initial Evaluation    Date/Time of Evaluation: 6/5/2024 10:32 AM  Assessment Completed by: MONIQUE Davis    If patient is discharged prior to next notation, then this note serves as note for discharge by case management.    Patient Name: Ambrosio Johnson                   YOB: 1960  Diagnosis: Acute respiratory failure with hypoxia and hypercapnia (HCC) [J96.01, J96.02]  Pneumonia of both lower lobes due to infectious organism [J18.9]  Pneumonia, unspecified organism [J18.9]                   Date / Time: 6/4/2024  1:38 AM    Patient Admission Status: Inpatient   Readmission Risk (Low < 19, Mod (19-27), High > 27): Readmission Risk Score: 11    Current PCP: Michelle Harper APRN - NP  PCP verified by CM? Yes    Chart Reviewed: Yes      History Provided by: Patient  Patient Orientation: Alert and Oriented    Patient Cognition: Alert    Hospitalization in the last 30 days (Readmission):  No    If yes, Readmission Assessment in  Navigator will be completed.    Advance Directives:      Code Status: Full Code   Patient's Primary Decision Maker is: Legal Next of Kin    Primary Decision Maker: Lola Reyes - Brother/Sister - [phone]    Discharge Planning:    Patient lives with: Alone Type of Home: Assisted living  Primary Care Giver: Self  Patient Support Systems include: Family Members   Current Financial resources: Medicare, Medicaid  Current community resources: Assisted Living  Current services prior to admission: Other (Comment) (assisted living)            Current DME:              Type of Home Care services:  None    ADLS  Prior functional level: Independent in ADLs/IADLs  Current functional level: Independent in ADLs/IADLs    PT AM-PAC:   /24  OT AM-PAC:   /24    Family can provide assistance at DC: No  Would you like Case Management to discuss the discharge plan with any other family members/significant others, and if so, who? No  Plans to Return to

## 2024-06-06 ENCOUNTER — APPOINTMENT (OUTPATIENT)
Dept: GENERAL RADIOLOGY | Age: 64
DRG: 871 | End: 2024-06-06
Payer: MEDICARE

## 2024-06-06 LAB
1,3 BETA GLUCAN SER-MCNC: <31 PG/ML
1,3 BETA-D-GLUCAN INTERP: NEGATIVE
ANNOTATION COMMENT IMP: ABNORMAL
CD3 CELLS # BLD: 927 CELLS/UL (ref 660–2200)
CD3+CD4+ CELLS # BLD: 483 CELLS/UL (ref 490–1600)
CD3+CD4+ CELLS/CD3+CD8+ CLL BLD: 1.09 RATIO (ref 0.8–6.17)
CD3+CD8+ CELLS # BLD: 443 CELLS/UL (ref 150–1050)
GLUCOSE BLD-MCNC: 105 MG/DL (ref 74–99)
GLUCOSE BLD-MCNC: 138 MG/DL (ref 74–99)
GLUCOSE BLD-MCNC: 181 MG/DL (ref 74–99)
GLUCOSE BLD-MCNC: 195 MG/DL (ref 74–99)
MICROORGANISM SPEC CULT: NORMAL
MYCOPLASMA AB,IGM: NORMAL
SERVICE CMNT-IMP: NORMAL
SPECIMEN DESCRIPTION: NORMAL

## 2024-06-06 PROCEDURE — 6360000002 HC RX W HCPCS: Performed by: INTERNAL MEDICINE

## 2024-06-06 PROCEDURE — 6370000000 HC RX 637 (ALT 250 FOR IP): Performed by: INTERNAL MEDICINE

## 2024-06-06 PROCEDURE — 6370000000 HC RX 637 (ALT 250 FOR IP): Performed by: SPECIALIST

## 2024-06-06 PROCEDURE — 2060000000 HC ICU INTERMEDIATE R&B

## 2024-06-06 PROCEDURE — 74018 RADEX ABDOMEN 1 VIEW: CPT

## 2024-06-06 PROCEDURE — 99232 SBSQ HOSP IP/OBS MODERATE 35: CPT | Performed by: INTERNAL MEDICINE

## 2024-06-06 PROCEDURE — 82962 GLUCOSE BLOOD TEST: CPT

## 2024-06-06 PROCEDURE — 2500000003 HC RX 250 WO HCPCS: Performed by: INTERNAL MEDICINE

## 2024-06-06 PROCEDURE — 94660 CPAP INITIATION&MGMT: CPT

## 2024-06-06 PROCEDURE — 87040 BLOOD CULTURE FOR BACTERIA: CPT

## 2024-06-06 PROCEDURE — 2580000003 HC RX 258: Performed by: INTERNAL MEDICINE

## 2024-06-06 PROCEDURE — 2700000000 HC OXYGEN THERAPY PER DAY

## 2024-06-06 PROCEDURE — 6360000002 HC RX W HCPCS: Performed by: SPECIALIST

## 2024-06-06 PROCEDURE — 36415 COLL VENOUS BLD VENIPUNCTURE: CPT

## 2024-06-06 PROCEDURE — 94640 AIRWAY INHALATION TREATMENT: CPT

## 2024-06-06 PROCEDURE — 2580000003 HC RX 258: Performed by: SPECIALIST

## 2024-06-06 RX ADMIN — CYANOCOBALAMIN TAB 1000 MCG 250 MCG: 1000 TAB at 08:44

## 2024-06-06 RX ADMIN — POLYETHYLENE GLYCOL 3350 17 G: 17 POWDER, FOR SOLUTION ORAL at 20:37

## 2024-06-06 RX ADMIN — Medication 10 ML: at 20:39

## 2024-06-06 RX ADMIN — Medication 5 MG: at 20:38

## 2024-06-06 RX ADMIN — DOXYCYCLINE 100 MG: 100 INJECTION, POWDER, LYOPHILIZED, FOR SOLUTION INTRAVENOUS at 02:40

## 2024-06-06 RX ADMIN — Medication 2000 UNITS: at 08:43

## 2024-06-06 RX ADMIN — BICTEGRAVIR SODIUM, EMTRICITABINE, AND TENOFOVIR ALAFENAMIDE FUMARATE 1 TABLET: 50; 200; 25 TABLET ORAL at 10:03

## 2024-06-06 RX ADMIN — HYDROCHLOROTHIAZIDE 25 MG: 25 TABLET ORAL at 08:44

## 2024-06-06 RX ADMIN — LEVETIRACETAM 500 MG: 500 TABLET, FILM COATED ORAL at 08:43

## 2024-06-06 RX ADMIN — NYSTATIN 500000 UNITS: 100000 SUSPENSION ORAL at 08:44

## 2024-06-06 RX ADMIN — PYRIDOXINE HCL TAB 50 MG 100 MG: 50 TAB at 08:43

## 2024-06-06 RX ADMIN — DOXYCYCLINE 100 MG: 100 INJECTION, POWDER, LYOPHILIZED, FOR SOLUTION INTRAVENOUS at 15:03

## 2024-06-06 RX ADMIN — NYSTATIN 500000 UNITS: 100000 SUSPENSION ORAL at 17:48

## 2024-06-06 RX ADMIN — WATER 60 MG: 1 INJECTION INTRAMUSCULAR; INTRAVENOUS; SUBCUTANEOUS at 05:48

## 2024-06-06 RX ADMIN — ASPIRIN 81 MG: 81 TABLET, COATED ORAL at 08:43

## 2024-06-06 RX ADMIN — WATER 2000 MG: 1 INJECTION INTRAMUSCULAR; INTRAVENOUS; SUBCUTANEOUS at 05:44

## 2024-06-06 RX ADMIN — CLONIDINE HYDROCHLORIDE 0.2 MG: 0.2 TABLET ORAL at 20:38

## 2024-06-06 RX ADMIN — DIVALPROEX SODIUM 250 MG: 250 TABLET, DELAYED RELEASE ORAL at 20:37

## 2024-06-06 RX ADMIN — FLUOXETINE HYDROCHLORIDE 60 MG: 20 CAPSULE ORAL at 08:43

## 2024-06-06 RX ADMIN — AMLODIPINE BESYLATE 10 MG: 5 TABLET ORAL at 08:43

## 2024-06-06 RX ADMIN — LEVETIRACETAM 500 MG: 500 TABLET, FILM COATED ORAL at 20:38

## 2024-06-06 RX ADMIN — Medication 10 ML: at 08:46

## 2024-06-06 RX ADMIN — CETIRIZINE HYDROCHLORIDE 10 MG: 10 TABLET, FILM COATED ORAL at 20:38

## 2024-06-06 RX ADMIN — HYDROXYZINE PAMOATE 25 MG: 25 CAPSULE ORAL at 17:48

## 2024-06-06 RX ADMIN — VANCOMYCIN HYDROCHLORIDE 1000 MG: 1 INJECTION, POWDER, LYOPHILIZED, FOR SOLUTION INTRAVENOUS at 11:57

## 2024-06-06 RX ADMIN — NYSTATIN 500000 UNITS: 100000 SUSPENSION ORAL at 11:43

## 2024-06-06 RX ADMIN — IPRATROPIUM BROMIDE AND ALBUTEROL SULFATE 1 DOSE: .5; 2.5 SOLUTION RESPIRATORY (INHALATION) at 05:03

## 2024-06-06 RX ADMIN — ROSUVASTATIN CALCIUM 10 MG: 10 TABLET, FILM COATED ORAL at 20:38

## 2024-06-06 RX ADMIN — DIVALPROEX SODIUM 125 MG: 125 TABLET, DELAYED RELEASE ORAL at 10:03

## 2024-06-06 RX ADMIN — CARVEDILOL 25 MG: 25 TABLET, FILM COATED ORAL at 17:49

## 2024-06-06 RX ADMIN — WATER 40 MG: 1 INJECTION INTRAMUSCULAR; INTRAVENOUS; SUBCUTANEOUS at 17:48

## 2024-06-06 RX ADMIN — RIVASTIGMINE TARTRATE 3 MG: 3 CAPSULE ORAL at 20:38

## 2024-06-06 RX ADMIN — LISINOPRIL 40 MG: 20 TABLET ORAL at 08:44

## 2024-06-06 RX ADMIN — DOCUSATE SODIUM 100 MG: 100 CAPSULE, LIQUID FILLED ORAL at 20:38

## 2024-06-06 RX ADMIN — ENOXAPARIN SODIUM 40 MG: 100 INJECTION SUBCUTANEOUS at 08:42

## 2024-06-06 RX ADMIN — DIVALPROEX SODIUM 125 MG: 125 TABLET, DELAYED RELEASE ORAL at 17:50

## 2024-06-06 RX ADMIN — BUDESONIDE INHALATION 1000 MCG: 0.5 SUSPENSION RESPIRATORY (INHALATION) at 05:03

## 2024-06-06 RX ADMIN — RIVASTIGMINE TARTRATE 3 MG: 3 CAPSULE ORAL at 10:02

## 2024-06-06 RX ADMIN — TAMSULOSIN HYDROCHLORIDE 0.4 MG: 0.4 CAPSULE ORAL at 08:43

## 2024-06-06 RX ADMIN — DOCUSATE SODIUM 100 MG: 100 CAPSULE, LIQUID FILLED ORAL at 08:43

## 2024-06-06 RX ADMIN — MELOXICAM 15 MG: 7.5 TABLET ORAL at 10:02

## 2024-06-06 RX ADMIN — CLONIDINE HYDROCHLORIDE 0.2 MG: 0.2 TABLET ORAL at 08:43

## 2024-06-06 RX ADMIN — BUDESONIDE INHALATION 1000 MCG: 0.5 SUSPENSION RESPIRATORY (INHALATION) at 17:49

## 2024-06-06 RX ADMIN — CARVEDILOL 25 MG: 25 TABLET, FILM COATED ORAL at 08:43

## 2024-06-06 ASSESSMENT — PAIN SCALES - GENERAL
PAINLEVEL_OUTOF10: 0

## 2024-06-06 NOTE — CARE COORDINATION
Patient admitted from [facility], assisted living facility.  He is independent there, per Sujata there he walks all around town and back.  He does not wear oxygen at the facility, however if he needs it on DC Sujata states they can get it for him.  Currently on 2L, will need pulse ox testing and DME order if needed at DC.  ID is following, patient currently on IV Rocephin and Doxy.  Per Sujata at AL they can not do IV meds so if IV needed at DC will need to explore other options.  SW following for possible oxygen needs and final ABX plan.

## 2024-06-07 LAB
ACB COMPLEX DNA BLD POS QL NAA+NON-PROBE: NOT DETECTED
B FRAGILIS DNA BLD POS QL NAA+NON-PROBE: NOT DETECTED
BIOFIRE TEST COMMENT: ABNORMAL
C ALBICANS DNA BLD POS QL NAA+NON-PROBE: NOT DETECTED
C AURIS DNA BLD POS QL NAA+NON-PROBE: NOT DETECTED
C GATTII+NEOFOR DNA BLD POS QL NAA+N-PRB: NOT DETECTED
C GLABRATA DNA BLD POS QL NAA+NON-PROBE: NOT DETECTED
C KRUSEI DNA BLD POS QL NAA+NON-PROBE: NOT DETECTED
C PARAP DNA BLD POS QL NAA+NON-PROBE: NOT DETECTED
C TROPICLS DNA BLD POS QL NAA+NON-PROBE: NOT DETECTED
CMV QNT BY NAAT, PLASMA INTERP: NOT DETECTED
CMV QNT BY NAAT, PLASMA IU/ML: NOT DETECTED IU/ML
CMV QNT BY NAAT, PLASMA LOG IU/ML: NOT DETECTED LOG IU/ML
DATE LAST DOSE: NORMAL
E CLOAC COMP DNA BLD POS NAA+NON-PROBE: NOT DETECTED
E COLI DNA BLD POS QL NAA+NON-PROBE: NOT DETECTED
E FAECALIS DNA BLD POS QL NAA+NON-PROBE: NOT DETECTED
E FAECIUM DNA BLD POS QL NAA+NON-PROBE: NOT DETECTED
ENTEROBACTERALES DNA BLD POS NAA+N-PRB: NOT DETECTED
GLUCOSE BLD-MCNC: 131 MG/DL (ref 74–99)
GLUCOSE BLD-MCNC: 148 MG/DL (ref 74–99)
GLUCOSE BLD-MCNC: 256 MG/DL (ref 74–99)
GLUCOSE BLD-MCNC: 83 MG/DL (ref 74–99)
GP B STREP DNA BLD POS QL NAA+NON-PROBE: NOT DETECTED
HAEM INFLU DNA BLD POS QL NAA+NON-PROBE: NOT DETECTED
HISTOPLASMA GALACTOMANNAN AG, URINE: NOT DETECTED NG/ML
INTERPRETATION: NOT DETECTED
K OXYTOCA DNA BLD POS QL NAA+NON-PROBE: NOT DETECTED
KLEBSIELLA SP DNA BLD POS QL NAA+NON-PRB: NOT DETECTED
KLEBSIELLA SP DNA BLD POS QL NAA+NON-PRB: NOT DETECTED
L MONOCYTOG DNA BLD POS QL NAA+NON-PROBE: NOT DETECTED
MICROORGANISM SPEC CULT: ABNORMAL
MICROORGANISM/AGENT SPEC: ABNORMAL
N MEN DNA BLD POS QL NAA+NON-PROBE: NOT DETECTED
P AERUGINOSA DNA BLD POS NAA+NON-PROBE: NOT DETECTED
PROTEUS SP DNA BLD POS QL NAA+NON-PROBE: NOT DETECTED
S AUREUS DNA BLD POS QL NAA+NON-PROBE: NOT DETECTED
S AUREUS+CONS DNA BLD POS NAA+NON-PROBE: NOT DETECTED
S EPIDERMIDIS DNA BLD POS QL NAA+NON-PRB: NOT DETECTED
S LUGDUNENSIS DNA BLD POS QL NAA+NON-PRB: NOT DETECTED
S MALTOPHILIA DNA BLD POS QL NAA+NON-PRB: NOT DETECTED
S MARCESCENS DNA BLD POS NAA+NON-PROBE: NOT DETECTED
S PNEUM DNA BLD POS QL NAA+NON-PROBE: NOT DETECTED
S PYO DNA BLD POS QL NAA+NON-PROBE: NOT DETECTED
SALMONELLA DNA BLD POS QL NAA+NON-PROBE: NOT DETECTED
SERVICE CMNT-IMP: ABNORMAL
SPECIMEN DESCRIPTION: ABNORMAL
STREPTOCOCCUS DNA BLD POS NAA+NON-PROBE: NOT DETECTED
TME LAST DOSE: NORMAL H
VANCOMYCIN DOSE: NORMAL MG
VANCOMYCIN TROUGH SERPL-MCNC: 5 UG/ML (ref 5–16)

## 2024-06-07 PROCEDURE — 6370000000 HC RX 637 (ALT 250 FOR IP): Performed by: SPECIALIST

## 2024-06-07 PROCEDURE — 94667 MNPJ CHEST WALL 1ST: CPT

## 2024-06-07 PROCEDURE — 6360000002 HC RX W HCPCS: Performed by: INTERNAL MEDICINE

## 2024-06-07 PROCEDURE — 80202 ASSAY OF VANCOMYCIN: CPT

## 2024-06-07 PROCEDURE — 36415 COLL VENOUS BLD VENIPUNCTURE: CPT

## 2024-06-07 PROCEDURE — 99232 SBSQ HOSP IP/OBS MODERATE 35: CPT | Performed by: INTERNAL MEDICINE

## 2024-06-07 PROCEDURE — 2580000003 HC RX 258: Performed by: INTERNAL MEDICINE

## 2024-06-07 PROCEDURE — 94660 CPAP INITIATION&MGMT: CPT

## 2024-06-07 PROCEDURE — 6370000000 HC RX 637 (ALT 250 FOR IP): Performed by: INTERNAL MEDICINE

## 2024-06-07 PROCEDURE — 2060000000 HC ICU INTERMEDIATE R&B

## 2024-06-07 PROCEDURE — 2700000000 HC OXYGEN THERAPY PER DAY

## 2024-06-07 PROCEDURE — 2500000003 HC RX 250 WO HCPCS: Performed by: INTERNAL MEDICINE

## 2024-06-07 PROCEDURE — 6360000002 HC RX W HCPCS: Performed by: SPECIALIST

## 2024-06-07 PROCEDURE — 2580000003 HC RX 258: Performed by: SPECIALIST

## 2024-06-07 PROCEDURE — 82962 GLUCOSE BLOOD TEST: CPT

## 2024-06-07 PROCEDURE — 94640 AIRWAY INHALATION TREATMENT: CPT

## 2024-06-07 RX ORDER — CEFUROXIME AXETIL 500 MG/1
500 TABLET ORAL EVERY 12 HOURS SCHEDULED
Qty: 14 TABLET | Refills: 0 | Status: SHIPPED | OUTPATIENT
Start: 2024-06-07 | End: 2024-06-14

## 2024-06-07 RX ORDER — HYDRALAZINE HYDROCHLORIDE 20 MG/ML
5 INJECTION INTRAMUSCULAR; INTRAVENOUS EVERY 6 HOURS PRN
Status: DISCONTINUED | OUTPATIENT
Start: 2024-06-07 | End: 2024-06-09 | Stop reason: HOSPADM

## 2024-06-07 RX ORDER — CEFUROXIME AXETIL 500 MG/1
500 TABLET ORAL EVERY 12 HOURS SCHEDULED
Status: DISCONTINUED | OUTPATIENT
Start: 2024-06-07 | End: 2024-06-09 | Stop reason: HOSPADM

## 2024-06-07 RX ADMIN — CYANOCOBALAMIN TAB 1000 MCG 250 MCG: 1000 TAB at 08:22

## 2024-06-07 RX ADMIN — CETIRIZINE HYDROCHLORIDE 10 MG: 10 TABLET, FILM COATED ORAL at 20:10

## 2024-06-07 RX ADMIN — DOXYCYCLINE 100 MG: 100 INJECTION, POWDER, LYOPHILIZED, FOR SOLUTION INTRAVENOUS at 13:31

## 2024-06-07 RX ADMIN — CLONIDINE HYDROCHLORIDE 0.2 MG: 0.2 TABLET ORAL at 20:11

## 2024-06-07 RX ADMIN — LISINOPRIL 40 MG: 20 TABLET ORAL at 08:20

## 2024-06-07 RX ADMIN — BUDESONIDE INHALATION 1000 MCG: 0.5 SUSPENSION RESPIRATORY (INHALATION) at 05:01

## 2024-06-07 RX ADMIN — VANCOMYCIN HYDROCHLORIDE 1000 MG: 1 INJECTION, POWDER, LYOPHILIZED, FOR SOLUTION INTRAVENOUS at 00:22

## 2024-06-07 RX ADMIN — CARVEDILOL 25 MG: 25 TABLET, FILM COATED ORAL at 17:45

## 2024-06-07 RX ADMIN — WATER 40 MG: 1 INJECTION INTRAMUSCULAR; INTRAVENOUS; SUBCUTANEOUS at 17:45

## 2024-06-07 RX ADMIN — CARVEDILOL 25 MG: 25 TABLET, FILM COATED ORAL at 08:22

## 2024-06-07 RX ADMIN — HYDROXYZINE PAMOATE 25 MG: 25 CAPSULE ORAL at 20:11

## 2024-06-07 RX ADMIN — VANCOMYCIN HYDROCHLORIDE 1000 MG: 1 INJECTION, POWDER, LYOPHILIZED, FOR SOLUTION INTRAVENOUS at 22:43

## 2024-06-07 RX ADMIN — DIVALPROEX SODIUM 250 MG: 250 TABLET, DELAYED RELEASE ORAL at 20:11

## 2024-06-07 RX ADMIN — NYSTATIN 500000 UNITS: 100000 SUSPENSION ORAL at 17:45

## 2024-06-07 RX ADMIN — AMLODIPINE BESYLATE 10 MG: 5 TABLET ORAL at 08:22

## 2024-06-07 RX ADMIN — PYRIDOXINE HCL TAB 50 MG 100 MG: 50 TAB at 08:21

## 2024-06-07 RX ADMIN — WATER 2000 MG: 1 INJECTION INTRAMUSCULAR; INTRAVENOUS; SUBCUTANEOUS at 06:00

## 2024-06-07 RX ADMIN — DOCUSATE SODIUM 100 MG: 100 CAPSULE, LIQUID FILLED ORAL at 20:15

## 2024-06-07 RX ADMIN — LEVETIRACETAM 500 MG: 500 TABLET, FILM COATED ORAL at 20:10

## 2024-06-07 RX ADMIN — ASPIRIN 81 MG: 81 TABLET, COATED ORAL at 08:20

## 2024-06-07 RX ADMIN — TAMSULOSIN HYDROCHLORIDE 0.4 MG: 0.4 CAPSULE ORAL at 08:20

## 2024-06-07 RX ADMIN — NYSTATIN 500000 UNITS: 100000 SUSPENSION ORAL at 08:20

## 2024-06-07 RX ADMIN — HYDROXYZINE PAMOATE 25 MG: 25 CAPSULE ORAL at 08:22

## 2024-06-07 RX ADMIN — ROSUVASTATIN CALCIUM 10 MG: 10 TABLET, FILM COATED ORAL at 20:10

## 2024-06-07 RX ADMIN — WATER 40 MG: 1 INJECTION INTRAMUSCULAR; INTRAVENOUS; SUBCUTANEOUS at 06:00

## 2024-06-07 RX ADMIN — NYSTATIN 500000 UNITS: 100000 SUSPENSION ORAL at 20:11

## 2024-06-07 RX ADMIN — VANCOMYCIN HYDROCHLORIDE 1000 MG: 1 INJECTION, POWDER, LYOPHILIZED, FOR SOLUTION INTRAVENOUS at 11:41

## 2024-06-07 RX ADMIN — MELOXICAM 15 MG: 7.5 TABLET ORAL at 08:21

## 2024-06-07 RX ADMIN — INSULIN LISPRO 2 UNITS: 100 INJECTION, SOLUTION INTRAVENOUS; SUBCUTANEOUS at 11:56

## 2024-06-07 RX ADMIN — IPRATROPIUM BROMIDE AND ALBUTEROL SULFATE 1 DOSE: .5; 2.5 SOLUTION RESPIRATORY (INHALATION) at 18:24

## 2024-06-07 RX ADMIN — DOXYCYCLINE 100 MG: 100 INJECTION, POWDER, LYOPHILIZED, FOR SOLUTION INTRAVENOUS at 02:18

## 2024-06-07 RX ADMIN — HYDROCHLOROTHIAZIDE 25 MG: 25 TABLET ORAL at 08:21

## 2024-06-07 RX ADMIN — FLUOXETINE HYDROCHLORIDE 60 MG: 20 CAPSULE ORAL at 08:21

## 2024-06-07 RX ADMIN — HYDRALAZINE HYDROCHLORIDE 5 MG: 20 INJECTION INTRAMUSCULAR; INTRAVENOUS at 19:01

## 2024-06-07 RX ADMIN — Medication 5 MG: at 20:10

## 2024-06-07 RX ADMIN — CLONIDINE HYDROCHLORIDE 0.2 MG: 0.2 TABLET ORAL at 08:22

## 2024-06-07 RX ADMIN — DIVALPROEX SODIUM 125 MG: 125 TABLET, DELAYED RELEASE ORAL at 08:21

## 2024-06-07 RX ADMIN — BUDESONIDE INHALATION 1000 MCG: 0.5 SUSPENSION RESPIRATORY (INHALATION) at 18:23

## 2024-06-07 RX ADMIN — LEVETIRACETAM 500 MG: 500 TABLET, FILM COATED ORAL at 08:20

## 2024-06-07 RX ADMIN — CEFUROXIME AXETIL 500 MG: 500 TABLET ORAL at 20:10

## 2024-06-07 RX ADMIN — DIVALPROEX SODIUM 125 MG: 125 TABLET, DELAYED RELEASE ORAL at 17:45

## 2024-06-07 RX ADMIN — Medication 2000 UNITS: at 08:21

## 2024-06-07 RX ADMIN — ENOXAPARIN SODIUM 40 MG: 100 INJECTION SUBCUTANEOUS at 08:22

## 2024-06-07 RX ADMIN — Medication 10 ML: at 20:11

## 2024-06-07 RX ADMIN — IPRATROPIUM BROMIDE AND ALBUTEROL SULFATE 1 DOSE: .5; 2.5 SOLUTION RESPIRATORY (INHALATION) at 05:01

## 2024-06-07 RX ADMIN — BICTEGRAVIR SODIUM, EMTRICITABINE, AND TENOFOVIR ALAFENAMIDE FUMARATE 1 TABLET: 50; 200; 25 TABLET ORAL at 08:21

## 2024-06-07 RX ADMIN — RIVASTIGMINE TARTRATE 3 MG: 3 CAPSULE ORAL at 20:10

## 2024-06-07 RX ADMIN — DOCUSATE SODIUM 100 MG: 100 CAPSULE, LIQUID FILLED ORAL at 08:22

## 2024-06-07 RX ADMIN — RIVASTIGMINE TARTRATE 3 MG: 3 CAPSULE ORAL at 08:21

## 2024-06-07 RX ADMIN — Medication 10 ML: at 08:22

## 2024-06-07 NOTE — PLAN OF CARE
Problem: Discharge Planning  Goal: Discharge to home or other facility with appropriate resources  Outcome: Progressing  Flowsheets (Taken 6/6/2024 2021)  Discharge to home or other facility with appropriate resources:   Identify barriers to discharge with patient and caregiver   Arrange for needed discharge resources and transportation as appropriate     Problem: Safety - Adult  Goal: Free from fall injury  Outcome: Progressing     Problem: Chronic Conditions and Co-morbidities  Goal: Patient's chronic conditions and co-morbidity symptoms are monitored and maintained or improved  Outcome: Progressing  Flowsheets (Taken 6/6/2024 2021)  Care Plan - Patient's Chronic Conditions and Co-Morbidity Symptoms are Monitored and Maintained or Improved:   Monitor and assess patient's chronic conditions and comorbid symptoms for stability, deterioration, or improvement   Collaborate with multidisciplinary team to address chronic and comorbid conditions and prevent exacerbation or deterioration

## 2024-06-07 NOTE — CARE COORDINATION
[facility]  Per Sujata there patient is completely independent, he walks around town and back again.  Nursing reports he is up in room independent while here as well.  He is currently on 4L of oxygen, he does not wear oxygen at AL.  Sujata states he can return with oxygen and they normally use [employer].   Have made tentative referral to Darby at [facility] to follow for now.  Will need pulse ox testing and DME order if he qualifies prior to DC.  ID is following and patient is currently on IV Doxy and Rocephin, he Can NOT return to AL on IV so if IV needed at DC will need to explore other options.  Will await final ABX plan.

## 2024-06-08 LAB
GLUCOSE BLD-MCNC: 107 MG/DL (ref 74–99)
GLUCOSE BLD-MCNC: 143 MG/DL (ref 74–99)
GLUCOSE BLD-MCNC: 143 MG/DL (ref 74–99)
GLUCOSE BLD-MCNC: 233 MG/DL (ref 74–99)

## 2024-06-08 PROCEDURE — 2580000003 HC RX 258: Performed by: INTERNAL MEDICINE

## 2024-06-08 PROCEDURE — 94660 CPAP INITIATION&MGMT: CPT

## 2024-06-08 PROCEDURE — 6370000000 HC RX 637 (ALT 250 FOR IP): Performed by: INTERNAL MEDICINE

## 2024-06-08 PROCEDURE — 2500000003 HC RX 250 WO HCPCS: Performed by: INTERNAL MEDICINE

## 2024-06-08 PROCEDURE — 6360000002 HC RX W HCPCS: Performed by: INTERNAL MEDICINE

## 2024-06-08 PROCEDURE — 6370000000 HC RX 637 (ALT 250 FOR IP): Performed by: SPECIALIST

## 2024-06-08 PROCEDURE — 2580000003 HC RX 258: Performed by: SPECIALIST

## 2024-06-08 PROCEDURE — 2700000000 HC OXYGEN THERAPY PER DAY

## 2024-06-08 PROCEDURE — 2060000000 HC ICU INTERMEDIATE R&B

## 2024-06-08 PROCEDURE — 99232 SBSQ HOSP IP/OBS MODERATE 35: CPT | Performed by: INTERNAL MEDICINE

## 2024-06-08 PROCEDURE — 82962 GLUCOSE BLOOD TEST: CPT

## 2024-06-08 PROCEDURE — 94640 AIRWAY INHALATION TREATMENT: CPT

## 2024-06-08 PROCEDURE — 6360000002 HC RX W HCPCS: Performed by: SPECIALIST

## 2024-06-08 RX ORDER — LORAZEPAM 0.5 MG/1
0.5 TABLET ORAL EVERY 4 HOURS PRN
Status: DISCONTINUED | OUTPATIENT
Start: 2024-06-08 | End: 2024-06-09 | Stop reason: HOSPADM

## 2024-06-08 RX ADMIN — BUDESONIDE INHALATION 1000 MCG: 0.5 SUSPENSION RESPIRATORY (INHALATION) at 06:24

## 2024-06-08 RX ADMIN — BUDESONIDE INHALATION 1000 MCG: 0.5 SUSPENSION RESPIRATORY (INHALATION) at 16:48

## 2024-06-08 RX ADMIN — LORAZEPAM 0.5 MG: 0.5 TABLET ORAL at 22:31

## 2024-06-08 RX ADMIN — Medication 2000 UNITS: at 09:18

## 2024-06-08 RX ADMIN — BICTEGRAVIR SODIUM, EMTRICITABINE, AND TENOFOVIR ALAFENAMIDE FUMARATE 1 TABLET: 50; 200; 25 TABLET ORAL at 09:16

## 2024-06-08 RX ADMIN — RIVASTIGMINE TARTRATE 3 MG: 3 CAPSULE ORAL at 20:26

## 2024-06-08 RX ADMIN — Medication 10 ML: at 20:31

## 2024-06-08 RX ADMIN — Medication 10 ML: at 09:21

## 2024-06-08 RX ADMIN — DIVALPROEX SODIUM 250 MG: 250 TABLET, DELAYED RELEASE ORAL at 20:26

## 2024-06-08 RX ADMIN — CARVEDILOL 25 MG: 25 TABLET, FILM COATED ORAL at 18:01

## 2024-06-08 RX ADMIN — CLONIDINE HYDROCHLORIDE 0.2 MG: 0.2 TABLET ORAL at 09:18

## 2024-06-08 RX ADMIN — TAMSULOSIN HYDROCHLORIDE 0.4 MG: 0.4 CAPSULE ORAL at 09:18

## 2024-06-08 RX ADMIN — HYDRALAZINE HYDROCHLORIDE 5 MG: 20 INJECTION INTRAMUSCULAR; INTRAVENOUS at 18:05

## 2024-06-08 RX ADMIN — LEVETIRACETAM 500 MG: 500 TABLET, FILM COATED ORAL at 09:18

## 2024-06-08 RX ADMIN — ASPIRIN 81 MG: 81 TABLET, COATED ORAL at 09:20

## 2024-06-08 RX ADMIN — VANCOMYCIN HYDROCHLORIDE 1000 MG: 1 INJECTION, POWDER, LYOPHILIZED, FOR SOLUTION INTRAVENOUS at 22:35

## 2024-06-08 RX ADMIN — WATER 40 MG: 1 INJECTION INTRAMUSCULAR; INTRAVENOUS; SUBCUTANEOUS at 05:45

## 2024-06-08 RX ADMIN — NYSTATIN 500000 UNITS: 100000 SUSPENSION ORAL at 18:01

## 2024-06-08 RX ADMIN — DOXYCYCLINE 100 MG: 100 INJECTION, POWDER, LYOPHILIZED, FOR SOLUTION INTRAVENOUS at 14:22

## 2024-06-08 RX ADMIN — NYSTATIN 500000 UNITS: 100000 SUSPENSION ORAL at 20:27

## 2024-06-08 RX ADMIN — DOXYCYCLINE 100 MG: 100 INJECTION, POWDER, LYOPHILIZED, FOR SOLUTION INTRAVENOUS at 02:33

## 2024-06-08 RX ADMIN — NYSTATIN 500000 UNITS: 100000 SUSPENSION ORAL at 09:20

## 2024-06-08 RX ADMIN — CYANOCOBALAMIN TAB 1000 MCG 250 MCG: 1000 TAB at 09:17

## 2024-06-08 RX ADMIN — DIVALPROEX SODIUM 125 MG: 125 TABLET, DELAYED RELEASE ORAL at 09:20

## 2024-06-08 RX ADMIN — Medication 5 MG: at 20:26

## 2024-06-08 RX ADMIN — CETIRIZINE HYDROCHLORIDE 10 MG: 10 TABLET, FILM COATED ORAL at 20:27

## 2024-06-08 RX ADMIN — CEFUROXIME AXETIL 500 MG: 500 TABLET ORAL at 20:27

## 2024-06-08 RX ADMIN — FLUOXETINE HYDROCHLORIDE 60 MG: 20 CAPSULE ORAL at 09:18

## 2024-06-08 RX ADMIN — LISINOPRIL 40 MG: 20 TABLET ORAL at 09:18

## 2024-06-08 RX ADMIN — CARVEDILOL 25 MG: 25 TABLET, FILM COATED ORAL at 09:19

## 2024-06-08 RX ADMIN — INSULIN LISPRO 1 UNITS: 100 INJECTION, SOLUTION INTRAVENOUS; SUBCUTANEOUS at 12:27

## 2024-06-08 RX ADMIN — CEFUROXIME AXETIL 500 MG: 500 TABLET ORAL at 09:17

## 2024-06-08 RX ADMIN — HYDRALAZINE HYDROCHLORIDE 5 MG: 20 INJECTION INTRAMUSCULAR; INTRAVENOUS at 05:49

## 2024-06-08 RX ADMIN — ROSUVASTATIN CALCIUM 10 MG: 10 TABLET, FILM COATED ORAL at 20:27

## 2024-06-08 RX ADMIN — LEVETIRACETAM 500 MG: 500 TABLET, FILM COATED ORAL at 20:27

## 2024-06-08 RX ADMIN — MELOXICAM 15 MG: 7.5 TABLET ORAL at 09:19

## 2024-06-08 RX ADMIN — HYDROXYZINE PAMOATE 25 MG: 25 CAPSULE ORAL at 20:26

## 2024-06-08 RX ADMIN — WATER 40 MG: 1 INJECTION INTRAMUSCULAR; INTRAVENOUS; SUBCUTANEOUS at 18:01

## 2024-06-08 RX ADMIN — HYDROCHLOROTHIAZIDE 25 MG: 25 TABLET ORAL at 09:20

## 2024-06-08 RX ADMIN — DOCUSATE SODIUM 100 MG: 100 CAPSULE, LIQUID FILLED ORAL at 09:20

## 2024-06-08 RX ADMIN — CLONIDINE HYDROCHLORIDE 0.2 MG: 0.2 TABLET ORAL at 20:26

## 2024-06-08 RX ADMIN — POLYETHYLENE GLYCOL 3350 17 G: 17 POWDER, FOR SOLUTION ORAL at 12:27

## 2024-06-08 RX ADMIN — DIVALPROEX SODIUM 125 MG: 125 TABLET, DELAYED RELEASE ORAL at 18:02

## 2024-06-08 RX ADMIN — RIVASTIGMINE TARTRATE 3 MG: 3 CAPSULE ORAL at 09:19

## 2024-06-08 RX ADMIN — VANCOMYCIN HYDROCHLORIDE 1000 MG: 1 INJECTION, POWDER, LYOPHILIZED, FOR SOLUTION INTRAVENOUS at 12:30

## 2024-06-08 RX ADMIN — ENOXAPARIN SODIUM 40 MG: 100 INJECTION SUBCUTANEOUS at 09:20

## 2024-06-08 RX ADMIN — PYRIDOXINE HCL TAB 50 MG 100 MG: 50 TAB at 09:19

## 2024-06-08 RX ADMIN — NYSTATIN 500000 UNITS: 100000 SUSPENSION ORAL at 12:27

## 2024-06-08 RX ADMIN — HYDRALAZINE HYDROCHLORIDE 5 MG: 20 INJECTION INTRAMUSCULAR; INTRAVENOUS at 20:31

## 2024-06-08 RX ADMIN — DOCUSATE SODIUM 100 MG: 100 CAPSULE, LIQUID FILLED ORAL at 20:27

## 2024-06-08 RX ADMIN — AMLODIPINE BESYLATE 10 MG: 5 TABLET ORAL at 09:18

## 2024-06-08 NOTE — PLAN OF CARE
Problem: Discharge Planning  Goal: Discharge to home or other facility with appropriate resources  6/8/2024 1858 by Madonna High RN  Outcome: Progressing  6/8/2024 0700 by Antonella Ibarra RN  Outcome: Progressing     Problem: Safety - Adult  Goal: Free from fall injury  6/8/2024 1858 by Madonna High RN  Outcome: Progressing  6/8/2024 0700 by Antonella Ibarra RN  Outcome: Progressing     Problem: Chronic Conditions and Co-morbidities  Goal: Patient's chronic conditions and co-morbidity symptoms are monitored and maintained or improved  6/8/2024 1858 by Madonna High RN  Outcome: Progressing  6/8/2024 0700 by Antonella Ibarra RN  Outcome: Progressing

## 2024-06-09 ENCOUNTER — APPOINTMENT (OUTPATIENT)
Dept: CT IMAGING | Age: 64
DRG: 871 | End: 2024-06-09
Payer: MEDICARE

## 2024-06-09 VITALS
OXYGEN SATURATION: 94 % | BODY MASS INDEX: 31.82 KG/M2 | HEART RATE: 80 BPM | SYSTOLIC BLOOD PRESSURE: 170 MMHG | TEMPERATURE: 98.2 F | RESPIRATION RATE: 18 BRPM | DIASTOLIC BLOOD PRESSURE: 87 MMHG | WEIGHT: 198 LBS | HEIGHT: 66 IN

## 2024-06-09 LAB
ALBUMIN SERPL-MCNC: 3.7 G/DL (ref 3.5–5.2)
ALP SERPL-CCNC: 65 U/L (ref 40–129)
ALT SERPL-CCNC: 35 U/L (ref 0–40)
ANION GAP SERPL CALCULATED.3IONS-SCNC: 10 MMOL/L (ref 7–16)
AST SERPL-CCNC: 19 U/L (ref 0–39)
BASOPHILS # BLD: 0.06 K/UL (ref 0–0.2)
BASOPHILS NFR BLD: 1 % (ref 0–2)
BILIRUB SERPL-MCNC: 0.3 MG/DL (ref 0–1.2)
BUN SERPL-MCNC: 15 MG/DL (ref 6–23)
CALCIUM SERPL-MCNC: 9.7 MG/DL (ref 8.6–10.2)
CHLORIDE SERPL-SCNC: 93 MMOL/L (ref 98–107)
CO2 SERPL-SCNC: 31 MMOL/L (ref 22–29)
CREAT SERPL-MCNC: 0.6 MG/DL (ref 0.7–1.2)
EOSINOPHIL # BLD: 0 K/UL (ref 0.05–0.5)
EOSINOPHILS RELATIVE PERCENT: 0 % (ref 0–6)
ERYTHROCYTE [DISTWIDTH] IN BLOOD BY AUTOMATED COUNT: 13.9 % (ref 11.5–15)
GFR, ESTIMATED: >90 ML/MIN/1.73M2
GLUCOSE BLD-MCNC: 112 MG/DL (ref 74–99)
GLUCOSE BLD-MCNC: 209 MG/DL (ref 74–99)
GLUCOSE SERPL-MCNC: 267 MG/DL (ref 74–99)
HCT VFR BLD AUTO: 44.2 % (ref 37–54)
HGB BLD-MCNC: 14.2 G/DL (ref 12.5–16.5)
IMM GRANULOCYTES # BLD AUTO: 0.22 K/UL (ref 0–0.58)
IMM GRANULOCYTES NFR BLD: 2 % (ref 0–5)
LYMPHOCYTES NFR BLD: 1.31 K/UL (ref 1.5–4)
LYMPHOCYTES RELATIVE PERCENT: 10 % (ref 20–42)
MCH RBC QN AUTO: 29.1 PG (ref 26–35)
MCHC RBC AUTO-ENTMCNC: 32.1 G/DL (ref 32–34.5)
MCV RBC AUTO: 90.6 FL (ref 80–99.9)
MICROORGANISM SPEC CULT: NORMAL
MONOCYTES NFR BLD: 0.83 K/UL (ref 0.1–0.95)
MONOCYTES NFR BLD: 6 % (ref 2–12)
NEUTS SEG NFR BLD: 10.47 K/UL (ref 1.8–7.3)
PLATELET # BLD AUTO: 223 K/UL (ref 130–450)
PMV BLD AUTO: 11.6 FL (ref 7–12)
POTASSIUM SERPL-SCNC: 4 MMOL/L (ref 3.5–5)
PROT SERPL-MCNC: 7.2 G/DL (ref 6.4–8.3)
RBC # BLD AUTO: 4.88 M/UL (ref 3.8–5.8)
SERVICE CMNT-IMP: NORMAL
SODIUM SERPL-SCNC: 134 MMOL/L (ref 132–146)
SPECIMEN DESCRIPTION: NORMAL
WBC OTHER # BLD: 12.9 K/UL (ref 4.5–11.5)

## 2024-06-09 PROCEDURE — 2580000003 HC RX 258: Performed by: INTERNAL MEDICINE

## 2024-06-09 PROCEDURE — 94669 MECHANICAL CHEST WALL OSCILL: CPT

## 2024-06-09 PROCEDURE — 6360000002 HC RX W HCPCS: Performed by: INTERNAL MEDICINE

## 2024-06-09 PROCEDURE — 94640 AIRWAY INHALATION TREATMENT: CPT

## 2024-06-09 PROCEDURE — 6370000000 HC RX 637 (ALT 250 FOR IP): Performed by: INTERNAL MEDICINE

## 2024-06-09 PROCEDURE — 99239 HOSP IP/OBS DSCHRG MGMT >30: CPT | Performed by: INTERNAL MEDICINE

## 2024-06-09 PROCEDURE — 82962 GLUCOSE BLOOD TEST: CPT

## 2024-06-09 PROCEDURE — 80053 COMPREHEN METABOLIC PANEL: CPT

## 2024-06-09 PROCEDURE — 6370000000 HC RX 637 (ALT 250 FOR IP): Performed by: SPECIALIST

## 2024-06-09 PROCEDURE — 2500000003 HC RX 250 WO HCPCS: Performed by: INTERNAL MEDICINE

## 2024-06-09 PROCEDURE — 36415 COLL VENOUS BLD VENIPUNCTURE: CPT

## 2024-06-09 PROCEDURE — 85025 COMPLETE CBC W/AUTO DIFF WBC: CPT

## 2024-06-09 PROCEDURE — 94660 CPAP INITIATION&MGMT: CPT

## 2024-06-09 RX ORDER — PREDNISONE 10 MG/1
TABLET ORAL
Qty: 42 TABLET | Refills: 0 | Status: SHIPPED | OUTPATIENT
Start: 2024-06-09

## 2024-06-09 RX ORDER — FLUOXETINE HYDROCHLORIDE 20 MG/1
60 CAPSULE ORAL EVERY MORNING
Qty: 30 CAPSULE | Refills: 0 | Status: SHIPPED | OUTPATIENT
Start: 2024-06-10

## 2024-06-09 RX ORDER — LISINOPRIL 40 MG/1
40 TABLET ORAL DAILY
Qty: 30 TABLET | Refills: 0 | Status: SHIPPED | OUTPATIENT
Start: 2024-06-10

## 2024-06-09 RX ADMIN — CLONIDINE HYDROCHLORIDE 0.2 MG: 0.2 TABLET ORAL at 09:59

## 2024-06-09 RX ADMIN — DOXYCYCLINE 100 MG: 100 INJECTION, POWDER, LYOPHILIZED, FOR SOLUTION INTRAVENOUS at 03:04

## 2024-06-09 RX ADMIN — LORAZEPAM 0.5 MG: 0.5 TABLET ORAL at 08:17

## 2024-06-09 RX ADMIN — LISINOPRIL 40 MG: 20 TABLET ORAL at 09:57

## 2024-06-09 RX ADMIN — IPRATROPIUM BROMIDE AND ALBUTEROL SULFATE 1 DOSE: .5; 2.5 SOLUTION RESPIRATORY (INHALATION) at 06:27

## 2024-06-09 RX ADMIN — CARVEDILOL 25 MG: 25 TABLET, FILM COATED ORAL at 08:17

## 2024-06-09 RX ADMIN — ENOXAPARIN SODIUM 40 MG: 100 INJECTION SUBCUTANEOUS at 09:59

## 2024-06-09 RX ADMIN — LEVETIRACETAM 500 MG: 500 TABLET, FILM COATED ORAL at 09:59

## 2024-06-09 RX ADMIN — AMLODIPINE BESYLATE 10 MG: 5 TABLET ORAL at 09:59

## 2024-06-09 RX ADMIN — FLUOXETINE HYDROCHLORIDE 60 MG: 20 CAPSULE ORAL at 09:57

## 2024-06-09 RX ADMIN — WATER 40 MG: 1 INJECTION INTRAMUSCULAR; INTRAVENOUS; SUBCUTANEOUS at 05:30

## 2024-06-09 RX ADMIN — DOCUSATE SODIUM 100 MG: 100 CAPSULE, LIQUID FILLED ORAL at 09:58

## 2024-06-09 RX ADMIN — NYSTATIN 500000 UNITS: 100000 SUSPENSION ORAL at 09:59

## 2024-06-09 RX ADMIN — ASPIRIN 81 MG: 81 TABLET, COATED ORAL at 09:58

## 2024-06-09 RX ADMIN — HYDROCHLOROTHIAZIDE 25 MG: 25 TABLET ORAL at 09:57

## 2024-06-09 RX ADMIN — PYRIDOXINE HCL TAB 50 MG 100 MG: 50 TAB at 09:59

## 2024-06-09 RX ADMIN — Medication 10 ML: at 09:59

## 2024-06-09 RX ADMIN — CYANOCOBALAMIN TAB 1000 MCG 250 MCG: 1000 TAB at 09:58

## 2024-06-09 RX ADMIN — Medication 2000 UNITS: at 09:58

## 2024-06-09 RX ADMIN — BICTEGRAVIR SODIUM, EMTRICITABINE, AND TENOFOVIR ALAFENAMIDE FUMARATE 1 TABLET: 50; 200; 25 TABLET ORAL at 09:58

## 2024-06-09 RX ADMIN — BUDESONIDE INHALATION 1000 MCG: 0.5 SUSPENSION RESPIRATORY (INHALATION) at 16:38

## 2024-06-09 RX ADMIN — MELOXICAM 15 MG: 7.5 TABLET ORAL at 09:58

## 2024-06-09 RX ADMIN — TAMSULOSIN HYDROCHLORIDE 0.4 MG: 0.4 CAPSULE ORAL at 08:17

## 2024-06-09 RX ADMIN — CEFUROXIME AXETIL 500 MG: 500 TABLET ORAL at 09:58

## 2024-06-09 RX ADMIN — BUDESONIDE INHALATION 1000 MCG: 0.5 SUSPENSION RESPIRATORY (INHALATION) at 06:27

## 2024-06-09 RX ADMIN — DIVALPROEX SODIUM 125 MG: 125 TABLET, DELAYED RELEASE ORAL at 08:55

## 2024-06-09 RX ADMIN — RIVASTIGMINE TARTRATE 3 MG: 3 CAPSULE ORAL at 09:58

## 2024-06-09 NOTE — PLAN OF CARE
Problem: Discharge Planning  Goal: Discharge to home or other facility with appropriate resources  6/9/2024 1440 by Madonna High RN  Outcome: Progressing  6/9/2024 0602 by Antonella Ibarra RN  Outcome: Progressing     Problem: Safety - Adult  Goal: Free from fall injury  6/9/2024 1440 by Madonna High RN  Outcome: Progressing  6/9/2024 0602 by Antonella Ibarra RN  Outcome: Progressing     Problem: Chronic Conditions and Co-morbidities  Goal: Patient's chronic conditions and co-morbidity symptoms are monitored and maintained or improved  6/9/2024 1440 by Madonna High RN  Outcome: Progressing  6/9/2024 0602 by Antonella Ibarra RN  Outcome: Progressing

## 2024-06-09 NOTE — CARE COORDINATION
6/9/24 1515 CM note: no covid testing, bl cx (+) 6/4/24, bl cx 6/6/24 ngtd. PO prednisone & ceftin. Room air. Discharge order noted. Discharge plan is to return to [facility]. Notified [facility] liaison, of pts return to their facility tonight. Sujata states she only has a nurse their till 7pm tonight so he needs to be back to the facility before then so the nurse can get him checked in. Spoke with pts sister Lola who is transporting him back to the facility and she is headed to the hospital now, she lives in [place]. Updated pts ALANIS Peacock and senior care liaison Sujata of above. Electronically signed by Mariah Garza RN on 6/9/2024 at 4:15 PM

## 2024-06-09 NOTE — PLAN OF CARE
Problem: Discharge Planning  Goal: Discharge to home or other facility with appropriate resources  6/9/2024 0602 by Antonella Ibarra RN  Outcome: Progressing  6/8/2024 1858 by Madonna High RN  Outcome: Progressing     Problem: Safety - Adult  Goal: Free from fall injury  6/9/2024 0602 by Antonella Ibarra RN  Outcome: Progressing  6/8/2024 1858 by Madonna High RN  Outcome: Progressing     Problem: Chronic Conditions and Co-morbidities  Goal: Patient's chronic conditions and co-morbidity symptoms are monitored and maintained or improved  6/9/2024 0602 by Antonella Ibarra RN  Outcome: Progressing  6/8/2024 1858 by Madonna High RN  Outcome: Progressing

## 2024-06-09 NOTE — PLAN OF CARE
Problem: Discharge Planning  Goal: Discharge to home or other facility with appropriate resources  6/9/2024 1607 by Madonna High RN  Outcome: Completed  6/9/2024 1440 by Madonna High RN  Outcome: Progressing  6/9/2024 0602 by Antonella Ibarra RN  Outcome: Progressing     Problem: Safety - Adult  Goal: Free from fall injury  6/9/2024 1607 by Madonna High RN  Outcome: Completed  6/9/2024 1440 by Madonna High RN  Outcome: Progressing  6/9/2024 0602 by Antonella Ibarra RN  Outcome: Progressing     Problem: Chronic Conditions and Co-morbidities  Goal: Patient's chronic conditions and co-morbidity symptoms are monitored and maintained or improved  6/9/2024 1607 by Madonna High RN  Outcome: Completed  6/9/2024 1440 by Madonna High RN  Outcome: Progressing  6/9/2024 0602 by Antonella Ibarra RN  Outcome: Progressing

## 2024-06-10 LAB — T SPOT TB TEST: NORMAL

## 2024-06-10 NOTE — PROGRESS NOTES
NAME: Ambrosio Johnson  MR:  [id number]  :   1960  Admit Date:  2024    Elements of this note, were copied and pasted from Previous. Updates have been made where noted and reflect current exam and medical decision making from the DOS of this encounter.  CHIEF COMPLAINT       Chief Complaint   Patient presents with    Shortness of Breath     Sob for 3 days; pt was 90 RA in ems; ems states pt became audibly wheezy and was placed on cpap. Denies other symptoms. Pt does not wear O2 at home.     HISTORY OF PRESENT ILLNESS     Ambrosio Johnson is a 64 y.o. male admitted on 2024 and has  has a past medical history of Asthma, CHF (congestive heart failure) ([facility]), COPD (chronic obstructive pulmonary disease) ([facility]), Depression, Diabetes mellitus ([facility]), H/O cardiovascular stress test, History of echocardiogram, HIV (human immunodeficiency virus infection) ([facility]), Hypertension, Seizure ([facility]), and Sleep apnea.     This is a face to face encounter   24 in bed on o2 has no c/o tolerating ceftin    IN BED ASKING ABOUT D/C AFEBRILE 4l    In bed c/o abd distesnion no f/c   in bed on o2 feels the same     Patient is tolerating medications. No reported adverse drug reactions.  Available labs, imaging studies, microbiologic studies have been reviewed with pt and family if present.  Assessment & Plan     Admitted for   Acute respiratory failure with hypoxia and hypercapnia (HCC) [J96.01, J96.02]  Pneumonia of both lower lobes due to infectious organism [J18.9]  Pneumonia, unspecified organism [J18.9]  ID following for   Hiv CD4 483   Sepsis  S pneumonia pneumonia Hazy bibasilar opacities may be due to atelectasis or infiltrates. Probable trace right pleural effusion.   Abd distesnion check xry N OTED ->CONSTIPATION   Gpc bacteremia f/u blood cx -MICROCOCCUS CONTAMINANT      Stop doxycycline (VIBRAMYCIN day 5  Cont  CEFTIN      CONT bictegravir-emtricitab-tenofovir alafenamide (BIKTARVY) -25 MG per 
    NAME: Ambrosio Johnson  MR:  [id number]  :   1960  Admit Date:  2024    Elements of this note, were copied and pasted from Previous. Updates have been made where noted and reflect current exam and medical decision making from the DOS of this encounter.  CHIEF COMPLAINT       Chief Complaint   Patient presents with    Shortness of Breath     Sob for 3 days; pt was 90 RA in ems; ems states pt became audibly wheezy and was placed on cpap. Denies other symptoms. Pt does not wear O2 at home.     HISTORY OF PRESENT ILLNESS     Ambrosio Johnson is a 64 y.o. male admitted on 2024 and has  has a past medical history of Asthma, CHF (congestive heart failure) ([facility]), COPD (chronic obstructive pulmonary disease) ([facility]), Depression, Diabetes mellitus ([facility]), H/O cardiovascular stress test, History of echocardiogram, HIV (human immunodeficiency virus infection) ([facility]), Hypertension, Seizure ([facility]), and Sleep apnea.     This is a face to face encounter   24 in bed off o2 has no c/o AWAITING D/C   in bed on o2 has no c/o tolerating ceftin   6/7 IN BED ASKING ABOUT D/C AFEBRILE 4l   6/ In bed c/o abd distesnion no f/c  / in bed on o2 feels the same     Patient is tolerating medications. No reported adverse drug reactions.  Available labs, imaging studies, microbiologic studies have been reviewed with pt and family if present.  Assessment & Plan     Admitted for   Acute respiratory failure with hypoxia and hypercapnia (HCC) [J96.01, J96.02]  Pneumonia of both lower lobes due to infectious organism [J18.9]  Pneumonia, unspecified organism [J18.9]  ID following for   Hiv CD4 483   Sepsis  S pneumonia pneumonia Hazy bibasilar opacities may be due to atelectasis or infiltrates. Probable trace right pleural effusion.   Gpc bacteremia f/u blood cx -MICROCOCCUS CONTAMINANT      Stop doxycycline (VIBRAMYCIN day 6  Cont  CEFTIN      CONT bictegravir-emtricitab-tenofovir alafenamide (BIKTARVY) -25 MG per tablet 
    NAME: Ambrosio Johnson  MR:  [id number]  :   1960  Admit Date:  2024    Elements of this note, were copied and pasted from Previous. Updates have been made where noted and reflect current exam and medical decision making from the DOS of this encounter.  CHIEF COMPLAINT       Chief Complaint   Patient presents with    Shortness of Breath     Sob for 3 days; pt was 90 RA in ems; ems states pt became audibly wheezy and was placed on cpap. Denies other symptoms. Pt does not wear O2 at home.     HISTORY OF PRESENT ILLNESS     Ambrosio Johnson is a 64 y.o. male admitted on 2024 and has  has a past medical history of Asthma, CHF (congestive heart failure) ([facility]), COPD (chronic obstructive pulmonary disease) ([facility]), Depression, Diabetes mellitus (HCC), H/O cardiovascular stress test, History of echocardiogram, HIV (human immunodeficiency virus infection) ([facility]), Hypertension, Seizure ([facility]), and Sleep apnea.     This is a face to face encounter   24 IN BED ASKING ABOUT D/C AFEBRILE 4l    In bed c/o abd distesnion no f/c   in bed on o2 feels the same     Patient is tolerating medications. No reported adverse drug reactions.  Available labs, imaging studies, microbiologic studies have been reviewed with pt and family if present.  Assessment & Plan     Admitted for   Acute respiratory failure with hypoxia and hypercapnia (HCC) [J96.01, J96.02]  Pneumonia of both lower lobes due to infectious organism [J18.9]  Pneumonia, unspecified organism [J18.9]  ID following for   Hiv CD4 483   Sepsis  S pneumonia pneumonia Hazy bibasilar opacities may be due to atelectasis or infiltrates. Probable trace right pleural effusion.   Abd distesnion check xry N OTED ->CONSTIPATION   Gpc bacteremia f/u blood cx -MICROCOCCUS CONTAMINANT      doxycycline (VIBRAMYCIN) 100 mg in sodium chloride 0.9 % 100 mL IVPB ([id number]), Q12H  cefTRIAXone (SWITCH TO CEFTIN   methylPREDNISolone sodium succ (SOLU-MEDROL) 60 mg in sterile 
    NAME: Ambrosio Johnson  MR:  [id number]  :   1960  Admit Date:  2024    Elements of this note, were copied and pasted from Previous. Updates have been made where noted and reflect current exam and medical decision making from the DOS of this encounter.  CHIEF COMPLAINT       Chief Complaint   Patient presents with    Shortness of Breath     Sob for 3 days; pt was 90 RA in ems; ems states pt became audibly wheezy and was placed on cpap. Denies other symptoms. Pt does not wear O2 at home.     HISTORY OF PRESENT ILLNESS     Ambrosio Johnson is a 64 y.o. male admitted on 2024 and has  has a past medical history of Asthma, CHF (congestive heart failure) (HCC), COPD (chronic obstructive pulmonary disease) (HCC), Depression, Diabetes mellitus (HCC), H/O cardiovascular stress test, History of echocardiogram, HIV (human immunodeficiency virus infection) ([facility]), Hypertension, Seizure ([facility]), and Sleep apnea.     This is a face to face encounter   24 in bed on o2 feels the same     Patient is tolerating medications. No reported adverse drug reactions.  Available labs, imaging studies, microbiologic studies have been reviewed with pt and family if present.  Assessment & Plan     Admitted for   Acute respiratory failure with hypoxia and hypercapnia (HCC) [J96.01, J96.02]  Pneumonia of both lower lobes due to infectious organism [J18.9]  Pneumonia, unspecified organism [J18.9]  ID following for   Hiv  Sepsis  S pneumonia pneumonia Hazy bibasilar opacities may be due to atelectasis or infiltrates. Probable trace right pleural effusion.        doxycycline (VIBRAMYCIN) 100 mg in sodium chloride 0.9 % 100 mL IVPB ([id number]), Q12H  cefTRIAXone (ROCEPHIN) 1,000 mg in sterile water 10 mL IV syringe, Q24H  methylPREDNISolone sodium succ (SOLU-MEDROL) 60 mg in sterile water 0.96 mL injection, Q12H  bictegravir-emtricitab-tenofovir alafenamide (BIKTARVY) -25 MG per tablet 1 tablet, Daily   
    NAME: Ambrosio Johnson  MR:  [id number]  :   1960  Admit Date:  2024    Elements of this note, were copied and pasted from Previous. Updates have been made where noted and reflect current exam and medical decision making from the DOS of this encounter.  CHIEF COMPLAINT       Chief Complaint   Patient presents with    Shortness of Breath     Sob for 3 days; pt was 90 RA in ems; ems states pt became audibly wheezy and was placed on cpap. Denies other symptoms. Pt does not wear O2 at home.     HISTORY OF PRESENT ILLNESS     Ambrosio Johnson is a 64 y.o. male admitted on 2024 and has  has a past medical history of Asthma, CHF (congestive heart failure) (HCC), COPD (chronic obstructive pulmonary disease) (HCC), Depression, Diabetes mellitus (HCC), H/O cardiovascular stress test, History of echocardiogram, HIV (human immunodeficiency virus infection) ([facility]), Hypertension, Seizure ([facility]), and Sleep apnea.     This is a face to face encounter   24 in bed c/o abd distesnion no f/c   in bed on o2 feels the same     Patient is tolerating medications. No reported adverse drug reactions.  Available labs, imaging studies, microbiologic studies have been reviewed with pt and family if present.  Assessment & Plan     Admitted for   Acute respiratory failure with hypoxia and hypercapnia (HCC) [J96.01, J96.02]  Pneumonia of both lower lobes due to infectious organism [J18.9]  Pneumonia, unspecified organism [J18.9]  ID following for   Hiv  Sepsis  S pneumonia pneumonia Hazy bibasilar opacities may be due to atelectasis or infiltrates. Probable trace right pleural effusion.   Abd distesnion check xry  Gpc bacteremia f/u blood cx      doxycycline (VIBRAMYCIN) 100 mg in sodium chloride 0.9 % 100 mL IVPB ([id number]), Q12H  cefTRIAXone (ROCEPHIN) 1,000 mg in sterile water 10 mL IV syringe, Q24H  methylPREDNISolone sodium succ (SOLU-MEDROL) 60 mg in sterile water 0.96 mL injection, 
  Physician Progress Note      PATIENT:               JAVIER WILDE  CSN #:                  [id number]  :                       1960  ADMIT DATE:       2024 1:38 AM  DISCH DATE:  RESPONDING  PROVIDER #:        Humble Barker MD          QUERY TEXT:    Pt admitted with Sepsis, Strep pneumoniae pneumonia, acute hypoxic and   hypercapnic respiratory failure. Pt noted on admission to have WBC 16.2-21.6,   Lactic acid 2.2, -111, RR 21-27. If possible, please document in the   progress notes and discharge summary if you are evaluating and /or treating   any of the following:    The medical record reflects the following:  Risk Factors:  Strep pneumoniae pneumonia, acute hypoxic and hypercapnic   respiratory failure, HIV, Sepsis  Clinical Indicators: WBC 16.2-21.6, Lactic acid 2.2, -111, RR 21-27;   Strep Pneumoniae antigen positive  Treatment: ID consult, IV Rocephin, IV Doxycycline, Duoneb, IV Mag sulfate, IV   Solumedrol, IVFB 1L, BC    Thank you,  Minerva Amin   Clinical Documentation Improvement Specialist  W: [phone]  Options provided:  -- Sepsis, present on admission due to Strep pneumoniae pneumonia  -- Other - I will add my own diagnosis  -- Disagree - Not applicable / Not valid  -- Disagree - Clinically unable to determine / Unknown  -- Refer to Clinical Documentation Reviewer    PROVIDER RESPONSE TEXT:    This patient has sepsis which was present on admission due to Strep pneumoniae   pneumonia.    Query created by: Minerva Amin on 2024 10:39 AM      Electronically signed by:  Humble Barker MD 2024 12:06 PM          
4 Eyes Skin Assessment     NAME:  Ambrosio Johnson  YOB: 1960  MEDICAL RECORD NUMBER:  [id number]    The patient is being assessed for  Admission    I agree that at least one RN has performed a thorough Head to Toe Skin Assessment on the patient. ALL assessment sites listed below have been assessed.      Areas assessed by both nurses:    Head, Face, Ears, Shoulders, Back, Chest, Arms, Elbows, Hands, Sacrum. Buttock, Coccyx, Ischium, Legs. Feet and Heels, and Under Medical Devices         Does the Patient have a Wound? No noted wound(s)       Martin Prevention initiated by RN: No  Wound Care Orders initiated by RN: No    Pressure Injury (Stage 3,4, Unstageable, DTI, NWPT, and Complex wounds) if present, place Wound referral order by RN under : No    New Ostomies, if present place, Ostomy referral order under : No     Nurse 1 eSignature: Electronically signed by Yadi Avendano RN on 6/4/24 at 9:18 AM EDT    **SHARE this note so that the co-signing nurse can place an eSignature**    Nurse 2 eSignature: {Esignature:[id number]}    
Admitting Date and Time: 6/4/2024  1:38 AM  Admit Dx: Acute respiratory failure with hypoxia and hypercapnia (HCC) [J96.01, J96.02]  Pneumonia of both lower lobes due to infectious organism [J18.9]  Pneumonia, unspecified organism [J18.9]    Subjective:  No complaints today  Per RN: no issues    ROS: denies fever, chills, cp, sob, n/v, HA unless stated above.     cefUROXime  500 mg Oral 2 times per day    methylPREDNISolone  40 mg IntraVENous Q12H    vancomycin  1,000 mg IntraVENous Q12H    nystatin  5 mL Oral 4x Daily    sodium chloride flush  10 mL IntraVENous 2 times per day    enoxaparin  40 mg SubCUTAneous Daily    doxycycline (VIBRAMYCIN) IV  100 mg IntraVENous Q12H    bictegravir-emtricitab-tenofovir alafenamide  1 tablet Oral Daily    hydroCHLOROthiazide  25 mg Oral Daily    aspirin EC  81 mg Oral Daily    amLODIPine  10 mg Oral Daily    insulin lispro  0-4 Units SubCUTAneous TID WC    insulin lispro  0-4 Units SubCUTAneous Nightly    divalproex  125 mg Oral BID WC    divalproex  250 mg Oral QHS    docusate sodium  100 mg Oral BID    budesonide  1 mg Nebulization BID RT    pyridoxine  100 mg Oral Daily    rivastigmine  3 mg Oral BID    rosuvastatin  10 mg Oral QHS    vitamin B-12  250 mcg Oral QAM    Vitamin D  2,000 Units Oral QAM    carvedilol  25 mg Oral BID WC    cloNIDine  0.2 mg Oral BID    lisinopril  40 mg Oral Daily    levETIRAcetam  500 mg Oral BID    FLUoxetine  60 mg Oral QAM    tamsulosin  0.4 mg Oral Daily with breakfast    melatonin  5 mg Oral Nightly    cetirizine  10 mg Oral Nightly    meloxicam  15 mg Oral QAM     sodium chloride flush, 10 mL, PRN  sodium chloride, , PRN  promethazine, 12.5 mg, Q6H PRN   Or  ondansetron, 4 mg, Q6H PRN  polyethylene glycol, 17 g, Daily PRN  acetaminophen, 650 mg, Q6H PRN   Or  acetaminophen, 650 mg, Q6H PRN  hydrOXYzine pamoate, 25 mg, 4x Daily PRN  ipratropium 0.5 mg-albuterol 2.5 mg, 1 Dose, Q6H PRN  glucose, 4 tablet, PRN  dextrose bolus, 125 mL, PRN   
Admitting Date and Time: 6/4/2024  1:38 AM  Admit Dx: Acute respiratory failure with hypoxia and hypercapnia (HCC) [J96.01, J96.02]  Pneumonia of both lower lobes due to infectious organism [J18.9]  Pneumonia, unspecified organism [J18.9]    Subjective:  RN noticed distended abdomen a very careful history with the nurse in the room and with the patient notices that he has had more abdominal distention over the last 1 year if it has changed since then he confirms that there is been no change in it at all in the last few weeks or months.  There is no additional pain  Pt feels even less sob  Per RN: no issues    ROS: denies fever, chills, cp, sob, n/v, HA unless stated above.     cefTRIAXone (ROCEPHIN) IV  2,000 mg IntraVENous Q24H    nystatin  5 mL Oral 4x Daily    sodium chloride flush  10 mL IntraVENous 2 times per day    enoxaparin  40 mg SubCUTAneous Daily    doxycycline (VIBRAMYCIN) IV  100 mg IntraVENous Q12H    methylPREDNISolone  60 mg IntraVENous Q12H    bictegravir-emtricitab-tenofovir alafenamide  1 tablet Oral Daily    hydroCHLOROthiazide  25 mg Oral Daily    aspirin EC  81 mg Oral Daily    amLODIPine  10 mg Oral Daily    insulin lispro  0-4 Units SubCUTAneous TID WC    insulin lispro  0-4 Units SubCUTAneous Nightly    divalproex  125 mg Oral BID WC    divalproex  250 mg Oral QHS    docusate sodium  100 mg Oral BID    budesonide  1 mg Nebulization BID RT    pyridoxine  100 mg Oral Daily    rivastigmine  3 mg Oral BID    rosuvastatin  10 mg Oral QHS    vitamin B-12  250 mcg Oral QAM    Vitamin D  2,000 Units Oral QAM    carvedilol  25 mg Oral BID WC    cloNIDine  0.2 mg Oral BID    lisinopril  40 mg Oral Daily    levETIRAcetam  500 mg Oral BID    FLUoxetine  60 mg Oral QAM    tamsulosin  0.4 mg Oral Daily with breakfast    melatonin  5 mg Oral Nightly    cetirizine  10 mg Oral Nightly    meloxicam  15 mg Oral QAM     sodium chloride flush, 10 mL, PRN  sodium chloride, , PRN  promethazine, 12.5 mg, Q6H PRN   
CLINICAL PHARMACY NOTE: MEDS TO BEDS    Total # of Prescriptions Filled: 1   The following medications were delivered to the patient:  Cefuroxime 500 mg    Additional Documentation:   
Per P&T Committee and Medical Executive Committee approval, respiratory inhalers are nonformulary and interchanged to nebulized therapy in [facility] unless the prescriber rejects the interchange and orders the inhaler as dispense as written (TAE).  TAE orders for inhalers will require the patient/caregiver to bring the nonformulary inhaler into the hospital from home.    Please note that per P&T Committee and Medical Executive Committee approval, Pharmacy has discontinued the duplicate order for the following respiratory medications:  albuterol HFA every 4 hrs PRN .  Patient has orders for Duoneb every 6 hrs PRN.  Please call the Pharmacy with any questions.    Shantel Escamilla, PharmD, 6/4/20248:28 AM    
PRN  dextrose bolus, 125 mL, PRN   Or  dextrose bolus, 250 mL, PRN  glucagon (rDNA), 1 mg, PRN  dextrose, , Continuous PRN  polyethyl glyc-propyl glyc PF, 2 drop, Q4H PRN  ammonium lactate, , Q12H PRN         Objective:    BP (!) 151/85   Pulse 84   Temp 97.7 °F (36.5 °C) (Infrared)   Resp 20   Ht 1.676 m (5' 6\")   Wt 89.8 kg (198 lb)   SpO2 100%   BMI 31.96 kg/m²   General Appearance: alert and oriented to person, place and time and in no acute distress  Skin: warm and dry  Head: normocephalic and atraumatic  Eyes: pupils equal, round, and reactive to light, extraocular eye movements intact, conjunctivae normal  Neck: neck supple and non tender without mass   Pulmonary/Chest: clear to auscultation bilaterally- no wheezes, rales or rhonchi, normal air movement, no respiratory distress  Cardiovascular: normal rate, normal S1 and S2 and no carotid bruits  Abdomen: soft, non-tender, non-distended, normal bowel sounds, no masses or organomegaly  Extremities: no cyanosis, no clubbing and no  edema  Neurologic: no cranial nerve deficit and speech normal      No results for input(s): \"NA\", \"K\", \"CL\", \"CO2\", \"BUN\", \"CREATININE\", \"GLUCOSE\", \"CALCIUM\" in the last 72 hours.      No results for input(s): \"ALKPHOS\", \"PROT\", \"LABALBU\", \"BILITOT\", \"AST\", \"ALT\" in the last 72 hours.      No results for input(s): \"WBC\", \"RBC\", \"HGB\", \"HCT\", \"MCV\", \"MCH\", \"MCHC\", \"RDW\", \"PLT\", \"MPV\" in the last 72 hours.          Radiology:   XR ABDOMEN (KUB) (SINGLE AP VIEW)   Final Result   1. Nonobstructive bowel gas pattern.   2. Much of the colon is stool-filled suggesting possible constipation.         XR CHEST PORTABLE   Final Result   Hazy bibasilar opacities may be due to atelectasis or infiltrates. Probable   trace right pleural effusion.             Assessment:  Principal Problem:    Pneumonia, unspecified organism  Resolved Problems:    * No resolved hospital problems. *      Plan: History of present illness from History and 
PRN  dextrose, , Continuous PRN  polyethyl glyc-propyl glyc PF, 2 drop, Q4H PRN  ammonium lactate, , Q12H PRN         Objective:    BP (!) 180/83   Pulse 87   Temp 97.9 °F (36.6 °C) (Axillary)   Resp 18   Ht 1.676 m (5' 6\")   Wt 89.8 kg (198 lb)   SpO2 97%   BMI 31.96 kg/m²   General Appearance: alert and oriented to person, place and time and in no acute distress  Skin: warm and dry  Head: normocephalic and atraumatic  Eyes: pupils equal, round, and reactive to light, extraocular eye movements intact, conjunctivae normal  Neck: neck supple and non tender without mass   Pulmonary/Chest: clear to auscultation bilaterally- no wheezes, rales or rhonchi, normal air movement, no respiratory distress  Cardiovascular: normal rate, normal S1 and S2 and no carotid bruits  Abdomen: soft, non-tender, non-distended, normal bowel sounds, no masses or organomegaly  Extremities: no cyanosis, no clubbing and no  edema  Neurologic: no cranial nerve deficit and speech normal      Recent Labs     06/04/24  0153      K 4.8      CO2 25   BUN 19   CREATININE 0.7   GLUCOSE 172*   CALCIUM 8.4*       Recent Labs     06/04/24  0153   ALKPHOS 95   BILITOT 0.2   AST 42*   ALT 28       Recent Labs     06/04/24  0153 06/05/24  0716   WBC 16.2* 21.6*   RBC 4.69 4.35   HGB 13.6 12.2*   HCT 43.0 39.6   MCV 91.7 91.0   MCH 29.0 28.0   MCHC 31.6* 30.8*   RDW 15.0 14.5    182   MPV 11.7 11.8           Radiology:   XR CHEST PORTABLE   Final Result   Hazy bibasilar opacities may be due to atelectasis or infiltrates. Probable   trace right pleural effusion.             Assessment:  Principal Problem:    Pneumonia, unspecified organism  Resolved Problems:    * No resolved hospital problems. *      Plan: History of present illness from History and Physical:  This is a 64 y.o. male  has a past medical history of Asthma, CHF (congestive heart failure) ([facility]), COPD (chronic obstructive pulmonary disease) ([facility]), Depression, Diabetes 
diet.  No oral diabetic meds here.  Sliding scale coverage.  Hypoglycemic protocol.  Sleep apnea history of CPAP upon arrival at his facility but he says that they took it away from him gave him oxygen and then took oxygen away from him.  I spoke to bedside nurse who will help him investigate  DVT prophylaxis: Lovenox  Full code.  Disposition: Back to nursing home      NOTE: This report was transcribed using voice recognition software. Every effort was made to ensure accuracy; however, inadvertent computerized transcription errors may be present.     Electronically signed by MARIO DOMINGUEZ MD on 6/4/2024 at 8:33 AM

## 2024-06-11 LAB
MICROORGANISM SPEC CULT: NORMAL
MICROORGANISM SPEC CULT: NORMAL
SERVICE CMNT-IMP: NORMAL
SERVICE CMNT-IMP: NORMAL
SPECIMEN DESCRIPTION: NORMAL
SPECIMEN DESCRIPTION: NORMAL

## 2025-03-04 ENCOUNTER — APPOINTMENT (OUTPATIENT)
Dept: CT IMAGING | Age: 65
End: 2025-03-04
Payer: MEDICARE

## 2025-03-04 ENCOUNTER — APPOINTMENT (OUTPATIENT)
Dept: GENERAL RADIOLOGY | Age: 65
End: 2025-03-04
Payer: MEDICARE

## 2025-03-04 ENCOUNTER — HOSPITAL ENCOUNTER (INPATIENT)
Age: 65
LOS: 3 days | Discharge: NURSING, REHAB OR LONG-TERM CARE | End: 2025-03-07
Attending: STUDENT IN AN ORGANIZED HEALTH CARE EDUCATION/TRAINING PROGRAM | Admitting: INTERNAL MEDICINE
Payer: MEDICARE

## 2025-03-04 DIAGNOSIS — R60.0 LOCALIZED EDEMA: ICD-10-CM

## 2025-03-04 DIAGNOSIS — I50.9 ACUTE ON CHRONIC CONGESTIVE HEART FAILURE, UNSPECIFIED HEART FAILURE TYPE (HCC): ICD-10-CM

## 2025-03-04 DIAGNOSIS — J44.1 CHRONIC OBSTRUCTIVE PULMONARY DISEASE WITH ACUTE EXACERBATION (HCC): Chronic | ICD-10-CM

## 2025-03-04 DIAGNOSIS — J96.01 ACUTE RESPIRATORY FAILURE WITH HYPOXIA (HCC): ICD-10-CM

## 2025-03-04 DIAGNOSIS — R06.02 SHORTNESS OF BREATH: ICD-10-CM

## 2025-03-04 DIAGNOSIS — J18.9 PNEUMONIA OF BOTH LOWER LOBES DUE TO INFECTIOUS ORGANISM: Primary | ICD-10-CM

## 2025-03-04 DIAGNOSIS — J44.1 COPD EXACERBATION (HCC): ICD-10-CM

## 2025-03-04 LAB
AADO2: 179.6 MMHG
ALBUMIN SERPL-MCNC: 3.8 G/DL (ref 3.5–5.2)
ALP SERPL-CCNC: 68 U/L (ref 40–129)
ALT SERPL-CCNC: 31 U/L (ref 0–40)
ANION GAP SERPL CALCULATED.3IONS-SCNC: 9 MMOL/L (ref 7–16)
AST SERPL-CCNC: 61 U/L (ref 0–39)
B.E.: 10 MMOL/L (ref -3–3)
B.E.: 5.4 MMOL/L (ref -3–3)
BASOPHILS # BLD: 0.01 K/UL (ref 0–0.2)
BASOPHILS NFR BLD: 0 % (ref 0–2)
BILIRUB SERPL-MCNC: 0.4 MG/DL (ref 0–1.2)
BNP SERPL-MCNC: <36 PG/ML (ref 0–450)
BUN SERPL-MCNC: 18 MG/DL (ref 6–23)
CALCIUM SERPL-MCNC: 9.1 MG/DL (ref 8.6–10.2)
CHLORIDE SERPL-SCNC: 95 MMOL/L (ref 98–107)
CO2 SERPL-SCNC: 36 MMOL/L (ref 22–29)
COHB: 1.3 % (ref 0–1.5)
COHB: 1.5 % (ref 0–1.5)
CREAT SERPL-MCNC: 0.8 MG/DL (ref 0.7–1.2)
CRITICAL: ABNORMAL
CRITICAL: ABNORMAL
CRP SERPL HS-MCNC: 29 MG/L (ref 0–5)
DATE ANALYZED: ABNORMAL
DATE ANALYZED: ABNORMAL
DATE OF COLLECTION: ABNORMAL
DATE OF COLLECTION: ABNORMAL
EOSINOPHIL # BLD: 0 K/UL (ref 0.05–0.5)
EOSINOPHILS RELATIVE PERCENT: 0 % (ref 0–6)
ERYTHROCYTE [DISTWIDTH] IN BLOOD BY AUTOMATED COUNT: 14 % (ref 11.5–15)
ERYTHROCYTE [SEDIMENTATION RATE] IN BLOOD BY WESTERGREN METHOD: 11 MM/HR (ref 0–15)
FIO2: 50 %
FLUAV RNA RESP QL NAA+PROBE: NOT DETECTED
FLUBV RNA RESP QL NAA+PROBE: NOT DETECTED
GFR, ESTIMATED: >90 ML/MIN/1.73M2
GLUCOSE SERPL-MCNC: 126 MG/DL (ref 74–99)
HCO3: 32.6 MMOL/L (ref 22–26)
HCO3: 38 MMOL/L (ref 22–26)
HCT VFR BLD AUTO: 40.5 % (ref 37–54)
HGB BLD-MCNC: 12.8 G/DL (ref 12.5–16.5)
HHB: 0.2 % (ref 0–5)
HHB: 3.3 % (ref 0–5)
IMM GRANULOCYTES # BLD AUTO: 0.03 K/UL (ref 0–0.58)
IMM GRANULOCYTES NFR BLD: 0 % (ref 0–5)
LAB: ABNORMAL
LAB: ABNORMAL
LDH SERPL-CCNC: 396 U/L (ref 135–225)
LYMPHOCYTES NFR BLD: 1.52 K/UL (ref 1.5–4)
LYMPHOCYTES RELATIVE PERCENT: 18 % (ref 20–42)
Lab: 1020
Lab: 1150
MCH RBC QN AUTO: 29.8 PG (ref 26–35)
MCHC RBC AUTO-ENTMCNC: 31.6 G/DL (ref 32–34.5)
MCV RBC AUTO: 94.2 FL (ref 80–99.9)
METHB: 0 % (ref 0–1.5)
METHB: 0.1 % (ref 0–1.5)
MODE: ABNORMAL
MODE: ABNORMAL
MONOCYTES NFR BLD: 0.77 K/UL (ref 0.1–0.95)
MONOCYTES NFR BLD: 9 % (ref 2–12)
NEUTROPHILS NFR BLD: 73 % (ref 43–80)
NEUTS SEG NFR BLD: 6.15 K/UL (ref 1.8–7.3)
O2 CONTENT: 17.7 ML/DL
O2 CONTENT: 18.7 ML/DL
O2 SATURATION: 96.6 % (ref 92–98.5)
O2 SATURATION: 99.8 % (ref 92–98.5)
O2HB: 95.2 % (ref 94–97)
O2HB: 98.4 % (ref 94–97)
OPERATOR ID: 5134
OPERATOR ID: 5134
PATIENT TEMP: 37 C
PATIENT TEMP: 37 C
PCO2: 60.2 MMHG (ref 35–45)
PCO2: 67.8 MMHG (ref 35–45)
PEEP/CPAP: 12 CMH2O
PFO2: 1.76 MMHG/%
PH BLOOD GAS: 7.35 (ref 7.35–7.45)
PH BLOOD GAS: 7.37 (ref 7.35–7.45)
PLATELET # BLD AUTO: 175 K/UL (ref 130–450)
PMV BLD AUTO: 11.8 FL (ref 7–12)
PO2: 220.8 MMHG (ref 75–100)
PO2: 88.1 MMHG (ref 75–100)
POTASSIUM SERPL-SCNC: 5 MMOL/L (ref 3.5–5)
PROCALCITONIN SERPL-MCNC: 0.07 NG/ML (ref 0–0.08)
PROT SERPL-MCNC: 7.4 G/DL (ref 6.4–8.3)
PS: 6 CMH20
RBC # BLD AUTO: 4.3 M/UL (ref 3.8–5.8)
RI(T): 2.04
SARS-COV-2 RNA RESP QL NAA+PROBE: NOT DETECTED
SODIUM SERPL-SCNC: 140 MMOL/L (ref 132–146)
SOURCE, BLOOD GAS: ABNORMAL
SOURCE, BLOOD GAS: ABNORMAL
SOURCE: NORMAL
SPECIMEN DESCRIPTION: NORMAL
THB: 13.2 G/DL (ref 11.5–16.5)
THB: 13.2 G/DL (ref 11.5–16.5)
TIME ANALYZED: 1045
TIME ANALYZED: 1158
TROPONIN I SERPL HS-MCNC: 11 NG/L (ref 0–11)
WBC OTHER # BLD: 8.5 K/UL (ref 4.5–11.5)

## 2025-03-04 PROCEDURE — 6370000000 HC RX 637 (ALT 250 FOR IP): Performed by: INTERNAL MEDICINE

## 2025-03-04 PROCEDURE — 6370000000 HC RX 637 (ALT 250 FOR IP): Performed by: SPECIALIST

## 2025-03-04 PROCEDURE — 6360000002 HC RX W HCPCS: Performed by: STUDENT IN AN ORGANIZED HEALTH CARE EDUCATION/TRAINING PROGRAM

## 2025-03-04 PROCEDURE — 94640 AIRWAY INHALATION TREATMENT: CPT

## 2025-03-04 PROCEDURE — 85652 RBC SED RATE AUTOMATED: CPT

## 2025-03-04 PROCEDURE — 84484 ASSAY OF TROPONIN QUANT: CPT

## 2025-03-04 PROCEDURE — 6360000002 HC RX W HCPCS: Performed by: INTERNAL MEDICINE

## 2025-03-04 PROCEDURE — 99285 EMERGENCY DEPT VISIT HI MDM: CPT

## 2025-03-04 PROCEDURE — 86140 C-REACTIVE PROTEIN: CPT

## 2025-03-04 PROCEDURE — 82805 BLOOD GASES W/O2 SATURATION: CPT

## 2025-03-04 PROCEDURE — 96367 TX/PROPH/DG ADDL SEQ IV INF: CPT

## 2025-03-04 PROCEDURE — 2700000000 HC OXYGEN THERAPY PER DAY

## 2025-03-04 PROCEDURE — 2500000003 HC RX 250 WO HCPCS: Performed by: INTERNAL MEDICINE

## 2025-03-04 PROCEDURE — 0202U NFCT DS 22 TRGT SARS-COV-2: CPT

## 2025-03-04 PROCEDURE — 6370000000 HC RX 637 (ALT 250 FOR IP)

## 2025-03-04 PROCEDURE — 93005 ELECTROCARDIOGRAM TRACING: CPT

## 2025-03-04 PROCEDURE — 87899 AGENT NOS ASSAY W/OPTIC: CPT

## 2025-03-04 PROCEDURE — 2060000000 HC ICU INTERMEDIATE R&B

## 2025-03-04 PROCEDURE — 87449 NOS EACH ORGANISM AG IA: CPT

## 2025-03-04 PROCEDURE — 83615 LACTATE (LD) (LDH) ENZYME: CPT

## 2025-03-04 PROCEDURE — 96375 TX/PRO/DX INJ NEW DRUG ADDON: CPT

## 2025-03-04 PROCEDURE — 83880 ASSAY OF NATRIURETIC PEPTIDE: CPT

## 2025-03-04 PROCEDURE — 71045 X-RAY EXAM CHEST 1 VIEW: CPT

## 2025-03-04 PROCEDURE — 80053 COMPREHEN METABOLIC PANEL: CPT

## 2025-03-04 PROCEDURE — 2580000003 HC RX 258

## 2025-03-04 PROCEDURE — 86738 MYCOPLASMA ANTIBODY: CPT

## 2025-03-04 PROCEDURE — 96365 THER/PROPH/DIAG IV INF INIT: CPT

## 2025-03-04 PROCEDURE — 87636 SARSCOV2 & INF A&B AMP PRB: CPT

## 2025-03-04 PROCEDURE — APPSS45 APP SPLIT SHARED TIME 31-45 MINUTES: Performed by: NURSE PRACTITIONER

## 2025-03-04 PROCEDURE — 99222 1ST HOSP IP/OBS MODERATE 55: CPT | Performed by: INTERNAL MEDICINE

## 2025-03-04 PROCEDURE — 84145 PROCALCITONIN (PCT): CPT

## 2025-03-04 PROCEDURE — 5A09357 ASSISTANCE WITH RESPIRATORY VENTILATION, LESS THAN 24 CONSECUTIVE HOURS, CONTINUOUS POSITIVE AIRWAY PRESSURE: ICD-10-PCS | Performed by: INTERNAL MEDICINE

## 2025-03-04 PROCEDURE — 85025 COMPLETE CBC W/AUTO DIFF WBC: CPT

## 2025-03-04 PROCEDURE — 6360000002 HC RX W HCPCS

## 2025-03-04 PROCEDURE — 2500000003 HC RX 250 WO HCPCS

## 2025-03-04 PROCEDURE — 2580000003 HC RX 258: Performed by: INTERNAL MEDICINE

## 2025-03-04 PROCEDURE — 71250 CT THORAX DX C-: CPT

## 2025-03-04 RX ORDER — IBUPROFEN 800 MG/1
800 TABLET, FILM COATED ORAL EVERY 6 HOURS PRN
Status: ON HOLD | COMMUNITY
End: 2025-03-07 | Stop reason: HOSPADM

## 2025-03-04 RX ORDER — ACETYLCYSTEINE 200 MG/ML
600 SOLUTION ORAL; RESPIRATORY (INHALATION)
Status: DISCONTINUED | OUTPATIENT
Start: 2025-03-04 | End: 2025-03-07 | Stop reason: HOSPADM

## 2025-03-04 RX ORDER — ONDANSETRON 4 MG/1
4 TABLET, ORALLY DISINTEGRATING ORAL EVERY 8 HOURS PRN
Status: DISCONTINUED | OUTPATIENT
Start: 2025-03-04 | End: 2025-03-07 | Stop reason: HOSPADM

## 2025-03-04 RX ORDER — ONDANSETRON 2 MG/ML
4 INJECTION INTRAMUSCULAR; INTRAVENOUS EVERY 6 HOURS PRN
Status: DISCONTINUED | OUTPATIENT
Start: 2025-03-04 | End: 2025-03-07 | Stop reason: HOSPADM

## 2025-03-04 RX ORDER — IPRATROPIUM BROMIDE AND ALBUTEROL SULFATE 2.5; .5 MG/3ML; MG/3ML
3 SOLUTION RESPIRATORY (INHALATION) ONCE
Status: COMPLETED | OUTPATIENT
Start: 2025-03-04 | End: 2025-03-04

## 2025-03-04 RX ORDER — DIVALPROEX SODIUM 250 MG/1
250 TABLET, DELAYED RELEASE ORAL
Status: DISCONTINUED | OUTPATIENT
Start: 2025-03-04 | End: 2025-03-05

## 2025-03-04 RX ORDER — MAGNESIUM SULFATE IN WATER 40 MG/ML
2000 INJECTION, SOLUTION INTRAVENOUS ONCE
Status: COMPLETED | OUTPATIENT
Start: 2025-03-04 | End: 2025-03-04

## 2025-03-04 RX ORDER — POLYETHYLENE GLYCOL 3350 17 G/17G
17 POWDER, FOR SOLUTION ORAL DAILY PRN
Status: DISCONTINUED | OUTPATIENT
Start: 2025-03-04 | End: 2025-03-07 | Stop reason: HOSPADM

## 2025-03-04 RX ORDER — HYDROCHLOROTHIAZIDE 25 MG/1
25 TABLET ORAL DAILY
Status: DISCONTINUED | OUTPATIENT
Start: 2025-03-05 | End: 2025-03-07 | Stop reason: HOSPADM

## 2025-03-04 RX ORDER — TAMSULOSIN HYDROCHLORIDE 0.4 MG/1
0.4 CAPSULE ORAL DAILY
Status: DISCONTINUED | OUTPATIENT
Start: 2025-03-05 | End: 2025-03-07 | Stop reason: HOSPADM

## 2025-03-04 RX ORDER — SODIUM CHLORIDE 0.9 % (FLUSH) 0.9 %
5-40 SYRINGE (ML) INJECTION EVERY 12 HOURS SCHEDULED
Status: DISCONTINUED | OUTPATIENT
Start: 2025-03-04 | End: 2025-03-07 | Stop reason: HOSPADM

## 2025-03-04 RX ORDER — CETIRIZINE HYDROCHLORIDE 10 MG/1
10 TABLET ORAL NIGHTLY
Status: DISCONTINUED | OUTPATIENT
Start: 2025-03-04 | End: 2025-03-07 | Stop reason: HOSPADM

## 2025-03-04 RX ORDER — CETIRIZINE HYDROCHLORIDE 10 MG/1
10 TABLET ORAL NIGHTLY
COMMUNITY

## 2025-03-04 RX ORDER — SODIUM CHLORIDE 9 MG/ML
INJECTION, SOLUTION INTRAVENOUS PRN
Status: DISCONTINUED | OUTPATIENT
Start: 2025-03-04 | End: 2025-03-07 | Stop reason: HOSPADM

## 2025-03-04 RX ORDER — TRIAMCINOLONE ACETONIDE 1 MG/G
1 CREAM TOPICAL EVERY 12 HOURS PRN
COMMUNITY

## 2025-03-04 RX ORDER — FUROSEMIDE 10 MG/ML
20 INJECTION INTRAMUSCULAR; INTRAVENOUS ONCE
Status: COMPLETED | OUTPATIENT
Start: 2025-03-04 | End: 2025-03-04

## 2025-03-04 RX ORDER — LISINOPRIL 20 MG/1
40 TABLET ORAL DAILY
Status: DISCONTINUED | OUTPATIENT
Start: 2025-03-05 | End: 2025-03-07 | Stop reason: HOSPADM

## 2025-03-04 RX ORDER — FUROSEMIDE 20 MG/1
20 TABLET ORAL DAILY
COMMUNITY

## 2025-03-04 RX ORDER — RIVASTIGMINE TARTRATE 3 MG/1
6 CAPSULE ORAL 2 TIMES DAILY
Status: DISCONTINUED | OUTPATIENT
Start: 2025-03-05 | End: 2025-03-07 | Stop reason: HOSPADM

## 2025-03-04 RX ORDER — ACETAMINOPHEN 325 MG/1
650 TABLET ORAL EVERY 6 HOURS PRN
Status: DISCONTINUED | OUTPATIENT
Start: 2025-03-04 | End: 2025-03-04 | Stop reason: SDUPTHER

## 2025-03-04 RX ORDER — GLYCERIN, HYPROMELLOSE, POLYETHYLENE GLYCOL .2; .2; 1 G/100ML; G/100ML; G/100ML
2 LIQUID OPHTHALMIC EVERY 4 HOURS PRN
COMMUNITY

## 2025-03-04 RX ORDER — SODIUM CHLORIDE 0.9 % (FLUSH) 0.9 %
5-40 SYRINGE (ML) INJECTION PRN
Status: DISCONTINUED | OUTPATIENT
Start: 2025-03-04 | End: 2025-03-07 | Stop reason: HOSPADM

## 2025-03-04 RX ORDER — BUDESONIDE 0.5 MG/2ML
0.5 INHALANT ORAL
Status: DISCONTINUED | OUTPATIENT
Start: 2025-03-04 | End: 2025-03-07 | Stop reason: HOSPADM

## 2025-03-04 RX ORDER — IPRATROPIUM BROMIDE AND ALBUTEROL SULFATE 2.5; .5 MG/3ML; MG/3ML
1 SOLUTION RESPIRATORY (INHALATION)
Status: DISCONTINUED | OUTPATIENT
Start: 2025-03-04 | End: 2025-03-07 | Stop reason: HOSPADM

## 2025-03-04 RX ORDER — GUAIFENESIN/DEXTROMETHORPHAN 100-10MG/5
20 SYRUP ORAL EVERY 6 HOURS PRN
Status: DISCONTINUED | OUTPATIENT
Start: 2025-03-04 | End: 2025-03-07 | Stop reason: HOSPADM

## 2025-03-04 RX ORDER — VITAMIN B COMPLEX
2000 TABLET ORAL DAILY
Status: DISCONTINUED | OUTPATIENT
Start: 2025-03-05 | End: 2025-03-07 | Stop reason: HOSPADM

## 2025-03-04 RX ORDER — CLOTRIMAZOLE 10 MG/1
10 LOZENGE ORAL
Status: DISCONTINUED | OUTPATIENT
Start: 2025-03-04 | End: 2025-03-07 | Stop reason: HOSPADM

## 2025-03-04 RX ORDER — ACETAMINOPHEN 325 MG/1
650 TABLET ORAL EVERY 6 HOURS PRN
Status: DISCONTINUED | OUTPATIENT
Start: 2025-03-04 | End: 2025-03-07 | Stop reason: HOSPADM

## 2025-03-04 RX ORDER — AMLODIPINE BESYLATE 10 MG/1
10 TABLET ORAL DAILY
Status: DISCONTINUED | OUTPATIENT
Start: 2025-03-05 | End: 2025-03-07 | Stop reason: HOSPADM

## 2025-03-04 RX ORDER — GUAIFENESIN/DEXTROMETHORPHAN 100-10MG/5
20 SYRUP ORAL EVERY 6 HOURS PRN
COMMUNITY

## 2025-03-04 RX ORDER — OLANZAPINE 5 MG/1
2.5 TABLET ORAL 2 TIMES DAILY
Status: DISCONTINUED | OUTPATIENT
Start: 2025-03-05 | End: 2025-03-07 | Stop reason: HOSPADM

## 2025-03-04 RX ORDER — ROSUVASTATIN CALCIUM 10 MG/1
10 TABLET, COATED ORAL
Status: DISCONTINUED | OUTPATIENT
Start: 2025-03-04 | End: 2025-03-07 | Stop reason: HOSPADM

## 2025-03-04 RX ORDER — MAGNESIUM SULFATE IN WATER 40 MG/ML
2000 INJECTION, SOLUTION INTRAVENOUS PRN
Status: DISCONTINUED | OUTPATIENT
Start: 2025-03-04 | End: 2025-03-07 | Stop reason: HOSPADM

## 2025-03-04 RX ORDER — CARVEDILOL 6.25 MG/1
6.25 TABLET ORAL 2 TIMES DAILY WITH MEALS
Status: DISCONTINUED | OUTPATIENT
Start: 2025-03-05 | End: 2025-03-07 | Stop reason: HOSPADM

## 2025-03-04 RX ORDER — LANOLIN ALCOHOL/MO/W.PET/CERES
100 CREAM (GRAM) TOPICAL DAILY
Status: DISCONTINUED | OUTPATIENT
Start: 2025-03-05 | End: 2025-03-07 | Stop reason: HOSPADM

## 2025-03-04 RX ORDER — ACETAMINOPHEN 325 MG/1
650 TABLET ORAL EVERY 4 HOURS PRN
COMMUNITY

## 2025-03-04 RX ORDER — DIPHENHYDRAMINE HCL 25 MG
25 TABLET ORAL EVERY 6 HOURS PRN
Status: DISCONTINUED | OUTPATIENT
Start: 2025-03-04 | End: 2025-03-07 | Stop reason: HOSPADM

## 2025-03-04 RX ORDER — ACETAMINOPHEN 650 MG/1
650 SUPPOSITORY RECTAL EVERY 6 HOURS PRN
Status: DISCONTINUED | OUTPATIENT
Start: 2025-03-04 | End: 2025-03-07 | Stop reason: HOSPADM

## 2025-03-04 RX ORDER — DIVALPROEX SODIUM 125 MG/1
500 CAPSULE, COATED PELLETS ORAL 2 TIMES DAILY
Status: DISCONTINUED | OUTPATIENT
Start: 2025-03-05 | End: 2025-03-07 | Stop reason: HOSPADM

## 2025-03-04 RX ORDER — ENOXAPARIN SODIUM 100 MG/ML
40 INJECTION SUBCUTANEOUS DAILY
Status: DISCONTINUED | OUTPATIENT
Start: 2025-03-04 | End: 2025-03-07 | Stop reason: HOSPADM

## 2025-03-04 RX ORDER — DOCUSATE SODIUM 100 MG/1
100 CAPSULE, LIQUID FILLED ORAL 2 TIMES DAILY
Status: DISCONTINUED | OUTPATIENT
Start: 2025-03-04 | End: 2025-03-07 | Stop reason: HOSPADM

## 2025-03-04 RX ORDER — DOXYCYCLINE 100 MG/1
100 CAPSULE ORAL 2 TIMES DAILY
Status: ON HOLD | COMMUNITY
End: 2025-03-07 | Stop reason: HOSPADM

## 2025-03-04 RX ORDER — OLANZAPINE 2.5 MG/1
2.5 TABLET, FILM COATED ORAL 2 TIMES DAILY
COMMUNITY

## 2025-03-04 RX ORDER — POTASSIUM CHLORIDE 1500 MG/1
40 TABLET, EXTENDED RELEASE ORAL PRN
Status: DISCONTINUED | OUTPATIENT
Start: 2025-03-04 | End: 2025-03-07 | Stop reason: HOSPADM

## 2025-03-04 RX ORDER — LEVETIRACETAM 500 MG/1
500 TABLET ORAL 2 TIMES DAILY
Status: DISCONTINUED | OUTPATIENT
Start: 2025-03-04 | End: 2025-03-07 | Stop reason: HOSPADM

## 2025-03-04 RX ORDER — POTASSIUM CHLORIDE 7.45 MG/ML
10 INJECTION INTRAVENOUS PRN
Status: DISCONTINUED | OUTPATIENT
Start: 2025-03-04 | End: 2025-03-07 | Stop reason: HOSPADM

## 2025-03-04 RX ORDER — CLONIDINE HYDROCHLORIDE 0.2 MG/1
0.2 TABLET ORAL 3 TIMES DAILY
Status: DISCONTINUED | OUTPATIENT
Start: 2025-03-04 | End: 2025-03-07 | Stop reason: HOSPADM

## 2025-03-04 RX ADMIN — WATER 125 MG: 1 INJECTION INTRAMUSCULAR; INTRAVENOUS; SUBCUTANEOUS at 10:49

## 2025-03-04 RX ADMIN — DOXYCYCLINE 100 MG: 100 INJECTION, POWDER, LYOPHILIZED, FOR SOLUTION INTRAVENOUS at 11:49

## 2025-03-04 RX ADMIN — ROSUVASTATIN CALCIUM 10 MG: 10 TABLET, FILM COATED ORAL at 23:39

## 2025-03-04 RX ADMIN — BICTEGRAVIR SODIUM, EMTRICITABINE, AND TENOFOVIR ALAFENAMIDE FUMARATE 1 TABLET: 50; 200; 25 TABLET ORAL at 20:19

## 2025-03-04 RX ADMIN — CLONIDINE HYDROCHLORIDE 0.2 MG: 0.2 TABLET ORAL at 23:40

## 2025-03-04 RX ADMIN — RIVASTIGMINE TARTRATE 6 MG: 3 CAPSULE ORAL at 23:41

## 2025-03-04 RX ADMIN — BUDESONIDE 500 MCG: 0.5 INHALANT RESPIRATORY (INHALATION) at 17:18

## 2025-03-04 RX ADMIN — ACETYLCYSTEINE 600 MG: 200 INHALANT RESPIRATORY (INHALATION) at 17:19

## 2025-03-04 RX ADMIN — IPRATROPIUM BROMIDE AND ALBUTEROL SULFATE 3 DOSE: .5; 2.5 SOLUTION RESPIRATORY (INHALATION) at 10:39

## 2025-03-04 RX ADMIN — MAGNESIUM SULFATE HEPTAHYDRATE 2000 MG: 40 INJECTION, SOLUTION INTRAVENOUS at 10:52

## 2025-03-04 RX ADMIN — WATER 1000 MG: 1 INJECTION INTRAMUSCULAR; INTRAVENOUS; SUBCUTANEOUS at 11:41

## 2025-03-04 RX ADMIN — IPRATROPIUM BROMIDE AND ALBUTEROL SULFATE 1 DOSE: .5; 3 SOLUTION RESPIRATORY (INHALATION) at 17:18

## 2025-03-04 RX ADMIN — CETIRIZINE HYDROCHLORIDE 10 MG: 10 TABLET, FILM COATED ORAL at 23:40

## 2025-03-04 RX ADMIN — AZITHROMYCIN DIHYDRATE 500 MG: 500 INJECTION, POWDER, LYOPHILIZED, FOR SOLUTION INTRAVENOUS at 21:24

## 2025-03-04 RX ADMIN — FUROSEMIDE 20 MG: 10 INJECTION, SOLUTION INTRAVENOUS at 11:39

## 2025-03-04 RX ADMIN — SODIUM CHLORIDE, PRESERVATIVE FREE 10 ML: 5 INJECTION INTRAVENOUS at 21:24

## 2025-03-04 RX ADMIN — LEVETIRACETAM 500 MG: 500 TABLET, FILM COATED ORAL at 23:40

## 2025-03-04 RX ADMIN — DOCUSATE SODIUM 100 MG: 100 CAPSULE, LIQUID FILLED ORAL at 23:40

## 2025-03-04 RX ADMIN — CLOTRIMAZOLE 10 MG: 10 LOZENGE ORAL at 21:24

## 2025-03-04 RX ADMIN — OLANZAPINE 2.5 MG: 5 TABLET, FILM COATED ORAL at 23:40

## 2025-03-04 RX ADMIN — CLOTRIMAZOLE 10 MG: 10 LOZENGE ORAL at 20:19

## 2025-03-04 RX ADMIN — DIVALPROEX SODIUM 500 MG: 125 CAPSULE ORAL at 23:39

## 2025-03-04 ASSESSMENT — PAIN - FUNCTIONAL ASSESSMENT
PAIN_FUNCTIONAL_ASSESSMENT: NONE - DENIES PAIN
PAIN_FUNCTIONAL_ASSESSMENT: NONE - DENIES PAIN

## 2025-03-04 ASSESSMENT — LIFESTYLE VARIABLES
HOW OFTEN DO YOU HAVE A DRINK CONTAINING ALCOHOL: 2-3 TIMES A WEEK
HOW MANY STANDARD DRINKS CONTAINING ALCOHOL DO YOU HAVE ON A TYPICAL DAY: 1 OR 2

## 2025-03-04 NOTE — ED PROVIDER NOTES
[facility] EMERGENCY DEPARTMENT  EMERGENCY DEPARTMENT ENCOUNTER        Pt Name: Ambrosio Johnson  MRN: [id number]  Birthdate 1960  Date of evaluation: 3/4/2025  Provider: Jessica Hurtado DO  PCP: Michelle Harper APRN - NP  Note Started: 10:08 AM EST 3/4/25    CHIEF COMPLAINT       Chief Complaint   Patient presents with    Shortness of Breath       HISTORY OF PRESENT ILLNESS: 1 or more Elements   History From: Patient/EMS    Limitations to history : None    Ambrosio Johnson is a 64 y.o. male coming from a nursing facility with past medical history of HIV, tobacco abuse, COPD, acute on chronic diastolic congestive heart failure, type 2 diabetes who presents to the emergency department in respiratory distress.  According to the patient, he started to feel short of breath yesterday after smoking a cigarette.  The patient states that his shortness of breath worsened over the past day which was what brought brought him into the ER.  He states that his shortness of breath is worse with exertion, but better with rest.  He is not on oxygen at the facility.  He denies any fevers, chest pain, nausea, vomiting, or diarrhea.  He denies any new leg swelling.  The patient states that he does have an albuterol inhaler, however he has not been able to use it as he ran out.  He does have a history of COPD.  According to EMS, when they arrived to the facility, the patient was 87% on room air.  He was struggling to breathe so they wanted to put the patient on CPAP, however he refused and he was placed on 15 L via nonrebreather.    Patient denies fever, chills, headache, chest pain, abdominal pain, nausea, vomiting, diarrhea, lightheadedness, dysuria, hematuria, hematochezia, and melena.    Nursing Notes were all reviewed and agreed with or any disagreements were addressed in the HPI.        REVIEW OF EXTERNAL NOTES :         Review discharge summary from June 2024      REVIEW OF SYSTEMS :        doxycycline in the ER.  COVID and influenza were not detected.  The patient's ABG did show retained CO2, but there is no evidence of respiratory acidosis.  EKG was not concerning for ischemia and troponins were trended with a negative delta.  Patient's BNP was not elevated, not concerning for fluid overload.  The patient has been stable on BiPAP and is oxygenating 98%.  The case was discussed with the hospitalist who accepts the patient for admission.  The patient is stable for admission      CONSULTS: (Who and What was discussed)  None  As discussed above in ED course       FINAL IMPRESSION      1. Pneumonia of both lower lobes due to infectious organism    2. COPD exacerbation (HCC)    3. Acute on chronic congestive heart failure, unspecified heart failure type (HCC)          DISPOSITION/PLAN     DISPOSITION Decision To Admit 03/04/2025 12:02:27 PM      3/4/25, 10:08 AM EST.    Jessica Hurtado DO PGY-1  Emergency Medicine    PATIENT REFERRED TO:  No follow-up provider specified.    DISCHARGE MEDICATIONS:  New Prescriptions    No medications on file       DISCONTINUED MEDICATIONS:  Discontinued Medications    No medications on file              (Please note that portions of this note were completed with a voice recognition program.  Efforts were made to edit the dictations but occasionally words are mis-transcribed.)    Jessica Hurtado DO (electronically signed)

## 2025-03-04 NOTE — H&P
[facility]   History and Physical      CHIEF COMPLAINT:  SOB    History of Present Illness:  64 y.o. male with a history of  HIV, COPD, Tobacco Use, Chronic CHF, HTN, Depression, CHEN, Seizure Disorder, and DM II presents with increased SOB. Presented to ED via EMS from facility. States he has been there for about 3 years or so. States about 1 week prior to presentation he developed increased SOB. He does note green colored sputum with productive cough. Overall generalized weakness. States he is able to ambulate and can care for self with little assistance. He has a history of tobacco use and continues to smoke a cigar every now and again. He denies fevers, chills, N/V/D, CP. Pt with know HIV. Pt is on therapy and states he is \"undetected. Pt states he is uncertain of how he developed HIV. Pt received Ceftriaxone 1000mg IV, Doxycycline 100mg IV, Furosemide 20mg IV, Duoneb, Magnesium Sulfate 2000mg IV, Solumedrol 125mg IV in ED course. Decision to admit pt for further evaluation and treatment.     Informant(s) for H&P: Pt and EMR    REVIEW OF SYSTEMS:  Admits to SOB and productive cough. Denies fevers, chills, cp,  n/v, ha, vision/hearing changes, wt changes, hot/cold flashes, other open skin lesions, diarrhea, constipation, dysuria/hematuria unless noted in HPI. Complete ROS performed with the patient and is otherwise negative.      PMH:  Past Medical History:   Diagnosis Date    Asthma     since childhood    CHF (congestive heart failure) ([facility])     COPD (chronic obstructive pulmonary disease) ([facility])     Depression     Diabetes mellitus ([facility])     H/O cardiovascular stress test 05/27/2020    Lexiscan    History of echocardiogram 04/19/2018    EF 62%    HIV (human immunodeficiency virus infection) ([facility])     Hypertension 2008    Seizure (HCC)     Sleep apnea     no cpap       Surgical History:  Past Surgical History:   Procedure Laterality Date    OTHER SURGICAL HISTORY   Pulmicort/Duonebs. Sulomedrol. Await Pulmonology recommendations.   Acute Respiratory Failure with Hypoxia and Hypercapnia- pH 7.366 PCO2 67.8 PO2 88.1 HCO3 38.0. Initially 15L NRB, however transitioned to BP FIO2 50%. Continue to wean as tolerated. Maintain SPO2>92%.  Pneumonia- CXR Interval development of bibasilar opacities concerning for pneumonia. Received Ceftriaxone 2000mg IV and Doxycycline 100mg IV in ED course. CT Chest pending. Mycoplasma pneumoniae, Respiratory Culture, Procalcitonin, Urine antigens pending. Continue on Azithromycin 500mg IV Daily.    Chronic HFpEF- 2/2020 ECHO LVEF 60%, Stage I Diastolic Dysfunction.   HTN- Amlodipine 10mg daily. Carvedilol 6.25mg BID. Clonidine 0.2mg 3x Daily.   DM II- 8/2020 A1c 6.0 Glucose 126. SSI/Hypoglycemic protocol/Carb restricted Diet. Follow adjust as needed. A1c in am.   CHEN- States he does not have CPAP.   HIV-Biktarvy -25mg Daily   Seizure Disorder- Divalproex 500mg 2x Daily Morning and afternoon. Divalproex 250mg Nightly.   Disposition- Return to Facility at [place]. Currently requiring BP. Await Pulmonology recommendations. Social Work/Case Management assistance appreciated.     Code Status: Full  DVT prophylaxis: Lovenox    NOTE: This report was transcribed using voice recognition software. Every effort was made to ensure accuracy; however, inadvertent computerized transcription errors may be present.     Electronically signed by LAVON Dallas CNP on 3/4/2025 at 5:30 PM

## 2025-03-04 NOTE — CONSULTS
CONSULTATION NOTE :ID     Patient - Ambrosio Johnson,  Age - 64 y.o.    - 1960      Room Number -    N -  [id number]   [facility] # - [id number]  Date of Admission -  3/4/2025  9:59 AM  Patient's PCP: Michelle Harper APRN - NP     Requesting Physician: Edilberto Chi MD    HISTORY OF PRESENT ILLNESS   This is a 64 y.o. male who was admitted on 3/4/2025   to the hospital with a chief complaints of   Chief Complaint   Patient presents with    Shortness of Breath     ID was consulted on 25 for antibiotic management   Knonw to ID   Lat seen  Hiv CD4 483   Sepsis  S pneumonia pneumonia Hazy bibasilar opacities may be due to atelectasis or infiltrates. Probable trace right pleural effusion.   Gpc bacteremia f/u blood cx -MICROCOCCUS CONTAMINANT      S/p doxycycline (VIBRAMYCIN day 6  On CEFTIN   Last seen by CCC 3/202    Afebrile wbc8.5 cr0.8     XR CHEST PORTABLE    Result Date: 3/4/2025  Interval development of bibasilar opacities, concerning for pneumonia. Follow-up study until resolution recommended.      Current antibiotics    [START ON 3/5/2025] cefTRIAXone (ROCEPHIN) 1,000 mg in sterile water 10 mL IV syringe, Q24H  azithromycin (ZITHROMAX) 500 mg in sodium chloride 0.9 % 250 mL IVPB ([id number]), Q24H      Pt was seen in ER  [facility]ives in [facility] has not f/u with ID/CCC  Has been on biktarvy   No recent tabx   On o2 had cough     PAST MEDICAL AND SURGICAL HISTORY     Past Medical History:   Diagnosis Date    Asthma     since childhood    CHF (congestive heart failure) (HCC)     COPD (chronic obstructive pulmonary disease) (HCC)     Depression     Diabetes mellitus (HCC)     H/O cardiovascular stress test 2020    Lexiscan    History of echocardiogram 2018    EF 62%    HIV (human immunodeficiency virus infection) (HCC)     Hypertension     Seizure (HCC)     Sleep apnea     no cpap       Past Surgical History:   Procedure Laterality Date    OTHER SURGICAL  were spent on this case in regards to the intensity and complexity inherent to hospital inpatient or observation care associated with a confirmed or suspected infectious disease.  This included education to patient/representative and/for hospital staff in regards to disease transmission risk assessment and mitigation, public health investigation, analysis and testing, and complex antimicrobial therapy counseling and treatment.    Thank you Edilberto Chi MD for allowing me to participate in this patient's care.  Please call [phone]  for any questions or concerns.    Electronically signed by Sydnee Resendiz MD on 3/4/2025 at 2:59 PM

## 2025-03-04 NOTE — ED PROVIDER NOTES
[facility] EMERGENCY DEPARTMENT  EMERGENCY DEPARTMENT ENCOUNTER        Pt Name: Ambrosio Johnson  MRN: [id number]  Birthdate 1960  Date of evaluation: 3/4/2025  Provider: Brian Cook MD  PCP: Michelle Harper APRN - NP  Note Started: 10:11 AM EST 3/4/25    CHIEF COMPLAINT       Chief Complaint   Patient presents with    Shortness of Breath       HISTORY OF PRESENT ILLNESS: 1 or more Elements        Limitations to history : None    Ambrosio Johnson is a 64 y.o. male who presents to the emergency department for shortness of breath, ongoing for the past few hours, but has been worsening over the past few days.  Brought in by EMS who evaluated the patient was placed on a nonrebreather, as patient did not want the CPAP machine they were attempting to put on him.  He states that he used to wear oxygen, but said that they took it away as he said that they determined he did not need it anymore.  Has a dry cough.    Nursing Notes were all reviewed and agreed with or any disagreements were addressed in the HPI.      REVIEW OF EXTERNAL NOTE :       Admission 8/7/2020 patient was admitted for COPD exacerbation, hypercapnia    REVIEW OF SYSTEMS :      Positives and Pertinent negatives as per HPI.     SURGICAL HISTORY     Past Surgical History:   Procedure Laterality Date    OTHER SURGICAL HISTORY  05/09/2017    exc abd neoplasm    ROTATOR CUFF REPAIR  2011    left shoulder in [place]       CURRENTMEDICATIONS       Previous Medications    ACETAMINOPHEN (TYLENOL) 325 MG TABLET    Take 2 tablets by mouth every 4 hours as needed for Pain    ALBUTEROL SULFATE  (90 BASE) MCG/ACT INHALER    Inhale 2 puffs into the lungs every 4 hours as needed for Wheezing or Shortness of Breath    AMLODIPINE (NORVASC) 10 MG TABLET    Take 1 tablet by mouth daily    ASPIRIN EC 81 MG EC TABLET    Take 1 tablet by mouth daily    BICTEGRAVIR-EMTRICITAB-TENOFOVIR ALAFENAMIDE (BIKTARVY) -25 MG TABS PER TABLET   Coma Scale Score: 15                [facility] Assessment  BP: (!) 149/79  Pulse: 78           PHYSICAL EXAM  1 or more Elements     ED Triage Vitals [03/04/25 1006]   BP Systolic BP Percentile Diastolic BP Percentile Temp Temp Source Pulse Respirations SpO2   124/86 -- -- 97.8 °F (36.6 °C) Axillary 89 27 98 %      Height Weight - Scale         -- 90.7 kg (200 lb)               Physical Exam  Vitals and nursing note reviewed.   Constitutional:       Appearance: He is well-developed.   HENT:      Head: Normocephalic and atraumatic.   Eyes:      Conjunctiva/sclera: Conjunctivae normal.   Cardiovascular:      Rate and Rhythm: Normal rate and regular rhythm.      Heart sounds: Normal heart sounds. No murmur heard.  Pulmonary:      Effort: Respiratory distress present.      Breath sounds: Wheezing present. No rales.   Abdominal:      General: Bowel sounds are normal.      Palpations: Abdomen is soft.      Tenderness: There is no abdominal tenderness. There is no guarding or rebound.   Musculoskeletal:         General: No tenderness or deformity.      Cervical back: Normal range of motion and neck supple.      Right lower leg: Edema present.      Left lower leg: Edema present.   Skin:     General: Skin is warm and dry.   Neurological:      Mental Status: He is alert. He is disoriented.      Cranial Nerves: No cranial nerve deficit.                  DIAGNOSTIC RESULTS   LABS:    Labs Reviewed   CBC WITH AUTO DIFFERENTIAL - Abnormal; Notable for the following components:       Result Value    MCHC 31.6 (*)     Lymphocytes % 18 (*)     Eosinophils Absolute 0.00 (*)     All other components within normal limits   COMPREHENSIVE METABOLIC PANEL W/ REFLEX TO MG FOR LOW K - Abnormal; Notable for the following components:    Chloride 95 (*)     CO2 36 (*)     Glucose 126 (*)     AST 61 (*)     All other components within normal limits   BLOOD GAS, ARTERIAL - Abnormal; Notable for the following components:    PCO2 60.2 (*)     PO2 220.8

## 2025-03-05 ENCOUNTER — APPOINTMENT (OUTPATIENT)
Age: 65
End: 2025-03-05
Attending: INTERNAL MEDICINE
Payer: MEDICARE

## 2025-03-05 LAB
ALBUMIN SERPL-MCNC: 3.9 G/DL (ref 3.5–5.2)
ALP SERPL-CCNC: 71 U/L (ref 40–129)
ALT SERPL-CCNC: 25 U/L (ref 0–40)
ANION GAP SERPL CALCULATED.3IONS-SCNC: 10 MMOL/L (ref 7–16)
AST SERPL-CCNC: 24 U/L (ref 0–39)
B PARAP IS1001 DNA NPH QL NAA+NON-PROBE: NOT DETECTED
B PERT DNA SPEC QL NAA+PROBE: NOT DETECTED
BASOPHILS # BLD: 0.01 K/UL (ref 0–0.2)
BASOPHILS NFR BLD: 0 % (ref 0–2)
BILIRUB SERPL-MCNC: 0.3 MG/DL (ref 0–1.2)
BUN SERPL-MCNC: 16 MG/DL (ref 6–23)
C PNEUM DNA NPH QL NAA+NON-PROBE: NOT DETECTED
CALCIUM SERPL-MCNC: 9 MG/DL (ref 8.6–10.2)
CHLORIDE SERPL-SCNC: 100 MMOL/L (ref 98–107)
CO2 SERPL-SCNC: 36 MMOL/L (ref 22–29)
CREAT SERPL-MCNC: 0.7 MG/DL (ref 0.7–1.2)
ECHO AO ASC DIAM: 3.4 CM
ECHO AO ASCENDING AORTA INDEX: 1.77 CM/M2
ECHO AV AREA PEAK VELOCITY: 3.9 CM2
ECHO AV AREA VTI: 4.1 CM2
ECHO AV AREA/BSA PEAK VELOCITY: 2 CM2/M2
ECHO AV AREA/BSA VTI: 2.1 CM2/M2
ECHO AV CUSP MM: 2.1 CM
ECHO AV MEAN GRADIENT: 3 MMHG
ECHO AV MEAN VELOCITY: 0.8 M/S
ECHO AV PEAK GRADIENT: 5 MMHG
ECHO AV PEAK VELOCITY: 1.1 M/S
ECHO AV VELOCITY RATIO: 1
ECHO AV VTI: 25.5 CM
ECHO BSA: 2.06 M2
ECHO LA DIAMETER INDEX: 2.03 CM/M2
ECHO LA DIAMETER: 3.9 CM
ECHO LA VOL A-L A2C: 41 ML (ref 18–58)
ECHO LA VOL A-L A4C: 84 ML (ref 18–58)
ECHO LA VOL BP: 60 ML (ref 18–58)
ECHO LA VOL MOD A2C: 41 ML (ref 18–58)
ECHO LA VOL MOD A4C: 79 ML (ref 18–58)
ECHO LA VOL/BSA BIPLANE: 31 ML/M2 (ref 16–34)
ECHO LA VOLUME AREA LENGTH: 63 ML
ECHO LA VOLUME INDEX A-L A2C: 21 ML/M2 (ref 16–34)
ECHO LA VOLUME INDEX A-L A4C: 44 ML/M2 (ref 16–34)
ECHO LA VOLUME INDEX AREA LENGTH: 33 ML/M2 (ref 16–34)
ECHO LA VOLUME INDEX MOD A2C: 21 ML/M2 (ref 16–34)
ECHO LA VOLUME INDEX MOD A4C: 41 ML/M2 (ref 16–34)
ECHO LV EDV A2C: 151 ML
ECHO LV EDV A4C: 131 ML
ECHO LV EDV BP: 143 ML (ref 67–155)
ECHO LV EDV INDEX A4C: 68 ML/M2
ECHO LV EDV INDEX BP: 74 ML/M2
ECHO LV EDV NDEX A2C: 79 ML/M2
ECHO LV EF PHYSICIAN: 55 %
ECHO LV EJECTION FRACTION A2C: 59 %
ECHO LV EJECTION FRACTION A4C: 60 %
ECHO LV ESV A2C: 62 ML
ECHO LV ESV A4C: 52 ML
ECHO LV ESV BP: 58 ML (ref 22–58)
ECHO LV ESV INDEX A2C: 32 ML/M2
ECHO LV ESV INDEX A4C: 27 ML/M2
ECHO LV ESV INDEX BP: 30 ML/M2
ECHO LV FRACTIONAL SHORTENING: 17 % (ref 28–44)
ECHO LV INTERNAL DIMENSION DIASTOLE INDEX: 2.4 CM/M2
ECHO LV INTERNAL DIMENSION DIASTOLIC: 4.6 CM (ref 4.2–5.9)
ECHO LV INTERNAL DIMENSION SYSTOLIC INDEX: 1.98 CM/M2
ECHO LV INTERNAL DIMENSION SYSTOLIC: 3.8 CM
ECHO LV ISOVOLUMETRIC RELAXATION TIME (IVRT): 125.6 MS
ECHO LV IVSD: 1.2 CM (ref 0.6–1)
ECHO LV MASS 2D: 192.9 G (ref 88–224)
ECHO LV MASS INDEX 2D: 100.5 G/M2 (ref 49–115)
ECHO LV POSTERIOR WALL DIASTOLIC: 1.1 CM (ref 0.6–1)
ECHO LV RELATIVE WALL THICKNESS RATIO: 0.48
ECHO LVOT AREA: 4.2 CM2
ECHO LVOT AV VTI INDEX: 1.01
ECHO LVOT DIAM: 2.3 CM
ECHO LVOT MEAN GRADIENT: 2 MMHG
ECHO LVOT PEAK GRADIENT: 5 MMHG
ECHO LVOT PEAK VELOCITY: 1.1 M/S
ECHO LVOT STROKE VOLUME INDEX: 55.8 ML/M2
ECHO LVOT SV: 107.1 ML
ECHO LVOT VTI: 25.8 CM
ECHO MV "A" WAVE DURATION: 129.4 MSEC
ECHO MV A VELOCITY: 0.67 M/S
ECHO MV AREA PHT: 3.7 CM2
ECHO MV AREA VTI: 3.1 CM2
ECHO MV E DECELERATION TIME (DT): 195 MS
ECHO MV E VELOCITY: 0.97 M/S
ECHO MV E/A RATIO: 1.45
ECHO MV LVOT VTI INDEX: 1.33
ECHO MV MAX VELOCITY: 1.2 M/S
ECHO MV MEAN GRADIENT: 3 MMHG
ECHO MV MEAN VELOCITY: 0.7 M/S
ECHO MV PEAK GRADIENT: 6 MMHG
ECHO MV PRESSURE HALF TIME (PHT): 60.2 MS
ECHO MV VTI: 34.2 CM
ECHO PV MAX VELOCITY: 1.1 M/S
ECHO PV MEAN GRADIENT: 3 MMHG
ECHO PV MEAN VELOCITY: 0.8 M/S
ECHO PV PEAK GRADIENT: 5 MMHG
ECHO PV VTI: 17.5 CM
ECHO RV INTERNAL DIMENSION: 3.7 CM
ECHO RV LONGITUDINAL DIMENSION: 7.4 CM
ECHO RV MID DIMENSION: 2.6 CM
ECHO RV TAPSE: 2.7 CM (ref 1.7–?)
ECHO TV REGURGITANT MAX VELOCITY: 2.08 M/S
ECHO TV REGURGITANT PEAK GRADIENT: 17 MMHG
EOSINOPHIL # BLD: 0 K/UL (ref 0.05–0.5)
EOSINOPHILS RELATIVE PERCENT: 0 % (ref 0–6)
ERYTHROCYTE [DISTWIDTH] IN BLOOD BY AUTOMATED COUNT: 13.9 % (ref 11.5–15)
FLUAV H1 2009 PAN RNA NPH NAA+NON-PROBE: DETECTED
FLUAV RNA NPH QL NAA+NON-PROBE: DETECTED
FLUBV RNA NPH QL NAA+NON-PROBE: NOT DETECTED
GFR, ESTIMATED: >90 ML/MIN/1.73M2
GLUCOSE SERPL-MCNC: 108 MG/DL (ref 74–99)
HADV DNA NPH QL NAA+NON-PROBE: NOT DETECTED
HBA1C MFR BLD: 5.9 % (ref 4–5.6)
HCOV 229E RNA NPH QL NAA+NON-PROBE: NOT DETECTED
HCOV HKU1 RNA NPH QL NAA+NON-PROBE: NOT DETECTED
HCOV NL63 RNA NPH QL NAA+NON-PROBE: NOT DETECTED
HCOV OC43 RNA NPH QL NAA+NON-PROBE: NOT DETECTED
HCT VFR BLD AUTO: 41.8 % (ref 37–54)
HGB BLD-MCNC: 13.1 G/DL (ref 12.5–16.5)
HMPV RNA NPH QL NAA+NON-PROBE: NOT DETECTED
HPIV1 RNA NPH QL NAA+NON-PROBE: NOT DETECTED
HPIV2 RNA NPH QL NAA+NON-PROBE: NOT DETECTED
HPIV3 RNA NPH QL NAA+NON-PROBE: NOT DETECTED
HPIV4 RNA NPH QL NAA+NON-PROBE: NOT DETECTED
IMM GRANULOCYTES # BLD AUTO: 0.03 K/UL (ref 0–0.58)
IMM GRANULOCYTES NFR BLD: 0 % (ref 0–5)
L PNEUMO1 AG UR QL IA.RAPID: NEGATIVE
LYMPHOCYTES NFR BLD: 1.65 K/UL (ref 1.5–4)
LYMPHOCYTES RELATIVE PERCENT: 18 % (ref 20–42)
M PNEUMO DNA NPH QL NAA+NON-PROBE: NOT DETECTED
MCH RBC QN AUTO: 30 PG (ref 26–35)
MCHC RBC AUTO-ENTMCNC: 31.3 G/DL (ref 32–34.5)
MCV RBC AUTO: 95.7 FL (ref 80–99.9)
MONOCYTES NFR BLD: 0.96 K/UL (ref 0.1–0.95)
MONOCYTES NFR BLD: 10 % (ref 2–12)
MYCOPLASMA AB,IGM: NORMAL
NEUTROPHILS NFR BLD: 71 % (ref 43–80)
NEUTS SEG NFR BLD: 6.61 K/UL (ref 1.8–7.3)
PLATELET # BLD AUTO: 199 K/UL (ref 130–450)
PMV BLD AUTO: 12.1 FL (ref 7–12)
POTASSIUM SERPL-SCNC: 4.3 MMOL/L (ref 3.5–5)
PROT SERPL-MCNC: 6.7 G/DL (ref 6.4–8.3)
RBC # BLD AUTO: 4.37 M/UL (ref 3.8–5.8)
RSV RNA NPH QL NAA+NON-PROBE: NOT DETECTED
RV+EV RNA NPH QL NAA+NON-PROBE: NOT DETECTED
S PNEUM AG SPEC QL: POSITIVE
SARS-COV-2 RNA NPH QL NAA+NON-PROBE: NOT DETECTED
SODIUM SERPL-SCNC: 146 MMOL/L (ref 132–146)
SPECIMEN DESCRIPTION: ABNORMAL
SPECIMEN SOURCE: ABNORMAL
WBC OTHER # BLD: 9.3 K/UL (ref 4.5–11.5)

## 2025-03-05 PROCEDURE — 94669 MECHANICAL CHEST WALL OSCILL: CPT

## 2025-03-05 PROCEDURE — 99232 SBSQ HOSP IP/OBS MODERATE 35: CPT | Performed by: INTERNAL MEDICINE

## 2025-03-05 PROCEDURE — 94640 AIRWAY INHALATION TREATMENT: CPT

## 2025-03-05 PROCEDURE — 80053 COMPREHEN METABOLIC PANEL: CPT

## 2025-03-05 PROCEDURE — 97530 THERAPEUTIC ACTIVITIES: CPT

## 2025-03-05 PROCEDURE — 93306 TTE W/DOPPLER COMPLETE: CPT

## 2025-03-05 PROCEDURE — 36415 COLL VENOUS BLD VENIPUNCTURE: CPT

## 2025-03-05 PROCEDURE — 6370000000 HC RX 637 (ALT 250 FOR IP): Performed by: INTERNAL MEDICINE

## 2025-03-05 PROCEDURE — 2700000000 HC OXYGEN THERAPY PER DAY

## 2025-03-05 PROCEDURE — 2060000000 HC ICU INTERMEDIATE R&B

## 2025-03-05 PROCEDURE — 97165 OT EVAL LOW COMPLEX 30 MIN: CPT

## 2025-03-05 PROCEDURE — 2580000003 HC RX 258: Performed by: INTERNAL MEDICINE

## 2025-03-05 PROCEDURE — 6360000002 HC RX W HCPCS: Performed by: INTERNAL MEDICINE

## 2025-03-05 PROCEDURE — 6370000000 HC RX 637 (ALT 250 FOR IP): Performed by: SPECIALIST

## 2025-03-05 PROCEDURE — 83036 HEMOGLOBIN GLYCOSYLATED A1C: CPT

## 2025-03-05 PROCEDURE — 2500000003 HC RX 250 WO HCPCS: Performed by: SPECIALIST

## 2025-03-05 PROCEDURE — 6360000002 HC RX W HCPCS: Performed by: SPECIALIST

## 2025-03-05 PROCEDURE — 93306 TTE W/DOPPLER COMPLETE: CPT | Performed by: INTERNAL MEDICINE

## 2025-03-05 PROCEDURE — 85025 COMPLETE CBC W/AUTO DIFF WBC: CPT

## 2025-03-05 PROCEDURE — 2500000003 HC RX 250 WO HCPCS: Performed by: INTERNAL MEDICINE

## 2025-03-05 PROCEDURE — 99222 1ST HOSP IP/OBS MODERATE 55: CPT | Performed by: INTERNAL MEDICINE

## 2025-03-05 RX ORDER — OSELTAMIVIR PHOSPHATE 75 MG/1
75 CAPSULE ORAL 2 TIMES DAILY
Status: DISCONTINUED | OUTPATIENT
Start: 2025-03-05 | End: 2025-03-07 | Stop reason: HOSPADM

## 2025-03-05 RX ORDER — IPRATROPIUM BROMIDE AND ALBUTEROL SULFATE 2.5; .5 MG/3ML; MG/3ML
1 SOLUTION RESPIRATORY (INHALATION) EVERY 4 HOURS PRN
Status: DISCONTINUED | OUTPATIENT
Start: 2025-03-05 | End: 2025-03-07 | Stop reason: HOSPADM

## 2025-03-05 RX ORDER — HYDROPHOR 42 G/100G
1 OINTMENT TOPICAL PRN
COMMUNITY

## 2025-03-05 RX ORDER — LORAZEPAM 0.5 MG/1
0.5 TABLET ORAL 2 TIMES DAILY
COMMUNITY

## 2025-03-05 RX ORDER — AMMONIUM LACTATE 12 G/100G
1 CREAM TOPICAL PRN
COMMUNITY

## 2025-03-05 RX ORDER — PREDNISONE 20 MG/1
40 TABLET ORAL DAILY
Status: DISCONTINUED | OUTPATIENT
Start: 2025-03-06 | End: 2025-03-07 | Stop reason: HOSPADM

## 2025-03-05 RX ORDER — CHLORHEXIDINE GLUCONATE ORAL RINSE 1.2 MG/ML
15 SOLUTION DENTAL 2 TIMES DAILY
Status: ON HOLD | COMMUNITY
End: 2025-03-07 | Stop reason: HOSPADM

## 2025-03-05 RX ADMIN — IPRATROPIUM BROMIDE AND ALBUTEROL SULFATE 1 DOSE: .5; 3 SOLUTION RESPIRATORY (INHALATION) at 14:26

## 2025-03-05 RX ADMIN — CETIRIZINE HYDROCHLORIDE 10 MG: 10 TABLET, FILM COATED ORAL at 19:38

## 2025-03-05 RX ADMIN — DIVALPROEX SODIUM 500 MG: 125 CAPSULE ORAL at 19:38

## 2025-03-05 RX ADMIN — CARVEDILOL 6.25 MG: 6.25 TABLET, FILM COATED ORAL at 16:27

## 2025-03-05 RX ADMIN — ENOXAPARIN SODIUM 40 MG: 100 INJECTION SUBCUTANEOUS at 09:04

## 2025-03-05 RX ADMIN — LISINOPRIL 40 MG: 20 TABLET ORAL at 09:03

## 2025-03-05 RX ADMIN — TAMSULOSIN HYDROCHLORIDE 0.4 MG: 0.4 CAPSULE ORAL at 09:03

## 2025-03-05 RX ADMIN — BUDESONIDE 500 MCG: 0.5 INHALANT RESPIRATORY (INHALATION) at 06:05

## 2025-03-05 RX ADMIN — BICTEGRAVIR SODIUM, EMTRICITABINE, AND TENOFOVIR ALAFENAMIDE FUMARATE 1 TABLET: 50; 200; 25 TABLET ORAL at 09:03

## 2025-03-05 RX ADMIN — LEVETIRACETAM 500 MG: 500 TABLET, FILM COATED ORAL at 09:03

## 2025-03-05 RX ADMIN — HYDROCHLOROTHIAZIDE 25 MG: 25 TABLET ORAL at 09:04

## 2025-03-05 RX ADMIN — DOCUSATE SODIUM 100 MG: 100 CAPSULE, LIQUID FILLED ORAL at 09:03

## 2025-03-05 RX ADMIN — CARVEDILOL 6.25 MG: 6.25 TABLET, FILM COATED ORAL at 09:03

## 2025-03-05 RX ADMIN — RIVASTIGMINE TARTRATE 6 MG: 3 CAPSULE ORAL at 09:02

## 2025-03-05 RX ADMIN — IPRATROPIUM BROMIDE AND ALBUTEROL SULFATE 1 DOSE: .5; 3 SOLUTION RESPIRATORY (INHALATION) at 06:05

## 2025-03-05 RX ADMIN — PYRIDOXINE HCL TAB 50 MG 100 MG: 50 TAB at 09:03

## 2025-03-05 RX ADMIN — Medication 2000 UNITS: at 09:03

## 2025-03-05 RX ADMIN — AZITHROMYCIN DIHYDRATE 500 MG: 500 INJECTION, POWDER, LYOPHILIZED, FOR SOLUTION INTRAVENOUS at 19:37

## 2025-03-05 RX ADMIN — CLONIDINE HYDROCHLORIDE 0.2 MG: 0.2 TABLET ORAL at 15:06

## 2025-03-05 RX ADMIN — SODIUM CHLORIDE, PRESERVATIVE FREE 10 ML: 5 INJECTION INTRAVENOUS at 09:04

## 2025-03-05 RX ADMIN — OLANZAPINE 2.5 MG: 5 TABLET, FILM COATED ORAL at 19:38

## 2025-03-05 RX ADMIN — BUDESONIDE 500 MCG: 0.5 INHALANT RESPIRATORY (INHALATION) at 18:41

## 2025-03-05 RX ADMIN — IPRATROPIUM BROMIDE AND ALBUTEROL SULFATE 1 DOSE: .5; 3 SOLUTION RESPIRATORY (INHALATION) at 18:41

## 2025-03-05 RX ADMIN — WATER 40 MG: 1 INJECTION INTRAMUSCULAR; INTRAVENOUS; SUBCUTANEOUS at 09:04

## 2025-03-05 RX ADMIN — FLUOXETINE HYDROCHLORIDE 60 MG: 20 CAPSULE ORAL at 09:03

## 2025-03-05 RX ADMIN — AMLODIPINE BESYLATE 10 MG: 10 TABLET ORAL at 09:03

## 2025-03-05 RX ADMIN — CLONIDINE HYDROCHLORIDE 0.2 MG: 0.2 TABLET ORAL at 09:03

## 2025-03-05 RX ADMIN — CLONIDINE HYDROCHLORIDE 0.2 MG: 0.2 TABLET ORAL at 19:38

## 2025-03-05 RX ADMIN — OSELTAMIVIR PHOSPHATE 75 MG: 75 CAPSULE ORAL at 10:40

## 2025-03-05 RX ADMIN — SODIUM CHLORIDE, PRESERVATIVE FREE 10 ML: 5 INJECTION INTRAVENOUS at 19:38

## 2025-03-05 RX ADMIN — DOCUSATE SODIUM 100 MG: 100 CAPSULE, LIQUID FILLED ORAL at 19:38

## 2025-03-05 RX ADMIN — CLOTRIMAZOLE 10 MG: 10 LOZENGE ORAL at 10:40

## 2025-03-05 RX ADMIN — CEFTRIAXONE SODIUM 2000 MG: 2 INJECTION, POWDER, FOR SOLUTION INTRAMUSCULAR; INTRAVENOUS at 09:04

## 2025-03-05 RX ADMIN — ROSUVASTATIN CALCIUM 10 MG: 10 TABLET, FILM COATED ORAL at 19:38

## 2025-03-05 RX ADMIN — ACETYLCYSTEINE 600 MG: 200 INHALANT RESPIRATORY (INHALATION) at 18:41

## 2025-03-05 RX ADMIN — DIVALPROEX SODIUM 500 MG: 125 CAPSULE ORAL at 09:03

## 2025-03-05 RX ADMIN — ACETYLCYSTEINE 600 MG: 200 INHALANT RESPIRATORY (INHALATION) at 06:05

## 2025-03-05 RX ADMIN — OLANZAPINE 2.5 MG: 5 TABLET, FILM COATED ORAL at 09:03

## 2025-03-05 RX ADMIN — OSELTAMIVIR PHOSPHATE 75 MG: 75 CAPSULE ORAL at 19:38

## 2025-03-05 RX ADMIN — RIVASTIGMINE TARTRATE 6 MG: 3 CAPSULE ORAL at 19:38

## 2025-03-05 RX ADMIN — LEVETIRACETAM 500 MG: 500 TABLET, FILM COATED ORAL at 19:38

## 2025-03-05 ASSESSMENT — ENCOUNTER SYMPTOMS
CHEST TIGHTNESS: 0
ABDOMINAL PAIN: 0
COUGH: 1
NAUSEA: 0
RHINORRHEA: 0
DIARRHEA: 0
FACIAL SWELLING: 0
BLOOD IN STOOL: 0
ABDOMINAL DISTENTION: 0
WHEEZING: 0
SINUS PAIN: 0
CONSTIPATION: 0
SHORTNESS OF BREATH: 0

## 2025-03-05 NOTE — PLAN OF CARE
Problem: Chronic Conditions and Co-morbidities  Goal: Patient's chronic conditions and co-morbidity symptoms are monitored and maintained or improved  3/5/2025 0949 by Joya Ascencio RN  Outcome: Progressing  3/5/2025 0025 by Kylee Chase RN  Outcome: Progressing     Problem: Discharge Planning  Goal: Discharge to home or other facility with appropriate resources  3/5/2025 0949 by Joya Ascencio RN  Outcome: Progressing  3/5/2025 0025 by Kylee Chase RN  Outcome: Progressing     Problem: ABCDS Injury Assessment  Goal: Absence of physical injury  3/5/2025 0949 by Joya Ascencio RN  Outcome: Progressing  3/5/2025 0025 by Kylee Chase RN  Outcome: Progressing     Problem: Safety - Adult  Goal: Free from fall injury  3/5/2025 0949 by Joya Ascencio RN  Outcome: Progressing  3/5/2025 0025 by Kylee Chase RN  Outcome: Progressing

## 2025-03-05 NOTE — PROGRESS NOTES
Progress Note  Date:3/5/2025       Room:0634/0634-01  Patient Name:Ambrosio Johnson     YOB: 1960     Age:64 y.o.        Subjective    Subjective:  Symptoms:  Improved.  He reports cough.  No shortness of breath, chest pain, weakness or diarrhea.  (Patient feels better breathing is more comfortable.).    Diet:  Adequate intake.  No nausea.       Review of Systems   Constitutional:  Negative for appetite change, chills, fatigue, fever and unexpected weight change.   HENT:  Negative for congestion, facial swelling, mouth sores, rhinorrhea and sinus pain.    Eyes:  Negative for visual disturbance.   Respiratory:  Positive for cough. Negative for chest tightness, shortness of breath and wheezing.    Cardiovascular:  Negative for chest pain and leg swelling.   Gastrointestinal:  Negative for abdominal distention, abdominal pain, blood in stool, constipation, diarrhea and nausea.   Genitourinary:  Negative for difficulty urinating, dysuria, frequency and urgency.   Musculoskeletal:  Negative for joint swelling.   Skin:  Negative for rash.   Neurological:  Negative for dizziness, syncope, weakness, light-headedness and headaches.   Psychiatric/Behavioral:  Negative for behavioral problems, confusion and hallucinations.      Objective         Vitals Last 24 Hours:  TEMPERATURE:  Temp  Av °F (36.7 °C)  Min: 97.4 °F (36.3 °C)  Max: 98.7 °F (37.1 °C)  RESPIRATIONS RANGE: Resp  Av.5  Min: 17  Max: 23  PULSE OXIMETRY RANGE: SpO2  Av.6 %  Min: 90 %  Max: 100 %  PULSE RANGE: Pulse  Av.5  Min: 78  Max: 92  BLOOD PRESSURE RANGE: Systolic (24hrs), Av , Min:131 , Max:167   ; Diastolic (24hrs), Av, Min:65, Max:91    I/O (24Hr):    Intake/Output Summary (Last 24 hours) at 3/5/2025 1048  Last data filed at 3/5/2025 0828  Gross per 24 hour   Intake 360 ml   Output 300 ml   Net 60 ml     Objective:  General Appearance:  Comfortable and in no acute distress (On nasal cannula).    Vital signs: (most

## 2025-03-05 NOTE — PROGRESS NOTES
OCCUPATIONAL THERAPY INITIAL EVALUATION    [facility]  [address] [place]. [place]        Date:3/5/2025                                                  Patient Name: Ambrosio Johnson    MRN: [id number]    : 1960    Room: [address] [place] [address]      Evaluating OT: Sonny Vogel OTR/L; #[id number]     Referring Provider and Specific Provider Orders/Date:      25  OT eval and treat  Start:  25,   End:  25,   ONE TIME,   Standing Count:  1 Occurrences,   R         Edilberto Chi MD      Placement Recommendation: Subacute Rehab       Diagnosis:   1. Pneumonia of both lower lobes due to infectious organism    2. COPD exacerbation (HCC)    3. Acute on chronic congestive heart failure, unspecified heart failure type (HCC)    4. Shortness of breath         Surgery: None      Pertinent Medical History:       Past Medical History:   Diagnosis Date    Allergy, unspecified, initial encounter     Anxiety disorder, unspecified     Asthma     since childhood    Benign prostatic hyperplasia without lower urinary tract symptoms     Candidal stomatitis     Cardiomegaly     CHF (congestive heart failure) (HCC)     Conversion disorder with seizures or convulsions     COPD (chronic obstructive pulmonary disease) (HCC)     Dementia in other diseases classified elsewhere, unspecified severity, with other behavioral disturbance ([facility])     Depression     Diabetes mellitus (HCC)     Essential (primary) hypertension     H/O cardiovascular stress test 2020    Lexiscan    Heartburn     History of echocardiogram 2018    EF 62%    HIV (human immunodeficiency virus infection) ([facility])     Hypertension     Hypo-osmolality and hyponatremia     Insomnia, unspecified     Localized edema     Mild cognitive impairment of uncertain or unknown etiology     Other male erectile dysfunction     Other seasonal allergic rhinitis     Personal history of COVID-19

## 2025-03-05 NOTE — PROGRESS NOTES
Patient - Ambrosio Johnson,  Age - 64 y.o.    - 1960      Room Number - 0634/0634-01   N -  [id number]   [facility] # - [id number]  Date of Admission -  3/4/2025  9:59 AM    Problem list of patient:     Hospital Problems             Last Modified POA    * (Principal) Community acquired pneumonia, bilateral 3/4/2025 Yes    HIV (human immunodeficiency virus infection) (HCC) (Chronic) 3/4/2025 Yes    Acute exacerbation of chronic obstructive pulmonary disease (COPD) (HCC) 3/4/2025 Yes    Sleep apnea 3/4/2025 Yes    Overview Signed 2020  3:46 PM by Shantel Joseph APRN - CNP     no cpap         Seizure (HCC) (Chronic) 3/4/2025 Yes    Acute respiratory failure with hypoxia (HCC) 3/4/2025 Yes     ASSESSMENT/PLAN     Pt with hiv on biktarvy last cd4 438   Has thrush   Has S pneumonia pneumon   Has  FLU A    Labs pending  Check cd4 /VL  Atbx  Procal 0.07  Will need prevnar 20       oseltamivir (TAMIFLU) capsule 75 mg, BID  predniSONE (DELTASONE) tablet 40 mg, Daily  azithromycin (ZITHROMAX) 500 mg in sodium chloride 0.9 % 250    clotrimazole (MYCELEX) bea 10 mg, 5x Daily  cefTRIAXone (ROCEPHIN) 2,000 mg in sterile water 20 mL IV syringe, Q24H  bictegravir-emtricitab-tenofovir alafenamide (BIKTARVY) -25 MG per tablet 1 tablet, Daily          Patient has an Illness(es)/ Infection present that pose threat to bodily function.   There is potential for severe exacerbation of infection/side effects of treatment.  Therapy requires intensive monitoring for antimicrobial agent toxicity.    Please note that 30 minutes were spent on this case in regards to the intensity and complexity inherent to hospital inpatient or observation care associated with a confirmed or suspected infectious disease.  This included education to patient/representative and/for hospital staff in regards to disease transmission risk assessment and mitigation, public health  mild linear density in the posterior lung bases consistent with atelectasis versus scarring. Similar stable mild linear density is present in the anterior inferior right middle lobe.     XR CHEST PORTABLE    Result Date: 3/4/2025  Interval development of bibasilar opacities, concerning for pneumonia. Follow-up study until resolution recommended.     CT CHEST WO CONTRAST    Result Date: 3/4/2025  1. No sign of acute cardiopulmonary disease. 2. Stable mild centrilobular emphysema with an upper lobe predominance. 3. Stable mild linear density in the posterior lung bases consistent with atelectasis versus scarring. Similar stable mild linear density is present in the anterior inferior right middle lobe.     XR CHEST PORTABLE    Result Date: 3/4/2025  Interval development of bibasilar opacities, concerning for pneumonia. Follow-up study until resolution recommended.         Please call [phone]  for any questions or concerns.    Electronically signed by Sydnee Resendiz MD on 3/5/2025 at 4:47 PM

## 2025-03-05 NOTE — PROGRESS NOTES
4 Eyes Skin Assessment     NAME:  Ambrosio Johnson  YOB: 1960  MEDICAL RECORD NUMBER:  [id number]    The patient is being assessed for  Admission    I agree that at least one RN has performed a thorough Head to Toe Skin Assessment on the patient. ALL assessment sites listed below have been assessed.      Areas assessed by both nurses:    Head, Face, Ears, Shoulders, Back, Chest, Arms, Elbows, Hands, Sacrum. Buttock, Coccyx, Ischium, Legs. Feet and Heels, Under Medical Devices , and Other skin dry and flaky to legs, heels dry, old incision to abdomen        Does the Patient have a Wound? No noted wound(s)       Martin Prevention initiated by RN: No  Wound Care Orders initiated by RN: No    Pressure Injury (Stage 3,4, Unstageable, DTI, NWPT, and Complex wounds) if present, place Wound referral order by RN under : No    New Ostomies, if present place, Ostomy referral order under : No     Nurse 1 eSignature: Electronically signed by Kylee Chase RN on 3/5/25 at 12:31 AM EST    **SHARE this note so that the co-signing nurse can place an eSignature**    Nurse 2 eSignature: Electronically signed by Blas Monte RN on 3/5/25 at 12:54 AM EST

## 2025-03-05 NOTE — CONSULTS
[facility]  Department of Internal Medicine  Division of Pulmonary, Critical Care and Sleep Medicine    Walter Dickey, APRN-CNP  Debi Chilel, APRN-CNP          Pulmonary Consult Note    CHIEF COMPLAINT: shortness of breath     ISSUES:     Acute on chronic hypoxic and hypercapnic respiratory failure requiring supplemental oxygen   Influenza A infection  Severe COPD with acute exacerbation  Severe emphysema  CHEN untreated    Tobacco abuse    Wean FIO2 as tolerated, baseline room air  Check ambulatory pulse ox prior to discharge  Continue NIV at bedtime and as needed     Aggressive bronchopulmonary hygiene   Scheduled bronchodilators and mucomyst  Azithromycin and ceftriaxone   Tamiflu x5 days  Solumedrol with taper   Incentive spirometry, flutter valve  Smoking cessation encouraged  Recommend follow up with pulmonary as outpatient with full PFT and PSG        HIV  On Biktarvy, ID following     __________________________________________________    Referring MD: Dr. Edilberto Chi     Reason for Consult: Acute resp failure       HISTORY OF PRESENT ILLNESS:   Ambrosio Johnson is a 64 y.o. male with hx of severe COPD, emphysema, CHEN untreated, active smoker, HIV,  presents with shortness of breath.     ALLERGY:  Patient has no known allergies.    FAMILY HISTORY:  Family History   Problem Relation Age of Onset    Alcohol Abuse Mother     Alcohol Abuse Father        SOCIAL HISTORY:  Social History     Socioeconomic History    Marital status: Single     Spouse name: Not on file    Number of children: Not on file    Years of education: Not on file    Highest education level: Not on file   Occupational History    Not on file   Tobacco Use    Smoking status: Some Days     Current packs/day: 0.00     Average packs/day: 0.5 packs/day for 35.0 years (17.5  Status   03/05/2025 0.7 0.70 - 1.20 mg/dL Final   03/04/2025 0.8 0.70 - 1.20 mg/dL Final   06/09/2024 0.6 (L) 0.70 - 1.20 mg/dL Final     Glucose   Date Value Ref Range Status   03/05/2025 108 (H) 74 - 99 mg/dL Final   03/04/2025 126 (H) 74 - 99 mg/dL Final   06/09/2024 267 (H) 74 - 99 mg/dL Final   05/24/2012 95 70 - 110 mg/dL Final   03/01/2012 78 70 - 110 mg/dL Final   11/11/2011 77 70 - 110 mg/dL Final     Calcium   Date Value Ref Range Status   03/05/2025 9.0 8.6 - 10.2 mg/dL Final   03/04/2025 9.1 8.6 - 10.2 mg/dL Final   06/09/2024 9.7 8.6 - 10.2 mg/dL Final     Total Bilirubin   Date Value Ref Range Status   03/05/2025 0.3 0.0 - 1.2 mg/dL Final   03/04/2025 0.4 0.0 - 1.2 mg/dL Final   06/09/2024 0.3 0.0 - 1.2 mg/dL Final     Alkaline Phosphatase   Date Value Ref Range Status   03/05/2025 71 40 - 129 U/L Final   03/04/2025 68 40 - 129 U/L Final   06/09/2024 65 40 - 129 U/L Final     AST   Date Value Ref Range Status   03/05/2025 24 0 - 39 U/L Final   03/04/2025 61 (H) 0 - 39 U/L Final     Comment:     SPECIMEN MODERATELY HEMOLYZED, RESULTS MAY BE ADVERSELY AFFECTED   06/09/2024 19 0 - 39 U/L Final     ALT   Date Value Ref Range Status   03/05/2025 25 0 - 40 U/L Final   03/04/2025 31 0 - 40 U/L Final   06/09/2024 35 0 - 40 U/L Final     Est, Glom Filt Rate   Date Value Ref Range Status   03/05/2025 >90 >60 mL/min/1.73m2 Final     Comment:           These results are not intended for use in patients <18 years of age.        eGFR results are calculated without a race factor using the 2021 CKD-EPI equation.  Careful clinical correlation is recommended, particularly when comparing to results   calculated using previous equations.  The CKD-EPI equation is less accurate in patients with extremes of muscle mass, extra-renal   metabolism of creatine, excessive creatine ingestion, or following therapy that affects   renal tubular secretion.     03/04/2025 >90 >60 mL/min/1.73m2 Final     Comment:           These results

## 2025-03-05 NOTE — CARE COORDINATION
SOCIAL WORK / DISCHARGE PLANNING:  Pt was admitted from [facility]. Unable to complete full assessment at this time. Medical record review, note ambulated with ww, received assistance from staff for ADLS/IADLS. Currently wearing 2L O2, does not have at [facility].  po prednisone, IV Rocephin/ IV Zithromax. Sw will complete full assessment, should be able to return to [facility].             Electronically signed by MONIQUE Dorantes on 3/5/2025 at 4:26 PM

## 2025-03-06 ENCOUNTER — APPOINTMENT (OUTPATIENT)
Dept: ULTRASOUND IMAGING | Age: 65
End: 2025-03-06
Payer: MEDICARE

## 2025-03-06 PROBLEM — I50.9 ACUTE ON CHRONIC CONGESTIVE HEART FAILURE (HCC): Status: ACTIVE | Noted: 2025-03-06

## 2025-03-06 PROBLEM — R60.0 LOCALIZED EDEMA: Status: ACTIVE | Noted: 2025-03-06

## 2025-03-06 PROBLEM — R06.02 SHORTNESS OF BREATH: Status: ACTIVE | Noted: 2025-03-06

## 2025-03-06 LAB
ALBUMIN SERPL-MCNC: 3.4 G/DL (ref 3.5–5.2)
ALP SERPL-CCNC: 58 U/L (ref 40–129)
ALT SERPL-CCNC: 26 U/L (ref 0–40)
ANION GAP SERPL CALCULATED.3IONS-SCNC: 7 MMOL/L (ref 7–16)
AST SERPL-CCNC: 24 U/L (ref 0–39)
BASOPHILS # BLD: 0.02 K/UL (ref 0–0.2)
BASOPHILS NFR BLD: 0 % (ref 0–2)
BILIRUB SERPL-MCNC: 0.3 MG/DL (ref 0–1.2)
BUN SERPL-MCNC: 14 MG/DL (ref 6–23)
CALCIUM SERPL-MCNC: 8.5 MG/DL (ref 8.6–10.2)
CHLORIDE SERPL-SCNC: 101 MMOL/L (ref 98–107)
CO2 SERPL-SCNC: 36 MMOL/L (ref 22–29)
CREAT SERPL-MCNC: 0.7 MG/DL (ref 0.7–1.2)
EKG ATRIAL RATE: 82 BPM
EKG P AXIS: 79 DEGREES
EKG P-R INTERVAL: 152 MS
EKG Q-T INTERVAL: 408 MS
EKG QRS DURATION: 96 MS
EKG QTC CALCULATION (BAZETT): 476 MS
EKG R AXIS: -11 DEGREES
EKG T AXIS: 56 DEGREES
EKG VENTRICULAR RATE: 82 BPM
EOSINOPHIL # BLD: 0 K/UL (ref 0.05–0.5)
EOSINOPHILS RELATIVE PERCENT: 0 % (ref 0–6)
ERYTHROCYTE [DISTWIDTH] IN BLOOD BY AUTOMATED COUNT: 13.7 % (ref 11.5–15)
GFR, ESTIMATED: >90 ML/MIN/1.73M2
GLUCOSE SERPL-MCNC: 91 MG/DL (ref 74–99)
HCT VFR BLD AUTO: 40.1 % (ref 37–54)
HGB BLD-MCNC: 12.1 G/DL (ref 12.5–16.5)
IMM GRANULOCYTES # BLD AUTO: 0.05 K/UL (ref 0–0.58)
IMM GRANULOCYTES NFR BLD: 1 % (ref 0–5)
LYMPHOCYTES NFR BLD: 2.9 K/UL (ref 1.5–4)
LYMPHOCYTES RELATIVE PERCENT: 28 % (ref 20–42)
MCH RBC QN AUTO: 28.8 PG (ref 26–35)
MCHC RBC AUTO-ENTMCNC: 30.2 G/DL (ref 32–34.5)
MCV RBC AUTO: 95.5 FL (ref 80–99.9)
MONOCYTES NFR BLD: 1.02 K/UL (ref 0.1–0.95)
MONOCYTES NFR BLD: 10 % (ref 2–12)
NEUTROPHILS NFR BLD: 62 % (ref 43–80)
NEUTS SEG NFR BLD: 6.5 K/UL (ref 1.8–7.3)
PLATELET # BLD AUTO: 172 K/UL (ref 130–450)
PMV BLD AUTO: 11.4 FL (ref 7–12)
POTASSIUM SERPL-SCNC: 3.9 MMOL/L (ref 3.5–5)
PROT SERPL-MCNC: 6.4 G/DL (ref 6.4–8.3)
RBC # BLD AUTO: 4.2 M/UL (ref 3.8–5.8)
SODIUM SERPL-SCNC: 144 MMOL/L (ref 132–146)
WBC OTHER # BLD: 10.5 K/UL (ref 4.5–11.5)

## 2025-03-06 PROCEDURE — 2500000003 HC RX 250 WO HCPCS: Performed by: INTERNAL MEDICINE

## 2025-03-06 PROCEDURE — 6370000000 HC RX 637 (ALT 250 FOR IP): Performed by: SPECIALIST

## 2025-03-06 PROCEDURE — 6360000002 HC RX W HCPCS: Performed by: INTERNAL MEDICINE

## 2025-03-06 PROCEDURE — 93010 ELECTROCARDIOGRAM REPORT: CPT | Performed by: INTERNAL MEDICINE

## 2025-03-06 PROCEDURE — 93971 EXTREMITY STUDY: CPT

## 2025-03-06 PROCEDURE — 6360000002 HC RX W HCPCS: Performed by: SPECIALIST

## 2025-03-06 PROCEDURE — 36415 COLL VENOUS BLD VENIPUNCTURE: CPT

## 2025-03-06 PROCEDURE — 80053 COMPREHEN METABOLIC PANEL: CPT

## 2025-03-06 PROCEDURE — 6370000000 HC RX 637 (ALT 250 FOR IP): Performed by: INTERNAL MEDICINE

## 2025-03-06 PROCEDURE — 97530 THERAPEUTIC ACTIVITIES: CPT | Performed by: PHYSICAL THERAPIST

## 2025-03-06 PROCEDURE — 87536 HIV-1 QUANT&REVRSE TRNSCRPJ: CPT

## 2025-03-06 PROCEDURE — 2700000000 HC OXYGEN THERAPY PER DAY

## 2025-03-06 PROCEDURE — 94640 AIRWAY INHALATION TREATMENT: CPT

## 2025-03-06 PROCEDURE — 99232 SBSQ HOSP IP/OBS MODERATE 35: CPT | Performed by: INTERNAL MEDICINE

## 2025-03-06 PROCEDURE — 2060000000 HC ICU INTERMEDIATE R&B

## 2025-03-06 PROCEDURE — 85025 COMPLETE CBC W/AUTO DIFF WBC: CPT

## 2025-03-06 PROCEDURE — 2500000003 HC RX 250 WO HCPCS: Performed by: SPECIALIST

## 2025-03-06 PROCEDURE — 94669 MECHANICAL CHEST WALL OSCILL: CPT

## 2025-03-06 PROCEDURE — 97161 PT EVAL LOW COMPLEX 20 MIN: CPT | Performed by: PHYSICAL THERAPIST

## 2025-03-06 PROCEDURE — 99233 SBSQ HOSP IP/OBS HIGH 50: CPT | Performed by: INTERNAL MEDICINE

## 2025-03-06 RX ORDER — AZITHROMYCIN 250 MG/1
500 TABLET, FILM COATED ORAL DAILY
Status: DISCONTINUED | OUTPATIENT
Start: 2025-03-06 | End: 2025-03-07 | Stop reason: HOSPADM

## 2025-03-06 RX ADMIN — CLOTRIMAZOLE 10 MG: 10 LOZENGE ORAL at 20:40

## 2025-03-06 RX ADMIN — BICTEGRAVIR SODIUM, EMTRICITABINE, AND TENOFOVIR ALAFENAMIDE FUMARATE 1 TABLET: 50; 200; 25 TABLET ORAL at 08:38

## 2025-03-06 RX ADMIN — OSELTAMIVIR PHOSPHATE 75 MG: 75 CAPSULE ORAL at 08:39

## 2025-03-06 RX ADMIN — PYRIDOXINE HCL TAB 50 MG 100 MG: 50 TAB at 08:39

## 2025-03-06 RX ADMIN — FLUOXETINE HYDROCHLORIDE 60 MG: 20 CAPSULE ORAL at 08:38

## 2025-03-06 RX ADMIN — CETIRIZINE HYDROCHLORIDE 10 MG: 10 TABLET, FILM COATED ORAL at 20:41

## 2025-03-06 RX ADMIN — HYDROCHLOROTHIAZIDE 25 MG: 25 TABLET ORAL at 08:39

## 2025-03-06 RX ADMIN — RIVASTIGMINE TARTRATE 6 MG: 3 CAPSULE ORAL at 08:38

## 2025-03-06 RX ADMIN — AZITHROMYCIN DIHYDRATE 500 MG: 250 TABLET ORAL at 20:40

## 2025-03-06 RX ADMIN — ENOXAPARIN SODIUM 40 MG: 100 INJECTION SUBCUTANEOUS at 08:39

## 2025-03-06 RX ADMIN — ROSUVASTATIN CALCIUM 10 MG: 10 TABLET, FILM COATED ORAL at 20:40

## 2025-03-06 RX ADMIN — LEVETIRACETAM 500 MG: 500 TABLET, FILM COATED ORAL at 08:38

## 2025-03-06 RX ADMIN — CARVEDILOL 6.25 MG: 6.25 TABLET, FILM COATED ORAL at 08:39

## 2025-03-06 RX ADMIN — OLANZAPINE 2.5 MG: 5 TABLET, FILM COATED ORAL at 20:40

## 2025-03-06 RX ADMIN — ACETYLCYSTEINE 600 MG: 200 INHALANT RESPIRATORY (INHALATION) at 19:21

## 2025-03-06 RX ADMIN — TAMSULOSIN HYDROCHLORIDE 0.4 MG: 0.4 CAPSULE ORAL at 08:39

## 2025-03-06 RX ADMIN — DOCUSATE SODIUM 100 MG: 100 CAPSULE, LIQUID FILLED ORAL at 08:39

## 2025-03-06 RX ADMIN — BUDESONIDE 500 MCG: 0.5 INHALANT RESPIRATORY (INHALATION) at 06:30

## 2025-03-06 RX ADMIN — CEFTRIAXONE SODIUM 2000 MG: 2 INJECTION, POWDER, FOR SOLUTION INTRAMUSCULAR; INTRAVENOUS at 08:39

## 2025-03-06 RX ADMIN — DIVALPROEX SODIUM 500 MG: 125 CAPSULE ORAL at 08:39

## 2025-03-06 RX ADMIN — LEVETIRACETAM 500 MG: 500 TABLET, FILM COATED ORAL at 20:40

## 2025-03-06 RX ADMIN — SODIUM CHLORIDE, PRESERVATIVE FREE 10 ML: 5 INJECTION INTRAVENOUS at 08:39

## 2025-03-06 RX ADMIN — Medication 2000 UNITS: at 08:38

## 2025-03-06 RX ADMIN — AMLODIPINE BESYLATE 10 MG: 10 TABLET ORAL at 08:39

## 2025-03-06 RX ADMIN — DOCUSATE SODIUM 100 MG: 100 CAPSULE, LIQUID FILLED ORAL at 20:40

## 2025-03-06 RX ADMIN — SODIUM CHLORIDE, PRESERVATIVE FREE 10 ML: 5 INJECTION INTRAVENOUS at 20:45

## 2025-03-06 RX ADMIN — CARVEDILOL 6.25 MG: 6.25 TABLET, FILM COATED ORAL at 17:28

## 2025-03-06 RX ADMIN — RIVASTIGMINE TARTRATE 6 MG: 3 CAPSULE ORAL at 20:39

## 2025-03-06 RX ADMIN — IPRATROPIUM BROMIDE AND ALBUTEROL SULFATE 1 DOSE: .5; 3 SOLUTION RESPIRATORY (INHALATION) at 06:30

## 2025-03-06 RX ADMIN — IPRATROPIUM BROMIDE AND ALBUTEROL SULFATE 1 DOSE: .5; 3 SOLUTION RESPIRATORY (INHALATION) at 13:30

## 2025-03-06 RX ADMIN — ACETYLCYSTEINE 600 MG: 200 INHALANT RESPIRATORY (INHALATION) at 06:30

## 2025-03-06 RX ADMIN — IPRATROPIUM BROMIDE AND ALBUTEROL SULFATE 1 DOSE: .5; 3 SOLUTION RESPIRATORY (INHALATION) at 09:37

## 2025-03-06 RX ADMIN — OSELTAMIVIR PHOSPHATE 75 MG: 75 CAPSULE ORAL at 20:40

## 2025-03-06 RX ADMIN — BUDESONIDE 500 MCG: 0.5 INHALANT RESPIRATORY (INHALATION) at 19:21

## 2025-03-06 RX ADMIN — IPRATROPIUM BROMIDE AND ALBUTEROL SULFATE 1 DOSE: .5; 3 SOLUTION RESPIRATORY (INHALATION) at 19:21

## 2025-03-06 RX ADMIN — DIVALPROEX SODIUM 500 MG: 125 CAPSULE ORAL at 20:40

## 2025-03-06 RX ADMIN — OLANZAPINE 2.5 MG: 5 TABLET, FILM COATED ORAL at 08:39

## 2025-03-06 RX ADMIN — PREDNISONE 40 MG: 20 TABLET ORAL at 08:38

## 2025-03-06 RX ADMIN — CLONIDINE HYDROCHLORIDE 0.2 MG: 0.2 TABLET ORAL at 08:39

## 2025-03-06 RX ADMIN — LISINOPRIL 40 MG: 20 TABLET ORAL at 08:39

## 2025-03-06 RX ADMIN — CLONIDINE HYDROCHLORIDE 0.2 MG: 0.2 TABLET ORAL at 20:40

## 2025-03-06 ASSESSMENT — ENCOUNTER SYMPTOMS
ABDOMINAL DISTENTION: 0
COUGH: 1
CONSTIPATION: 0
RHINORRHEA: 0
WHEEZING: 0
DIARRHEA: 0
SINUS PAIN: 0
ABDOMINAL PAIN: 0
SHORTNESS OF BREATH: 0
CHEST TIGHTNESS: 0
FACIAL SWELLING: 0
BLOOD IN STOOL: 0
NAUSEA: 0

## 2025-03-06 NOTE — PROGRESS NOTES
Pt found to be off O2, SpO2 83% on RA while sitting in bed. 78% on RA while ambulating in room. Pt rebound to 93% on 4L NC.

## 2025-03-06 NOTE — ACP (ADVANCE CARE PLANNING)
Advance Care Planning   Healthcare Decision Maker:    Primary Decision Maker: Lola Reyes - Brother/Sister - [phone]    Click here to complete Healthcare Decision Makers including selection of the Healthcare Decision Maker Relationship (ie \"Primary\").  Today we documented Decision Maker(s) consistent with ACP documents on file.

## 2025-03-06 NOTE — PROGRESS NOTES
Progress Note  Date:3/6/2025       Room:0634/0634-01  Patient Name:Ambrosio Johnson     YOB: 1960     Age:64 y.o.        Subjective    Subjective:  Symptoms:  Improved.  He reports cough.  No shortness of breath, chest pain, weakness or diarrhea.  (Patient feels better breathing , still has some cough ).    Diet:  Adequate intake.  No nausea.       Review of Systems   Constitutional:  Negative for appetite change, chills, fatigue, fever and unexpected weight change.   HENT:  Negative for congestion, facial swelling, mouth sores, rhinorrhea and sinus pain.    Eyes:  Negative for visual disturbance.   Respiratory:  Positive for cough. Negative for chest tightness, shortness of breath and wheezing.    Cardiovascular:  Negative for chest pain and leg swelling.   Gastrointestinal:  Negative for abdominal distention, abdominal pain, blood in stool, constipation, diarrhea and nausea.   Genitourinary:  Negative for difficulty urinating, dysuria, frequency and urgency.   Musculoskeletal:  Negative for joint swelling.   Skin:  Negative for rash.   Neurological:  Negative for dizziness, syncope, weakness, light-headedness and headaches.   Psychiatric/Behavioral:  Negative for behavioral problems, confusion and hallucinations.      Objective         Vitals Last 24 Hours:  TEMPERATURE:  Temp  Av.1 °F (36.7 °C)  Min: 97.7 °F (36.5 °C)  Max: 98.6 °F (37 °C)  RESPIRATIONS RANGE: Resp  Av.8  Min: 19  Max: 20  PULSE OXIMETRY RANGE: SpO2  Av.2 %  Min: 91 %  Max: 100 %  PULSE RANGE: Pulse  Av.1  Min: 53  Max: 73  BLOOD PRESSURE RANGE: Systolic (24hrs), Av , Min:115 , Max:154   ; Diastolic (24hrs), Av, Min:64, Max:84    I/O (24Hr):    Intake/Output Summary (Last 24 hours) at 3/6/2025 1804  Last data filed at 3/6/2025 1244  Gross per 24 hour   Intake 600 ml   Output --   Net 600 ml     Objective:  General Appearance:  Comfortable and in no acute distress (On nasal cannula).    Vital signs: (most

## 2025-03-06 NOTE — PLAN OF CARE
Problem: Chronic Conditions and Co-morbidities  Goal: Patient's chronic conditions and co-morbidity symptoms are monitored and maintained or improved  3/5/2025 2036 by Blas Monte RN  Outcome: Progressing  3/5/2025 0949 by Joya Ascencio RN  Outcome: Progressing     Problem: Discharge Planning  Goal: Discharge to home or other facility with appropriate resources  3/5/2025 2036 by Blas Monte RN  Outcome: Progressing  3/5/2025 0949 by Joya Ascencio RN  Outcome: Progressing     Problem: ABCDS Injury Assessment  Goal: Absence of physical injury  3/5/2025 2036 by Blas Monte RN  Outcome: Progressing  3/5/2025 0949 by Joya Ascencio RN  Outcome: Progressing     Problem: Safety - Adult  Goal: Free from fall injury  3/5/2025 2036 by Blas Monte RN  Outcome: Progressing  3/5/2025 0949 by Joya Ascencio RN  Outcome: Progressing

## 2025-03-06 NOTE — PLAN OF CARE
Problem: Chronic Conditions and Co-morbidities  Goal: Patient's chronic conditions and co-morbidity symptoms are monitored and maintained or improved  3/6/2025 0931 by Joya Ascencio RN  Outcome: Progressing  3/5/2025 2036 by Blas Monte RN  Outcome: Progressing     Problem: Discharge Planning  Goal: Discharge to home or other facility with appropriate resources  3/6/2025 0931 by Joya Ascencio RN  Outcome: Progressing  3/5/2025 2036 by Blas Monte RN  Outcome: Progressing     Problem: ABCDS Injury Assessment  Goal: Absence of physical injury  3/6/2025 0931 by Joya Ascencio RN  Outcome: Progressing  3/5/2025 2036 by Blas Monte RN  Outcome: Progressing     Problem: Safety - Adult  Goal: Free from fall injury  3/6/2025 0931 by Joya Ascencio RN  Outcome: Progressing  3/5/2025 2036 by Blas Monte RN  Outcome: Progressing

## 2025-03-06 NOTE — PROGRESS NOTES
Patient - Ambrosio Johnson,  Age - 64 y.o.    - 1960      Room Number - 0634/0634-01   MRN -  [id number]   [facility] # - [id number]  Date of Admission -  3/4/2025  9:59 AM    Problem list of patient:     Hospital Problems             Last Modified POA    * (Principal) Community acquired pneumonia, bilateral 3/4/2025 Yes    HIV (human immunodeficiency virus infection) (HCC) (Chronic) 3/4/2025 Yes    Acute exacerbation of chronic obstructive pulmonary disease (COPD) (HCC) 3/4/2025 Yes    Sleep apnea 3/4/2025 Yes    Overview Signed 2020  3:46 PM by Shantel Joseph APRN - CNP     no cpap         Seizure (HCC) (Chronic) 3/4/2025 Yes    Acute respiratory failure with hypoxia (HCC) 3/4/2025 Yes    Acute on chronic congestive heart failure (HCC) 3/6/2025 Yes    Localized edema 3/6/2025 Yes    Shortness of breath 3/6/2025 Yes     ASSESSMENT/PLAN     Pt with hiv on biktarvy last cd4 438   Has thrush   Has S pneumonia pneumonia  Has  FLU A    Labs pending  Check cd4 /VL  Atbx  Procal 0.07  Will need prevnar 20       oseltamivir (TAMIFLU) capsule 75 mg, BID  predniSONE (DELTASONE) tablet 40 mg, Daily  azithromycin (ZITHROMAX) 500 mg in sodium chloride 0.9 % 250    clotrimazole (MYCELEX) bea 10 mg, 5x Daily  cefTRIAXone (ROCEPHIN) 2,000 mg in sterile water 20 mL IV syringe, Q24H  bictegravir-emtricitab-tenofovir alafenamide (BIKTARVY) -25 MG per tablet 1 tablet, Daily      Can d/c  F.u in 1 month     Patient has an Illness(es)/ Infection present that pose threat to bodily function.   There is potential for severe exacerbation of infection/side effects of treatment.  Therapy requires intensive monitoring for antimicrobial agent toxicity.         SUBJECTIVE:   DOS:  25 doing well eating luch nad 2l   3/5 in chair more awake and alert has no c/o feels better some sob     OBJECTIVE   VITALS    height is 1.676 m (5' 6\") and weight is 84.4  \"LABCAST\", \"LABCASTTY\", \"AMORPHOS\" in the last 72 hours.    Invalid input(s): \"CRYSTALS\"  Results       Procedure Component Value Units Date/Time    STREP PNEUMONIAE ANTIGEN [[id number]]     Order Status: Canceled Specimen: Urine, clean catch     Respiratory Panel, Molecular, with COVID-19 (Restricted: peds pts or suitable admitted adults) [[id number]]     Order Status: Canceled Specimen: Nasopharyngeal     LEGIONELLA ANTIGEN, URINE [[id number]]     Order Status: Canceled Specimen: Urine     STREP PNEUMONIAE ANTIGEN [[id number]]  (Abnormal) Collected: 03/04/25 1345    Order Status: Completed Specimen: Urine voided Updated: 03/05/25 0932     Source .URINE     Strep pneumo Ag POSITIVE    Legionella antigen, urine [[id number]] Collected: 03/04/25 1345    Order Status: Completed Specimen: Urine Updated: 03/05/25 0932     Legionella Pneumophilia Ag, Urine NEGATIVE     Comment:       L. pneumophila serogroup 1 antigen not detected.  A negative result does not exclude infection with Leginella pnemophila serogroup 1 nor does   it rule out other microbial-caused respiratory infections of disease caused by other   serogroups of Legionella pneumophila.         Culture, Respiratory [[id number]]     Order Status: Sent Specimen: Sputum Expectorated     Respiratory Panel, Molecular, with COVID-19 (Restricted: peds pts or suitable admitted adults) [[id number]]     Order Status: Canceled Specimen: Nasopharyngeal     COVID-19 & Influenza Combo [[id number]] Collected: 03/04/25 1015    Order Status: Completed Specimen: Nasopharyngeal Swab Updated: 03/04/25 1108     Specimen Description .NASOPHARYNGEAL SWAB     Source .NASOPHARYNGEAL SWAB     SARS-CoV-2 RNA, RT PCR Not Detected     Comment:       Testing was performed using MIGUELANGEL Malissa SARS-CoV-2 and Influenza A/B nucleic acid assay.  This test is a multiplex Real-Time Reverse Transcriptase Polymerase Chain Reaction   (RT-PCR)-based in vitro diagnostic test intended for the qualitative

## 2025-03-06 NOTE — CARE COORDINATION
Case Management Assessment  Initial Evaluation    Date/Time of Evaluation: 3/6/2025 4:06 PM  Assessment Completed by: MONIQUE Dorantes    If patient is discharged prior to next notation, then this note serves as note for discharge by case management.    Patient Name: Ambrosio Johnson                   YOB: 1960  Diagnosis: Shortness of breath [R06.02]  COPD exacerbation (HCC) [J44.1]  Pneumonia of both lower lobes due to infectious organism [J18.9]  Acute on chronic congestive heart failure, unspecified heart failure type (HCC) [I50.9]  Community acquired pneumonia, bilateral [J18.9]                   Date / Time: 3/4/2025  9:59 AM    Patient Admission Status: Inpatient   Readmission Risk (Low < 19, Mod (19-27), High > 27): Readmission Risk Score: 12.8    Current PCP: Michelle Harper APRN - NP  PCP verified by CM? Yes    Chart Reviewed: Yes      History Provided by: Patient, Medical Record  Patient Orientation: Alert and Oriented, Person, Place, Situation    Patient Cognition: Alert    Hospitalization in the last 30 days (Readmission):  No    If yes, Readmission Assessment in CM Navigator will be completed.    Advance Directives:      Code Status: Full Code   Patient's Primary Decision Maker is: Named in Scanned ACP Document    Primary Decision Maker: EricLola - Brother/Sister - [phone]    Discharge Planning:    Patient lives with: Alone Type of Home: Assisted living  Primary Care Giver: Other (Comment) (long-term)  Patient Support Systems include: Other (Comment), Family Members (OSMAR)   Current Financial resources:    Current community resources:    Current services prior to admission: None            Current DME:              Type of Home Care services:  PT    ADLS  Prior functional level: Assistance with the following:, Housework, Cooking, Shopping, Bathing  Current functional level: Assistance with the following:, Bathing, Cooking, Housework, Shopping    PT AM-PAC:   /24  OT AM-PAC: 19  /24    Family can provide assistance at DC: No  Would you like Case Management to discuss the discharge plan with any other family members/significant others, and if so, who? Yes (sister)  Plans to Return to Present Housing: Yes  Other Identified Issues/Barriers to RETURNING to current housing: none noted   Potential Assistance needed at discharge: Other (Comment) (PT)            Potential DME:    Patient expects to discharge to: Assisted living  Plan for transportation at discharge:      Financial    Payor: [facility] MEDICARE / Plan: [employer] DUAL COMPLETE / Product Type: *No Product type* /     Does insurance require precert for SNF: Yes    Potential assistance Purchasing Medications:    Meds-to-Beds request: Yes      [facility] - [facility] [address] [place] [address] - P [phone] - F [phone]  [address] [place] [address]  [address]  [facility] [address]  Phone: [phone] Fax: [phone]      Notes:    Factors facilitating achievement of predicted outcomes: Cooperative, Pleasant, and Sense of humor    Barriers to discharge: watch for home O2 needs.     Additional Case Management Notes: Sw met with pt at bedside to discuss transition to care. Pt admitted from [facility]. He states he uses rollater or cane for ambulation. [facility] manages his meds and meals, as well as assisting with batheing. He currently is wearing O2, previously had O2 but it was returned when he no longer needed it. He does not recall which dme provider it was. He has no specific preference. He sees [facility] -provided by [facility]. He is very anxious to return to [facility]. Will need walk test, if qualifies will need script for arrangements to be made. Pt's sister has provided transport in the past.     MONIQUE Dorantes  Case Management Department  Ph:  Fax:

## 2025-03-06 NOTE — PROGRESS NOTES
Date:3/6/25     This patient's order for azithromycin IV has been changed to PO as approved by the Pharmacy & Therapeutics and Medical Executive Committees.     If the patient should become strict NPO while on this therapy, contact the prescriber for further orders.

## 2025-03-06 NOTE — PROGRESS NOTES
Physical Therapy  Physical Therapy Initial Evaluation/Plan of Care    Room #:  0634/0634-01  Patient Name: Ambrosio Johnson  YOB: 1960  MRN: [id number]    Date of Service: 3/6/2025     Tentative placement recommendation: Home with Home Health Physical Therapy  Equipment recommendation: Patient has needed equipment       Evaluating Physical Therapist: Mikey Casarez PT, DPT #[id number]      Specific Provider Orders/Date/Referring Provider :     03/04/25 1445    PT evaluation and treat  Start:  03/04/25 1445,   End:  03/04/25 1445,   ONE TIME,   Standing Count:  1 Occurrences,   R       Edilberto Chi MD Acknowledge New    Admitting Diagnosis:   Shortness of breath [R06.02]  COPD exacerbation ([facility]) [J44.1]  Pneumonia of both lower lobes due to infectious organism [J18.9]  Acute on chronic congestive heart failure, unspecified heart failure type ([facility]) [I50.9]  Community acquired pneumonia, bilateral [J18.9]      Surgery: none      Patient Active Problem List   Diagnosis    Depression    Bronchospasm, acute    SIRS (systemic inflammatory response syndrome) ([facility])    Acute on chronic diastolic CHF (congestive heart failure) ([facility])    Lactic acidosis    Poorly-controlled hypertension    Alcohol abuse    HIV (human immunodeficiency virus infection) (HCC)    Tobacco abuse    Asthma exacerbation with COPD (chronic obstructive pulmonary disease) (HCC)    Chronic obstructive pulmonary disease (HCC)    Asthma, intrinsic    Acute exacerbation of chronic obstructive pulmonary disease (COPD) ([facility])    Sleep apnea    Acute on chronic respiratory failure with hypercapnia (HCC)    Chronic diastolic heart failure (HCC)    Chest pain on exertion    Seizure (HCC)    Benign prostatic hyperplasia without lower urinary tract symptoms    COPD exacerbation (HCC)    Acute on chronic respiratory failure with hypoxemia ([facility])    Type 2 diabetes mellitus, without long-term current use of insulin (HCC)    Asthma without status asthmaticus

## 2025-03-06 NOTE — PROGRESS NOTES
[facility]  Department of Internal Medicine  Division of Pulmonary, Critical Care and Sleep Medicine    Walter Dickey, APRN-CNP  Debi Chilel, APRN-CNP          Pulmonary Progress Note     In Summary:  Ambrosio Johnson is a 64 y.o. male with hx of severe COPD, emphysema, CHEN untreated, active smoker, HIV,  presents with shortness of breath.     ISSUES:     Acute on chronic hypoxic and hypercapnic respiratory failure requiring supplemental oxygen   Influenza A infection  + Strep pneumoniae antigen   Severe COPD with acute exacerbation  Severe emphysema  CHEN untreated    Tobacco abuse    Wean FIO2 as tolerated, baseline room air  Check ambulatory pulse ox prior to discharge  Continue NIV at bedtime and as needed     Aggressive bronchopulmonary hygiene   Scheduled bronchodilators and mucomyst  Azithromycin and ceftriaxone   Tamiflu x5 days  Taper steroids, on prednisone 40mg   Incentive spirometry, flutter valve  Smoking cessation encouraged  Recommend follow up with pulmonary as outpatient with full PFT and PSG        HIV  On Biktarvy, ID following     3/6  Seen on 2L nasal cannula, in no acute distress  No new complaints     __________________________________________________    Referring MD: Dr. Edilberto Chi     Reason for Consult: Acute resp failure       HISTORY OF PRESENT ILLNESS:   Ambrosio Johnson is a 64 y.o. male with hx of severe COPD, emphysema, CHEN untreated, active smoker, HIV,  presents with shortness of breath.     ALLERGY:  Patient has no known allergies.    FAMILY HISTORY:  Family History   Problem Relation Age of Onset    Alcohol Abuse Mother     Alcohol Abuse Father        SOCIAL HISTORY:  Social History     Socioeconomic History    Marital status: Single     Spouse name: Not on file    Number of children: Not on file       metabolism of creatine, excessive creatine ingestion, or following therapy that affects   renal tubular secretion.     03/05/2025 >90 >60 mL/min/1.73m2 Final     Comment:           These results are not intended for use in patients <18 years of age.        eGFR results are calculated without a race factor using the 2021 CKD-EPI equation.  Careful clinical correlation is recommended, particularly when comparing to results   calculated using previous equations.  The CKD-EPI equation is less accurate in patients with extremes of muscle mass, extra-renal   metabolism of creatine, excessive creatine ingestion, or following therapy that affects   renal tubular secretion.     03/04/2025 >90 >60 mL/min/1.73m2 Final     Comment:           These results are not intended for use in patients <18 years of age.        eGFR results are calculated without a race factor using the 2021 CKD-EPI equation.  Careful clinical correlation is recommended, particularly when comparing to results   calculated using previous equations.  The CKD-EPI equation is less accurate in patients with extremes of muscle mass, extra-renal   metabolism of creatine, excessive creatine ingestion, or following therapy that affects   renal tubular secretion.       GFR    Date Value Ref Range Status   09/06/2022 >60  Final   03/21/2022 >60  Final   11/09/2021 >60  Final     Magnesium   Date Value Ref Range Status   02/11/2020 2.8 (H) 1.6 - 2.6 mg/dL Final   02/10/2020 2.6 1.6 - 2.6 mg/dL Final   02/09/2020 2.5 1.6 - 2.6 mg/dL Final     Phosphorus   Date Value Ref Range Status   05/16/2018 4.2 2.5 - 4.5 mg/dL Final   02/14/2018 2.8 2.5 - 4.5 mg/dL Final   02/13/2018 3.1 2.5 - 4.5 mg/dL Final     Recent Labs     03/04/25  1150   PH 7.366   PO2 88.1   PCO2 67.8*   HCO3 38.0*   BE 10.0*   O2SAT 96.6       RADIOLOGY:  Vascular duplex lower extremity venous left   Final Result   No evidence of DVT in the left lower extremity.         CT CHEST WO

## 2025-03-07 VITALS
WEIGHT: 188.71 LBS | OXYGEN SATURATION: 91 % | DIASTOLIC BLOOD PRESSURE: 86 MMHG | SYSTOLIC BLOOD PRESSURE: 135 MMHG | BODY MASS INDEX: 30.33 KG/M2 | RESPIRATION RATE: 18 BRPM | HEIGHT: 66 IN | TEMPERATURE: 97.7 F | HEART RATE: 70 BPM

## 2025-03-07 PROCEDURE — 6360000002 HC RX W HCPCS: Performed by: INTERNAL MEDICINE

## 2025-03-07 PROCEDURE — 2500000003 HC RX 250 WO HCPCS: Performed by: SPECIALIST

## 2025-03-07 PROCEDURE — 6370000000 HC RX 637 (ALT 250 FOR IP): Performed by: INTERNAL MEDICINE

## 2025-03-07 PROCEDURE — 94640 AIRWAY INHALATION TREATMENT: CPT

## 2025-03-07 PROCEDURE — 2700000000 HC OXYGEN THERAPY PER DAY

## 2025-03-07 PROCEDURE — 99239 HOSP IP/OBS DSCHRG MGMT >30: CPT | Performed by: INTERNAL MEDICINE

## 2025-03-07 PROCEDURE — 2500000003 HC RX 250 WO HCPCS: Performed by: INTERNAL MEDICINE

## 2025-03-07 PROCEDURE — 6360000002 HC RX W HCPCS: Performed by: SPECIALIST

## 2025-03-07 PROCEDURE — 6370000000 HC RX 637 (ALT 250 FOR IP): Performed by: SPECIALIST

## 2025-03-07 RX ORDER — PREDNISONE 10 MG/1
TABLET ORAL
Qty: 32 TABLET | Refills: 0 | Status: SHIPPED | OUTPATIENT
Start: 2025-03-08 | End: 2025-03-23

## 2025-03-07 RX ORDER — AZITHROMYCIN 500 MG/1
500 TABLET, FILM COATED ORAL DAILY
Qty: 2 TABLET | Refills: 0 | Status: SHIPPED | OUTPATIENT
Start: 2025-03-07 | End: 2025-03-09

## 2025-03-07 RX ORDER — IPRATROPIUM BROMIDE AND ALBUTEROL SULFATE 2.5; .5 MG/3ML; MG/3ML
1 SOLUTION RESPIRATORY (INHALATION) EVERY 6 HOURS PRN
Qty: 360 ML | Refills: 1 | Status: SHIPPED | OUTPATIENT
Start: 2025-03-07

## 2025-03-07 RX ORDER — LEVETIRACETAM 500 MG/1
500 TABLET ORAL 2 TIMES DAILY
Qty: 60 TABLET | Refills: 1 | Status: SHIPPED | OUTPATIENT
Start: 2025-03-07

## 2025-03-07 RX ORDER — OSELTAMIVIR PHOSPHATE 75 MG/1
75 CAPSULE ORAL 2 TIMES DAILY
Qty: 5 CAPSULE | Refills: 0 | Status: SHIPPED | OUTPATIENT
Start: 2025-03-07 | End: 2025-03-10

## 2025-03-07 RX ADMIN — CLOTRIMAZOLE 10 MG: 10 LOZENGE ORAL at 05:16

## 2025-03-07 RX ADMIN — BUDESONIDE 500 MCG: 0.5 INHALANT RESPIRATORY (INHALATION) at 07:28

## 2025-03-07 RX ADMIN — ACETYLCYSTEINE 600 MG: 200 INHALANT RESPIRATORY (INHALATION) at 07:28

## 2025-03-07 RX ADMIN — CLONIDINE HYDROCHLORIDE 0.2 MG: 0.2 TABLET ORAL at 13:55

## 2025-03-07 RX ADMIN — OLANZAPINE 2.5 MG: 5 TABLET, FILM COATED ORAL at 08:37

## 2025-03-07 RX ADMIN — PREDNISONE 40 MG: 20 TABLET ORAL at 08:37

## 2025-03-07 RX ADMIN — IPRATROPIUM BROMIDE AND ALBUTEROL SULFATE 1 DOSE: .5; 3 SOLUTION RESPIRATORY (INHALATION) at 07:28

## 2025-03-07 RX ADMIN — DOCUSATE SODIUM 100 MG: 100 CAPSULE, LIQUID FILLED ORAL at 08:37

## 2025-03-07 RX ADMIN — CLONIDINE HYDROCHLORIDE 0.2 MG: 0.2 TABLET ORAL at 08:36

## 2025-03-07 RX ADMIN — SODIUM CHLORIDE, PRESERVATIVE FREE 10 ML: 5 INJECTION INTRAVENOUS at 08:38

## 2025-03-07 RX ADMIN — DIVALPROEX SODIUM 500 MG: 125 CAPSULE ORAL at 08:36

## 2025-03-07 RX ADMIN — LISINOPRIL 40 MG: 20 TABLET ORAL at 08:36

## 2025-03-07 RX ADMIN — TAMSULOSIN HYDROCHLORIDE 0.4 MG: 0.4 CAPSULE ORAL at 08:36

## 2025-03-07 RX ADMIN — CARVEDILOL 6.25 MG: 6.25 TABLET, FILM COATED ORAL at 16:48

## 2025-03-07 RX ADMIN — CARVEDILOL 6.25 MG: 6.25 TABLET, FILM COATED ORAL at 08:37

## 2025-03-07 RX ADMIN — ENOXAPARIN SODIUM 40 MG: 100 INJECTION SUBCUTANEOUS at 08:37

## 2025-03-07 RX ADMIN — PYRIDOXINE HCL TAB 50 MG 100 MG: 50 TAB at 08:36

## 2025-03-07 RX ADMIN — LEVETIRACETAM 500 MG: 500 TABLET, FILM COATED ORAL at 08:36

## 2025-03-07 RX ADMIN — Medication 2000 UNITS: at 08:36

## 2025-03-07 RX ADMIN — RIVASTIGMINE TARTRATE 6 MG: 3 CAPSULE ORAL at 08:37

## 2025-03-07 RX ADMIN — BICTEGRAVIR SODIUM, EMTRICITABINE, AND TENOFOVIR ALAFENAMIDE FUMARATE 1 TABLET: 50; 200; 25 TABLET ORAL at 08:37

## 2025-03-07 RX ADMIN — AMLODIPINE BESYLATE 10 MG: 10 TABLET ORAL at 08:37

## 2025-03-07 RX ADMIN — IPRATROPIUM BROMIDE AND ALBUTEROL SULFATE 1 DOSE: .5; 3 SOLUTION RESPIRATORY (INHALATION) at 10:43

## 2025-03-07 RX ADMIN — OSELTAMIVIR PHOSPHATE 75 MG: 75 CAPSULE ORAL at 08:37

## 2025-03-07 RX ADMIN — FLUOXETINE HYDROCHLORIDE 60 MG: 20 CAPSULE ORAL at 08:37

## 2025-03-07 RX ADMIN — HYDROCHLOROTHIAZIDE 25 MG: 25 TABLET ORAL at 08:36

## 2025-03-07 RX ADMIN — CEFTRIAXONE SODIUM 2000 MG: 2 INJECTION, POWDER, FOR SOLUTION INTRAMUSCULAR; INTRAVENOUS at 08:37

## 2025-03-07 NOTE — PROGRESS NOTES
Patient - Ambrosio Johnson,  Age - 64 y.o.    - 1960      Room Number - 0634/0634-01   MRN -  [id number]   [facility] # - [id number]  Date of Admission -  3/4/2025  9:59 AM    Problem list of patient:     Hospital Problems             Last Modified POA    * (Principal) Community acquired pneumonia, bilateral 3/4/2025 Yes    HIV (human immunodeficiency virus infection) (HCC) (Chronic) 3/4/2025 Yes    Acute exacerbation of chronic obstructive pulmonary disease (COPD) (HCC) 3/4/2025 Yes    Sleep apnea 3/4/2025 Yes    Overview Signed 2020  3:46 PM by Shantel Joseph APRN - CNP     no cpap         Seizure (HCC) (Chronic) 3/4/2025 Yes    Acute respiratory failure with hypoxia (HCC) 3/4/2025 Yes    Acute on chronic congestive heart failure (HCC) 3/6/2025 Yes    Localized edema 3/6/2025 Yes    Shortness of breath 3/6/2025 Yes     ASSESSMENT/PLAN     Pt with hiv on biktarvy last cd4 438  check viral load  Has thrush   Has S pneumonia pneumonia  Has  FLU A    Labs pending  Check cd4 /VL  Atbx  Procal 0.07  Will need prevnar 20       oseltamivir (TAMIFLU) capsule 75 mg, BID  predniSONE (DELTASONE) tablet 40 mg, Daily  azithromycin (ZITHROMAX) 500 mg in sodium chloride 0.9 % 250    clotrimazole (MYCELEX) bea 10 mg, 5x Daily  cefTRIAXone (ROCEPHIN) 2,000 mg in sterile water 20 mL IV syringe, Q24H  bictegravir-emtricitab-tenofovir alafenamide (BIKTARVY) -25 MG per tablet 1 tablet, Daily      Can d/c  F/u in 1 month   Med rec     Patient has an Illness(es)/ Infection present that pose threat to bodily function.   There is potential for severe exacerbation of infection/side effects of treatment.  Therapy requires intensive monitoring for antimicrobial agent toxicity.         SUBJECTIVE:   DOS:  25 in bed has no c/o on o2 for d/c  3/6 doing well eating luch nad 2l   3/5 in chair more awake and alert has no c/o feels better some sob

## 2025-03-07 NOTE — CARE COORDINATION
SOCIAL WORK / DISCHARGE PLANNING:  Sw spoke with nurse at [facility] regarding possible return today. Pt qualified for 4L O2, which she states is max they can accept. They use [employer] in their building and pt has no preference. Script will be obtained in Epic. They will not be able provide return transportation. Sw will attempt pt's sister and if not then will arrange wheelchair transport.     Addendum: 143pm. Transport arranged via [facility] wheel chair van 630pm Leeanna THAPA aware.  [facility] notified of dc for concentrator delivery and nebulizer. Script to be faxed.           Electronically signed by MONIQUE Dorantes on 3/7/2025 at 10:15 AM

## 2025-03-08 NOTE — DISCHARGE SUMMARY
[facility] Hospitalist       Hospitalist Physician Discharge Summary       [facility]  [address]  [place] [address]  [phone]    Follow up      Michelle Harper APRN - NP  [address] [facility] [place] [address]  [phone]    Follow up  Primary Care- call for hospital follow up      Activity level: Ambulating well with walker    Diet: ADULT DIET; Regular; 5 carb choices (75 gm/meal); Low Fat/Low Chol/High Fiber/AHMET    Condition at discharge: Stable    Dispo: Patient is in assisted living    Continue supplemental oxygen via nasal canula @ 4 LPM round-the-clock.      Patient ID:  Ambrosio Johnson  [id number]  64 y.o.  1960    Admit date: 3/4/2025    Discharge date and time:  3/7/2025  7:39 PM    Admission Diagnoses: Principal Problem:    Community acquired pneumonia, bilateral  Active Problems:    HIV (human immunodeficiency virus infection) (HCC)    Acute exacerbation of chronic obstructive pulmonary disease (COPD) (HCC)    Sleep apnea    Seizure (HCC)    Acute respiratory failure with hypoxia (HCC)    Acute on chronic congestive heart failure (HCC)    Localized edema    Shortness of breath  Resolved Problems:    * No resolved hospital problems. *      Discharge Diagnoses: Principal Problem:    Community acquired pneumonia, bilateral  Active Problems:    HIV (human immunodeficiency virus infection) (HCC)    Acute exacerbation of chronic obstructive pulmonary disease (COPD) (HCC)    Sleep apnea    Seizure (HCC)    Acute respiratory failure with hypoxia (HCC)    Acute on chronic congestive heart failure (HCC)    Localized edema    Shortness of breath  Resolved Problems:    * No resolved hospital problems. *      Consults:  IP CONSULT TO INFECTIOUS DISEASES  IP CONSULT TO PULMONOLOGY    Procedures: None    Hospital Course: 64 years old Black gentleman significant past medical history of HIV, COPD, hypertension, and diabetes, seizure.  Patient presented to the emergency room by

## 2025-03-11 LAB
HIV1 RNA # SERPL NAA+PROBE: NOT DETECTED {COPIES}/ML
SPECIMEN SOURCE: NORMAL

## 2025-03-26 ENCOUNTER — HOSPITAL ENCOUNTER (EMERGENCY)
Age: 65
Discharge: HOME | End: 2025-03-27
Attending: STUDENT IN AN ORGANIZED HEALTH CARE EDUCATION/TRAINING PROGRAM
Payer: MEDICARE

## 2025-03-26 ENCOUNTER — APPOINTMENT (OUTPATIENT)
Dept: GENERAL RADIOLOGY | Age: 65
End: 2025-03-26
Payer: MEDICARE

## 2025-03-26 DIAGNOSIS — R22.0 FACIAL SWELLING: Primary | ICD-10-CM

## 2025-03-26 LAB
ALBUMIN SERPL-MCNC: 3.7 G/DL (ref 3.5–5.2)
ALP SERPL-CCNC: 65 U/L (ref 40–129)
ALT SERPL-CCNC: 14 U/L (ref 0–40)
ANION GAP SERPL CALCULATED.3IONS-SCNC: 8 MMOL/L (ref 7–16)
AST SERPL-CCNC: 15 U/L (ref 0–39)
BASOPHILS # BLD: 0.02 K/UL (ref 0–0.2)
BASOPHILS NFR BLD: 0 % (ref 0–2)
BILIRUB SERPL-MCNC: 0.2 MG/DL (ref 0–1.2)
BNP SERPL-MCNC: 48 PG/ML (ref 0–450)
BUN SERPL-MCNC: 7 MG/DL (ref 6–23)
CALCIUM SERPL-MCNC: 9.4 MG/DL (ref 8.6–10.2)
CHLORIDE SERPL-SCNC: 97 MMOL/L (ref 98–107)
CO2 SERPL-SCNC: 38 MMOL/L (ref 22–29)
CREAT SERPL-MCNC: 0.9 MG/DL (ref 0.7–1.2)
EOSINOPHIL # BLD: 0.06 K/UL (ref 0.05–0.5)
EOSINOPHILS RELATIVE PERCENT: 1 % (ref 0–6)
ERYTHROCYTE [DISTWIDTH] IN BLOOD BY AUTOMATED COUNT: 14.6 % (ref 11.5–15)
GFR, ESTIMATED: >90 ML/MIN/1.73M2
GLUCOSE SERPL-MCNC: 76 MG/DL (ref 74–99)
HCT VFR BLD AUTO: 39.2 % (ref 37–54)
HGB BLD-MCNC: 12.1 G/DL (ref 12.5–16.5)
IMM GRANULOCYTES # BLD AUTO: 0.05 K/UL (ref 0–0.58)
IMM GRANULOCYTES NFR BLD: 1 % (ref 0–5)
LYMPHOCYTES NFR BLD: 2.31 K/UL (ref 1.5–4)
LYMPHOCYTES RELATIVE PERCENT: 28 % (ref 20–42)
MCH RBC QN AUTO: 29.5 PG (ref 26–35)
MCHC RBC AUTO-ENTMCNC: 30.9 G/DL (ref 32–34.5)
MCV RBC AUTO: 95.6 FL (ref 80–99.9)
MONOCYTES NFR BLD: 0.98 K/UL (ref 0.1–0.95)
MONOCYTES NFR BLD: 12 % (ref 2–12)
NEUTROPHILS NFR BLD: 58 % (ref 43–80)
NEUTS SEG NFR BLD: 4.77 K/UL (ref 1.8–7.3)
PLATELET # BLD AUTO: 153 K/UL (ref 130–450)
PMV BLD AUTO: 11.8 FL (ref 7–12)
POTASSIUM SERPL-SCNC: 3.5 MMOL/L (ref 3.5–5)
PROT SERPL-MCNC: 6.7 G/DL (ref 6.4–8.3)
RBC # BLD AUTO: 4.1 M/UL (ref 3.8–5.8)
SODIUM SERPL-SCNC: 143 MMOL/L (ref 132–146)
TROPONIN I SERPL HS-MCNC: 8 NG/L (ref 0–11)
TROPONIN I SERPL HS-MCNC: 9 NG/L (ref 0–11)
WBC OTHER # BLD: 8.2 K/UL (ref 4.5–11.5)

## 2025-03-26 PROCEDURE — 83880 ASSAY OF NATRIURETIC PEPTIDE: CPT

## 2025-03-26 PROCEDURE — 85025 COMPLETE CBC W/AUTO DIFF WBC: CPT

## 2025-03-26 PROCEDURE — 93005 ELECTROCARDIOGRAM TRACING: CPT | Performed by: STUDENT IN AN ORGANIZED HEALTH CARE EDUCATION/TRAINING PROGRAM

## 2025-03-26 PROCEDURE — 84484 ASSAY OF TROPONIN QUANT: CPT

## 2025-03-26 PROCEDURE — 99285 EMERGENCY DEPT VISIT HI MDM: CPT

## 2025-03-26 PROCEDURE — 80053 COMPREHEN METABOLIC PANEL: CPT

## 2025-03-26 PROCEDURE — 6370000000 HC RX 637 (ALT 250 FOR IP): Performed by: STUDENT IN AN ORGANIZED HEALTH CARE EDUCATION/TRAINING PROGRAM

## 2025-03-26 PROCEDURE — 71045 X-RAY EXAM CHEST 1 VIEW: CPT

## 2025-03-26 RX ORDER — HYDROXYZINE HYDROCHLORIDE 25 MG/1
25 TABLET, FILM COATED ORAL ONCE
Status: COMPLETED | OUTPATIENT
Start: 2025-03-26 | End: 2025-03-26

## 2025-03-26 RX ORDER — PREDNISONE 50 MG/1
50 TABLET ORAL DAILY
Qty: 5 TABLET | Refills: 0 | Status: SHIPPED | OUTPATIENT
Start: 2025-03-26 | End: 2025-03-31

## 2025-03-26 RX ADMIN — HYDROXYZINE HYDROCHLORIDE 25 MG: 25 TABLET, FILM COATED ORAL at 20:13

## 2025-03-26 NOTE — ED PROVIDER NOTES
[facility] EMERGENCY DEPARTMENT  EMERGENCY DEPARTMENT ENCOUNTER    Pt Name: Ambrosio Johnson  MRN: [id number]  Birthdate 1960  Date of evaluation: 3/26/2025  Provider: Cal Kaye MD  PCP: Michelle Harper APRN - NP  Note Started: 7:32 PM EDT 3/26/25    HPI     Patient is a 64 y.o. male presents with a chief complaint of   Chief Complaint   Patient presents with    Facial Swelling    Pruritis     [id number] eyelid/lip swelling, itching around neck   .    Patient presented for facial swelling.  Patient has been having some eyelid swelling for at least a week and then has been having some symptoms for about a month.  Patient is denying any chest pain.  Patient is chronically short of breath and is on 4 L since his recent discharge from the hospital.  Patient is coming from facility.  Patient is denying any changes in urinary or bowel habits.    Nursing Notes were all reviewed and agreed with or any disagreements were addressed in the HPI.    History From: Patient    Review of Systems   Pertinent positives and negatives as per HPI.     Physical Exam  Vitals and nursing note reviewed.   Constitutional:       Appearance: He is well-developed.   HENT:      Head: Normocephalic and atraumatic.   Eyes:      Conjunctiva/sclera: Conjunctivae normal.      Comments: Bilateral upper eyelid edema.   Cardiovascular:      Rate and Rhythm: Normal rate and regular rhythm.      Heart sounds: Normal heart sounds. No murmur heard.  Pulmonary:      Effort: Pulmonary effort is normal. No respiratory distress.      Breath sounds: Normal breath sounds. No wheezing or rales.   Abdominal:      General: Bowel sounds are normal.      Palpations: Abdomen is soft.      Tenderness: There is no abdominal tenderness. There is no guarding or rebound.   Musculoskeletal:         General: No tenderness or deformity.      Cervical back: Normal range of motion and neck supple.   Skin:     General: Skin is warm and dry.

## 2025-03-27 VITALS
OXYGEN SATURATION: 93 % | DIASTOLIC BLOOD PRESSURE: 68 MMHG | HEART RATE: 85 BPM | TEMPERATURE: 98.8 F | RESPIRATION RATE: 18 BRPM | SYSTOLIC BLOOD PRESSURE: 145 MMHG

## 2025-03-27 LAB
EKG ATRIAL RATE: 73 BPM
EKG P AXIS: 76 DEGREES
EKG P-R INTERVAL: 158 MS
EKG Q-T INTERVAL: 426 MS
EKG QRS DURATION: 96 MS
EKG QTC CALCULATION (BAZETT): 469 MS
EKG R AXIS: -17 DEGREES
EKG T AXIS: 67 DEGREES
EKG VENTRICULAR RATE: 73 BPM

## 2025-03-27 PROCEDURE — 93010 ELECTROCARDIOGRAM REPORT: CPT | Performed by: INTERNAL MEDICINE

## 2025-03-27 PROCEDURE — 6370000000 HC RX 637 (ALT 250 FOR IP)

## 2025-03-27 PROCEDURE — 94640 AIRWAY INHALATION TREATMENT: CPT

## 2025-03-27 PROCEDURE — 6370000000 HC RX 637 (ALT 250 FOR IP): Performed by: EMERGENCY MEDICINE

## 2025-03-27 RX ORDER — IPRATROPIUM BROMIDE AND ALBUTEROL SULFATE 2.5; .5 MG/3ML; MG/3ML
1 SOLUTION RESPIRATORY (INHALATION) ONCE
Status: COMPLETED | OUTPATIENT
Start: 2025-03-27 | End: 2025-03-27

## 2025-03-27 RX ADMIN — RACEPINEPHRINE HYDROCHLORIDE 0.5 ML: 11.25 SOLUTION RESPIRATORY (INHALATION) at 02:59

## 2025-03-27 RX ADMIN — IPRATROPIUM BROMIDE AND ALBUTEROL SULFATE 1 DOSE: .5; 2.5 SOLUTION RESPIRATORY (INHALATION) at 02:52

## 2025-04-03 NOTE — DISCHARGE SUMMARY
Neck , no lymphadenopathy
        Patient Instructions:   Current Discharge Medication List        START taking these medications    Details   cefUROXime (CEFTIN) 500 MG tablet Take 1 tablet by mouth every 12 hours for 7 days  Qty: 14 tablet, Refills: 0           CONTINUE these medications which have NOT CHANGED    Details   diphenhydrAMINE (BENADRYL) 25 MG capsule Take 1 capsule by mouth every 6 hours as needed for Itching      docusate sodium (COLACE) 100 MG capsule Take 1 capsule by mouth 2 times daily      rosuvastatin (CRESTOR) 10 MG tablet Take 1 tablet by mouth nightly      vitamin B-12 (CYANOCOBALAMIN) 500 MCG tablet Take 0.5 tablets by mouth daily      divalproex (DEPAKOTE SPRINKLE) 125 MG DR capsule Take 1 capsule by mouth 2 times daily Morning and afternoon      divalproex (DEPAKOTE) 250 MG DR tablet Take 1 tablet by mouth nightly      mometasone (ASMANEX) 200 MCG/ACT AERO inhaler Inhale 2 puffs into the lungs 2 times daily      melatonin 3 MG TABS tablet Take 5 mg by mouth daily      meloxicam (MOBIC) 15 MG tablet Take 1 tablet by mouth daily      rivastigmine (EXELON) 3 MG capsule Take 1 capsule by mouth 2 times daily      pyridoxine (B-6) 100 MG tablet Take 1 tablet by mouth daily      vitamin D (CHOLECALCIFEROL) 25 MCG (1000 UT) TABS tablet Take 2 tablets by mouth daily      TRELEGY ELLIPTA 100-62.5-25 MCG/INH AEPB INHALE 1 PUFF INTO LUNGS DAILY  Qty: 60 each, Refills: 0    Associated Diagnoses: Stage 4 very severe COPD by GOLD classification ([facility])      albuterol sulfate  (90 Base) MCG/ACT inhaler Inhale 2 puffs into the lungs every 4 hours as needed for Wheezing or Shortness of Breath  Qty: 1 each, Refills: 2    Associated Diagnoses: Stage 4 very severe COPD by GOLD classification ([facility])      ipratropium-albuterol (DUONEB) 0.5-2.5 (3) MG/3ML SOLN nebulizer solution Inhale 3 mLs into the lungs every 6 hours as needed for Shortness of Breath  Qty: 360 mL, Refills: 1      hydrOXYzine (VISTARIL) 25 MG capsule Take 25 mg by 
Clothing